# Patient Record
Sex: FEMALE | Race: WHITE | NOT HISPANIC OR LATINO | Employment: OTHER | URBAN - METROPOLITAN AREA
[De-identification: names, ages, dates, MRNs, and addresses within clinical notes are randomized per-mention and may not be internally consistent; named-entity substitution may affect disease eponyms.]

---

## 2017-02-06 ENCOUNTER — OFFICE VISIT (OUTPATIENT)
Dept: FAMILY MEDICINE | Facility: CLINIC | Age: 72
End: 2017-02-06
Payer: MEDICARE

## 2017-02-06 VITALS
SYSTOLIC BLOOD PRESSURE: 124 MMHG | BODY MASS INDEX: 31.51 KG/M2 | DIASTOLIC BLOOD PRESSURE: 84 MMHG | HEIGHT: 59 IN | WEIGHT: 156.31 LBS | HEART RATE: 72 BPM

## 2017-02-06 DIAGNOSIS — K21.9 GASTROESOPHAGEAL REFLUX DISEASE WITHOUT ESOPHAGITIS: Chronic | ICD-10-CM

## 2017-02-06 DIAGNOSIS — M54.2 NECK PAIN: ICD-10-CM

## 2017-02-06 DIAGNOSIS — Z12.39 BREAST CANCER SCREENING: ICD-10-CM

## 2017-02-06 DIAGNOSIS — I77.9 CAROTID DISEASE, BILATERAL: ICD-10-CM

## 2017-02-06 DIAGNOSIS — M54.9 BACK PAIN, UNSPECIFIED BACK LOCATION, UNSPECIFIED BACK PAIN LATERALITY, UNSPECIFIED CHRONICITY: ICD-10-CM

## 2017-02-06 DIAGNOSIS — I25.10 CORONARY ARTERY DISEASE INVOLVING NATIVE CORONARY ARTERY OF NATIVE HEART WITHOUT ANGINA PECTORIS: ICD-10-CM

## 2017-02-06 DIAGNOSIS — M79.2 NEUROPATHIC PAIN OF FOOT, UNSPECIFIED LATERALITY: ICD-10-CM

## 2017-02-06 DIAGNOSIS — M85.80 OSTEOPENIA: ICD-10-CM

## 2017-02-06 DIAGNOSIS — E78.5 DYSLIPIDEMIA: Primary | ICD-10-CM

## 2017-02-06 DIAGNOSIS — Z87.891 FORMER SMOKER: ICD-10-CM

## 2017-02-06 DIAGNOSIS — I70.0 ATHEROSCLEROSIS OF AORTA: ICD-10-CM

## 2017-02-06 PROCEDURE — 99999 PR PBB SHADOW E&M-EST. PATIENT-LVL II: CPT | Mod: PBBFAC,,, | Performed by: FAMILY MEDICINE

## 2017-02-06 PROCEDURE — 3074F SYST BP LT 130 MM HG: CPT | Mod: S$GLB,,, | Performed by: FAMILY MEDICINE

## 2017-02-06 PROCEDURE — 99214 OFFICE O/P EST MOD 30 MIN: CPT | Mod: S$GLB,,, | Performed by: FAMILY MEDICINE

## 2017-02-06 PROCEDURE — 1157F ADVNC CARE PLAN IN RCRD: CPT | Mod: S$GLB,,, | Performed by: FAMILY MEDICINE

## 2017-02-06 PROCEDURE — 99499 UNLISTED E&M SERVICE: CPT | Mod: S$PBB,,, | Performed by: FAMILY MEDICINE

## 2017-02-06 PROCEDURE — 1159F MED LIST DOCD IN RCRD: CPT | Mod: S$GLB,,, | Performed by: FAMILY MEDICINE

## 2017-02-06 PROCEDURE — 3079F DIAST BP 80-89 MM HG: CPT | Mod: S$GLB,,, | Performed by: FAMILY MEDICINE

## 2017-02-06 PROCEDURE — 1160F RVW MEDS BY RX/DR IN RCRD: CPT | Mod: S$GLB,,, | Performed by: FAMILY MEDICINE

## 2017-02-06 NOTE — MR AVS SNAPSHOT
[address] [address]  Phone: [phone]  Fax: [phone]                  Megha Mendoza   2017 8:00 AM   Office Visit    Description:  Female : 1945   Provider:  Tomy Baron MD   Department:  [facility]           Diagnoses this Visit        Comments    Dyslipidemia    -  Primary     Former smoker         Osteopenia         Gastroesophageal reflux disease without esophagitis         Coronary artery disease involving native coronary artery of native heart without angina pectoris         Carotid disease, bilateral         Atherosclerosis of aorta         Breast cancer screening         Neuropathic pain of foot, unspecified laterality         Neck pain         Back pain, unspecified back location, unspecified back pain laterality, unspecified chronicity                To Do List           Future Appointments        Provider Department Dept Phone    2017 8:10 AM STAH MAMMO1 [facility] [phone]    2017 9:20 AM JAMIE ROGERS [facility] [phone]    2017 8:30 AM CHAIR 01 STAH [facility] [phone]    2017 8:30 AM Tomy Baron MD [facility] [phone]      Goals (5 Years of Data)     None      Follow-Up and Disposition     Return in about 6 months (around 2017).    Follow-up and Disposition History      [facility]     [facility] -  Assistance  Registered nurses in the [facility] provide clinical advisement, health education, appointment booking, and other advisory services.  Call for this free service at [phone].             Medications           Message regarding Medications     Verify the changes and/or additions to your medication regime listed below are the same as discussed with your clinician today.  If any of these changes or additions are incorrect, please notify your  healthcare provider.             Verify that the below list of medications is an accurate representation of the medications you are currently taking.  If none reported, the list may be blank. If incorrect, please contact your healthcare provider. Carry this list with you in case of emergency.           Current Medications     acetaminophen (TYLENOL) 500 MG tablet Take 1,000 mg by mouth every 6 (six) hours as needed for Pain.     albuterol (PROVENTIL) 2.5 mg /3 mL (0.083 %) nebulizer solution Take 2.5 mg by nebulization every 12 (twelve) hours as needed for Wheezing.    aspirin 81 MG Chew Take 81 mg by mouth once daily.    atorvastatin (LIPITOR) 20 MG tablet Take 20 mg by mouth nightly.    biotin 10,000 mcg Cap Take 1 tablet by mouth nightly. Dissolvable tablet    cetirizine (ZYRTEC) 10 MG tablet Take 10 mg by mouth once daily.    co-enzyme Q-10 30 mg capsule Take 30 mg by mouth once daily.     denosumab (PROLIA) 60 mg/mL Syrg Inject 1 mL (60 mg total) into the skin every 6 (six) months.    esomeprazole (NEXIUM) 20 MG capsule Take 2 capsules (40 mg total) by mouth nightly. Hold until finished with Flagyl    fluticasone (FLONASE) 50 mcg/actuation nasal spray 1 spray by Each Nare route nightly.    ipratropium (ATROVENT) 0.02 % nebulizer solution Take 500 mcg by nebulization every 12 (twelve) hours.    krill-omega-3-dha-epa-lipids 858-10-88-50 mg Cap Take 1 capsule by mouth nightly.     LACTOBACILLUS ACIDOPHILUS (PROBIOTIC ORAL) Take 1 capsule by mouth once daily.    multivitamin with minerals tablet Take 1 tablet by mouth once daily.    nitroGLYCERIN (NITROSTAT) 0.4 MG SL tablet Place 0.4 mg under the tongue every 5 (five) minutes as needed for Chest pain.    vitamin D 1000 units Tab Take 185 mg by mouth every evening. Vitamin D3    denosumab (PROLIA) 60 mg/mL Syrg Inject 1 mL (60 mg total) into the skin every 6 (six) months.           Clinical Reference Information           Your Vitals Were     BP Pulse Height Weight  "BMI    124/84 (BP Location: Left arm, Patient Position: Sitting, BP Method: Manual) 72 4' 11" (1.499 m) 70.9 kg (156 lb 4.9 oz) 31.57 kg/m2      Blood Pressure          Most Recent Value    BP  124/84      Allergies as of 2/6/2017     Keflex [Cephalexin]    Sulfa (Sulfonamide Antibiotics)      Immunizations Administered on Date of Encounter - 2/6/2017     None      Orders Placed During Today's Visit      Normal Orders This Visit    Ambulatory referral to Pain Clinic     Future Labs/Procedures Expected by Expires    Mammo Digital Screening Bilat with CAD  2/6/2017 4/9/2018    Vitamin B12  2/6/2017 4/7/2018    Comprehensive metabolic panel  2/7/2017 2/6/2018    Lipid panel  2/7/2017 2/6/2018    Vitamin D  2/7/2017 2/6/2018      [facility] Sign-Up     Activating your [facility] account is as easy as 1-2-3!     1) Visit [email or web address], select Sign Up Now, enter this activation code and your date of birth, then select Next.  [email or web address]  Expires: 2/13/2017  9:42 AM      2) Create a username and password to use when you visit [facility] in the future and select a security question in case you lose your password and select Next.    3) Enter your e-mail address and click Sign Up!    Additional Information  If you have questions, please e-mail [email or web address] or call [phone] to talk to our [facility] staff. Remember, [facility] is NOT to be used for urgent needs. For medical emergencies, dial 911.         Language Assistance Services     ATTENTION: Language assistance services are available, free of charge. Please call [phone].      ATENCIÓN: Si habla dana, tiene a charles disposición servicios gratuitos de asistencia lingüística. Llame al [phone].     CHÚ Ý: N?u b?n nói Ti?ng Vi?t, có các d?ch v? h? tr? ngôn ng? mi?n phí dành cho b?n. G?i s? [phone].         [facility] complies with applicable Federal civil rights laws and does not discriminate on the basis of race, color, " national origin, age, disability, or sex.

## 2017-02-06 NOTE — PROGRESS NOTES
Subjective:       Patient ID: Megha Mendoza is a 71 y.o. female.    Chief Complaint: No chief complaint on file.    Pt is a 71 y.o. female who presents for evaluation and management of   Encounter Diagnoses   Name Primary?    Dyslipidemia Yes    Former smoker     Osteopenia     Gastroesophageal reflux disease without esophagitis     Coronary artery disease involving native coronary artery of native heart without angina pectoris     Carotid disease, bilateral     Atherosclerosis of aorta     Breast cancer screening    .  Doing well on current meds. Denies any side effects. Prevention is up to date.  Review of Systems   Constitutional: Negative for chills and fever.   Respiratory: Negative for shortness of breath.    Cardiovascular: Negative for chest pain and palpitations.   Gastrointestinal: Negative for abdominal pain, blood in stool, constipation and nausea.   Genitourinary: Negative for difficulty urinating.   Neurological: Positive for dizziness.   Psychiatric/Behavioral: Negative for dysphoric mood, sleep disturbance and suicidal ideas. The patient is not nervous/anxious.        Objective:      Physical Exam   Constitutional: She is oriented to person, place, and time. She appears well-developed and well-nourished.   HENT:   Head: Normocephalic and atraumatic.   Right Ear: External ear normal.   Left Ear: External ear normal.   Nose: Nose normal.   Mouth/Throat: Oropharynx is clear and moist.   Eyes: EOM are normal. Pupils are equal, round, and reactive to light.   Neck: Normal range of motion. Neck supple. No JVD present. No tracheal deviation present. No thyromegaly present.   Cardiovascular: Normal rate, normal heart sounds and intact distal pulses.    No murmur heard.  Pulmonary/Chest: Effort normal and breath sounds normal. No respiratory distress. She has no wheezes. She has no rales. She exhibits no tenderness.   Abdominal: Soft. Bowel sounds are normal. She exhibits no distension and no mass.  There is no tenderness. There is no rebound and no guarding.   Musculoskeletal: Normal range of motion. She exhibits no edema or tenderness.   Lymphadenopathy:     She has no cervical adenopathy.   Neurological: She is alert and oriented to person, place, and time. She has normal reflexes. She displays normal reflexes. No cranial nerve deficit. She exhibits normal muscle tone. Coordination normal.   Tandem gait intact   Neg Rhomberg    Skin: Skin is warm and dry. No rash noted. No erythema. No pallor.   Psychiatric: She has a normal mood and affect. Her behavior is normal. Judgment and thought content normal.       Assessment:       1. Dyslipidemia    2. Former smoker    3. Osteopenia    4. Gastroesophageal reflux disease without esophagitis    5. Coronary artery disease involving native coronary artery of native heart without angina pectoris    6. Carotid disease, bilateral    7. Atherosclerosis of aorta    8. Breast cancer screening        Plan:   Diagnoses and all orders for this visit:    Dyslipidemia  -     Comprehensive metabolic panel; Future  -     Lipid panel; Future    Former smoker    Osteopenia  -     Vitamin D; Future    Gastroesophageal reflux disease without esophagitis    Coronary artery disease involving native coronary artery of native heart without angina pectoris    Carotid disease, bilateral    Atherosclerosis of aorta    Breast cancer screening  -     Mammo Digital Screening Bilat with CAD; Future    Neuropathic pain of foot, unspecified laterality  -     Vitamin B12; Future    continue same see labs and rx orders   RTC 6 months   No Follow-up on file.

## 2017-02-08 ENCOUNTER — HOSPITAL ENCOUNTER (OUTPATIENT)
Dept: RADIOLOGY | Facility: HOSPITAL | Age: 72
Discharge: HOME | End: 2017-02-08
Attending: FAMILY MEDICINE
Payer: MEDICARE

## 2017-02-08 DIAGNOSIS — Z12.31 ENCOUNTER FOR SCREENING MAMMOGRAM FOR BREAST CANCER: ICD-10-CM

## 2017-02-08 PROCEDURE — 77063 BREAST TOMOSYNTHESIS BI: CPT | Mod: 26,,, | Performed by: RADIOLOGY

## 2017-02-08 PROCEDURE — 77067 SCR MAMMO BI INCL CAD: CPT | Mod: 26,,, | Performed by: RADIOLOGY

## 2017-02-08 PROCEDURE — 77067 SCR MAMMO BI INCL CAD: CPT | Mod: TC

## 2017-02-13 ENCOUNTER — LAB VISIT (OUTPATIENT)
Dept: LAB | Facility: HOSPITAL | Age: 72
End: 2017-02-13
Attending: PAIN MEDICINE
Payer: MEDICARE

## 2017-02-13 DIAGNOSIS — M54.81 OCCIPITAL NEURALGIA: ICD-10-CM

## 2017-02-13 DIAGNOSIS — E78.00 HIGH CHOLESTEROL: Primary | ICD-10-CM

## 2017-02-13 DIAGNOSIS — Z79.899 ENCOUNTER FOR LONG-TERM CURRENT USE OF MEDICATION: ICD-10-CM

## 2017-02-13 DIAGNOSIS — M25.50 MULTIPLE JOINT PAIN: ICD-10-CM

## 2017-02-13 DIAGNOSIS — G62.9 NEUROPATHY: ICD-10-CM

## 2017-02-13 DIAGNOSIS — R53.83 FATIGUE: ICD-10-CM

## 2017-02-13 LAB
ALBUMIN SERPL BCP-MCNC: 3.9 G/DL
ALP SERPL-CCNC: 41 U/L
ALT SERPL W/O P-5'-P-CCNC: 17 U/L
ANION GAP SERPL CALC-SCNC: 8 MMOL/L
AST SERPL-CCNC: 21 U/L
BASOPHILS # BLD AUTO: 0.02 K/UL
BASOPHILS NFR BLD: 0.4 %
BILIRUB SERPL-MCNC: 0.3 MG/DL
BUN SERPL-MCNC: 18 MG/DL
CALCIUM SERPL-MCNC: 9.4 MG/DL
CHLORIDE SERPL-SCNC: 107 MMOL/L
CK SERPL-CCNC: 75 U/L
CO2 SERPL-SCNC: 27 MMOL/L
CREAT SERPL-MCNC: 1 MG/DL
DIFFERENTIAL METHOD: NORMAL
EOSINOPHIL # BLD AUTO: 0.2 K/UL
EOSINOPHIL NFR BLD: 4.2 %
ERYTHROCYTE [DISTWIDTH] IN BLOOD BY AUTOMATED COUNT: 12.8 %
ERYTHROCYTE [SEDIMENTATION RATE] IN BLOOD BY WESTERGREN METHOD: 13 MM/HR
EST. GFR  (AFRICAN AMERICAN): >60 ML/MIN/1.73 M^2
EST. GFR  (NON AFRICAN AMERICAN): 57 ML/MIN/1.73 M^2
GLUCOSE SERPL-MCNC: 129 MG/DL
HCT VFR BLD AUTO: 38.8 %
HGB BLD-MCNC: 12.8 G/DL
LYMPHOCYTES # BLD AUTO: 2.4 K/UL
LYMPHOCYTES NFR BLD: 45.3 %
MCH RBC QN AUTO: 30.5 PG
MCHC RBC AUTO-ENTMCNC: 33 %
MCV RBC AUTO: 92 FL
MONOCYTES # BLD AUTO: 0.4 K/UL
MONOCYTES NFR BLD: 6.6 %
NEUTROPHILS # BLD AUTO: 2.3 K/UL
NEUTROPHILS NFR BLD: 43.5 %
PLATELET # BLD AUTO: 232 K/UL
PMV BLD AUTO: 10.3 FL
POTASSIUM SERPL-SCNC: 3.8 MMOL/L
PROT SERPL-MCNC: 7.5 G/DL
RBC # BLD AUTO: 4.2 M/UL
SODIUM SERPL-SCNC: 142 MMOL/L
T4 FREE SERPL-MCNC: 0.98 NG/DL
TSH SERPL DL<=0.005 MIU/L-ACNC: 1.11 UIU/ML
URATE SERPL-MCNC: 5.4 MG/DL
WBC # BLD AUTO: 5.28 K/UL

## 2017-02-13 PROCEDURE — 86235 NUCLEAR ANTIGEN ANTIBODY: CPT | Mod: 59

## 2017-02-13 PROCEDURE — 84550 ASSAY OF BLOOD/URIC ACID: CPT

## 2017-02-13 PROCEDURE — 85025 COMPLETE CBC W/AUTO DIFF WBC: CPT

## 2017-02-13 PROCEDURE — 86140 C-REACTIVE PROTEIN: CPT

## 2017-02-13 PROCEDURE — 36415 COLL VENOUS BLD VENIPUNCTURE: CPT

## 2017-02-13 PROCEDURE — 84443 ASSAY THYROID STIM HORMONE: CPT

## 2017-02-13 PROCEDURE — 84165 PROTEIN E-PHORESIS SERUM: CPT

## 2017-02-13 PROCEDURE — 82550 ASSAY OF CK (CPK): CPT

## 2017-02-13 PROCEDURE — 82652 VIT D 1 25-DIHYDROXY: CPT

## 2017-02-13 PROCEDURE — 84439 ASSAY OF FREE THYROXINE: CPT

## 2017-02-13 PROCEDURE — 84207 ASSAY OF VITAMIN B-6: CPT

## 2017-02-13 PROCEDURE — 85651 RBC SED RATE NONAUTOMATED: CPT

## 2017-02-13 PROCEDURE — 80053 COMPREHEN METABOLIC PANEL: CPT

## 2017-02-13 PROCEDURE — 83036 HEMOGLOBIN GLYCOSYLATED A1C: CPT

## 2017-02-13 PROCEDURE — 86038 ANTINUCLEAR ANTIBODIES: CPT

## 2017-02-13 PROCEDURE — 84165 PROTEIN E-PHORESIS SERUM: CPT | Mod: 26,,, | Performed by: PATHOLOGY

## 2017-02-13 PROCEDURE — 81374 HLA I TYPING 1 ANTIGEN LR: CPT

## 2017-02-13 PROCEDURE — 82607 VITAMIN B-12: CPT

## 2017-02-13 PROCEDURE — 86039 ANTINUCLEAR ANTIBODIES (ANA): CPT

## 2017-02-13 PROCEDURE — 86431 RHEUMATOID FACTOR QUANT: CPT

## 2017-02-14 LAB
CRP SERPL-MCNC: 1.1 MG/L
RHEUMATOID FACT SERPL-ACNC: <10 IU/ML
VIT B12 SERPL-MCNC: 589 PG/ML

## 2017-02-15 LAB
ALBUMIN SERPL ELPH-MCNC: 4.03 G/DL
ALPHA1 GLOB SERPL ELPH-MCNC: 0.31 G/DL
ALPHA2 GLOB SERPL ELPH-MCNC: 1.06 G/DL
ANA SER QL IF: POSITIVE
ANA TITR SER IF: NORMAL {TITER}
B-GLOBULIN SERPL ELPH-MCNC: 0.79 G/DL
ESTIMATED AVG GLUCOSE: 120 MG/DL
GAMMA GLOB SERPL ELPH-MCNC: 0.92 G/DL
HBA1C MFR BLD HPLC: 5.8 %
PROT SERPL-MCNC: 7.1 G/DL

## 2017-02-16 LAB
1,25(OH)2D3 SERPL-MCNC: 79 PG/ML
ANTI SM ANTIBODY: 0.11 EU
ANTI SM/RNP ANTIBODY: 0.51 EU
ANTI-SM INTERPRETATION: NEGATIVE
ANTI-SM/RNP INTERPRETATION: NEGATIVE
ANTI-SSA ANTIBODY: 0.37 EU
ANTI-SSA INTERPRETATION: NEGATIVE
ANTI-SSB ANTIBODY: 0 EU
ANTI-SSB INTERPRETATION: NEGATIVE
DSDNA AB SER-ACNC: NORMAL [IU]/ML
PATHOLOGIST INTERPRETATION SPE: NORMAL

## 2017-02-17 LAB — PYRIDOXAL SERPL-MCNC: 23 UG/L (ref 5–50)

## 2017-02-20 ENCOUNTER — OFFICE VISIT (OUTPATIENT)
Dept: INTERNAL MEDICINE | Facility: CLINIC | Age: 72
End: 2017-02-20
Payer: MEDICARE

## 2017-02-20 VITALS
SYSTOLIC BLOOD PRESSURE: 126 MMHG | HEIGHT: 59 IN | DIASTOLIC BLOOD PRESSURE: 70 MMHG | WEIGHT: 155.44 LBS | OXYGEN SATURATION: 96 % | HEART RATE: 68 BPM | TEMPERATURE: 100 F | BODY MASS INDEX: 31.34 KG/M2 | RESPIRATION RATE: 20 BRPM

## 2017-02-20 DIAGNOSIS — J06.9 VIRAL URI WITH COUGH: ICD-10-CM

## 2017-02-20 DIAGNOSIS — R50.9 FEVER, UNSPECIFIED FEVER CAUSE: Primary | ICD-10-CM

## 2017-02-20 LAB
CTP QC/QA: YES
FLUAV AG NPH QL: NEGATIVE
FLUBV AG NPH QL: NEGATIVE

## 2017-02-20 PROCEDURE — 3078F DIAST BP <80 MM HG: CPT | Mod: S$GLB,,, | Performed by: INTERNAL MEDICINE

## 2017-02-20 PROCEDURE — 87804 INFLUENZA ASSAY W/OPTIC: CPT | Mod: QW,S$GLB,, | Performed by: INTERNAL MEDICINE

## 2017-02-20 PROCEDURE — 96372 THER/PROPH/DIAG INJ SC/IM: CPT | Mod: S$GLB,,, | Performed by: INTERNAL MEDICINE

## 2017-02-20 PROCEDURE — 1159F MED LIST DOCD IN RCRD: CPT | Mod: S$GLB,,, | Performed by: INTERNAL MEDICINE

## 2017-02-20 PROCEDURE — 1157F ADVNC CARE PLAN IN RCRD: CPT | Mod: S$GLB,,, | Performed by: INTERNAL MEDICINE

## 2017-02-20 PROCEDURE — 3074F SYST BP LT 130 MM HG: CPT | Mod: S$GLB,,, | Performed by: INTERNAL MEDICINE

## 2017-02-20 PROCEDURE — 99213 OFFICE O/P EST LOW 20 MIN: CPT | Mod: 25,S$GLB,, | Performed by: INTERNAL MEDICINE

## 2017-02-20 PROCEDURE — 99999 PR PBB SHADOW E&M-EST. PATIENT-LVL III: CPT | Mod: PBBFAC,,, | Performed by: INTERNAL MEDICINE

## 2017-02-20 PROCEDURE — 1126F AMNT PAIN NOTED NONE PRSNT: CPT | Mod: S$GLB,,, | Performed by: INTERNAL MEDICINE

## 2017-02-20 RX ORDER — PROMETHAZINE HYDROCHLORIDE AND CODEINE PHOSPHATE 6.25; 1 MG/5ML; MG/5ML
5 SOLUTION ORAL EVERY 6 HOURS PRN
Qty: 120 ML | Refills: 0 | Status: SHIPPED | OUTPATIENT
Start: 2017-02-20 | End: 2017-03-02

## 2017-02-20 RX ORDER — METHYLPREDNISOLONE ACETATE 40 MG/ML
40 INJECTION, SUSPENSION INTRA-ARTICULAR; INTRALESIONAL; INTRAMUSCULAR; SOFT TISSUE
Status: COMPLETED | OUTPATIENT
Start: 2017-02-20 | End: 2017-02-20

## 2017-02-20 RX ORDER — PROMETHAZINE HYDROCHLORIDE AND CODEINE PHOSPHATE 6.25; 1 MG/5ML; MG/5ML
5 SOLUTION ORAL EVERY 6 HOURS PRN
Qty: 120 ML | Refills: 0 | Status: SHIPPED | OUTPATIENT
Start: 2017-02-20 | End: 2017-02-20 | Stop reason: SDUPTHER

## 2017-02-20 RX ORDER — FLUTICASONE PROPIONATE 50 MCG
1 SPRAY, SUSPENSION (ML) NASAL NIGHTLY
Qty: 16 G | Refills: 1 | Status: SHIPPED | OUTPATIENT
Start: 2017-02-20 | End: 2018-02-01

## 2017-02-20 RX ADMIN — METHYLPREDNISOLONE ACETATE 40 MG: 40 INJECTION, SUSPENSION INTRA-ARTICULAR; INTRALESIONAL; INTRAMUSCULAR; SOFT TISSUE at 02:02

## 2017-02-20 NOTE — PATIENT INSTRUCTIONS
Viral Upper Respiratory Illness (Adult)  You have a viral upper respiratory illness (URI), which is another term for the common cold. This illness is contagious during the first few days. It is spread through the air by coughing and sneezing. It may also be spread by direct contact (touching the sick person and then touching your own eyes, nose, or mouth). Frequent handwashing will decrease risk of spread. Most viral illnesses go away within 7 to 10 days with rest and simple home remedies. Sometimes the illness may last for several weeks. Antibiotics will not kill a virus, and they are generally not prescribed for this condition.    Home care  · If symptoms are severe, rest at home for the first 2 to 3 days. When you resume activity, don't let yourself get too tired.  · Avoid being exposed to cigarette smoke (yours or others).  · You may use acetaminophen or ibuprofen to control pain and fever, unless another medicine was prescribed. (Note: If you have chronic liver or kidney disease, have ever had a stomach ulcer or gastrointestinal bleeding, or are taking blood-thinning medicines, talk with your healthcare provider before using these medicines.) Aspirin should never be given to anyone under 18 years of age who is ill with a viral infection or fever. It may cause severe liver or brain damage.  · Your appetite may be poor, so a light diet is fine. Avoid dehydration by drinking 6 to 8 glasses of fluids per day (water, soft drinks, juices, tea, or soup). Extra fluids will help loosen secretions in the nose and lungs.  · Over-the-counter cold medicines will not shorten the length of time youre sick, but they may be helpful for the following symptoms: cough, sore throat, and nasal and sinus congestion. (Note: Do not use decongestants if you have high blood pressure.)  Follow-up care  Follow up with your healthcare provider, or as advised.  When to seek medical advice  Call your healthcare provider right away if any  of these occur:  · Cough with lots of colored sputum (mucus)  · Severe headache; face, neck, or ear pain  · Difficulty swallowing due to throat pain  · Fever of 100.4°F (38°C)  Call 911, or get immediate medical care  Call emergency services right away if any of these occur:  · Chest pain, shortness of breath, wheezing, or difficulty breathing  · Coughing up blood  · Inability to swallow due to throat pain  Date Last Reviewed: 9/13/2015  © 9068-1747 [employer]. [address] [place] [address]. All rights reserved. This information is not intended as a substitute for professional medical care. Always follow your healthcare professional's instructions.

## 2017-02-20 NOTE — MR AVS SNAPSHOT
[facility]  [address] [facility] [address]  Phone: [phone]  Fax: [phone]                  Megha Mendoza   2017 3:30 PM   Office Visit    Description:  Female : 1945   Provider:  Brandy Marie MD   Department:  [facility]           Reason for Visit     Cough           Diagnoses this Visit        Comments    Fever, unspecified fever cause    -  Primary     Viral URI with cough                To Do List           Future Appointments        Provider Department Dept Phone    2017 3:30 PM Brandy Marie MD [facility] [phone]    2017 8:30 AM CHAIR 01 STAH [facility] [phone]    2017 8:30 AM Tomy Baron MD [facility] [phone]      Goals (5 Years of Data)     None      Follow-Up and Disposition     Return if symptoms worsen or fail to improve.       These Medications        Disp Refills Start End    fluticasone (FLONASE) 50 mcg/actuation nasal spray 16 g 1 2017     1 spray by Each Nare route nightly. - Each Nare    [id number] [place], [place] - [address] Ph #: [phone]       promethazine-codeine 6.25-10 mg/5 ml (PHENERGAN WITH CODEINE) 6.25-10 mg/5 mL syrup 120 mL 0 2017 3/2/2017    Take 5 mLs by mouth every 6 (six) hours as needed for Cough. - Oral    Pharmacy: [id number] - [place], [place] - [address] Ph #: [phone]         [facility]     [facility] -  Assistance  Registered nurses in the [facility] provide clinical advisement, health education, appointment booking, and other advisory services.  Call for this free service at [phone].             Medications           Message regarding Medications     Verify the changes and/or additions to your medication regime listed below are the same as discussed with your clinician today.   If any of these changes or additions are incorrect, please notify your healthcare provider.        START taking these NEW medications        Refills    promethazine-codeine 6.25-10 mg/5 ml (PHENERGAN WITH CODEINE) 6.25-10 mg/5 mL syrup 0    Sig: Take 5 mLs by mouth every 6 (six) hours as needed for Cough.    Class: Print    Route: Oral      These medications were administered today        Dose Freq    methylPREDNISolone acetate injection 40 mg 40 mg Clinic/HOD 1 time    Sig: Inject 1 mL (40 mg total) into the muscle one time.    Class: Normal    Route: Intramuscular           Verify that the below list of medications is an accurate representation of the medications you are currently taking.  If none reported, the list may be blank. If incorrect, please contact your healthcare provider. Carry this list with you in case of emergency.           Current Medications     acetaminophen (TYLENOL) 500 MG tablet Take 1,000 mg by mouth every 6 (six) hours as needed for Pain.     albuterol (PROVENTIL) 2.5 mg /3 mL (0.083 %) nebulizer solution Take 2.5 mg by nebulization every 12 (twelve) hours as needed for Wheezing.    aspirin 81 MG Chew Take 81 mg by mouth once daily.    atorvastatin (LIPITOR) 20 MG tablet Take 20 mg by mouth nightly.    biotin 10,000 mcg Cap Take 1 tablet by mouth nightly. Dissolvable tablet    cetirizine (ZYRTEC) 10 MG tablet Take 10 mg by mouth once daily.    co-enzyme Q-10 30 mg capsule Take 30 mg by mouth once daily.     denosumab (PROLIA) 60 mg/mL Syrg Inject 1 mL (60 mg total) into the skin every 6 (six) months.    denosumab (PROLIA) 60 mg/mL Syrg Inject 1 mL (60 mg total) into the skin every 6 (six) months.    esomeprazole (NEXIUM) 20 MG capsule Take 2 capsules (40 mg total) by mouth nightly. Hold until finished with Flagyl    fluticasone (FLONASE) 50 mcg/actuation nasal spray 1 spray by Each Nare route nightly.    ipratropium (ATROVENT) 0.02 % nebulizer solution Take 500 mcg by nebulization every 12  "(twelve) hours.    krill-omega-3-dha-epa-lipids 823-04-88-50 mg Cap Take 1 capsule by mouth nightly.     LACTOBACILLUS ACIDOPHILUS (PROBIOTIC ORAL) Take 1 capsule by mouth once daily.    multivitamin with minerals tablet Take 1 tablet by mouth once daily.    nitroGLYCERIN (NITROSTAT) 0.4 MG SL tablet Place 0.4 mg under the tongue every 5 (five) minutes as needed for Chest pain.    promethazine-codeine 6.25-10 mg/5 ml (PHENERGAN WITH CODEINE) 6.25-10 mg/5 mL syrup Take 5 mLs by mouth every 6 (six) hours as needed for Cough.    vitamin D 1000 units Tab Take 185 mg by mouth every evening. Vitamin D3           Clinical Reference Information           Your Vitals Were     BP Pulse Temp Resp Height Weight    126/70 68 99.8 °F (37.7 °C) (Tympanic) 20 4' 11" (1.499 m) 70.5 kg (155 lb 6.8 oz)    SpO2 BMI             96% 31.39 kg/m2         Blood Pressure          Most Recent Value    BP  126/70      Allergies as of 2/20/2017     Keflex [Cephalexin]    Sulfa (Sulfonamide Antibiotics)      Immunizations Administered on Date of Encounter - 2/20/2017     None      Orders Placed During Today's Visit      Normal Orders This Visit    POCT Influenza A/B       [facility] Sign-Up     Activating your [facility] account is as easy as 1-2-3!     1) Visit [email or web address], select Sign Up Now, enter this activation code and your date of birth, then select Next.  [email or web address]  Expires: 4/6/2017  2:49 PM      2) Create a username and password to use when you visit [facility] in the future and select a security question in case you lose your password and select Next.    3) Enter your e-mail address and click Sign Up!    Additional Information  If you have questions, please e-mail [email or web address] or call [phone] to talk to our [facility] staff. Remember, [facility] is NOT to be used for urgent needs. For medical emergencies, dial 911.         Instructions      Viral Upper Respiratory Illness (Adult)  You have a viral upper respiratory " illness (URI), which is another term for the common cold. This illness is contagious during the first few days. It is spread through the air by coughing and sneezing. It may also be spread by direct contact (touching the sick person and then touching your own eyes, nose, or mouth). Frequent handwashing will decrease risk of spread. Most viral illnesses go away within 7 to 10 days with rest and simple home remedies. Sometimes the illness may last for several weeks. Antibiotics will not kill a virus, and they are generally not prescribed for this condition.    Home care  · If symptoms are severe, rest at home for the first 2 to 3 days. When you resume activity, don't let yourself get too tired.  · Avoid being exposed to cigarette smoke (yours or others).  · You may use acetaminophen or ibuprofen to control pain and fever, unless another medicine was prescribed. (Note: If you have chronic liver or kidney disease, have ever had a stomach ulcer or gastrointestinal bleeding, or are taking blood-thinning medicines, talk with your healthcare provider before using these medicines.) Aspirin should never be given to anyone under 18 years of age who is ill with a viral infection or fever. It may cause severe liver or brain damage.  · Your appetite may be poor, so a light diet is fine. Avoid dehydration by drinking 6 to 8 glasses of fluids per day (water, soft drinks, juices, tea, or soup). Extra fluids will help loosen secretions in the nose and lungs.  · Over-the-counter cold medicines will not shorten the length of time youre sick, but they may be helpful for the following symptoms: cough, sore throat, and nasal and sinus congestion. (Note: Do not use decongestants if you have high blood pressure.)  Follow-up care  Follow up with your healthcare provider, or as advised.  When to seek medical advice  Call your healthcare provider right away if any of these occur:  · Cough with lots of colored sputum (mucus)  · Severe  headache; face, neck, or ear pain  · Difficulty swallowing due to throat pain  · Fever of 100.4°F (38°C)  Call 911, or get immediate medical care  Call emergency services right away if any of these occur:  · Chest pain, shortness of breath, wheezing, or difficulty breathing  · Coughing up blood  · Inability to swallow due to throat pain  Date Last Reviewed: 9/13/2015  © 8698-7791 [employer]. [address] [place] [address], [place] [address]. All rights reserved. This information is not intended as a substitute for professional medical care. Always follow your healthcare professional's instructions.             Language Assistance Services     ATTENTION: Language assistance services are available, free of charge. Please call [phone].      ATENCIÓN: Si lupillola dana, tiene a charles disposición servicios gratuitos de asistencia lingüística. Llame al [phone].     CHÚ Ý: N?u b?n nói Ti?ng Vi?t, có các d?ch v? h? tr? ngôn ng? mi?n phí dành cho b?n. G?i s? [phone].         [facility] complies with applicable Federal civil rights laws and does not discriminate on the basis of race, color, national origin, age, disability, or sex.

## 2017-02-20 NOTE — PROGRESS NOTES
Subjective:       Patient ID: Megha Mendoza is a 71 y.o. female.    Chief Complaint: Cough      HPI:  Patient is new alejandra e but known to clinic and presents with cough. Sx started 1 week ago with sore throat. Cough started about 4 days ago and getting worse. Tried nyquil, robitussin, tylenol without relief. Cough is productive of yellow sputum. + nasal congestion but no chest tightness or wheezing. + PND, worse int he AM. Subjective fevers and chills, 99 F today in clinic.     Past Medical History   Diagnosis Date    Arthritis     Back pain     Bronchitis, chronic     Carotid artery disease      50% blockage bilateral    COPD (chronic obstructive pulmonary disease)     Coronary artery disease     Hyperlipidemia     Trouble in sleeping        Family History   Problem Relation Age of Onset    Diabetes Mother     Hypertension Mother     Arthritis Mother     Stroke Mother     Stroke Father     Cancer Sister      lung    COPD Sister        Social History     Social History    Marital status:      Spouse name: N/A    Number of children: N/A    Years of education: N/A     Occupational History    Not on file.     Social History Main Topics    Smoking status: Former Smoker     Packs/day: 0.75     Start date: 10/14/1959     Quit date: 10/14/2005    Smokeless tobacco: Never Used      Comment: Stopped and started several times    Alcohol use No    Drug use: No    Sexual activity: No      Comment:      Other Topics Concern    Not on file     Social History Narrative       Review of Systems   Constitutional: Positive for fatigue. Negative for activity change, fever and unexpected weight change.   HENT: Positive for congestion, postnasal drip and sore throat. Negative for ear pain, hearing loss, rhinorrhea and tinnitus.    Eyes: Negative for pain, redness and visual disturbance.   Respiratory: Positive for cough. Negative for shortness of breath and wheezing.    Cardiovascular: Negative for  chest pain, palpitations and leg swelling.   Gastrointestinal: Negative for abdominal pain, blood in stool, constipation, diarrhea, nausea and vomiting.   Genitourinary: Negative for dysuria, frequency and urgency.   Musculoskeletal: Negative for back pain, joint swelling and neck pain.   Skin: Negative for color change, rash and wound.   Neurological: Positive for headaches. Negative for dizziness, weakness and light-headedness.         Objective:      Physical Exam   Constitutional: She is oriented to person, place, and time. She appears well-developed and well-nourished. No distress.   HENT:   Head: Normocephalic and atraumatic.   Right Ear: External ear normal.   Left Ear: External ear normal.   Dull TM b/l  Posterior oropharyngel erythema without exudate   Eyes: Conjunctivae and EOM are normal. Pupils are equal, round, and reactive to light. Right eye exhibits no discharge. Left eye exhibits no discharge.   Neck: Neck supple. No tracheal deviation present.   Cardiovascular: Normal rate and regular rhythm.  Exam reveals no gallop and no friction rub.    No murmur heard.  Pulmonary/Chest: Effort normal and breath sounds normal. No respiratory distress. She has no wheezes. She has no rales.   Abdominal: Soft. Bowel sounds are normal. She exhibits no distension. There is no tenderness.   Musculoskeletal: She exhibits no edema.   Neurological: She is alert and oriented to person, place, and time. No cranial nerve deficit.   Skin: Skin is warm and dry.   Psychiatric: She has a normal mood and affect. Her behavior is normal.   Vitals reviewed.      Assessment:       1. Fever, unspecified fever cause    2. Viral URI with cough        Plan:       Megha was seen today for cough.    Diagnoses and all orders for this visit:    Fever, unspecified fever cause  -     POCT Influenza A/B--negative    Viral URI with cough  -     methylPREDNISolone acetate injection 40 mg; Inject 1 mL (40 mg total) into the muscle one time.  -      fluticasone (FLONASE) 50 mcg/actuation nasal spray; 1 spray by Each Nare route nightly.  -     promethazine-codeine 6.25-10 mg/5 ml (PHENERGAN WITH CODEINE) 6.25-10 mg/5 mL syrup; Take 5 mLs by mouth every 6 (six) hours as needed for Cough.    Stay on zyrtec daily  Start flonase daily  Saline nasal spray PRN  IM steroids  Call for worsening sx or fever > 101 F

## 2017-02-24 LAB
HLA-B27 RELATED AG QL: NEGATIVE
HLA-B27 RELATED AG QL: NORMAL

## 2017-03-13 ENCOUNTER — LAB VISIT (OUTPATIENT)
Dept: LAB | Facility: HOSPITAL | Age: 72
End: 2017-03-13
Attending: PAIN MEDICINE
Payer: MEDICARE

## 2017-03-13 DIAGNOSIS — R79.9 ABNORMAL BLOOD CHEMISTRY: ICD-10-CM

## 2017-03-13 DIAGNOSIS — E55.9 VITAMIN D DEFICIENCY: Primary | ICD-10-CM

## 2017-03-13 PROCEDURE — 86038 ANTINUCLEAR ANTIBODIES: CPT

## 2017-03-13 PROCEDURE — 82306 VITAMIN D 25 HYDROXY: CPT

## 2017-03-13 PROCEDURE — 86039 ANTINUCLEAR ANTIBODIES (ANA): CPT

## 2017-03-13 PROCEDURE — 36415 COLL VENOUS BLD VENIPUNCTURE: CPT

## 2017-03-13 PROCEDURE — 86235 NUCLEAR ANTIGEN ANTIBODY: CPT | Mod: 59

## 2017-03-14 LAB — 25(OH)D3+25(OH)D2 SERPL-MCNC: 47 NG/ML

## 2017-03-15 LAB
ANA SER QL IF: POSITIVE
ANA TITR SER IF: NORMAL {TITER}

## 2017-03-16 LAB
ANTI SM ANTIBODY: 0.49 EU
ANTI SM/RNP ANTIBODY: 0.66 EU
ANTI-SM INTERPRETATION: NEGATIVE
ANTI-SM/RNP INTERPRETATION: NEGATIVE
ANTI-SSA ANTIBODY: 1.19 EU
ANTI-SSA INTERPRETATION: NEGATIVE
ANTI-SSB ANTIBODY: 0.1 EU
ANTI-SSB INTERPRETATION: NEGATIVE
DSDNA AB SER-ACNC: NORMAL [IU]/ML

## 2017-03-17 NOTE — TELEPHONE ENCOUNTER
----- Message from Summer Sea sent at 3/17/2017  9:39 AM CDT -----  Contact: self  Megha Mendoza  MRN: [id number]  : 1945  PCP: Tomy Baron  Home Phone      [phone]  Work Phone      Not on file.  Mobile          [phone]      MESSAGE: needs refill on atorvastatin    [facility]    Phone: [phone]

## 2017-03-19 RX ORDER — ATORVASTATIN CALCIUM 20 MG/1
20 TABLET, FILM COATED ORAL NIGHTLY
Qty: 30 TABLET | Refills: 5 | Status: SHIPPED | OUTPATIENT
Start: 2017-03-19 | End: 2017-07-31 | Stop reason: SDUPTHER

## 2017-04-11 ENCOUNTER — LAB VISIT (OUTPATIENT)
Dept: LAB | Facility: HOSPITAL | Age: 72
End: 2017-04-11
Attending: PAIN MEDICINE
Payer: MEDICARE

## 2017-04-11 DIAGNOSIS — E55.9 UNSPECIFIED VITAMIN D DEFICIENCY: Primary | ICD-10-CM

## 2017-04-11 DIAGNOSIS — R79.9 ABNORMAL BLOOD FINDINGS: ICD-10-CM

## 2017-04-11 LAB — 25(OH)D3+25(OH)D2 SERPL-MCNC: 36 NG/ML

## 2017-04-11 PROCEDURE — 82306 VITAMIN D 25 HYDROXY: CPT

## 2017-04-11 PROCEDURE — 86235 NUCLEAR ANTIGEN ANTIBODY: CPT | Mod: 59

## 2017-04-11 PROCEDURE — 82652 VIT D 1 25-DIHYDROXY: CPT

## 2017-04-11 PROCEDURE — 36415 COLL VENOUS BLD VENIPUNCTURE: CPT

## 2017-04-11 PROCEDURE — 86038 ANTINUCLEAR ANTIBODIES: CPT

## 2017-04-11 PROCEDURE — 86039 ANTINUCLEAR ANTIBODIES (ANA): CPT

## 2017-04-12 LAB
ANA SER QL IF: POSITIVE
ANA TITR SER IF: NORMAL {TITER}

## 2017-04-13 LAB
1,25(OH)2D3 SERPL-MCNC: 84 PG/ML
ANTI SM ANTIBODY: 0 EU
ANTI SM/RNP ANTIBODY: 0 EU
ANTI-SM INTERPRETATION: NEGATIVE
ANTI-SM/RNP INTERPRETATION: NEGATIVE
ANTI-SSA ANTIBODY: 0.92 EU
ANTI-SSA INTERPRETATION: NEGATIVE
ANTI-SSB ANTIBODY: 0.68 EU
ANTI-SSB INTERPRETATION: NEGATIVE
DSDNA AB SER-ACNC: NORMAL [IU]/ML

## 2017-04-18 ENCOUNTER — TELEPHONE (OUTPATIENT)
Dept: FAMILY MEDICINE | Facility: CLINIC | Age: 72
End: 2017-04-18

## 2017-04-18 NOTE — TELEPHONE ENCOUNTER
----- Message from Mariya Newman sent at 2017 10:52 AM CDT -----  Contact: SELF  Megha Mendoza  MRN: [id number]  : 1945  PCP: Tomy Baron  Home Phone      [phone]  Work Phone      Not on file.  Mobile          [phone]      MESSAGE: NEEDS REFILL ON GENERIC LIPITOR    PHONE: [phone]    [facility]

## 2017-04-27 ENCOUNTER — HOSPITAL ENCOUNTER (OUTPATIENT)
Dept: RADIOLOGY | Facility: HOSPITAL | Age: 72
Discharge: HOME | End: 2017-04-27
Attending: PAIN MEDICINE
Payer: MEDICARE

## 2017-04-27 DIAGNOSIS — R10.32 ABDOMINAL PAIN, LEFT LOWER QUADRANT: ICD-10-CM

## 2017-04-27 DIAGNOSIS — R10.12 ABDOMINAL PAIN, LEFT UPPER QUADRANT: ICD-10-CM

## 2017-04-27 PROCEDURE — 74176 CT ABD & PELVIS W/O CONTRAST: CPT | Mod: 26,,, | Performed by: RADIOLOGY

## 2017-04-27 PROCEDURE — 74176 CT ABD & PELVIS W/O CONTRAST: CPT | Mod: TC

## 2017-04-27 PROCEDURE — 25500020 PHARM REV CODE 255: Performed by: PAIN MEDICINE

## 2017-04-27 RX ADMIN — IOHEXOL 30 ML: 350 INJECTION, SOLUTION INTRAVENOUS at 09:04

## 2017-06-30 ENCOUNTER — INFUSION (OUTPATIENT)
Dept: INFUSION THERAPY | Facility: HOSPITAL | Age: 72
End: 2017-06-30
Attending: FAMILY MEDICINE
Payer: MEDICARE

## 2017-06-30 VITALS
RESPIRATION RATE: 20 BRPM | HEART RATE: 73 BPM | TEMPERATURE: 96 F | DIASTOLIC BLOOD PRESSURE: 84 MMHG | SYSTOLIC BLOOD PRESSURE: 124 MMHG

## 2017-06-30 DIAGNOSIS — M85.80 OSTEOPENIA, UNSPECIFIED LOCATION: Primary | ICD-10-CM

## 2017-06-30 PROCEDURE — 63600175 PHARM REV CODE 636 W HCPCS: Performed by: FAMILY MEDICINE

## 2017-06-30 PROCEDURE — 96401 CHEMO ANTI-NEOPL SQ/IM: CPT

## 2017-06-30 RX ADMIN — DENOSUMAB 60 MG: 60 INJECTION SUBCUTANEOUS at 08:06

## 2017-06-30 NOTE — PATIENT INSTRUCTIONS
Denosumab injection  What is this medicine?  DENOSUMAB (den oh rosangela mab) slows bone breakdown. Prolia is used to treat osteoporosis in women after menopause and in men. Xgeva is used to prevent bone fractures and other bone problems caused by cancer bone metastases. Xgeva is also used to treat giant cell tumor of the bone.  How should I use this medicine?  This medicine is for injection under the skin. It is given by a health care professional in a hospital or clinic setting.  If you are getting Prolia, a special MedGuide will be given to you by the pharmacist with each prescription and refill. Be sure to read this information carefully each time.  For Prolia, talk to your pediatrician regarding the use of this medicine in children. Special care may be needed. For Xgeva, talk to your pediatrician regarding the use of this medicine in children. While this drug may be prescribed for children as young as 13 years for selected conditions, precautions do apply.  What side effects may I notice from receiving this medicine?  Side effects that you should report to your doctor or health care professional as soon as possible:  · allergic reactions like skin rash, itching or hives, swelling of the face, lips, or tongue  · breathing problems  · chest pain  · fast, irregular heartbeat  · feeling faint or lightheaded, falls  · fever, chills, or any other sign of infection  · muscle spasms, tightening, or twitches  · numbness or tingling  · skin blisters or bumps, or is dry, peels, or red  · slow healing or unexplained pain in the mouth or jaw  · unusual bleeding or bruising  Side effects that usually do not require medical attention (Report these to your doctor or health care professional if they continue or are bothersome.):  · muscle pain  · stomach upset, gas  What may interact with this medicine?  Do not take this medicine with any of the following medications:  · other medicines containing denosumab  This medicine may also  interact with the following medications:  · medicines that suppress the immune system  · medicines that treat cancer  · steroid medicines like prednisone or cortisone  What if I miss a dose?  It is important not to miss your dose. Call your doctor or health care professional if you are unable to keep an appointment.  Where should I keep my medicine?  This medicine is only given in a clinic, doctor's office, or other health care setting and will not be stored at home.  What should I tell my health care provider before I take this medicine?  They need to know if you have any of these conditions:  · dental disease  · eczema  · infection or history of infections  · kidney disease or on dialysis  · low blood calcium or vitamin D  · malabsorption syndrome  · scheduled to have surgery or tooth extraction  · taking medicine that contains denosumab  · thyroid or parathyroid disease  · an unusual reaction to denosumab, other medicines, foods, dyes, or preservatives  · pregnant or trying to get pregnant  · breast-feeding  What should I watch for while using this medicine?  Visit your doctor or health care professional for regular checks on your progress. Your doctor or health care professional may order blood tests and other tests to see how you are doing.  Call your doctor or health care professional if you get a cold or other infection while receiving this medicine. Do not treat yourself. This medicine may decrease your body's ability to fight infection.  You should make sure you get enough calcium and vitamin D while you are taking this medicine, unless your doctor tells you not to. Discuss the foods you eat and the vitamins you take with your health care professional.  See your dentist regularly. Brush and floss your teeth as directed. Before you have any dental work done, tell your dentist you are receiving this medicine.  Do not become pregnant while taking this medicine or for 5 months after stopping it. Women should  inform their doctor if they wish to become pregnant or think they might be pregnant. There is a potential for serious side effects to an unborn child. Talk to your health care professional or pharmacist for more information.  Date Last Reviewed:   NOTE:This sheet is a summary. It may not cover all possible information. If you have questions about this medicine, talk to your doctor, pharmacist, or health care provider. Copyright© 2016 [employer]

## 2017-07-17 ENCOUNTER — PATIENT OUTREACH (OUTPATIENT)
Dept: ADMINISTRATIVE | Facility: HOSPITAL | Age: 72
End: 2017-07-17

## 2017-07-19 ENCOUNTER — OFFICE VISIT (OUTPATIENT)
Dept: FAMILY MEDICINE | Facility: CLINIC | Age: 72
End: 2017-07-19
Payer: MEDICARE

## 2017-07-19 VITALS
WEIGHT: 158.31 LBS | HEART RATE: 74 BPM | HEIGHT: 59 IN | SYSTOLIC BLOOD PRESSURE: 100 MMHG | BODY MASS INDEX: 31.92 KG/M2 | DIASTOLIC BLOOD PRESSURE: 64 MMHG | RESPIRATION RATE: 18 BRPM

## 2017-07-19 DIAGNOSIS — J32.9 SINUSITIS, UNSPECIFIED CHRONICITY, UNSPECIFIED LOCATION: Primary | ICD-10-CM

## 2017-07-19 PROCEDURE — 96372 THER/PROPH/DIAG INJ SC/IM: CPT | Mod: S$GLB,,, | Performed by: FAMILY MEDICINE

## 2017-07-19 PROCEDURE — 1125F AMNT PAIN NOTED PAIN PRSNT: CPT | Mod: S$GLB,,, | Performed by: FAMILY MEDICINE

## 2017-07-19 PROCEDURE — 99213 OFFICE O/P EST LOW 20 MIN: CPT | Mod: 25,S$GLB,, | Performed by: FAMILY MEDICINE

## 2017-07-19 PROCEDURE — 1159F MED LIST DOCD IN RCRD: CPT | Mod: S$GLB,,, | Performed by: FAMILY MEDICINE

## 2017-07-19 PROCEDURE — 99999 PR PBB SHADOW E&M-EST. PATIENT-LVL II: CPT | Mod: PBBFAC,,, | Performed by: FAMILY MEDICINE

## 2017-07-19 RX ORDER — AZITHROMYCIN 500 MG/1
500 TABLET, FILM COATED ORAL DAILY
Qty: 3 TABLET | Refills: 0 | Status: SHIPPED | OUTPATIENT
Start: 2017-07-19 | End: 2017-07-22

## 2017-07-19 RX ORDER — METHYLPREDNISOLONE ACETATE 40 MG/ML
60 INJECTION, SUSPENSION INTRA-ARTICULAR; INTRALESIONAL; INTRAMUSCULAR; SOFT TISSUE
Status: COMPLETED | OUTPATIENT
Start: 2017-07-19 | End: 2017-07-19

## 2017-07-19 RX ADMIN — METHYLPREDNISOLONE ACETATE 60 MG: 40 INJECTION, SUSPENSION INTRA-ARTICULAR; INTRALESIONAL; INTRAMUSCULAR; SOFT TISSUE at 04:07

## 2017-07-19 NOTE — PROGRESS NOTES
Subjective:       Patient ID: Megha Mendoza is a 72 y.o. female.    Chief Complaint: Sinus Problem    Pt is 72 y.o. female who c/o 5 day h/o sinus congestion and h/a. Pos PND and cough.  No relief with OTC antihistamine/decongestant cold preparations. Severity is moderate.  Review of Systems   Constitutional: Negative for fatigue and fever.   HENT: Positive for congestion, postnasal drip, rhinorrhea and sinus pressure.    Eyes: Positive for itching.   Respiratory: Positive for cough. Negative for shortness of breath and wheezing.    Cardiovascular: Negative.  Negative for chest pain and palpitations.   Gastrointestinal: Negative.  Negative for diarrhea, nausea and vomiting.   Genitourinary: Negative.    Musculoskeletal: Negative.    Skin: Negative.    Neurological: Negative.    Psychiatric/Behavioral: Negative.        Objective:    Physical Exam   Constitutional: She is oriented to person, place, and time. She appears well-developed and well-nourished.   HENT:   Head: Normocephalic and atraumatic.   Right Ear: Hearing, tympanic membrane, external ear and ear canal normal.   Left Ear: Hearing, tympanic membrane, external ear and ear canal normal.   Nose: Nose normal.   Mouth/Throat: Uvula is midline and oropharynx is clear and moist.   Pos max sinus ttp   Eyes: Conjunctivae and EOM are normal. Pupils are equal, round, and reactive to light.   Neck: Normal range of motion. Neck supple. No JVD present. No tracheal deviation present. No thyromegaly present.   Cardiovascular: Normal rate, regular rhythm, normal heart sounds and intact distal pulses.    No murmur heard.  Pulmonary/Chest: Effort normal and breath sounds normal. No respiratory distress. She has no wheezes. She has no rales. She exhibits no tenderness.   Abdominal: Soft. Bowel sounds are normal. She exhibits no distension and no mass. There is no tenderness. There is no rebound and no guarding.   Musculoskeletal: Normal range of motion. She exhibits no  edema or tenderness.   Lymphadenopathy:     She has no cervical adenopathy.   Neurological: She is alert and oriented to person, place, and time. She has normal reflexes. She displays normal reflexes. No cranial nerve deficit. She exhibits normal muscle tone. Coordination normal.   Skin: Skin is warm and dry. No rash noted. No erythema. No pallor.   Psychiatric: She has a normal mood and affect. Her behavior is normal. Judgment and thought content normal.       Assessment:       1. Sinusitis, unspecified chronicity, unspecified location        Plan:   Megha was seen today for sinus problem.    Diagnoses and all orders for this visit:    Sinusitis, unspecified chronicity, unspecified location  -     azithromycin (ZITHROMAX) 500 MG tablet; Take 1 tablet (500 mg total) by mouth once daily.  -     methylPREDNISolone acetate injection 60 mg; Inject 1.5 mLs (60 mg total) into the muscle one time.      No Follow-up on file.

## 2017-07-31 ENCOUNTER — OFFICE VISIT (OUTPATIENT)
Dept: FAMILY MEDICINE | Facility: CLINIC | Age: 72
End: 2017-07-31
Payer: MEDICARE

## 2017-07-31 VITALS
RESPIRATION RATE: 20 BRPM | BODY MASS INDEX: 31.43 KG/M2 | HEIGHT: 59 IN | SYSTOLIC BLOOD PRESSURE: 100 MMHG | DIASTOLIC BLOOD PRESSURE: 60 MMHG | HEART RATE: 56 BPM | WEIGHT: 155.88 LBS

## 2017-07-31 DIAGNOSIS — J44.9 CHRONIC OBSTRUCTIVE PULMONARY DISEASE, UNSPECIFIED COPD TYPE: ICD-10-CM

## 2017-07-31 DIAGNOSIS — F51.04 CHRONIC INSOMNIA: ICD-10-CM

## 2017-07-31 DIAGNOSIS — I25.10 CORONARY ARTERY DISEASE INVOLVING NATIVE CORONARY ARTERY OF NATIVE HEART WITHOUT ANGINA PECTORIS: ICD-10-CM

## 2017-07-31 DIAGNOSIS — I77.9 CAROTID DISEASE, BILATERAL: ICD-10-CM

## 2017-07-31 DIAGNOSIS — E78.5 DYSLIPIDEMIA: ICD-10-CM

## 2017-07-31 DIAGNOSIS — Z87.891 FORMER SMOKER: ICD-10-CM

## 2017-07-31 DIAGNOSIS — M17.12 PRIMARY OSTEOARTHRITIS OF LEFT KNEE: ICD-10-CM

## 2017-07-31 DIAGNOSIS — I70.0 ATHEROSCLEROSIS OF AORTA: Primary | ICD-10-CM

## 2017-07-31 DIAGNOSIS — M85.80 OSTEOPENIA, UNSPECIFIED LOCATION: ICD-10-CM

## 2017-07-31 PROBLEM — I10 ESSENTIAL HYPERTENSION: Status: RESOLVED | Noted: 2017-07-31 | Resolved: 2017-07-31

## 2017-07-31 PROBLEM — I10 ESSENTIAL HYPERTENSION: Status: ACTIVE | Noted: 2017-07-31

## 2017-07-31 PROCEDURE — 1125F AMNT PAIN NOTED PAIN PRSNT: CPT | Mod: S$GLB,,, | Performed by: FAMILY MEDICINE

## 2017-07-31 PROCEDURE — 99214 OFFICE O/P EST MOD 30 MIN: CPT | Mod: S$GLB,,, | Performed by: FAMILY MEDICINE

## 2017-07-31 PROCEDURE — 99499 UNLISTED E&M SERVICE: CPT | Mod: S$PBB,,, | Performed by: FAMILY MEDICINE

## 2017-07-31 PROCEDURE — 99999 PR PBB SHADOW E&M-EST. PATIENT-LVL III: CPT | Mod: PBBFAC,,, | Performed by: FAMILY MEDICINE

## 2017-07-31 PROCEDURE — 1159F MED LIST DOCD IN RCRD: CPT | Mod: S$GLB,,, | Performed by: FAMILY MEDICINE

## 2017-07-31 RX ORDER — ATORVASTATIN CALCIUM 20 MG/1
20 TABLET, FILM COATED ORAL NIGHTLY
Qty: 30 TABLET | Refills: 5 | Status: SHIPPED | OUTPATIENT
Start: 2017-07-31 | End: 2018-08-03 | Stop reason: DRUGHIGH

## 2017-07-31 NOTE — TELEPHONE ENCOUNTER
Patient seen today, also requesting refill for Atorvastatin. Advise    [facility] [place]

## 2017-07-31 NOTE — PROGRESS NOTES
Subjective:       Patient ID: Megha Mendoza is a 72 y.o. female.    Chief Complaint: Follow-up (6 mo)    Pt is a 72 y.o. female who presents for evaluation and management of   Encounter Diagnoses   Name Primary?    Atherosclerosis of aorta Yes    Carotid disease, bilateral     Chronic insomnia     Coronary artery disease involving native coronary artery of native heart without angina pectoris     Dyslipidemia     Former smoker     Primary osteoarthritis of left knee     Osteopenia, unspecified location    .  Doing well on current meds. Denies any side effects. Prevention is not up to date. Needs cscope   Review of Systems   Constitutional: Negative.  Negative for activity change, appetite change, chills, diaphoresis, fatigue, fever and unexpected weight change.   HENT: Positive for hearing loss. Negative for congestion, dental problem, drooling, ear discharge, ear pain, facial swelling, mouth sores, nosebleeds, postnasal drip, rhinorrhea, sinus pressure, sneezing, sore throat, tinnitus, trouble swallowing and voice change.    Eyes: Negative.  Negative for photophobia, pain, discharge, redness, itching and visual disturbance.   Respiratory: Positive for shortness of breath. Negative for apnea, cough, choking, chest tightness and wheezing.         SOB unchanged(COPD)   Cardiovascular: Negative.  Negative for chest pain, palpitations and leg swelling.   Gastrointestinal: Negative.  Negative for abdominal distention, abdominal pain, anal bleeding, blood in stool, constipation, diarrhea, nausea, rectal pain and vomiting.   Genitourinary: Negative.  Negative for difficulty urinating, dyspareunia, dysuria, enuresis, flank pain, frequency, hematuria, menstrual problem, pelvic pain, urgency, vaginal bleeding, vaginal discharge and vaginal pain.   Musculoskeletal: Positive for arthralgias. Negative for back pain, gait problem, joint swelling, myalgias, neck pain and neck stiffness.   Skin: Negative.  Negative for color  change, pallor, rash and wound.   Neurological: Negative.  Negative for dizziness, tremors, seizures, syncope, facial asymmetry, speech difficulty, weakness, light-headedness, numbness and headaches.   Hematological: Negative for adenopathy. Does not bruise/bleed easily.   Psychiatric/Behavioral: Negative.  Negative for agitation, behavioral problems, confusion, decreased concentration, dysphoric mood, hallucinations, self-injury, sleep disturbance and suicidal ideas. The patient is not nervous/anxious and is not hyperactive.        Objective:      Physical Exam   Constitutional: She is oriented to person, place, and time. She appears well-developed and well-nourished.   HENT:   Head: Normocephalic and atraumatic.   Right Ear: External ear normal.   Left Ear: External ear normal.   Nose: Nose normal.   Mouth/Throat: Oropharynx is clear and moist.   Eyes: EOM are normal. Pupils are equal, round, and reactive to light.   Neck: Normal range of motion. Neck supple. No JVD present. No tracheal deviation present. No thyromegaly present.   Cardiovascular: Normal rate, normal heart sounds and intact distal pulses.    No murmur heard.  Pulmonary/Chest: Effort normal and breath sounds normal. No respiratory distress. She has no wheezes. She has no rales. She exhibits no tenderness.   Abdominal: Soft. Bowel sounds are normal. She exhibits no distension and no mass. There is no tenderness. There is no rebound and no guarding.   Musculoskeletal: Normal range of motion. She exhibits no edema or tenderness.   Lymphadenopathy:     She has no cervical adenopathy.   Neurological: She is alert and oriented to person, place, and time. She has normal reflexes. She displays normal reflexes. No cranial nerve deficit. She exhibits normal muscle tone. Coordination normal.   Skin: Skin is warm and dry. No rash noted. No erythema. No pallor.   Psychiatric: She has a normal mood and affect. Her behavior is normal. Judgment and thought content  normal.       CMP  Sodium   Date Value Ref Range Status   07/24/2017 140 136 - 145 mmol/L Final     Potassium   Date Value Ref Range Status   07/24/2017 4.0 3.5 - 5.1 mmol/L Final     Chloride   Date Value Ref Range Status   07/24/2017 101 95 - 110 mmol/L Final     CO2   Date Value Ref Range Status   07/24/2017 28 23 - 29 mmol/L Final     Glucose   Date Value Ref Range Status   07/24/2017 76 74 - 106 mg/dL Final     BUN, Bld   Date Value Ref Range Status   07/24/2017 23 (H) 7 - 17 mg/dL Final     Creatinine   Date Value Ref Range Status   07/24/2017 0.80 0.70 - 1.20 mg/dL Final     Calcium   Date Value Ref Range Status   07/24/2017 9.4 8.4 - 10.2 mg/dL Final     Total Protein   Date Value Ref Range Status   07/24/2017 7.5 6.3 - 8.2 g/dL Final     Albumin   Date Value Ref Range Status   07/24/2017 4.3 3.5 - 5.2 g/dL Final     Total Bilirubin   Date Value Ref Range Status   07/24/2017 0.4 0.2 - 1.3 mg/dL Final     Alkaline Phosphatase   Date Value Ref Range Status   07/24/2017 50 38 - 145 U/L Final     AST   Date Value Ref Range Status   07/24/2017 25 14 - 36 U/L Final     ALT   Date Value Ref Range Status   07/24/2017 32 10 - 44 U/L Final     Anion Gap   Date Value Ref Range Status   02/13/2017 8 8 - 16 mmol/L Final     eGFR if    Date Value Ref Range Status   07/24/2017 >60 >60 mL/min/1.73 m^2 Final     eGFR if non    Date Value Ref Range Status   07/24/2017 >60 >60 mL/min/1.73 m^2 Final     Comment:     Calculation used to obtain the estimated glomerular filtration  rate (eGFR) is the CKD-EPI equation. Since race is unknown   in our information system, the eGFR values for   -American and Non--American patients are given   for each creatinine result.         Assessment:       1. Atherosclerosis of aorta    2. Carotid disease, bilateral    3. Chronic insomnia    4. Coronary artery disease involving native coronary artery of native heart without angina pectoris    5.  Dyslipidemia    6. Former smoker    7. Primary osteoarthritis of left knee    8. Osteopenia, unspecified location        Plan:   Megha was seen today for follow-up.    Diagnoses and all orders for this visit:    Atherosclerosis of aorta  Continue statin    Carotid disease, bilateral  Continue statin. Dr maldonado imaging qyear     Chronic insomnia  Stable without meds currently     Coronary artery disease involving native coronary artery of native heart without angina pectoris  Continue statin, no longer smoking     Dyslipidemia  Continue statin    Former smoker    Primary osteoarthritis of left knee  Will have left knee replacement with Dr. Trotter this year     Osteopenia, unspecified location  -     DXA Bone Density Spine And Hip; Future      No Follow-up on file.

## 2017-08-02 PROBLEM — M25.562 KNEE PAIN, LEFT: Status: ACTIVE | Noted: 2017-08-02

## 2017-08-22 DIAGNOSIS — Z12.11 COLON CANCER SCREENING: ICD-10-CM

## 2017-09-12 ENCOUNTER — HOSPITAL ENCOUNTER (OUTPATIENT)
Dept: RADIOLOGY | Facility: HOSPITAL | Age: 72
Discharge: HOME | End: 2017-09-12
Attending: FAMILY MEDICINE
Payer: MEDICARE

## 2017-09-12 DIAGNOSIS — M85.80 OSTEOPENIA, UNSPECIFIED LOCATION: ICD-10-CM

## 2017-09-12 PROCEDURE — 77080 DXA BONE DENSITY AXIAL: CPT | Mod: 26,,, | Performed by: RADIOLOGY

## 2017-09-12 PROCEDURE — 77080 DXA BONE DENSITY AXIAL: CPT | Mod: TC

## 2018-01-24 ENCOUNTER — INFUSION (OUTPATIENT)
Dept: INFUSION THERAPY | Facility: HOSPITAL | Age: 73
End: 2018-01-24
Attending: FAMILY MEDICINE
Payer: MEDICARE

## 2018-01-24 ENCOUNTER — TELEPHONE (OUTPATIENT)
Dept: FAMILY MEDICINE | Facility: CLINIC | Age: 73
End: 2018-01-24

## 2018-01-24 VITALS
HEIGHT: 59 IN | TEMPERATURE: 96 F | SYSTOLIC BLOOD PRESSURE: 108 MMHG | DIASTOLIC BLOOD PRESSURE: 53 MMHG | WEIGHT: 155 LBS | BODY MASS INDEX: 31.25 KG/M2 | HEART RATE: 59 BPM | RESPIRATION RATE: 18 BRPM

## 2018-01-24 DIAGNOSIS — M85.80 OSTEOPENIA, UNSPECIFIED LOCATION: Primary | ICD-10-CM

## 2018-01-24 PROCEDURE — 96372 THER/PROPH/DIAG INJ SC/IM: CPT

## 2018-01-24 PROCEDURE — 63600175 PHARM REV CODE 636 W HCPCS: Performed by: FAMILY MEDICINE

## 2018-01-24 RX ADMIN — DENOSUMAB 60 MG: 60 INJECTION SUBCUTANEOUS at 12:01

## 2018-01-24 NOTE — PATIENT INSTRUCTIONS
Denosumab injection  What is this medicine?  DENOSUMAB (den oh rosangela mab) slows bone breakdown. Prolia is used to treat osteoporosis in women after menopause and in men. Xgeva is used to prevent bone fractures and other bone problems caused by cancer bone metastases. Xgeva is also used to treat giant cell tumor of the bone.  How should I use this medicine?  This medicine is for injection under the skin. It is given by a health care professional in a hospital or clinic setting.  If you are getting Prolia, a special MedGuide will be given to you by the pharmacist with each prescription and refill. Be sure to read this information carefully each time.  For Prolia, talk to your pediatrician regarding the use of this medicine in children. Special care may be needed. For Xgeva, talk to your pediatrician regarding the use of this medicine in children. While this drug may be prescribed for children as young as 13 years for selected conditions, precautions do apply.  What side effects may I notice from receiving this medicine?  Side effects that you should report to your doctor or health care professional as soon as possible:  · allergic reactions like skin rash, itching or hives, swelling of the face, lips, or tongue  · breathing problems  · chest pain  · fast, irregular heartbeat  · feeling faint or lightheaded, falls  · fever, chills, or any other sign of infection  · muscle spasms, tightening, or twitches  · numbness or tingling  · skin blisters or bumps, or is dry, peels, or red  · slow healing or unexplained pain in the mouth or jaw  · unusual bleeding or bruising  Side effects that usually do not require medical attention (Report these to your doctor or health care professional if they continue or are bothersome.):  · muscle pain  · stomach upset, gas  What may interact with this medicine?  Do not take this medicine with any of the following medications:  · other medicines containing denosumab  This medicine may also  interact with the following medications:  · medicines that suppress the immune system  · medicines that treat cancer  · steroid medicines like prednisone or cortisone  What if I miss a dose?  It is important not to miss your dose. Call your doctor or health care professional if you are unable to keep an appointment.  Where should I keep my medicine?  This medicine is only given in a clinic, doctor's office, or other health care setting and will not be stored at home.  What should I tell my health care provider before I take this medicine?  They need to know if you have any of these conditions:  · dental disease  · eczema  · infection or history of infections  · kidney disease or on dialysis  · low blood calcium or vitamin D  · malabsorption syndrome  · scheduled to have surgery or tooth extraction  · taking medicine that contains denosumab  · thyroid or parathyroid disease  · an unusual reaction to denosumab, other medicines, foods, dyes, or preservatives  · pregnant or trying to get pregnant  · breast-feeding  What should I watch for while using this medicine?  Visit your doctor or health care professional for regular checks on your progress. Your doctor or health care professional may order blood tests and other tests to see how you are doing.  Call your doctor or health care professional if you get a cold or other infection while receiving this medicine. Do not treat yourself. This medicine may decrease your body's ability to fight infection.  You should make sure you get enough calcium and vitamin D while you are taking this medicine, unless your doctor tells you not to. Discuss the foods you eat and the vitamins you take with your health care professional.  See your dentist regularly. Brush and floss your teeth as directed. Before you have any dental work done, tell your dentist you are receiving this medicine.  Do not become pregnant while taking this medicine or for 5 months after stopping it. Women should  inform their doctor if they wish to become pregnant or think they might be pregnant. There is a potential for serious side effects to an unborn child. Talk to your health care professional or pharmacist for more information.  NOTE:This sheet is a summary. It may not cover all possible information. If you have questions about this medicine, talk to your doctor, pharmacist, or health care provider. Copyright© 2017 [employer]

## 2018-01-25 NOTE — TELEPHONE ENCOUNTER
----- Message from Summer Terrell RN sent at 1/24/2018 10:43 AM CST -----  Dr Baron;  The insurance will cover the Prolia injection.  Can you please send me a rx. (See your previous msg about wanting to con't but dexa shows osteopenia).  Thanks,  Summer

## 2018-02-01 ENCOUNTER — DOCUMENTATION ONLY (OUTPATIENT)
Dept: FAMILY MEDICINE | Facility: CLINIC | Age: 73
End: 2018-02-01

## 2018-02-01 ENCOUNTER — OFFICE VISIT (OUTPATIENT)
Dept: FAMILY MEDICINE | Facility: CLINIC | Age: 73
End: 2018-02-01
Payer: MEDICARE

## 2018-02-01 VITALS
HEIGHT: 59 IN | BODY MASS INDEX: 27.42 KG/M2 | HEART RATE: 84 BPM | SYSTOLIC BLOOD PRESSURE: 126 MMHG | DIASTOLIC BLOOD PRESSURE: 86 MMHG | WEIGHT: 136 LBS

## 2018-02-01 DIAGNOSIS — E55.9 VITAMIN D DEFICIENCY DISEASE: ICD-10-CM

## 2018-02-01 DIAGNOSIS — K21.9 GASTROESOPHAGEAL REFLUX DISEASE WITHOUT ESOPHAGITIS: Chronic | ICD-10-CM

## 2018-02-01 DIAGNOSIS — F51.04 CHRONIC INSOMNIA: ICD-10-CM

## 2018-02-01 DIAGNOSIS — Z00.00 PREVENTATIVE HEALTH CARE: ICD-10-CM

## 2018-02-01 DIAGNOSIS — Z87.891 FORMER SMOKER: ICD-10-CM

## 2018-02-01 DIAGNOSIS — I77.9 CAROTID DISEASE, BILATERAL: ICD-10-CM

## 2018-02-01 DIAGNOSIS — I70.0 ATHEROSCLEROSIS OF AORTA: Primary | ICD-10-CM

## 2018-02-01 DIAGNOSIS — M85.80 OSTEOPENIA, UNSPECIFIED LOCATION: ICD-10-CM

## 2018-02-01 DIAGNOSIS — E78.5 DYSLIPIDEMIA: ICD-10-CM

## 2018-02-01 DIAGNOSIS — I25.10 CORONARY ARTERY DISEASE INVOLVING NATIVE CORONARY ARTERY OF NATIVE HEART WITHOUT ANGINA PECTORIS: ICD-10-CM

## 2018-02-01 DIAGNOSIS — M17.12 PRIMARY OSTEOARTHRITIS OF LEFT KNEE: ICD-10-CM

## 2018-02-01 PROCEDURE — 99999 PR PBB SHADOW E&M-EST. PATIENT-LVL II: CPT | Mod: PBBFAC,,, | Performed by: FAMILY MEDICINE

## 2018-02-01 PROCEDURE — 1159F MED LIST DOCD IN RCRD: CPT | Mod: S$GLB,,, | Performed by: FAMILY MEDICINE

## 2018-02-01 PROCEDURE — G0008 ADMIN INFLUENZA VIRUS VAC: HCPCS | Mod: S$GLB,,, | Performed by: FAMILY MEDICINE

## 2018-02-01 PROCEDURE — 99214 OFFICE O/P EST MOD 30 MIN: CPT | Mod: 25,S$GLB,, | Performed by: FAMILY MEDICINE

## 2018-02-01 PROCEDURE — 90715 TDAP VACCINE 7 YRS/> IM: CPT | Mod: S$GLB,,, | Performed by: FAMILY MEDICINE

## 2018-02-01 PROCEDURE — 90732 PPSV23 VACC 2 YRS+ SUBQ/IM: CPT | Mod: S$GLB,,, | Performed by: FAMILY MEDICINE

## 2018-02-01 PROCEDURE — 90662 IIV NO PRSV INCREASED AG IM: CPT | Mod: S$GLB,,, | Performed by: FAMILY MEDICINE

## 2018-02-01 PROCEDURE — 90471 IMMUNIZATION ADMIN: CPT | Mod: S$GLB,,, | Performed by: FAMILY MEDICINE

## 2018-02-01 PROCEDURE — G0009 ADMIN PNEUMOCOCCAL VACCINE: HCPCS | Mod: S$GLB,,, | Performed by: FAMILY MEDICINE

## 2018-02-01 PROCEDURE — 3008F BODY MASS INDEX DOCD: CPT | Mod: S$GLB,,, | Performed by: FAMILY MEDICINE

## 2018-02-01 NOTE — PROGRESS NOTES
FitKit was given to patient on 2/1/2018 9:37 AM       Instructions on fit kit given to pt. Pt verbalized understanding

## 2018-02-01 NOTE — PROGRESS NOTES
Subjective:       Patient ID: Megha Mendoza is a 72 y.o. female.    Chief Complaint: Follow-up    Pt is a 72 y.o. female who presents for evaluation and management of   Encounter Diagnoses   Name Primary?    Atherosclerosis of aorta Yes    Carotid disease, bilateral     Chronic insomnia     Coronary artery disease involving native coronary artery of native heart without angina pectoris     Dyslipidemia     Former smoker     Osteopenia, unspecified location     Primary osteoarthritis of left knee     Gastroesophageal reflux disease without esophagitis     Preventative health care     Vitamin D deficiency disease    .  Doing well on current meds. Denies any side effects. Prevention is not up to date. Needs cscope   Review of Systems   Constitutional: Negative.  Negative for activity change, appetite change, chills, diaphoresis, fatigue, fever and unexpected weight change.   HENT: Positive for hearing loss. Negative for congestion, dental problem, drooling, ear discharge, ear pain, facial swelling, mouth sores, nosebleeds, postnasal drip, rhinorrhea, sinus pressure, sneezing, sore throat, tinnitus, trouble swallowing and voice change.    Eyes: Negative.  Negative for photophobia, pain, discharge, redness, itching and visual disturbance.   Respiratory: Positive for shortness of breath. Negative for apnea, cough, choking, chest tightness and wheezing.         SOB unchanged(COPD)   Cardiovascular: Negative.  Negative for chest pain, palpitations and leg swelling.   Gastrointestinal: Negative.  Negative for abdominal distention, abdominal pain, anal bleeding, blood in stool, constipation, diarrhea, nausea, rectal pain and vomiting.   Genitourinary: Negative.  Negative for difficulty urinating, dyspareunia, dysuria, enuresis, flank pain, frequency, hematuria, menstrual problem, pelvic pain, urgency, vaginal bleeding, vaginal discharge and vaginal pain.   Musculoskeletal: Positive for arthralgias. Negative for back  pain, gait problem, joint swelling, myalgias, neck pain and neck stiffness.   Skin: Negative.  Negative for color change, pallor, rash and wound.   Neurological: Negative.  Negative for dizziness, tremors, seizures, syncope, facial asymmetry, speech difficulty, weakness, light-headedness, numbness and headaches.   Hematological: Negative for adenopathy. Does not bruise/bleed easily.   Psychiatric/Behavioral: Negative.  Negative for agitation, behavioral problems, confusion, decreased concentration, dysphoric mood, hallucinations, self-injury, sleep disturbance and suicidal ideas. The patient is not nervous/anxious and is not hyperactive.        Objective:      Physical Exam   Constitutional: She is oriented to person, place, and time. She appears well-developed and well-nourished.   HENT:   Head: Normocephalic and atraumatic.   Right Ear: External ear normal.   Left Ear: External ear normal.   Nose: Nose normal.   Mouth/Throat: Oropharynx is clear and moist.   Eyes: EOM are normal. Pupils are equal, round, and reactive to light.   Neck: Normal range of motion. Neck supple. No JVD present. No tracheal deviation present. No thyromegaly present.   Cardiovascular: Normal rate, normal heart sounds and intact distal pulses.    No murmur heard.  Pulmonary/Chest: Effort normal and breath sounds normal. No respiratory distress. She has no wheezes. She has no rales. She exhibits no tenderness.   Abdominal: Soft. Bowel sounds are normal. She exhibits no distension and no mass. There is no tenderness. There is no rebound and no guarding.   Musculoskeletal: Normal range of motion. She exhibits no edema or tenderness.   Lymphadenopathy:     She has no cervical adenopathy.   Neurological: She is alert and oriented to person, place, and time. She has normal reflexes. She displays normal reflexes. No cranial nerve deficit. She exhibits normal muscle tone. Coordination normal.   Skin: Skin is warm and dry. No rash noted. No erythema.  No pallor.   Psychiatric: She has a normal mood and affect. Her behavior is normal. Judgment and thought content normal.       CMP  Sodium   Date Value Ref Range Status   07/24/2017 140 136 - 145 mmol/L Final     Potassium   Date Value Ref Range Status   07/24/2017 4.0 3.5 - 5.1 mmol/L Final     Chloride   Date Value Ref Range Status   07/24/2017 101 95 - 110 mmol/L Final     CO2   Date Value Ref Range Status   07/24/2017 28 23 - 29 mmol/L Final     Glucose   Date Value Ref Range Status   07/24/2017 76 74 - 106 mg/dL Final     BUN, Bld   Date Value Ref Range Status   07/24/2017 23 (H) 7 - 17 mg/dL Final     Creatinine   Date Value Ref Range Status   07/24/2017 0.80 0.70 - 1.20 mg/dL Final     Calcium   Date Value Ref Range Status   07/24/2017 9.4 8.4 - 10.2 mg/dL Final     Total Protein   Date Value Ref Range Status   07/24/2017 7.5 6.3 - 8.2 g/dL Final     Albumin   Date Value Ref Range Status   07/24/2017 4.3 3.5 - 5.2 g/dL Final     Total Bilirubin   Date Value Ref Range Status   07/24/2017 0.4 0.2 - 1.3 mg/dL Final     Alkaline Phosphatase   Date Value Ref Range Status   07/24/2017 50 38 - 145 U/L Final     AST   Date Value Ref Range Status   07/24/2017 25 14 - 36 U/L Final     ALT   Date Value Ref Range Status   07/24/2017 32 10 - 44 U/L Final     Anion Gap   Date Value Ref Range Status   02/13/2017 8 8 - 16 mmol/L Final     eGFR if    Date Value Ref Range Status   07/24/2017 >60 >60 mL/min/1.73 m^2 Final     eGFR if non    Date Value Ref Range Status   07/24/2017 >60 >60 mL/min/1.73 m^2 Final     Comment:     Calculation used to obtain the estimated glomerular filtration  rate (eGFR) is the CKD-EPI equation. Since race is unknown   in our information system, the eGFR values for   -American and Non--American patients are given   for each creatinine result.         Assessment:       1. Atherosclerosis of aorta    2. Carotid disease, bilateral    3. Chronic insomnia     4. Coronary artery disease involving native coronary artery of native heart without angina pectoris    5. Dyslipidemia    6. Former smoker    7. Osteopenia, unspecified location    8. Primary osteoarthritis of left knee    9. Gastroesophageal reflux disease without esophagitis        Plan:   Megha was seen today for follow-up.    Diagnoses and all orders for this visit:    Atherosclerosis of aorta  Continue statin    Carotid disease, bilateral  Continue statin. Dr maldonado imaging qyear     Chronic insomnia  Stable without meds currently     Coronary artery disease involving native coronary artery of native heart without angina pectoris  Continue statin, no longer smoking     Dyslipidemia  Continue statin    Former smoker    Primary osteoarthritis of left knee  Had left knee replacement with Dr. Trotter last year     Osteopenia, unspecified location  -     Continue Vit d   Labs tomoro  Vaccines today see orders     RTC 6 months     No Follow-up on file.

## 2018-02-03 ENCOUNTER — LAB VISIT (OUTPATIENT)
Dept: LAB | Facility: HOSPITAL | Age: 73
End: 2018-02-03
Attending: FAMILY MEDICINE
Payer: MEDICARE

## 2018-02-03 DIAGNOSIS — E55.9 VITAMIN D DEFICIENCY DISEASE: ICD-10-CM

## 2018-02-03 DIAGNOSIS — E78.5 DYSLIPIDEMIA: ICD-10-CM

## 2018-02-03 LAB
25(OH)D3+25(OH)D2 SERPL-MCNC: 53 NG/ML
ALBUMIN SERPL BCP-MCNC: 3.9 G/DL
ALP SERPL-CCNC: 61 U/L
ALT SERPL W/O P-5'-P-CCNC: 14 U/L
ANION GAP SERPL CALC-SCNC: 9 MMOL/L
AST SERPL-CCNC: 19 U/L
BILIRUB SERPL-MCNC: 0.5 MG/DL
BUN SERPL-MCNC: 17 MG/DL
CALCIUM SERPL-MCNC: 9.7 MG/DL
CHLORIDE SERPL-SCNC: 107 MMOL/L
CHOLEST SERPL-MCNC: 187 MG/DL
CHOLEST/HDLC SERPL: 2.8 {RATIO}
CO2 SERPL-SCNC: 27 MMOL/L
CREAT SERPL-MCNC: 1 MG/DL
EST. GFR  (AFRICAN AMERICAN): >60 ML/MIN/1.73 M^2
EST. GFR  (NON AFRICAN AMERICAN): 56 ML/MIN/1.73 M^2
GLUCOSE SERPL-MCNC: 101 MG/DL
HDLC SERPL-MCNC: 66 MG/DL
HDLC SERPL: 35.3 %
LDLC SERPL CALC-MCNC: 102.6 MG/DL
NONHDLC SERPL-MCNC: 121 MG/DL
POTASSIUM SERPL-SCNC: 4 MMOL/L
PROT SERPL-MCNC: 7.7 G/DL
SODIUM SERPL-SCNC: 143 MMOL/L
TRIGL SERPL-MCNC: 92 MG/DL
TSH SERPL DL<=0.005 MIU/L-ACNC: 1.26 UIU/ML

## 2018-02-03 PROCEDURE — 82306 VITAMIN D 25 HYDROXY: CPT

## 2018-02-03 PROCEDURE — 80061 LIPID PANEL: CPT

## 2018-02-03 PROCEDURE — 80053 COMPREHEN METABOLIC PANEL: CPT

## 2018-02-03 PROCEDURE — 84443 ASSAY THYROID STIM HORMONE: CPT

## 2018-02-03 PROCEDURE — 36415 COLL VENOUS BLD VENIPUNCTURE: CPT

## 2018-02-05 ENCOUNTER — TELEPHONE (OUTPATIENT)
Dept: FAMILY MEDICINE | Facility: CLINIC | Age: 73
End: 2018-02-05

## 2018-02-05 DIAGNOSIS — Z12.39 BREAST CANCER SCREENING: Primary | ICD-10-CM

## 2018-02-05 NOTE — TELEPHONE ENCOUNTER
----- Message from Santos Tucker sent at 2018  9:15 AM CST -----  Contact: Patient  Megha Mendoza  MRN: [id number]  : 1945  PCP: Tomy Baron  Home Phone      [phone]  Work Phone      Not on file.  Mobile          [phone]      MESSAGE: requesting MMG be scheduled for her -- please advise    Call [phone]    PCP: Loraine

## 2018-02-09 ENCOUNTER — HOSPITAL ENCOUNTER (OUTPATIENT)
Dept: RADIOLOGY | Facility: HOSPITAL | Age: 73
Discharge: HOME | End: 2018-02-09
Attending: FAMILY MEDICINE
Payer: MEDICARE

## 2018-02-09 VITALS — HEIGHT: 59 IN | WEIGHT: 136 LBS | BODY MASS INDEX: 27.42 KG/M2

## 2018-02-09 DIAGNOSIS — Z12.39 BREAST CANCER SCREENING: ICD-10-CM

## 2018-02-09 PROCEDURE — 77063 BREAST TOMOSYNTHESIS BI: CPT | Mod: 26,,, | Performed by: RADIOLOGY

## 2018-02-09 PROCEDURE — 77067 SCR MAMMO BI INCL CAD: CPT | Mod: 26,,, | Performed by: RADIOLOGY

## 2018-02-09 PROCEDURE — 77067 SCR MAMMO BI INCL CAD: CPT | Mod: TC

## 2018-02-15 ENCOUNTER — LAB VISIT (OUTPATIENT)
Dept: LAB | Facility: HOSPITAL | Age: 73
End: 2018-02-15
Attending: FAMILY MEDICINE
Payer: MEDICARE

## 2018-02-15 DIAGNOSIS — Z12.11 COLON CANCER SCREENING: ICD-10-CM

## 2018-02-15 DIAGNOSIS — Z00.00 PREVENTATIVE HEALTH CARE: ICD-10-CM

## 2018-02-15 LAB — HEMOCCULT STL QL IA: NEGATIVE

## 2018-02-15 PROCEDURE — 82274 ASSAY TEST FOR BLOOD FECAL: CPT

## 2018-07-24 ENCOUNTER — OFFICE VISIT (OUTPATIENT)
Dept: FAMILY MEDICINE | Facility: CLINIC | Age: 73
End: 2018-07-24
Payer: MEDICARE

## 2018-07-24 VITALS
HEART RATE: 74 BPM | RESPIRATION RATE: 18 BRPM | BODY MASS INDEX: 27.91 KG/M2 | OXYGEN SATURATION: 96 % | DIASTOLIC BLOOD PRESSURE: 64 MMHG | SYSTOLIC BLOOD PRESSURE: 110 MMHG | HEIGHT: 59 IN | WEIGHT: 138.44 LBS

## 2018-07-24 DIAGNOSIS — T15.91XA FOREIGN BODY, EYE, RIGHT, INITIAL ENCOUNTER: ICD-10-CM

## 2018-07-24 DIAGNOSIS — S05.01XA ABRASION OF RIGHT CORNEA, INITIAL ENCOUNTER: Primary | ICD-10-CM

## 2018-07-24 PROCEDURE — 3078F DIAST BP <80 MM HG: CPT | Mod: CPTII,S$GLB,, | Performed by: FAMILY MEDICINE

## 2018-07-24 PROCEDURE — 65222 REMOVE FOREIGN BODY FROM EYE: CPT | Mod: RT,S$GLB,, | Performed by: FAMILY MEDICINE

## 2018-07-24 PROCEDURE — 99213 OFFICE O/P EST LOW 20 MIN: CPT | Mod: 25,S$GLB,, | Performed by: FAMILY MEDICINE

## 2018-07-24 PROCEDURE — 3074F SYST BP LT 130 MM HG: CPT | Mod: CPTII,S$GLB,, | Performed by: FAMILY MEDICINE

## 2018-07-24 PROCEDURE — 99999 PR PBB SHADOW E&M-EST. PATIENT-LVL III: CPT | Mod: PBBFAC,,, | Performed by: FAMILY MEDICINE

## 2018-07-24 RX ORDER — ATORVASTATIN CALCIUM 40 MG/1
40 TABLET, FILM COATED ORAL NIGHTLY
COMMUNITY
Start: 2018-04-18 | End: 2019-04-22 | Stop reason: SDUPTHER

## 2018-07-24 RX ORDER — TOBRAMYCIN 3 MG/ML
1 SOLUTION/ DROPS OPHTHALMIC EVERY 4 HOURS
Qty: 5 ML | Refills: 0 | Status: SHIPPED | OUTPATIENT
Start: 2018-07-24 | End: 2018-07-31

## 2018-07-24 NOTE — PROGRESS NOTES
Subjective:       Patient ID: Megha Mendoza is a 73 y.o. female.    Chief Complaint: eye irritation    Pt is a 73 y.o. female who presents for evaluation and management of   Encounter Diagnoses   Name Primary?    Abrasion of right cornea, initial encounter Yes    Foreign body, eye, right, initial encounter    .irritation right eye for 4 days   Was sanding floors 4 days ago     Doing well on current meds. Denies any side effects. Prevention is up to date.  Review of Systems   Eyes: Positive for pain and redness.       Objective:      Physical Exam   Constitutional: She is oriented to person, place, and time. She appears well-developed and well-nourished.   HENT:   Head: Normocephalic and atraumatic.   Right Ear: External ear normal.   Left Ear: External ear normal.   Nose: Nose normal.   Eyes: EOM are normal. Pupils are equal, round, and reactive to light.   OS 20/40  OD 20/70  Both 20/70    0.5mm black foreign body over cornea at 3,o clock position   No rust ring   FB removed with TB needle and q tip   1mm defect/abrasion seen with fluorescein and black light    Neck: Normal range of motion. Neck supple. No JVD present. No tracheal deviation present. No thyromegaly present.   Cardiovascular: Normal rate.    Pulmonary/Chest: Effort normal. No respiratory distress.   Abdominal: Soft.   Musculoskeletal: Normal range of motion. She exhibits no edema or tenderness.   Lymphadenopathy:     She has no cervical adenopathy.   Neurological: She is alert and oriented to person, place, and time.   Skin: Skin is warm and dry. No rash noted. No erythema. No pallor.   Psychiatric: She has a normal mood and affect. Her behavior is normal. Judgment and thought content normal.       Assessment:       1. Abrasion of right cornea, initial encounter    2. Foreign body, eye, right, initial encounter        Plan:   Megha was seen today for eye irritation.    Diagnoses and all orders for this visit:    Abrasion of right cornea, initial  encounter  -     tobramycin sulfate 0.3% (TOBREX) 0.3 % ophthalmic solution; Place 1 drop into the right eye every 4 (four) hours. for 7 days  -     Ambulatory referral to Optometry    Foreign body, eye, right, initial encounter  -     tobramycin sulfate 0.3% (TOBREX) 0.3 % ophthalmic solution; Place 1 drop into the right eye every 4 (four) hours. for 7 days  -     Ambulatory referral to Optometry      Problem List Items Addressed This Visit     None      Visit Diagnoses     Abrasion of right cornea, initial encounter    -  Primary    Relevant Medications    tobramycin sulfate 0.3% (TOBREX) 0.3 % ophthalmic solution    Other Relevant Orders    Ambulatory referral to Optometry    Foreign body, eye, right, initial encounter        Relevant Medications    tobramycin sulfate 0.3% (TOBREX) 0.3 % ophthalmic solution    Other Relevant Orders    Ambulatory referral to Optometry      FB removed as described above   Abx, refer to optometry     No Follow-up on file.

## 2018-07-25 ENCOUNTER — INFUSION (OUTPATIENT)
Dept: INFUSION THERAPY | Facility: HOSPITAL | Age: 73
End: 2018-07-25
Attending: FAMILY MEDICINE
Payer: MEDICARE

## 2018-07-25 VITALS
DIASTOLIC BLOOD PRESSURE: 51 MMHG | HEART RATE: 67 BPM | TEMPERATURE: 97 F | RESPIRATION RATE: 20 BRPM | SYSTOLIC BLOOD PRESSURE: 110 MMHG

## 2018-07-25 DIAGNOSIS — M85.80 OSTEOPENIA, UNSPECIFIED LOCATION: Primary | ICD-10-CM

## 2018-07-25 PROCEDURE — 63600175 PHARM REV CODE 636 W HCPCS: Performed by: FAMILY MEDICINE

## 2018-07-25 PROCEDURE — 96372 THER/PROPH/DIAG INJ SC/IM: CPT

## 2018-07-25 RX ADMIN — DENOSUMAB 60 MG: 60 INJECTION SUBCUTANEOUS at 02:07

## 2018-08-03 ENCOUNTER — OFFICE VISIT (OUTPATIENT)
Dept: FAMILY MEDICINE | Facility: CLINIC | Age: 73
End: 2018-08-03
Payer: MEDICARE

## 2018-08-03 VITALS
WEIGHT: 138 LBS | RESPIRATION RATE: 16 BRPM | DIASTOLIC BLOOD PRESSURE: 56 MMHG | SYSTOLIC BLOOD PRESSURE: 102 MMHG | BODY MASS INDEX: 27.82 KG/M2 | HEART RATE: 74 BPM | HEIGHT: 59 IN

## 2018-08-03 DIAGNOSIS — K21.9 GASTROESOPHAGEAL REFLUX DISEASE WITHOUT ESOPHAGITIS: Chronic | ICD-10-CM

## 2018-08-03 DIAGNOSIS — D64.9 ANEMIA, UNSPECIFIED TYPE: ICD-10-CM

## 2018-08-03 DIAGNOSIS — I70.0 ATHEROSCLEROSIS OF AORTA: ICD-10-CM

## 2018-08-03 DIAGNOSIS — I25.10 CORONARY ARTERY DISEASE INVOLVING NATIVE CORONARY ARTERY OF NATIVE HEART WITHOUT ANGINA PECTORIS: ICD-10-CM

## 2018-08-03 DIAGNOSIS — J41.0 SIMPLE CHRONIC BRONCHITIS: ICD-10-CM

## 2018-08-03 DIAGNOSIS — Z87.891 FORMER SMOKER: ICD-10-CM

## 2018-08-03 DIAGNOSIS — M17.12 PRIMARY OSTEOARTHRITIS OF LEFT KNEE: ICD-10-CM

## 2018-08-03 DIAGNOSIS — I77.9 CAROTID DISEASE, BILATERAL: ICD-10-CM

## 2018-08-03 DIAGNOSIS — E78.5 DYSLIPIDEMIA: Primary | ICD-10-CM

## 2018-08-03 LAB
BASOPHILS # BLD AUTO: 0.02 K/UL
BASOPHILS NFR BLD: 0.4 %
DIFFERENTIAL METHOD: NORMAL
EOSINOPHIL # BLD AUTO: 0.2 K/UL
EOSINOPHIL NFR BLD: 4.2 %
ERYTHROCYTE [DISTWIDTH] IN BLOOD BY AUTOMATED COUNT: 13.2 %
HCT VFR BLD AUTO: 39.9 %
HGB BLD-MCNC: 12.8 G/DL
LYMPHOCYTES # BLD AUTO: 2.4 K/UL
LYMPHOCYTES NFR BLD: 43 %
MCH RBC QN AUTO: 30.5 PG
MCHC RBC AUTO-ENTMCNC: 32.1 G/DL
MCV RBC AUTO: 95 FL
MONOCYTES # BLD AUTO: 0.4 K/UL
MONOCYTES NFR BLD: 7.7 %
NEUTROPHILS # BLD AUTO: 2.5 K/UL
NEUTROPHILS NFR BLD: 44.7 %
PLATELET # BLD AUTO: 266 K/UL
PMV BLD AUTO: 10.3 FL
RBC # BLD AUTO: 4.2 M/UL
WBC # BLD AUTO: 5.49 K/UL

## 2018-08-03 PROCEDURE — 3074F SYST BP LT 130 MM HG: CPT | Mod: CPTII,S$GLB,, | Performed by: FAMILY MEDICINE

## 2018-08-03 PROCEDURE — 99499 UNLISTED E&M SERVICE: CPT | Mod: S$GLB,,, | Performed by: FAMILY MEDICINE

## 2018-08-03 PROCEDURE — 80053 COMPREHEN METABOLIC PANEL: CPT

## 2018-08-03 PROCEDURE — 99999 PR PBB SHADOW E&M-EST. PATIENT-LVL III: CPT | Mod: PBBFAC,,, | Performed by: FAMILY MEDICINE

## 2018-08-03 PROCEDURE — 99214 OFFICE O/P EST MOD 30 MIN: CPT | Mod: S$GLB,,, | Performed by: FAMILY MEDICINE

## 2018-08-03 PROCEDURE — 36415 COLL VENOUS BLD VENIPUNCTURE: CPT | Mod: S$GLB,,, | Performed by: FAMILY MEDICINE

## 2018-08-03 PROCEDURE — 3078F DIAST BP <80 MM HG: CPT | Mod: CPTII,S$GLB,, | Performed by: FAMILY MEDICINE

## 2018-08-03 PROCEDURE — 85025 COMPLETE CBC W/AUTO DIFF WBC: CPT

## 2018-08-03 NOTE — PROGRESS NOTES
Subjective:       Patient ID: Megha Mendoza is a 73 y.o. female.    Chief Complaint: Follow-up ( 6month )    Pt is a 73 y.o. female who presents for evaluation and management of   Encounter Diagnoses   Name Primary?    Dyslipidemia Yes    Coronary artery disease involving native coronary artery of native heart without angina pectoris     Atherosclerosis of aorta     Carotid disease, bilateral     Former smoker     Primary osteoarthritis of left knee     Gastroesophageal reflux disease without esophagitis     Simple chronic bronchitis     Anemia, unspecified type    .  Doing well on current meds. Denies any side effects. Prevention is up to date.  Review of Systems   Constitutional: Negative.  Negative for activity change, appetite change, chills, diaphoresis, fatigue, fever and unexpected weight change.   HENT: Positive for hearing loss. Negative for congestion, dental problem, drooling, ear discharge, ear pain, facial swelling, mouth sores, nosebleeds, postnasal drip, rhinorrhea, sinus pressure, sneezing, sore throat, tinnitus, trouble swallowing and voice change.    Eyes: Negative.  Negative for photophobia, pain, discharge, redness, itching and visual disturbance.   Respiratory: Positive for shortness of breath. Negative for apnea, cough, choking, chest tightness and wheezing.         SOB unchanged(COPD)   Cardiovascular: Negative.  Negative for chest pain, palpitations and leg swelling.   Gastrointestinal: Negative.  Negative for abdominal distention, abdominal pain, anal bleeding, blood in stool, constipation, diarrhea, nausea, rectal pain and vomiting.   Genitourinary: Negative.  Negative for difficulty urinating, dyspareunia, dysuria, enuresis, flank pain, frequency, hematuria, menstrual problem, pelvic pain, urgency, vaginal bleeding, vaginal discharge and vaginal pain.   Musculoskeletal: Positive for arthralgias. Negative for back pain, gait problem, joint swelling, myalgias, neck pain and neck  stiffness.   Skin: Negative.  Negative for color change, pallor, rash and wound.   Neurological: Negative.  Negative for dizziness, tremors, seizures, syncope, facial asymmetry, speech difficulty, weakness, light-headedness, numbness and headaches.   Hematological: Negative for adenopathy. Does not bruise/bleed easily.   Psychiatric/Behavioral: Negative.  Negative for agitation, behavioral problems, confusion, decreased concentration, dysphoric mood, hallucinations, self-injury, sleep disturbance and suicidal ideas. The patient is not nervous/anxious and is not hyperactive.        Objective:      Physical Exam   Constitutional: She is oriented to person, place, and time. She appears well-developed and well-nourished.   HENT:   Head: Normocephalic and atraumatic.   Right Ear: External ear normal.   Left Ear: External ear normal.   Nose: Nose normal.   Mouth/Throat: Oropharynx is clear and moist.   Eyes: EOM are normal. Pupils are equal, round, and reactive to light.   Neck: Normal range of motion. Neck supple. No JVD present. No tracheal deviation present. No thyromegaly present.   Cardiovascular: Normal rate, normal heart sounds and intact distal pulses.    No murmur heard.  Pulmonary/Chest: Effort normal and breath sounds normal. No respiratory distress. She has no wheezes. She has no rales. She exhibits no tenderness.   Abdominal: Soft. Bowel sounds are normal. She exhibits no distension and no mass. There is no tenderness. There is no rebound and no guarding.   Musculoskeletal: Normal range of motion. She exhibits no edema or tenderness.   Lymphadenopathy:     She has no cervical adenopathy.   Neurological: She is alert and oriented to person, place, and time. She has normal reflexes. She displays normal reflexes. No cranial nerve deficit. She exhibits normal muscle tone. Coordination normal.   Skin: Skin is warm and dry. No rash noted. No erythema. No pallor.   Psychiatric: She has a normal mood and affect. Her  behavior is normal. Judgment and thought content normal.       Assessment:       1. Dyslipidemia    2. Coronary artery disease involving native coronary artery of native heart without angina pectoris    3. Atherosclerosis of aorta    4. Carotid disease, bilateral    5. Former smoker    6. Primary osteoarthritis of left knee    7. Gastroesophageal reflux disease without esophagitis    8. Simple chronic bronchitis    9. Anemia, unspecified type        Plan:   Megha was seen today for follow-up.    Diagnoses and all orders for this visit:    Dyslipidemia  -     Comprehensive metabolic panel; Future    Coronary artery disease involving native coronary artery of native heart without angina pectoris    Atherosclerosis of aorta    Carotid disease, bilateral    Former smoker    Primary osteoarthritis of left knee    Gastroesophageal reflux disease without esophagitis    Simple chronic bronchitis    Anemia, unspecified type  -     CBC auto differential; Future      Problem List Items Addressed This Visit     Atherosclerosis of aorta    Overview     On statin          Carotid disease, bilateral    Overview     Monitored per Dr. Day  On statin          Coronary artery disease involving native coronary artery without angina pectoris    Current Assessment & Plan     Dr Day   Continue statin          Dyslipidemia - Primary    Overview     Lab Results   Component Value Date    CHOL 187 02/03/2018    CHOL 166 02/08/2017    CHOL 184 02/04/2016     Lab Results   Component Value Date    HDL 66 02/03/2018    HDL 60 02/08/2017    HDL 58 02/04/2016     Lab Results   Component Value Date    LDLCALC 102.6 02/03/2018    LDLCALC 85.2 02/08/2017    LDLCALC 99.2 02/04/2016     Lab Results   Component Value Date    TRIG 92 02/03/2018    TRIG 104 02/08/2017    TRIG 134 02/04/2016     Lab Results   Component Value Date    CHOLHDL 35.3 02/03/2018    CHOLHDL 36.1 02/08/2017    CHOLHDL 31.5 02/04/2016     Continue statin          Relevant Orders     Comprehensive metabolic panel    Former smoker    Gastroesophageal reflux disease without esophagitis (Chronic)    Overview     On PPI   Can't take bisphosphonate for osteo          Primary osteoarthritis of left knee    Simple chronic bronchitis    Overview     Continue albuterol atrovent prn   No longer smoking            Other Visit Diagnoses     Anemia, unspecified type        Relevant Orders    CBC auto differential        No Follow-up on file.

## 2018-08-04 LAB
ALBUMIN SERPL BCP-MCNC: 3.8 G/DL
ALP SERPL-CCNC: 52 U/L
ALT SERPL W/O P-5'-P-CCNC: 15 U/L
ANION GAP SERPL CALC-SCNC: 8 MMOL/L
AST SERPL-CCNC: 20 U/L
BILIRUB SERPL-MCNC: 0.4 MG/DL
BUN SERPL-MCNC: 23 MG/DL
CALCIUM SERPL-MCNC: 10.2 MG/DL
CHLORIDE SERPL-SCNC: 105 MMOL/L
CO2 SERPL-SCNC: 30 MMOL/L
CREAT SERPL-MCNC: 1.1 MG/DL
EST. GFR  (AFRICAN AMERICAN): 57.6 ML/MIN/1.73 M^2
EST. GFR  (NON AFRICAN AMERICAN): 49.9 ML/MIN/1.73 M^2
GLUCOSE SERPL-MCNC: 117 MG/DL
POTASSIUM SERPL-SCNC: 4.1 MMOL/L
PROT SERPL-MCNC: 7.2 G/DL
SODIUM SERPL-SCNC: 143 MMOL/L

## 2018-08-27 ENCOUNTER — HOSPITAL ENCOUNTER (EMERGENCY)
Facility: HOSPITAL | Age: 73
Discharge: HOME | End: 2018-08-27
Attending: EMERGENCY MEDICINE
Payer: MEDICARE

## 2018-08-27 VITALS
TEMPERATURE: 97 F | BODY MASS INDEX: 27.87 KG/M2 | HEART RATE: 50 BPM | RESPIRATION RATE: 16 BRPM | SYSTOLIC BLOOD PRESSURE: 145 MMHG | WEIGHT: 138 LBS | DIASTOLIC BLOOD PRESSURE: 68 MMHG

## 2018-08-27 DIAGNOSIS — S61.412A LACERATION OF LEFT HAND WITHOUT FOREIGN BODY, INITIAL ENCOUNTER: Primary | ICD-10-CM

## 2018-08-27 DIAGNOSIS — M79.642 LEFT HAND PAIN: ICD-10-CM

## 2018-08-27 PROCEDURE — 99283 EMERGENCY DEPT VISIT LOW MDM: CPT | Mod: 25

## 2018-08-27 PROCEDURE — 12001 RPR S/N/AX/GEN/TRNK 2.5CM/<: CPT

## 2018-08-27 PROCEDURE — 25000003 PHARM REV CODE 250: Performed by: NURSE PRACTITIONER

## 2018-08-27 RX ORDER — ACETAMINOPHEN 325 MG/1
650 TABLET ORAL
Status: COMPLETED | OUTPATIENT
Start: 2018-08-27 | End: 2018-08-27

## 2018-08-27 RX ADMIN — ACETAMINOPHEN 650 MG: 325 TABLET ORAL at 02:08

## 2018-08-27 NOTE — ED NOTES
The patient was seen, evaluated and discharged by the mid-level provider. All questions were asked and/or answered and the pt was discharged with written and verbal instructions.  Discharged to home/self care.    - Condition at discharge: Good  - Mode of Discharge: Ambulatory  - The patient left the ED accompanied by a family member.  - The discharge instructions were discussed with the patient.  - They state an understanding of the discharge instructions.  - Walked pt to the discharge station.

## 2018-08-27 NOTE — ED NOTES
Pt lying in bed, no distress noted, family at bedside, call bell with in reach, denies needs at this time, instru

## 2018-08-27 NOTE — ED TRIAGE NOTES
73 y.o. female presents to ER ED 01/ED 01A   Chief Complaint   Patient presents with    Laceration     left hand   Pt cut left hand on cutting tool. Reports UTD on tetanus. No acute distress noted.

## 2018-08-27 NOTE — DISCHARGE INSTRUCTIONS
Keep area clean and dry. Monitor for signs of infection such as redness, swelling, purulent discharge, or fever.     **Follow up with PCP in 24-48 hours. Return to ER with worsening of symptoms.     **You may take previously prescribed narcotic pain medication or over-the-counter Tylenol for pain as directed on package insert.  Drink plenty fluids. Get plenty rest. Wash hands frequently.     **Our goal in the emergency department is to always give you outstanding care and exceptional service. You may receive a survey by mail or e-mail in the next week regarding your experience in our ED. We would greatly appreciate your completing and returning the survey. Your feedback provides us with a way to recognize our staff who give very good care and it helps us learn how to improve when your experience was below our aspiration of excellence.

## 2018-08-28 NOTE — ED PROVIDER NOTES
Encounter Date: 8/27/2018       History     Chief Complaint   Patient presents with    Laceration     left hand     The history is provided by the patient.   Laceration    The incident occurred just prior to arrival. The laceration is located on the left hand. The laceration is 1 cm in size. Injury mechanism: Lost her  on a tool. The pain is at a severity of 8/10. The pain has been constant since onset. She reports no foreign bodies present. Her tetanus status is UTD.     Review of patient's allergies indicates:   Allergen Reactions    Keflex [cephalexin] Rash     Solid patches - rash like skin thickened    Hibiclens [chlorhexidine gluconate] Itching    Sulfa (sulfonamide antibiotics) Rash     Past Medical History:   Diagnosis Date    Arthritis     Back pain     Bronchitis, chronic     Carotid artery disease     50% blockage bilateral    COPD (chronic obstructive pulmonary disease)     Coronary artery disease     Diverticulitis     DJD (degenerative joint disease)     GERD (gastroesophageal reflux disease)     severe    Hemorrhoids     Hiatal hernia     Hyperlipidemia     Irritable bowel syndrome with constipation     DARA (obstructive sleep apnea)     Osteoporosis     Respiratory distress     Trouble in sleeping      Past Surgical History:   Procedure Laterality Date    blockages in artaries      CARDIAC CATHETERIZATION  5/1/15    COLONOSCOPY      HEMORRHOID SURGERY  2009    HYSTERECTOMY  1995    complete    JOINT REPLACEMENT      JOINT REPLACEMENT Left 08/02/2017    TOTAL KNEE ARTHROPLASTY Right     TUBAL LIGATION       Family History   Problem Relation Age of Onset    Diabetes Mother     Hypertension Mother     Arthritis Mother     Stroke Mother     Stroke Father     Heart disease Father         congenital heart disease    Cancer Sister         lung    COPD Sister      Social History     Tobacco Use    Smoking status: Former Smoker     Packs/day: 0.75     Start date:  10/14/1959     Last attempt to quit: 10/14/2005     Years since quittin.8    Smokeless tobacco: Never Used    Tobacco comment: Stopped and started several times   Substance Use Topics    Alcohol use: No    Drug use: No     Review of Systems   Constitutional: Negative for chills, fatigue and fever.   HENT: Negative for congestion, dental problem, ear pain, rhinorrhea, sore throat and trouble swallowing.    Eyes: Negative for pain, discharge, redness and visual disturbance.   Respiratory: Negative for cough, shortness of breath and wheezing.    Cardiovascular: Negative for chest pain, palpitations and leg swelling.   Gastrointestinal: Negative for abdominal pain, constipation, diarrhea, nausea and vomiting.   Genitourinary: Negative for difficulty urinating, dysuria, flank pain, frequency, hematuria and urgency.   Musculoskeletal: Negative for arthralgias, back pain, myalgias and neck pain.   Skin: Positive for wound (Laceration to left hand near thumb). Negative for pallor and rash.   Neurological: Negative for seizures, weakness and headaches.   Psychiatric/Behavioral: Negative.        Physical Exam     Initial Vitals [18 1246]   BP Pulse Resp Temp SpO2   134/66 70 18 96.4 °F (35.8 °C) --      MAP       --         Physical Exam    Nursing note and vitals reviewed.  Constitutional: No distress.   HENT:   Head: Normocephalic and atraumatic.   Right Ear: External ear normal.   Left Ear: External ear normal.   Nose: Nose normal.   Mouth/Throat: Oropharynx is clear and moist.   Eyes: Conjunctivae, EOM and lids are normal. Pupils are equal, round, and reactive to light.   Neck: Neck supple.   Cardiovascular: Normal rate, regular rhythm, S1 normal, S2 normal, normal heart sounds and intact distal pulses.   Pulmonary/Chest: Effort normal and breath sounds normal. No respiratory distress.   Abdominal: Soft. Bowel sounds are normal. There is no tenderness.   Musculoskeletal: Normal range of motion.    Neurological: She is alert and oriented to person, place, and time. She has normal strength. GCS eye subscore is 4. GCS verbal subscore is 5. GCS motor subscore is 6.   Skin: Skin is warm and dry. Capillary refill takes less than 2 seconds. Laceration (Left hand near thumb) noted. No rash noted.   Psychiatric: She has a normal mood and affect. Her speech is normal and behavior is normal.         ED Course   Lac Repair  Date/Time: 8/27/2018 11:40 PM  Performed by: Karena Fields NP  Authorized by: Maico Hein MD   Consent Done: Emergent Situation  Body area: upper extremity  Location details: left hand  Laceration length: 1 cm  Foreign bodies: no foreign bodies  Tendon involvement: none  Nerve involvement: none  Irrigation solution: saline  Irrigation method: syringe  Amount of cleaning: standard  Skin closure: glue  Dressing: non-stick sterile dressing  Patient tolerance: Patient tolerated the procedure well with no immediate complications               Imaging Results          X-Ray Hand 3 view Left (Final result)  Result time 08/27/18 14:46:26    Final result by Christy Ingram MD (08/27/18 14:46:26)                 Impression:      As above.      Electronically signed by: Christy Ingram MD  Date:    08/27/2018  Time:    14:46             Narrative:    EXAMINATION:  XR HAND COMPLETE 3 VIEW LEFT    CLINICAL HISTORY:  hand pain;.    TECHNIQUE:  PA, lateral, and oblique views of the left hand were performed.    COMPARISON:  None    FINDINGS:  The bones are osteopenic.  There is diffuse interphalangeal joint space narrowing with findings suggestive for a erosive osteoarthritis involving the 2nd through 4th distal interphalangeal joints.  There are advanced osteoarthritic changes involving the 1st carpometacarpal joint space.  There are calcifications of the triangular fibrocartilage.  No fracture or dislocation is identified.                                     Medications   acetaminophen tablet 650 mg (650  mg Oral Given 8/27/18 3748)                         Clinical Impression:   The primary encounter diagnosis was Laceration of left hand without foreign body, initial encounter. A diagnosis of Left hand pain was also pertinent to this visit.    Educated patient to monitor for signs symptoms of infection.  Educated patient to take Tylenol p.r.n. pain or previously prescribed pain medication as directed.    Disposition:   Disposition: Discharged  Condition: Stable    The patient acknowledges that close follow up with medical provider is required. Instructed to follow up with PCP within 2 days. Patient was given specific return precautions. The patient agrees to comply with all instruction and directions given in the ER.                       Karena Fields NP  08/27/18 9780

## 2018-08-29 ENCOUNTER — OFFICE VISIT (OUTPATIENT)
Dept: FAMILY MEDICINE | Facility: CLINIC | Age: 73
End: 2018-08-29
Payer: MEDICARE

## 2018-08-29 VITALS
RESPIRATION RATE: 16 BRPM | HEART RATE: 56 BPM | DIASTOLIC BLOOD PRESSURE: 62 MMHG | WEIGHT: 139.31 LBS | SYSTOLIC BLOOD PRESSURE: 120 MMHG | HEIGHT: 60 IN | BODY MASS INDEX: 27.35 KG/M2

## 2018-08-29 DIAGNOSIS — S61.412A LACERATION OF LEFT HAND WITHOUT FOREIGN BODY, INITIAL ENCOUNTER: Primary | ICD-10-CM

## 2018-08-29 PROCEDURE — 99999 PR PBB SHADOW E&M-EST. PATIENT-LVL III: CPT | Mod: PBBFAC,,, | Performed by: FAMILY MEDICINE

## 2018-08-29 PROCEDURE — 99212 OFFICE O/P EST SF 10 MIN: CPT | Mod: S$GLB,,, | Performed by: FAMILY MEDICINE

## 2018-08-29 PROCEDURE — 3074F SYST BP LT 130 MM HG: CPT | Mod: CPTII,S$GLB,, | Performed by: FAMILY MEDICINE

## 2018-08-29 PROCEDURE — 3078F DIAST BP <80 MM HG: CPT | Mod: CPTII,S$GLB,, | Performed by: FAMILY MEDICINE

## 2018-08-29 NOTE — PROGRESS NOTES
Subjective:       Patient ID: Megha Mendoza is a 73 y.o. female.    Chief Complaint: Hospital Follow Up    Pt is a 73 y.o. female who presents for evaluation and management of   Encounter Diagnosis   Name Primary?    Laceration of left hand without foreign body, initial encounter Yes   .  Doing well on current meds. Denies any side effects. Prevention is up to date.    Review of Systems   Constitutional: Negative for fever.   Skin: Positive for wound. Negative for color change.       Objective:      Physical Exam   Constitutional: She is oriented to person, place, and time. She appears well-developed and well-nourished.   HENT:   Head: Normocephalic and atraumatic.   Right Ear: External ear normal.   Left Ear: External ear normal.   Nose: Nose normal.   Eyes: EOM are normal. Pupils are equal, round, and reactive to light.   Neck: Normal range of motion. Neck supple. No JVD present. No tracheal deviation present. No thyromegaly present.   Cardiovascular: Normal rate.   Pulmonary/Chest: Effort normal. No respiratory distress.   Abdominal: Soft.   Musculoskeletal: Normal range of motion. She exhibits no edema or tenderness.   Lymphadenopathy:     She has no cervical adenopathy.   Neurological: She is alert and oriented to person, place, and time.   Skin: Skin is warm and dry. No rash noted. No erythema. No pallor.   Well healing glued laceration over the left lat thumb  Numbness distal to injury   Psychiatric: She has a normal mood and affect. Her behavior is normal. Judgment and thought content normal.       Assessment:       1. Laceration of left hand without foreign body, initial encounter        Plan:   Megha was seen today for hospital follow up.    Diagnoses and all orders for this visit:    Laceration of left hand without foreign body, initial encounter      Problem List Items Addressed This Visit     None      Visit Diagnoses     Laceration of left hand without foreign body, initial encounter    -  Primary       bandage changed     RTC if condition acutely worsens or any other concerns, otherwise RTC as scheduled      No Follow-up on file.

## 2019-01-10 ENCOUNTER — HOSPITAL ENCOUNTER (EMERGENCY)
Facility: HOSPITAL | Age: 74
Discharge: HOME | End: 2019-01-10
Attending: SURGERY
Payer: MEDICARE

## 2019-01-10 ENCOUNTER — TELEPHONE (OUTPATIENT)
Dept: FAMILY MEDICINE | Facility: CLINIC | Age: 74
End: 2019-01-10

## 2019-01-10 VITALS
OXYGEN SATURATION: 99 % | TEMPERATURE: 97 F | SYSTOLIC BLOOD PRESSURE: 155 MMHG | DIASTOLIC BLOOD PRESSURE: 62 MMHG | BODY MASS INDEX: 29.36 KG/M2 | RESPIRATION RATE: 16 BRPM | HEIGHT: 59 IN | WEIGHT: 145.63 LBS | HEART RATE: 95 BPM

## 2019-01-10 DIAGNOSIS — R10.30 LOWER ABDOMINAL PAIN: Primary | ICD-10-CM

## 2019-01-10 LAB
ALBUMIN SERPL BCP-MCNC: 3.9 G/DL
ALP SERPL-CCNC: 53 U/L
ALT SERPL W/O P-5'-P-CCNC: 19 U/L
ANION GAP SERPL CALC-SCNC: 9 MMOL/L
AST SERPL-CCNC: 21 U/L
BASOPHILS # BLD AUTO: 0.04 K/UL
BASOPHILS NFR BLD: 0.6 %
BILIRUB SERPL-MCNC: 0.5 MG/DL
BILIRUB UR QL STRIP: NEGATIVE
BUN SERPL-MCNC: 25 MG/DL
CALCIUM SERPL-MCNC: 9.6 MG/DL
CHLORIDE SERPL-SCNC: 105 MMOL/L
CLARITY UR: CLEAR
CO2 SERPL-SCNC: 27 MMOL/L
COLOR UR: YELLOW
CREAT SERPL-MCNC: 0.8 MG/DL
DIFFERENTIAL METHOD: NORMAL
EOSINOPHIL # BLD AUTO: 0.4 K/UL
EOSINOPHIL NFR BLD: 5.6 %
ERYTHROCYTE [DISTWIDTH] IN BLOOD BY AUTOMATED COUNT: 13.2 %
EST. GFR  (AFRICAN AMERICAN): >60 ML/MIN/1.73 M^2
EST. GFR  (NON AFRICAN AMERICAN): >60 ML/MIN/1.73 M^2
GLUCOSE SERPL-MCNC: 81 MG/DL
GLUCOSE UR QL STRIP: NEGATIVE
HCT VFR BLD AUTO: 40.4 %
HGB BLD-MCNC: 13.1 G/DL
HGB UR QL STRIP: ABNORMAL
KETONES UR QL STRIP: NEGATIVE
LEUKOCYTE ESTERASE UR QL STRIP: NEGATIVE
LIPASE SERPL-CCNC: 47 U/L
LYMPHOCYTES # BLD AUTO: 1.7 K/UL
LYMPHOCYTES NFR BLD: 26.1 %
MCH RBC QN AUTO: 30.7 PG
MCHC RBC AUTO-ENTMCNC: 32.4 G/DL
MCV RBC AUTO: 95 FL
MONOCYTES # BLD AUTO: 0.6 K/UL
MONOCYTES NFR BLD: 8.7 %
NEUTROPHILS # BLD AUTO: 3.9 K/UL
NEUTROPHILS NFR BLD: 59 %
NITRITE UR QL STRIP: NEGATIVE
PH UR STRIP: 6 [PH] (ref 5–8)
PLATELET # BLD AUTO: 251 K/UL
PMV BLD AUTO: 10.2 FL
POTASSIUM SERPL-SCNC: 4.3 MMOL/L
PROT SERPL-MCNC: 7.3 G/DL
PROT UR QL STRIP: NEGATIVE
RBC # BLD AUTO: 4.27 M/UL
SODIUM SERPL-SCNC: 141 MMOL/L
SP GR UR STRIP: 1.01 (ref 1–1.03)
URN SPEC COLLECT METH UR: ABNORMAL
UROBILINOGEN UR STRIP-ACNC: NEGATIVE EU/DL
WBC # BLD AUTO: 6.66 K/UL

## 2019-01-10 PROCEDURE — 96361 HYDRATE IV INFUSION ADD-ON: CPT

## 2019-01-10 PROCEDURE — 83690 ASSAY OF LIPASE: CPT

## 2019-01-10 PROCEDURE — 99285 EMERGENCY DEPT VISIT HI MDM: CPT | Mod: 25

## 2019-01-10 PROCEDURE — 80053 COMPREHEN METABOLIC PANEL: CPT

## 2019-01-10 PROCEDURE — 25500020 PHARM REV CODE 255: Performed by: SURGERY

## 2019-01-10 PROCEDURE — 96360 HYDRATION IV INFUSION INIT: CPT | Mod: 59

## 2019-01-10 PROCEDURE — 36415 COLL VENOUS BLD VENIPUNCTURE: CPT

## 2019-01-10 PROCEDURE — 25000003 PHARM REV CODE 250: Performed by: SURGERY

## 2019-01-10 PROCEDURE — 85025 COMPLETE CBC W/AUTO DIFF WBC: CPT

## 2019-01-10 PROCEDURE — 81003 URINALYSIS AUTO W/O SCOPE: CPT

## 2019-01-10 RX ORDER — MORPHINE SULFATE 2 MG/ML
1 INJECTION, SOLUTION INTRAMUSCULAR; INTRAVENOUS
Status: DISCONTINUED | OUTPATIENT
Start: 2019-01-10 | End: 2019-01-10 | Stop reason: HOSPADM

## 2019-01-10 RX ORDER — DICYCLOMINE HYDROCHLORIDE 20 MG/1
20 TABLET ORAL 4 TIMES DAILY PRN
Qty: 15 TABLET | Refills: 0 | Status: SHIPPED | OUTPATIENT
Start: 2019-01-10 | End: 2019-02-09

## 2019-01-10 RX ORDER — ONDANSETRON 4 MG/1
4 TABLET, ORALLY DISINTEGRATING ORAL EVERY 8 HOURS PRN
Qty: 20 TABLET | Refills: 0 | Status: SHIPPED | OUTPATIENT
Start: 2019-01-10 | End: 2019-07-30

## 2019-01-10 RX ORDER — ONDANSETRON 2 MG/ML
4 INJECTION INTRAMUSCULAR; INTRAVENOUS
Status: DISCONTINUED | OUTPATIENT
Start: 2019-01-10 | End: 2019-01-10 | Stop reason: HOSPADM

## 2019-01-10 RX ADMIN — SODIUM CHLORIDE 500 ML: 0.9 INJECTION, SOLUTION INTRAVENOUS at 10:01

## 2019-01-10 RX ADMIN — IOHEXOL 75 ML: 350 INJECTION, SOLUTION INTRAVENOUS at 12:01

## 2019-01-10 RX ADMIN — IOHEXOL 30 ML: 350 INJECTION, SOLUTION INTRAVENOUS at 12:01

## 2019-01-10 NOTE — TELEPHONE ENCOUNTER
----- Message from Vika Patino sent at 1/10/2019  8:34 AM CST -----  Contact: Self  Megha Mendoza  MRN: [id number]  : 1945  PCP: Tomy Baron  Home Phone      [phone]  Work Phone      Not on file.  Mobile          [phone]      MESSAGE:   Pt requests a sooner appointment than the  can schedule.  Does patient feel like they need to be seen today:  yes  What is the nature of the appointment:  Sharp Side pain, lower abdomen severe  What visit type:  est  Did you check other providers/department schedules for availability:   yes  Comments:     Phone:  [phone]

## 2019-01-10 NOTE — ED TRIAGE NOTES
Patient presents with c/o right  Lower quadrant pain since last night  Describes pain sharp cramping pain  Hx IBS  Denies n/v/d  Normal stool reported

## 2019-01-10 NOTE — ED PROVIDER NOTES
Encounter Date: 1/10/2019       History     Chief Complaint   Patient presents with    Abdominal Pain     RLQ PAIN SINCE LAST NIGHT     The history is provided by the patient.   Abdominal Pain   The current episode started today. The problem has not changed since onset.The abdominal pain is located in the RLQ. The abdominal pain does not radiate. The severity of the abdominal pain is 4/10. The other symptoms of the illness do not include fever, shortness of breath, nausea, vomiting, diarrhea or dysuria.   Symptoms associated with the illness do not include constipation, urgency, frequency or back pain.     Review of patient's allergies indicates:   Allergen Reactions    Keflex [cephalexin] Rash     Solid patches - rash like skin thickened    Hibiclens [chlorhexidine gluconate] Itching    Sulfa (sulfonamide antibiotics) Rash     Past Medical History:   Diagnosis Date    Arthritis     Back pain     Bronchitis, chronic     Carotid artery disease     50% blockage bilateral    COPD (chronic obstructive pulmonary disease)     Coronary artery disease     Diverticulitis     DJD (degenerative joint disease)     GERD (gastroesophageal reflux disease)     severe    Hemorrhoids     Hiatal hernia     Hyperlipidemia     Irritable bowel syndrome with constipation     DARA (obstructive sleep apnea)     Osteoporosis     Respiratory distress     Trouble in sleeping      Past Surgical History:   Procedure Laterality Date    ARTHROPLASTY-KNEE Left 8/2/2017    Performed by [facility]rotter [facility] at [facility] OR    ARTHROPLASTY-KNEE (PROCEDURE WAS NOT PERFORMED) Right 10/21/2015    [facility]    ARTHROPLASTY-TOTAL KNEE  Right 10/26/2015    [facility]    blockages in artaries      CARDIAC CATHETERIZATION  5/1/15    COLONOSCOPY      HEMORRHOID SURGERY  2009    HYSTERECTOMY  1995    complete    JOINT REPLACEMENT      JOINT REPLACEMENT Left  2017    TOTAL KNEE ARTHROPLASTY Right     TUBAL LIGATION       Family History   Problem Relation Age of Onset    Diabetes Mother     Hypertension Mother     Arthritis Mother     Stroke Mother     Stroke Father     Heart disease Father         congenital heart disease    Cancer Sister         lung    COPD Sister      Social History     Tobacco Use    Smoking status: Former Smoker     Packs/day: 0.75     Start date: 10/14/1959     Last attempt to quit: 10/14/2005     Years since quittin.2    Smokeless tobacco: Never Used    Tobacco comment: Stopped and started several times   Substance Use Topics    Alcohol use: No    Drug use: No     Review of Systems   Constitutional: Negative for fever.   HENT: Negative for congestion, ear pain, rhinorrhea, sore throat and trouble swallowing.    Eyes: Negative for pain, discharge, redness and visual disturbance.   Respiratory: Negative for cough, shortness of breath and wheezing.    Cardiovascular: Negative for chest pain and leg swelling.   Gastrointestinal: Positive for abdominal pain. Negative for constipation, diarrhea, nausea and vomiting.   Genitourinary: Negative for difficulty urinating, dysuria, flank pain, frequency and urgency.   Musculoskeletal: Negative for arthralgias, back pain, myalgias and neck pain.   Skin: Negative for rash and wound.   Neurological: Negative for seizures, weakness and headaches.   Psychiatric/Behavioral: Negative.        Physical Exam     Initial Vitals [01/10/19 1025]   BP Pulse Resp Temp SpO2   (!) 164/69 102 20 96.5 °F (35.8 °C) 96 %      MAP       --         Physical Exam    Nursing note and vitals reviewed.  Constitutional: No distress.   HENT:   Head: Normocephalic and atraumatic.   Right Ear: External ear normal.   Left Ear: External ear normal.   Nose: Nose normal.   Mouth/Throat: Oropharynx is clear and moist.   Eyes: Conjunctivae, EOM and lids are normal. Pupils are equal, round, and reactive to light.   Neck:  Neck supple.   Cardiovascular: Normal rate, regular rhythm, S1 normal, S2 normal, normal heart sounds and intact distal pulses.   Pulmonary/Chest: Effort normal and breath sounds normal. No respiratory distress.   Abdominal: Soft. Bowel sounds are normal. There is tenderness in the right upper quadrant and right lower quadrant.   Musculoskeletal: Normal range of motion.   Neurological: She is alert and oriented to person, place, and time. She has normal strength. GCS eye subscore is 4. GCS verbal subscore is 5. GCS motor subscore is 6.   Skin: Skin is warm and dry. Capillary refill takes less than 2 seconds. No rash noted.   Psychiatric: She has a normal mood and affect. Her speech is normal and behavior is normal.         ED Course   Procedures  Labs Reviewed   COMPREHENSIVE METABOLIC PANEL - Abnormal; Notable for the following components:       Result Value    BUN, Bld 25 (*)     Alkaline Phosphatase 53 (*)     All other components within normal limits   URINALYSIS, REFLEX TO URINE CULTURE - Abnormal; Notable for the following components:    Occult Blood UA Trace (*)     All other components within normal limits    Narrative:     Preferred Collection Type->Urine, Clean Catch   CBC W/ AUTO DIFFERENTIAL   LIPASE          Imaging Results          CT Abdomen Pelvis With Contrast (Final result)  Result time 01/10/19 13:06:13    Final result by Christy Ingram MD (01/10/19 13:06:13)                 Impression:      No acute imaging abnormality is seen.  Diverticulosis coli is visualized without diverticulitis.    Additional nonemergent findings as above.      Electronically signed by: Christy Ingram MD  Date:    01/10/2019  Time:    13:06             Narrative:    EXAMINATION:  CT ABDOMEN PELVIS WITH CONTRAST    CLINICAL HISTORY:  Abd trauma, blunt, hematuria macroscopic;Right lower abdominal pain;    TECHNIQUE:  Low dose axial images, sagittal and coronal reformations were obtained from the lung bases to the pubic  symphysis following the IV administration of 75 mL of Omnipaque 350 and the oral administration of 30 mL of Omnipaque 350.    COMPARISON:  04/27/2017    FINDINGS:  Calcified granuloma is present in the lingula.  The lung bases are otherwise clear.  The base of the heart appears normal.  Calcified atheromatous disease affects the aorta and its branch vessels.  There is a moderate to large hiatal hernia.    No radiopaque gallstones are seen.  No intrahepatic or extrahepatic biliary ductal dilatation is identified.  The liver, spleen, pancreas and adrenal glands appear normal.  Both kidneys are normal in size, shape and contour.  No hydronephrosis or hydroureter is seen.  The urinary bladder is well distended and appears normal.    Diverticulosis coli is visualized without diverticulitis.  No free air, free fluid or obstruction is identified.  No pathologically enlarged abdominal or pelvic lymph nodes are seen.    Age-appropriate degenerative changes affect the skeleton.  There is bilateral sacroiliitis.                                           Medications   morphine injection 1 mg (1 mg Intravenous Not Given 1/10/19 1345)   ondansetron injection 4 mg (4 mg Intravenous Not Given 1/10/19 1345)   sodium chloride 0.9% bolus 500 mL (0 mLs Intravenous Stopped 1/10/19 1300)   omnipaque 350 iohexol 75 mL (75 mLs Intravenous Given 1/10/19 1234)   omnipaque 350 iohexol 30 mL (30 mLs Oral Given 1/10/19 1234)                   Clinical Impression:   The encounter diagnosis was Lower abdominal pain.      Disposition:   Disposition: Discharged  Condition: Stable       I took over this patient from the nurse practitioner this afternoon  Patient with right lower quadrant tenderness this morning  Patient had a normal white count, normal lab work in the ER  CT scan of the abdomen pelvis showed no acute findings at this time  Patient's pain has largely resolved without pain medication here  She declined morphine, stating that she no  longer had any abdominal pain  Follow up with PCP, patient is currently asymptomatic with a benign abdominal exam                 Abdias Sands MD  01/10/19 6398

## 2019-01-11 ENCOUNTER — TELEPHONE (OUTPATIENT)
Dept: INFUSION THERAPY | Facility: HOSPITAL | Age: 74
End: 2019-01-11

## 2019-01-11 NOTE — TELEPHONE ENCOUNTER
Dr Baron;  Mrs Cleveland's due for her Prolia injection.  Can you please send me a rx for Prolia.  Thanks,  Summer

## 2019-01-25 ENCOUNTER — INFUSION (OUTPATIENT)
Dept: INFUSION THERAPY | Facility: HOSPITAL | Age: 74
End: 2019-01-25
Attending: FAMILY MEDICINE
Payer: MEDICARE

## 2019-01-25 VITALS
RESPIRATION RATE: 20 BRPM | DIASTOLIC BLOOD PRESSURE: 53 MMHG | TEMPERATURE: 96 F | HEART RATE: 53 BPM | SYSTOLIC BLOOD PRESSURE: 133 MMHG

## 2019-01-25 DIAGNOSIS — M85.80 OSTEOPENIA, UNSPECIFIED LOCATION: Primary | ICD-10-CM

## 2019-01-25 PROCEDURE — 63600175 PHARM REV CODE 636 W HCPCS: Performed by: FAMILY MEDICINE

## 2019-01-25 PROCEDURE — 96372 THER/PROPH/DIAG INJ SC/IM: CPT

## 2019-01-25 RX ADMIN — DENOSUMAB 60 MG: 60 INJECTION SUBCUTANEOUS at 11:01

## 2019-01-31 ENCOUNTER — OFFICE VISIT (OUTPATIENT)
Dept: FAMILY MEDICINE | Facility: CLINIC | Age: 74
End: 2019-01-31
Payer: MEDICARE

## 2019-01-31 VITALS
RESPIRATION RATE: 18 BRPM | DIASTOLIC BLOOD PRESSURE: 68 MMHG | SYSTOLIC BLOOD PRESSURE: 124 MMHG | HEART RATE: 66 BPM | WEIGHT: 147.06 LBS | BODY MASS INDEX: 28.87 KG/M2 | HEIGHT: 60 IN

## 2019-01-31 DIAGNOSIS — Z87.891 FORMER SMOKER: ICD-10-CM

## 2019-01-31 DIAGNOSIS — I25.10 CORONARY ARTERY DISEASE INVOLVING NATIVE CORONARY ARTERY OF NATIVE HEART WITHOUT ANGINA PECTORIS: ICD-10-CM

## 2019-01-31 DIAGNOSIS — I70.0 ATHEROSCLEROSIS OF AORTA: ICD-10-CM

## 2019-01-31 DIAGNOSIS — K21.9 GASTROESOPHAGEAL REFLUX DISEASE WITHOUT ESOPHAGITIS: Chronic | ICD-10-CM

## 2019-01-31 DIAGNOSIS — E55.9 VITAMIN D DEFICIENCY DISEASE: ICD-10-CM

## 2019-01-31 DIAGNOSIS — E78.5 DYSLIPIDEMIA: Primary | ICD-10-CM

## 2019-01-31 DIAGNOSIS — M85.80 OSTEOPENIA, UNSPECIFIED LOCATION: ICD-10-CM

## 2019-01-31 DIAGNOSIS — I65.23 BILATERAL CAROTID ARTERY STENOSIS: ICD-10-CM

## 2019-01-31 LAB
ALBUMIN SERPL BCP-MCNC: 3.9 G/DL
ALP SERPL-CCNC: 47 U/L
ALT SERPL W/O P-5'-P-CCNC: 21 U/L
ANION GAP SERPL CALC-SCNC: 7 MMOL/L
AST SERPL-CCNC: 23 U/L
BASOPHILS # BLD AUTO: 0.02 K/UL
BASOPHILS NFR BLD: 0.4 %
BILIRUB SERPL-MCNC: 0.5 MG/DL
BUN SERPL-MCNC: 29 MG/DL
CALCIUM SERPL-MCNC: 10 MG/DL
CHLORIDE SERPL-SCNC: 104 MMOL/L
CHOLEST SERPL-MCNC: 179 MG/DL
CHOLEST/HDLC SERPL: 2.4 {RATIO}
CO2 SERPL-SCNC: 29 MMOL/L
CREAT SERPL-MCNC: 0.9 MG/DL
DIFFERENTIAL METHOD: ABNORMAL
EOSINOPHIL # BLD AUTO: 0.3 K/UL
EOSINOPHIL NFR BLD: 5.6 %
ERYTHROCYTE [DISTWIDTH] IN BLOOD BY AUTOMATED COUNT: 13.4 %
EST. GFR  (AFRICAN AMERICAN): >60 ML/MIN/1.73 M^2
EST. GFR  (NON AFRICAN AMERICAN): >60 ML/MIN/1.73 M^2
GLUCOSE SERPL-MCNC: 84 MG/DL
HCT VFR BLD AUTO: 41 %
HDLC SERPL-MCNC: 74 MG/DL
HDLC SERPL: 41.3 %
HGB BLD-MCNC: 13 G/DL
LDLC SERPL CALC-MCNC: 83.6 MG/DL
LYMPHOCYTES # BLD AUTO: 1.7 K/UL
LYMPHOCYTES NFR BLD: 31.6 %
MCH RBC QN AUTO: 30.2 PG
MCHC RBC AUTO-ENTMCNC: 31.7 G/DL
MCV RBC AUTO: 95 FL
MONOCYTES # BLD AUTO: 0.5 K/UL
MONOCYTES NFR BLD: 9.8 %
NEUTROPHILS # BLD AUTO: 2.8 K/UL
NEUTROPHILS NFR BLD: 52.6 %
NONHDLC SERPL-MCNC: 105 MG/DL
PLATELET # BLD AUTO: 266 K/UL
PMV BLD AUTO: 10.6 FL
POTASSIUM SERPL-SCNC: 4.2 MMOL/L
PROT SERPL-MCNC: 7.3 G/DL
RBC # BLD AUTO: 4.3 M/UL
SODIUM SERPL-SCNC: 140 MMOL/L
TRIGL SERPL-MCNC: 107 MG/DL
TSH SERPL DL<=0.005 MIU/L-ACNC: 1.96 UIU/ML
WBC # BLD AUTO: 5.31 K/UL

## 2019-01-31 PROCEDURE — 80053 COMPREHEN METABOLIC PANEL: CPT

## 2019-01-31 PROCEDURE — 90662 IIV NO PRSV INCREASED AG IM: CPT | Mod: S$GLB,,, | Performed by: FAMILY MEDICINE

## 2019-01-31 PROCEDURE — 99214 OFFICE O/P EST MOD 30 MIN: CPT | Mod: 25,S$GLB,, | Performed by: FAMILY MEDICINE

## 2019-01-31 PROCEDURE — 3074F PR MOST RECENT SYSTOLIC BLOOD PRESSURE < 130 MM HG: ICD-10-PCS | Mod: CPTII,S$GLB,, | Performed by: FAMILY MEDICINE

## 2019-01-31 PROCEDURE — G0008 FLU VACCINE - HIGH DOSE (65+) PRESERVATIVE FREE IM: ICD-10-PCS | Mod: S$GLB,,, | Performed by: FAMILY MEDICINE

## 2019-01-31 PROCEDURE — 99499 UNLISTED E&M SERVICE: CPT | Mod: S$GLB,,, | Performed by: FAMILY MEDICINE

## 2019-01-31 PROCEDURE — 84443 ASSAY THYROID STIM HORMONE: CPT

## 2019-01-31 PROCEDURE — 1101F PT FALLS ASSESS-DOCD LE1/YR: CPT | Mod: CPTII,S$GLB,, | Performed by: FAMILY MEDICINE

## 2019-01-31 PROCEDURE — 36415 COLL VENOUS BLD VENIPUNCTURE: CPT | Mod: S$GLB,,, | Performed by: FAMILY MEDICINE

## 2019-01-31 PROCEDURE — 3078F DIAST BP <80 MM HG: CPT | Mod: CPTII,S$GLB,, | Performed by: FAMILY MEDICINE

## 2019-01-31 PROCEDURE — 3074F SYST BP LT 130 MM HG: CPT | Mod: CPTII,S$GLB,, | Performed by: FAMILY MEDICINE

## 2019-01-31 PROCEDURE — 85025 COMPLETE CBC W/AUTO DIFF WBC: CPT

## 2019-01-31 PROCEDURE — 3078F PR MOST RECENT DIASTOLIC BLOOD PRESSURE < 80 MM HG: ICD-10-PCS | Mod: CPTII,S$GLB,, | Performed by: FAMILY MEDICINE

## 2019-01-31 PROCEDURE — 36415 PR COLLECTION VENOUS BLOOD,VENIPUNCTURE: ICD-10-PCS | Mod: S$GLB,,, | Performed by: FAMILY MEDICINE

## 2019-01-31 PROCEDURE — 90662 FLU VACCINE - HIGH DOSE (65+) PRESERVATIVE FREE IM: ICD-10-PCS | Mod: S$GLB,,, | Performed by: FAMILY MEDICINE

## 2019-01-31 PROCEDURE — 1101F PR PT FALLS ASSESS DOC 0-1 FALLS W/OUT INJ PAST YR: ICD-10-PCS | Mod: CPTII,S$GLB,, | Performed by: FAMILY MEDICINE

## 2019-01-31 PROCEDURE — 80061 LIPID PANEL: CPT

## 2019-01-31 PROCEDURE — 82306 VITAMIN D 25 HYDROXY: CPT

## 2019-01-31 PROCEDURE — 99214 PR OFFICE/OUTPT VISIT, EST, LEVL IV, 30-39 MIN: ICD-10-PCS | Mod: 25,S$GLB,, | Performed by: FAMILY MEDICINE

## 2019-01-31 PROCEDURE — 99999 PR PBB SHADOW E&M-EST. PATIENT-LVL IV: CPT | Mod: PBBFAC,,, | Performed by: FAMILY MEDICINE

## 2019-01-31 PROCEDURE — 99999 PR PBB SHADOW E&M-EST. PATIENT-LVL IV: ICD-10-PCS | Mod: PBBFAC,,, | Performed by: FAMILY MEDICINE

## 2019-01-31 PROCEDURE — 99499 RISK ADDL DX/OHS AUDIT: ICD-10-PCS | Mod: S$GLB,,, | Performed by: FAMILY MEDICINE

## 2019-01-31 PROCEDURE — G0008 ADMIN INFLUENZA VIRUS VAC: HCPCS | Mod: S$GLB,,, | Performed by: FAMILY MEDICINE

## 2019-01-31 NOTE — PROGRESS NOTES
Subjective:       Patient ID: Megha Mendoza is a 73 y.o. female.    Chief Complaint: Follow-up (6 month follow up)    Pt is a 73 y.o. female who presents for evaluation and management of   Encounter Diagnoses   Name Primary?    Dyslipidemia Yes    Coronary artery disease involving native coronary artery of native heart without angina pectoris     Bilateral carotid artery stenosis     Atherosclerosis of aorta     Former smoker     Gastroesophageal reflux disease without esophagitis     Osteopenia, unspecified location     Vitamin D deficiency disease    .  Doing well on current meds. Denies any side effects. Prevention is up to date.    Review of Systems   Constitutional: Negative.  Negative for activity change, appetite change, chills, diaphoresis, fatigue, fever and unexpected weight change.   HENT: Positive for hearing loss. Negative for congestion, dental problem, drooling, ear discharge, ear pain, facial swelling, mouth sores, nosebleeds, postnasal drip, rhinorrhea, sinus pressure, sneezing, sore throat, tinnitus, trouble swallowing and voice change.    Eyes: Negative.  Negative for photophobia, pain, discharge, redness, itching and visual disturbance.   Respiratory: Positive for shortness of breath. Negative for apnea, cough, choking, chest tightness and wheezing.         SOB unchanged(COPD)   Cardiovascular: Negative.  Negative for chest pain, palpitations and leg swelling.   Gastrointestinal: Negative.  Negative for abdominal distention, abdominal pain, anal bleeding, blood in stool, constipation, diarrhea, nausea, rectal pain and vomiting.   Genitourinary: Negative.  Negative for difficulty urinating, dyspareunia, dysuria, enuresis, flank pain, frequency, hematuria, menstrual problem, pelvic pain, urgency, vaginal bleeding, vaginal discharge and vaginal pain.   Musculoskeletal: Positive for arthralgias. Negative for back pain, gait problem, joint swelling, myalgias, neck pain and neck stiffness.    Skin: Negative.  Negative for color change, pallor, rash and wound.   Neurological: Negative.  Negative for dizziness, tremors, seizures, syncope, facial asymmetry, speech difficulty, weakness, light-headedness, numbness and headaches.   Hematological: Negative for adenopathy. Does not bruise/bleed easily.   Psychiatric/Behavioral: Negative.  Negative for agitation, behavioral problems, confusion, decreased concentration, dysphoric mood, hallucinations, self-injury, sleep disturbance and suicidal ideas. The patient is not nervous/anxious and is not hyperactive.        Objective:      Physical Exam   Constitutional: She is oriented to person, place, and time. She appears well-developed and well-nourished.   HENT:   Head: Normocephalic and atraumatic.   Right Ear: External ear normal.   Left Ear: External ear normal.   Nose: Nose normal.   Mouth/Throat: Oropharynx is clear and moist.   Eyes: EOM are normal. Pupils are equal, round, and reactive to light.   Neck: Normal range of motion. Neck supple. No JVD present. No tracheal deviation present. No thyromegaly present.   Cardiovascular: Normal rate, normal heart sounds and intact distal pulses.   No murmur heard.  Pulmonary/Chest: Effort normal and breath sounds normal. No respiratory distress. She has no wheezes. She has no rales. She exhibits no tenderness.   Abdominal: Soft. Bowel sounds are normal. She exhibits no distension and no mass. There is no tenderness. There is no rebound and no guarding.   Musculoskeletal: Normal range of motion. She exhibits no edema or tenderness.   Lymphadenopathy:     She has no cervical adenopathy.   Neurological: She is alert and oriented to person, place, and time. She has normal reflexes. She displays normal reflexes. No cranial nerve deficit. She exhibits normal muscle tone. Coordination normal.   Skin: Skin is warm and dry. No rash noted. No erythema. No pallor.   Psychiatric: She has a normal mood and affect. Her behavior is  normal. Judgment and thought content normal.       Assessment:       1. Dyslipidemia    2. Coronary artery disease involving native coronary artery of native heart without angina pectoris    3. Bilateral carotid artery stenosis    4. Atherosclerosis of aorta    5. Former smoker    6. Gastroesophageal reflux disease without esophagitis    7. Osteopenia, unspecified location    8. Vitamin D deficiency disease        Plan:   Megha was seen today for follow-up.    Diagnoses and all orders for this visit:    Dyslipidemia  -     Comprehensive metabolic panel; Future  -     Lipid panel; Future  -     TSH; Future    Coronary artery disease involving native coronary artery of native heart without angina pectoris  -     CBC auto differential; Future  -     Comprehensive metabolic panel; Future  -     Lipid panel; Future    Bilateral carotid artery stenosis    Atherosclerosis of aorta    Former smoker    Gastroesophageal reflux disease without esophagitis    Osteopenia, unspecified location  -     Vitamin D; Future    Vitamin D deficiency disease  -     Vitamin D; Future      Problem List Items Addressed This Visit     Atherosclerosis of aorta    Overview     On statin          Carotid disease, bilateral    Overview     Monitored per Dr. Day  On statin          Coronary artery disease involving native coronary artery without angina pectoris    Relevant Orders    CBC auto differential    Comprehensive metabolic panel    Lipid panel    Dyslipidemia - Primary    Overview     Lab Results   Component Value Date    CHOL 187 02/03/2018    CHOL 166 02/08/2017    CHOL 184 02/04/2016     Lab Results   Component Value Date    HDL 66 02/03/2018    HDL 60 02/08/2017    HDL 58 02/04/2016     Lab Results   Component Value Date    LDLCALC 102.6 02/03/2018    LDLCALC 85.2 02/08/2017    LDLCALC 99.2 02/04/2016     Lab Results   Component Value Date    TRIG 92 02/03/2018    TRIG 104 02/08/2017    TRIG 134 02/04/2016     Lab Results   Component  Value Date    CHOLHDL 35.3 02/03/2018    CHOLHDL 36.1 02/08/2017    CHOLHDL 31.5 02/04/2016     Continue statin          Relevant Orders    Comprehensive metabolic panel    Lipid panel    TSH    Former smoker    Gastroesophageal reflux disease without esophagitis (Chronic)    Overview     On PPI   Can't take bisphosphonate for osteo          Osteopenia    Overview     Originally osteoporosis. Continue prolia          Relevant Orders    Vitamin D      Other Visit Diagnoses     Vitamin D deficiency disease        Relevant Orders    Vitamin D        No Follow-up on file.

## 2019-02-01 LAB — 25(OH)D3+25(OH)D2 SERPL-MCNC: 76 NG/ML

## 2019-04-22 RX ORDER — ATORVASTATIN CALCIUM 40 MG/1
40 TABLET, FILM COATED ORAL NIGHTLY
Qty: 30 TABLET | Refills: 5 | Status: SHIPPED | OUTPATIENT
Start: 2019-04-22 | End: 2020-01-29

## 2019-04-22 NOTE — TELEPHONE ENCOUNTER
----- Message from Manjula Whitley sent at 2019  2:06 PM CDT -----  Contact: pt  Megha Mendoza  MRN: [id number]  : 1945  PCP: Tomy Baron  Home Phone      [phone]  Work Phone      Not on file.  Mobile          [phone]      MESSAGE:   Pt requesting refill or new Rx.   Is this a refill or new RX:  refill  RX name and strength: atorvastatin (LIPITOR) 40 MG tablet  Last office visit:   Is this a 30-day or 90-day RX:  30  Pharmacy name and location:  [facility] [address]  Comments:      Phone:  [phone]

## 2019-06-18 LAB — GASTROCULT GAST QL: NEGATIVE

## 2019-07-26 ENCOUNTER — INFUSION (OUTPATIENT)
Dept: INFUSION THERAPY | Facility: HOSPITAL | Age: 74
End: 2019-07-26
Attending: FAMILY MEDICINE
Payer: MEDICARE

## 2019-07-26 VITALS
DIASTOLIC BLOOD PRESSURE: 63 MMHG | BODY MASS INDEX: 31.25 KG/M2 | WEIGHT: 155 LBS | HEIGHT: 59 IN | HEART RATE: 51 BPM | RESPIRATION RATE: 18 BRPM | SYSTOLIC BLOOD PRESSURE: 140 MMHG | TEMPERATURE: 97 F

## 2019-07-26 DIAGNOSIS — M85.80 OSTEOPENIA, UNSPECIFIED LOCATION: Primary | ICD-10-CM

## 2019-07-26 PROCEDURE — 63600175 PHARM REV CODE 636 W HCPCS: Performed by: FAMILY MEDICINE

## 2019-07-26 PROCEDURE — 96372 THER/PROPH/DIAG INJ SC/IM: CPT

## 2019-07-26 RX ADMIN — DENOSUMAB 60 MG: 60 INJECTION SUBCUTANEOUS at 11:07

## 2019-07-26 NOTE — DISCHARGE INSTRUCTIONS
Denosumab injection (PROLIA)  What is this medicine?  DENOSUMAB (den oh rosangela mab) slows bone breakdown. Prolia is used to treat osteoporosis in women after menopause and in men. Xgeva is used to prevent bone fractures and other bone problems caused by cancer bone metastases. Xgeva is also used to treat giant cell tumor of the bone.  How should I use this medicine?  This medicine is for injection under the skin. It is given by a health care professional in a hospital or clinic setting.  If you are getting Prolia, a special MedGuide will be given to you by the pharmacist with each prescription and refill. Be sure to read this information carefully each time.  For Prolia, talk to your pediatrician regarding the use of this medicine in children. Special care may be needed. For Xgeva, talk to your pediatrician regarding the use of this medicine in children. While this drug may be prescribed for children as young as 13 years for selected conditions, precautions do apply.  What side effects may I notice from receiving this medicine?  Side effects that you should report to your doctor or health care professional as soon as possible:  · allergic reactions like skin rash, itching or hives, swelling of the face, lips, or tongue  · breathing problems  · chest pain  · fast, irregular heartbeat  · feeling faint or lightheaded, falls  · fever, chills, or any other sign of infection  · muscle spasms, tightening, or twitches  · numbness or tingling  · skin blisters or bumps, or is dry, peels, or red  · slow healing or unexplained pain in the mouth or jaw  · unusual bleeding or bruising  Side effects that usually do not require medical attention (Report these to your doctor or health care professional if they continue or are bothersome.):  · muscle pain  · stomach upset, gas  What may interact with this medicine?  Do not take this medicine with any of the following medications:  · other medicines containing denosumab  This medicine  may also interact with the following medications:  · medicines that suppress the immune system  · medicines that treat cancer  · steroid medicines like prednisone or cortisone  What if I miss a dose?  It is important not to miss your dose. Call your doctor or health care professional if you are unable to keep an appointment.  Where should I keep my medicine?  This medicine is only given in a clinic, doctor's office, or other health care setting and will not be stored at home.  What should I tell my health care provider before I take this medicine?  They need to know if you have any of these conditions:  · dental disease  · eczema  · infection or history of infections  · kidney disease or on dialysis  · low blood calcium or vitamin D  · malabsorption syndrome  · scheduled to have surgery or tooth extraction  · taking medicine that contains denosumab  · thyroid or parathyroid disease  · an unusual reaction to denosumab, other medicines, foods, dyes, or preservatives  · pregnant or trying to get pregnant  · breast-feeding  What should I watch for while using this medicine?  Visit your doctor or health care professional for regular checks on your progress. Your doctor or health care professional may order blood tests and other tests to see how you are doing.  Call your doctor or health care professional if you get a cold or other infection while receiving this medicine. Do not treat yourself. This medicine may decrease your body's ability to fight infection.  You should make sure you get enough calcium and vitamin D while you are taking this medicine, unless your doctor tells you not to. Discuss the foods you eat and the vitamins you take with your health care professional.  See your dentist regularly. Brush and floss your teeth as directed. Before you have any dental work done, tell your dentist you are receiving this medicine.  Do not become pregnant while taking this medicine or for 5 months after stopping it. Women  should inform their doctor if they wish to become pregnant or think they might be pregnant. There is a potential for serious side effects to an unborn child. Talk to your health care professional or pharmacist for more information.  NOTE:This sheet is a summary. It may not cover all possible information. If you have questions about this medicine, talk to your doctor, pharmacist, or health care provider. Copyright© 2017 [employer]

## 2019-07-30 ENCOUNTER — OFFICE VISIT (OUTPATIENT)
Dept: FAMILY MEDICINE | Facility: CLINIC | Age: 74
End: 2019-07-30
Payer: MEDICARE

## 2019-07-30 VITALS
HEIGHT: 60 IN | BODY MASS INDEX: 28.27 KG/M2 | DIASTOLIC BLOOD PRESSURE: 62 MMHG | WEIGHT: 144 LBS | HEART RATE: 60 BPM | SYSTOLIC BLOOD PRESSURE: 124 MMHG

## 2019-07-30 DIAGNOSIS — K21.9 GASTROESOPHAGEAL REFLUX DISEASE WITHOUT ESOPHAGITIS: Chronic | ICD-10-CM

## 2019-07-30 DIAGNOSIS — E78.5 DYSLIPIDEMIA: Primary | ICD-10-CM

## 2019-07-30 DIAGNOSIS — R06.02 SOB (SHORTNESS OF BREATH): ICD-10-CM

## 2019-07-30 DIAGNOSIS — I65.23 BILATERAL CAROTID ARTERY STENOSIS: ICD-10-CM

## 2019-07-30 PROCEDURE — 99999 PR PBB SHADOW E&M-EST. PATIENT-LVL II: CPT | Mod: PBBFAC,,, | Performed by: FAMILY MEDICINE

## 2019-07-30 PROCEDURE — 99499 RISK ADDL DX/OHS AUDIT: ICD-10-PCS | Mod: S$GLB,,, | Performed by: FAMILY MEDICINE

## 2019-07-30 PROCEDURE — 99214 OFFICE O/P EST MOD 30 MIN: CPT | Mod: S$GLB,,, | Performed by: FAMILY MEDICINE

## 2019-07-30 PROCEDURE — 3074F SYST BP LT 130 MM HG: CPT | Mod: CPTII,S$GLB,, | Performed by: FAMILY MEDICINE

## 2019-07-30 PROCEDURE — 99999 PR PBB SHADOW E&M-EST. PATIENT-LVL II: ICD-10-PCS | Mod: PBBFAC,,, | Performed by: FAMILY MEDICINE

## 2019-07-30 PROCEDURE — 3074F PR MOST RECENT SYSTOLIC BLOOD PRESSURE < 130 MM HG: ICD-10-PCS | Mod: CPTII,S$GLB,, | Performed by: FAMILY MEDICINE

## 2019-07-30 PROCEDURE — 99499 UNLISTED E&M SERVICE: CPT | Mod: S$GLB,,, | Performed by: FAMILY MEDICINE

## 2019-07-30 PROCEDURE — 3078F PR MOST RECENT DIASTOLIC BLOOD PRESSURE < 80 MM HG: ICD-10-PCS | Mod: CPTII,S$GLB,, | Performed by: FAMILY MEDICINE

## 2019-07-30 PROCEDURE — 1101F PT FALLS ASSESS-DOCD LE1/YR: CPT | Mod: CPTII,S$GLB,, | Performed by: FAMILY MEDICINE

## 2019-07-30 PROCEDURE — 99214 PR OFFICE/OUTPT VISIT, EST, LEVL IV, 30-39 MIN: ICD-10-PCS | Mod: S$GLB,,, | Performed by: FAMILY MEDICINE

## 2019-07-30 PROCEDURE — 1101F PR PT FALLS ASSESS DOC 0-1 FALLS W/OUT INJ PAST YR: ICD-10-PCS | Mod: CPTII,S$GLB,, | Performed by: FAMILY MEDICINE

## 2019-07-30 PROCEDURE — 3078F DIAST BP <80 MM HG: CPT | Mod: CPTII,S$GLB,, | Performed by: FAMILY MEDICINE

## 2019-07-30 RX ORDER — DICLOFENAC SODIUM 10 MG/G
2 GEL TOPICAL DAILY
Qty: 300 G | Refills: 5 | Status: SHIPPED | OUTPATIENT
Start: 2019-07-30 | End: 2023-04-18

## 2019-07-30 NOTE — PROGRESS NOTES
Subjective:       Patient ID: Megha Mendoza is a 74 y.o. female.    Chief Complaint: Follow-up ( 6month )    Pt is a 74 y.o. female who presents for evaluation and management of   Encounter Diagnoses   Name Primary?    Dyslipidemia Yes    Gastroesophageal reflux disease without esophagitis     SOB (shortness of breath)     Bilateral carotid artery stenosis    .  Doing well on current meds. Denies any side effects. Prevention is up to date.  Review of Systems   Constitutional: Negative for chills and fever.   Respiratory: Negative for shortness of breath.    Cardiovascular: Negative for chest pain and palpitations.        SOB sometimes when she is falling asleep  PND?  Can't lie flat on back due to pain    Gastrointestinal: Negative for abdominal pain, blood in stool, constipation and nausea.   Genitourinary: Negative for difficulty urinating.   Psychiatric/Behavioral: Negative for dysphoric mood, sleep disturbance and suicidal ideas. The patient is not nervous/anxious.        Objective:      Physical Exam   Constitutional: She is oriented to person, place, and time. She appears well-developed and well-nourished.   HENT:   Head: Normocephalic and atraumatic.   Right Ear: External ear normal.   Left Ear: External ear normal.   Nose: Nose normal.   Mouth/Throat: Oropharynx is clear and moist.   Eyes: Pupils are equal, round, and reactive to light. EOM are normal.   Neck: Normal range of motion. Neck supple. No JVD present. No tracheal deviation present. No thyromegaly present.   Cardiovascular: Normal rate, normal heart sounds and intact distal pulses.   No murmur heard.  Pulmonary/Chest: Effort normal and breath sounds normal. No respiratory distress. She has no wheezes. She has no rales. She exhibits no tenderness.   Abdominal: Soft. Bowel sounds are normal. She exhibits no distension and no mass. There is no tenderness. There is no rebound and no guarding.   Musculoskeletal: Normal range of motion. She exhibits  no edema or tenderness.   Lymphadenopathy:     She has no cervical adenopathy.   Neurological: She is alert and oriented to person, place, and time. She has normal reflexes. She displays normal reflexes. No cranial nerve deficit. She exhibits normal muscle tone. Coordination normal.   Skin: Skin is warm and dry. No rash noted. No erythema. No pallor.   Psychiatric: She has a normal mood and affect. Her behavior is normal. Judgment and thought content normal.       Assessment:       1. Dyslipidemia    2. Gastroesophageal reflux disease without esophagitis    3. SOB (shortness of breath)    4. Bilateral carotid artery stenosis        Plan:   Megha was seen today for follow-up.    Diagnoses and all orders for this visit:    Dyslipidemia  -     Comprehensive metabolic panel; Future    Gastroesophageal reflux disease without esophagitis    SOB (shortness of breath)  -     Brain natriuretic peptide; Future    Bilateral carotid artery stenosis      Problem List Items Addressed This Visit     Carotid disease, bilateral    Overview     Monitored per Dr. Day  On statin          Dyslipidemia - Primary    Overview     Lab Results   Component Value Date    CHOL 187 02/03/2018    CHOL 166 02/08/2017    CHOL 184 02/04/2016     Lab Results   Component Value Date    HDL 66 02/03/2018    HDL 60 02/08/2017    HDL 58 02/04/2016     Lab Results   Component Value Date    LDLCALC 102.6 02/03/2018    LDLCALC 85.2 02/08/2017    LDLCALC 99.2 02/04/2016     Lab Results   Component Value Date    TRIG 92 02/03/2018    TRIG 104 02/08/2017    TRIG 134 02/04/2016     Lab Results   Component Value Date    CHOLHDL 35.3 02/03/2018    CHOLHDL 36.1 02/08/2017    CHOLHDL 31.5 02/04/2016     Continue statin          Relevant Orders    Comprehensive metabolic panel    Gastroesophageal reflux disease without esophagitis (Chronic)    Overview     On PPI   Can't take bisphosphonate for osteo            Other Visit Diagnoses     SOB (shortness of breath)         Relevant Orders    Brain natriuretic peptide        No follow-ups on file.

## 2019-07-31 LAB
CHOLEST SERPL-MSCNC: 168 MG/DL (ref 0–200)
CHOLEST/HDLC SERPL: 2.2 {RATIO}
HDLC SERPL-MCNC: 77 MG/DL
LDL CHOLESTEROL DIRECT: 75 MG/DL
NON HDL CHOL (CALC): 91
TRIGL SERPL-MCNC: 80 MG/DL
VLDL CHOLESTEROL: 16 MG/DL

## 2019-08-20 ENCOUNTER — PATIENT OUTREACH (OUTPATIENT)
Dept: ADMINISTRATIVE | Facility: HOSPITAL | Age: 74
End: 2019-08-20

## 2019-09-06 ENCOUNTER — PATIENT OUTREACH (OUTPATIENT)
Dept: ADMINISTRATIVE | Facility: HOSPITAL | Age: 74
End: 2019-09-06

## 2020-01-02 LAB
CHOLEST/HDLC SERPL: 2 {RATIO}
CHOLESTEROL, TOTAL: 163
HDLC SERPL-MCNC: 82 MG/DL (ref 35–70)
LDL CHOLESTEROL DIRECT: 55 MG/DL
NON HDL CHOL. (LDL+VLDL): 81
TRIGL SERPL-MCNC: 133 MG/DL
VLDL CHOLESTEROL: 27 MG/DL

## 2020-01-17 DIAGNOSIS — M81.0 OSTEOPOROSIS, POSTMENOPAUSAL: Primary | ICD-10-CM

## 2020-01-17 NOTE — TELEPHONE ENCOUNTER
Dr Baron;  Mrs Cleveland's due for her Prolia.  Her last DEXA was 2017.  Can you please send me a rx for Prolia if you want her to continue on Prolia.  Thanks,  Summer

## 2020-01-23 ENCOUNTER — TELEPHONE (OUTPATIENT)
Dept: ADMINISTRATIVE | Facility: HOSPITAL | Age: 75
End: 2020-01-23

## 2020-01-27 ENCOUNTER — INFUSION (OUTPATIENT)
Dept: INFUSION THERAPY | Facility: HOSPITAL | Age: 75
End: 2020-01-27
Attending: FAMILY MEDICINE
Payer: MEDICARE

## 2020-01-27 VITALS
WEIGHT: 155 LBS | TEMPERATURE: 97 F | SYSTOLIC BLOOD PRESSURE: 123 MMHG | RESPIRATION RATE: 18 BRPM | DIASTOLIC BLOOD PRESSURE: 61 MMHG | HEART RATE: 58 BPM | HEIGHT: 59 IN | BODY MASS INDEX: 31.25 KG/M2

## 2020-01-27 DIAGNOSIS — M85.80 OSTEOPENIA, UNSPECIFIED LOCATION: Primary | ICD-10-CM

## 2020-01-27 PROCEDURE — 96372 THER/PROPH/DIAG INJ SC/IM: CPT

## 2020-01-27 PROCEDURE — 63600175 PHARM REV CODE 636 W HCPCS: Performed by: FAMILY MEDICINE

## 2020-01-27 RX ADMIN — DENOSUMAB 60 MG: 60 INJECTION SUBCUTANEOUS at 10:01

## 2020-01-29 ENCOUNTER — OFFICE VISIT (OUTPATIENT)
Dept: FAMILY MEDICINE | Facility: CLINIC | Age: 75
End: 2020-01-29
Payer: MEDICARE

## 2020-01-29 VITALS
DIASTOLIC BLOOD PRESSURE: 74 MMHG | HEIGHT: 59 IN | SYSTOLIC BLOOD PRESSURE: 118 MMHG | HEART RATE: 60 BPM | RESPIRATION RATE: 16 BRPM | BODY MASS INDEX: 31.16 KG/M2 | WEIGHT: 154.56 LBS

## 2020-01-29 DIAGNOSIS — K21.9 GASTROESOPHAGEAL REFLUX DISEASE WITHOUT ESOPHAGITIS: Chronic | ICD-10-CM

## 2020-01-29 DIAGNOSIS — E78.5 DYSLIPIDEMIA: Primary | ICD-10-CM

## 2020-01-29 DIAGNOSIS — R51.9 SINUS HEADACHE: ICD-10-CM

## 2020-01-29 DIAGNOSIS — M85.80 OSTEOPENIA, UNSPECIFIED LOCATION: ICD-10-CM

## 2020-01-29 DIAGNOSIS — M81.6 LOCALIZED OSTEOPOROSIS (LEQUESNE): ICD-10-CM

## 2020-01-29 DIAGNOSIS — J41.0 SIMPLE CHRONIC BRONCHITIS: ICD-10-CM

## 2020-01-29 PROCEDURE — 1125F AMNT PAIN NOTED PAIN PRSNT: CPT | Mod: S$GLB,,, | Performed by: FAMILY MEDICINE

## 2020-01-29 PROCEDURE — 1101F PT FALLS ASSESS-DOCD LE1/YR: CPT | Mod: CPTII,S$GLB,, | Performed by: FAMILY MEDICINE

## 2020-01-29 PROCEDURE — 99214 OFFICE O/P EST MOD 30 MIN: CPT | Mod: 25,S$GLB,, | Performed by: FAMILY MEDICINE

## 2020-01-29 PROCEDURE — 1101F PR PT FALLS ASSESS DOC 0-1 FALLS W/OUT INJ PAST YR: ICD-10-PCS | Mod: CPTII,S$GLB,, | Performed by: FAMILY MEDICINE

## 2020-01-29 PROCEDURE — 1125F PR PAIN SEVERITY QUANTIFIED, PAIN PRESENT: ICD-10-PCS | Mod: S$GLB,,, | Performed by: FAMILY MEDICINE

## 2020-01-29 PROCEDURE — 99214 PR OFFICE/OUTPT VISIT, EST, LEVL IV, 30-39 MIN: ICD-10-PCS | Mod: 25,S$GLB,, | Performed by: FAMILY MEDICINE

## 2020-01-29 PROCEDURE — G0008 FLU VACCINE - HIGH DOSE (65+) PRESERVATIVE FREE IM: ICD-10-PCS | Mod: 59,S$GLB,, | Performed by: FAMILY MEDICINE

## 2020-01-29 PROCEDURE — 3078F PR MOST RECENT DIASTOLIC BLOOD PRESSURE < 80 MM HG: ICD-10-PCS | Mod: CPTII,S$GLB,, | Performed by: FAMILY MEDICINE

## 2020-01-29 PROCEDURE — 3078F DIAST BP <80 MM HG: CPT | Mod: CPTII,S$GLB,, | Performed by: FAMILY MEDICINE

## 2020-01-29 PROCEDURE — 3074F PR MOST RECENT SYSTOLIC BLOOD PRESSURE < 130 MM HG: ICD-10-PCS | Mod: CPTII,S$GLB,, | Performed by: FAMILY MEDICINE

## 2020-01-29 PROCEDURE — 99499 RISK ADDL DX/OHS AUDIT: ICD-10-PCS | Mod: S$GLB,,, | Performed by: FAMILY MEDICINE

## 2020-01-29 PROCEDURE — 3074F SYST BP LT 130 MM HG: CPT | Mod: CPTII,S$GLB,, | Performed by: FAMILY MEDICINE

## 2020-01-29 PROCEDURE — 90662 FLU VACCINE - HIGH DOSE (65+) PRESERVATIVE FREE IM: ICD-10-PCS | Mod: S$GLB,,, | Performed by: FAMILY MEDICINE

## 2020-01-29 PROCEDURE — 90662 IIV NO PRSV INCREASED AG IM: CPT | Mod: S$GLB,,, | Performed by: FAMILY MEDICINE

## 2020-01-29 PROCEDURE — 1159F PR MEDICATION LIST DOCUMENTED IN MEDICAL RECORD: ICD-10-PCS | Mod: S$GLB,,, | Performed by: FAMILY MEDICINE

## 2020-01-29 PROCEDURE — 1159F MED LIST DOCD IN RCRD: CPT | Mod: S$GLB,,, | Performed by: FAMILY MEDICINE

## 2020-01-29 PROCEDURE — 99999 PR PBB SHADOW E&M-EST. PATIENT-LVL III: ICD-10-PCS | Mod: PBBFAC,,, | Performed by: FAMILY MEDICINE

## 2020-01-29 PROCEDURE — 96372 THER/PROPH/DIAG INJ SC/IM: CPT | Mod: S$GLB,,, | Performed by: FAMILY MEDICINE

## 2020-01-29 PROCEDURE — 99999 PR PBB SHADOW E&M-EST. PATIENT-LVL III: CPT | Mod: PBBFAC,,, | Performed by: FAMILY MEDICINE

## 2020-01-29 PROCEDURE — G0008 ADMIN INFLUENZA VIRUS VAC: HCPCS | Mod: 59,S$GLB,, | Performed by: FAMILY MEDICINE

## 2020-01-29 PROCEDURE — 96372 PR INJECTION,THERAP/PROPH/DIAG2ST, IM OR SUBCUT: ICD-10-PCS | Mod: S$GLB,,, | Performed by: FAMILY MEDICINE

## 2020-01-29 PROCEDURE — 99499 UNLISTED E&M SERVICE: CPT | Mod: S$GLB,,, | Performed by: FAMILY MEDICINE

## 2020-01-29 RX ORDER — METHYLPREDNISOLONE ACETATE 40 MG/ML
60 INJECTION, SUSPENSION INTRA-ARTICULAR; INTRALESIONAL; INTRAMUSCULAR; SOFT TISSUE
Status: COMPLETED | OUTPATIENT
Start: 2020-01-29 | End: 2020-01-29

## 2020-01-29 RX ORDER — LEVOFLOXACIN 500 MG/1
500 TABLET, FILM COATED ORAL DAILY
Qty: 10 TABLET | Refills: 0 | Status: SHIPPED | OUTPATIENT
Start: 2020-01-29 | End: 2020-02-08

## 2020-01-29 RX ORDER — ROSUVASTATIN CALCIUM 40 MG/1
40 TABLET, COATED ORAL NIGHTLY
COMMUNITY
Start: 2019-11-19 | End: 2021-08-02 | Stop reason: SDUPTHER

## 2020-01-29 RX ORDER — EZETIMIBE 10 MG/1
10 TABLET ORAL DAILY
COMMUNITY
Start: 2020-01-14 | End: 2021-02-02

## 2020-01-29 RX ADMIN — METHYLPREDNISOLONE ACETATE 60 MG: 40 INJECTION, SUSPENSION INTRA-ARTICULAR; INTRALESIONAL; INTRAMUSCULAR; SOFT TISSUE at 01:01

## 2020-01-29 NOTE — PROGRESS NOTES
Subjective:       Patient ID: Megha Mendoza is a 74 y.o. female.    Chief Complaint: Follow-up (6 month)    Pt is a 74 y.o. female who presents for evaluation and management of   Encounter Diagnoses   Name Primary?    Dyslipidemia Yes    Gastroesophageal reflux disease without esophagitis     Osteopenia, unspecified location     Simple chronic bronchitis     Sinus headache     Localized osteoporosis ([place])     .  Doing well on current meds. Denies any side effects. Prevention is up to date.  Review of Systems   Constitutional: Negative for chills and fever.   HENT: Positive for congestion and sinus pressure.    Respiratory: Negative for shortness of breath.    Cardiovascular: Negative for chest pain and palpitations.   Gastrointestinal: Negative for abdominal pain, blood in stool, constipation and nausea.   Genitourinary: Negative for difficulty urinating.   Neurological: Positive for headaches.   Psychiatric/Behavioral: Negative for dysphoric mood, sleep disturbance and suicidal ideas. The patient is not nervous/anxious.        Objective:      Physical Exam   Constitutional: She is oriented to person, place, and time. She appears well-developed and well-nourished.   HENT:   Head: Normocephalic and atraumatic.   Right Ear: External ear normal.   Left Ear: External ear normal.   Nose: Nose normal.   Mouth/Throat: Oropharynx is clear and moist.   Maxillary TTP      Eyes: Pupils are equal, round, and reactive to light. EOM are normal.   Neck: Normal range of motion. Neck supple. No JVD present. No tracheal deviation present. No thyromegaly present.   Cardiovascular: Normal rate, normal heart sounds and intact distal pulses.   No murmur heard.  Pulmonary/Chest: Effort normal and breath sounds normal. No respiratory distress. She has no wheezes. She has no rales. She exhibits no tenderness.   Abdominal: Soft. Bowel sounds are normal. She exhibits no distension and no mass. There is no tenderness. There is no  rebound and no guarding.   Musculoskeletal: Normal range of motion. She exhibits no edema or tenderness.   Lymphadenopathy:     She has no cervical adenopathy.   Neurological: She is alert and oriented to person, place, and time. She has normal reflexes. She displays normal reflexes. No cranial nerve deficit. She exhibits normal muscle tone. Coordination normal.   Skin: Skin is warm and dry. No rash noted. No erythema. No pallor.   Psychiatric: She has a normal mood and affect. Her behavior is normal. Judgment and thought content normal.       Assessment:       1. Dyslipidemia    2. Gastroesophageal reflux disease without esophagitis    3. Osteopenia, unspecified location    4. Simple chronic bronchitis    5. Sinus headache    6. Localized osteoporosis ([place])         Plan:   Megha was seen today for follow-up.    Diagnoses and all orders for this visit:    Dyslipidemia    Gastroesophageal reflux disease without esophagitis    Osteopenia, unspecified location  -     DXA Bone Density Spine And Hip; Future    Simple chronic bronchitis    Sinus headache  -     methylPREDNISolone acetate injection 60 mg  -     levoFLOXacin (LEVAQUIN) 500 MG tablet; Take 1 tablet (500 mg total) by mouth once daily. for 10 days    Localized osteoporosis ([place])   -     DXA Bone Density Spine And Hip; Future      Problem List Items Addressed This Visit     Dyslipidemia - Primary    Overview     Lab Results   Component Value Date    CHOL 187 02/03/2018    CHOL 166 02/08/2017    CHOL 184 02/04/2016     Lab Results   Component Value Date    HDL 66 02/03/2018    HDL 60 02/08/2017    HDL 58 02/04/2016     Lab Results   Component Value Date    LDLCALC 102.6 02/03/2018    LDLCALC 85.2 02/08/2017    LDLCALC 99.2 02/04/2016     Lab Results   Component Value Date    TRIG 92 02/03/2018    TRIG 104 02/08/2017    TRIG 134 02/04/2016     Lab Results   Component Value Date    CHOLHDL 35.3 02/03/2018    CHOLHDL 36.1 02/08/2017    CHOLHDL 31.5  02/04/2016     Continue statin          Gastroesophageal reflux disease without esophagitis (Chronic)    Overview     On PPI   Can't take bisphosphonate for osteo          Osteopenia    Overview     Originally osteoporosis. Continue prolia          Relevant Orders    DXA Bone Density Spine And Hip    Simple chronic bronchitis    Overview     Continue albuterol atrovent prn   No longer smoking            Other Visit Diagnoses     Sinus headache        Relevant Medications    methylPREDNISolone acetate injection 60 mg (Start on 1/29/2020  1:30 PM)    levoFLOXacin (LEVAQUIN) 500 MG tablet    Localized osteoporosis ([place])         Relevant Orders    DXA Bone Density Spine And Hip        No follow-ups on file.

## 2020-02-04 ENCOUNTER — HOSPITAL ENCOUNTER (OUTPATIENT)
Dept: RADIOLOGY | Facility: HOSPITAL | Age: 75
Discharge: HOME | End: 2020-02-04
Attending: FAMILY MEDICINE
Payer: MEDICARE

## 2020-02-04 DIAGNOSIS — M85.80 OSTEOPENIA, UNSPECIFIED LOCATION: ICD-10-CM

## 2020-02-04 DIAGNOSIS — M81.6 LOCALIZED OSTEOPOROSIS (LEQUESNE): ICD-10-CM

## 2020-02-04 PROCEDURE — 77080 DEXA BONE DENSITY SPINE HIP: ICD-10-PCS | Mod: 26,,, | Performed by: RADIOLOGY

## 2020-02-04 PROCEDURE — 77080 DXA BONE DENSITY AXIAL: CPT | Mod: 26,,, | Performed by: RADIOLOGY

## 2020-02-04 PROCEDURE — 77080 DXA BONE DENSITY AXIAL: CPT | Mod: TC

## 2020-07-27 ENCOUNTER — INFUSION (OUTPATIENT)
Dept: INFUSION THERAPY | Facility: HOSPITAL | Age: 75
End: 2020-07-27
Attending: FAMILY MEDICINE
Payer: MEDICARE

## 2020-07-27 VITALS
HEART RATE: 66 BPM | DIASTOLIC BLOOD PRESSURE: 53 MMHG | RESPIRATION RATE: 18 BRPM | SYSTOLIC BLOOD PRESSURE: 142 MMHG | TEMPERATURE: 97 F

## 2020-07-27 DIAGNOSIS — M85.80 OSTEOPENIA, UNSPECIFIED LOCATION: Primary | ICD-10-CM

## 2020-07-27 PROCEDURE — 96372 THER/PROPH/DIAG INJ SC/IM: CPT

## 2020-07-27 PROCEDURE — 63600175 PHARM REV CODE 636 W HCPCS: Performed by: FAMILY MEDICINE

## 2020-07-27 RX ADMIN — DENOSUMAB 60 MG: 60 INJECTION SUBCUTANEOUS at 10:07

## 2020-07-28 ENCOUNTER — PATIENT OUTREACH (OUTPATIENT)
Dept: ADMINISTRATIVE | Facility: HOSPITAL | Age: 75
End: 2020-07-28

## 2020-07-28 NOTE — PROGRESS NOTES
Health Maintenance Due   Topic Date Due    Shingles Vaccine (1 of 2) 1995    Colorectal Cancer Screening  2020       Chart review completed 2020.  Care Everywhere updates requested and reviewed.  Immunizations reconciled. Media reports reviewed.  Duplicate HM overrides and  orders removed.  Overdue HM topic chart audit and/or requested.  Overdue lab testing linked to upcoming lab appointments if applies.      Patient is due for another fit kit

## 2020-07-29 ENCOUNTER — OFFICE VISIT (OUTPATIENT)
Dept: FAMILY MEDICINE | Facility: CLINIC | Age: 75
End: 2020-07-29
Payer: MEDICARE

## 2020-07-29 VITALS
RESPIRATION RATE: 20 BRPM | BODY MASS INDEX: 29.42 KG/M2 | TEMPERATURE: 98 F | WEIGHT: 145.94 LBS | HEIGHT: 59 IN | SYSTOLIC BLOOD PRESSURE: 112 MMHG | HEART RATE: 88 BPM | DIASTOLIC BLOOD PRESSURE: 80 MMHG

## 2020-07-29 DIAGNOSIS — J43.9 PULMONARY EMPHYSEMA, UNSPECIFIED EMPHYSEMA TYPE: ICD-10-CM

## 2020-07-29 DIAGNOSIS — I70.0 ATHEROSCLEROSIS OF AORTA: ICD-10-CM

## 2020-07-29 DIAGNOSIS — E78.5 DYSLIPIDEMIA: Primary | ICD-10-CM

## 2020-07-29 DIAGNOSIS — E55.9 VITAMIN D DEFICIENCY: ICD-10-CM

## 2020-07-29 DIAGNOSIS — I25.10 CORONARY ARTERY DISEASE INVOLVING NATIVE CORONARY ARTERY OF NATIVE HEART WITHOUT ANGINA PECTORIS: ICD-10-CM

## 2020-07-29 DIAGNOSIS — I65.23 BILATERAL CAROTID ARTERY STENOSIS: ICD-10-CM

## 2020-07-29 DIAGNOSIS — F51.04 CHRONIC INSOMNIA: ICD-10-CM

## 2020-07-29 PROCEDURE — 3079F DIAST BP 80-89 MM HG: CPT | Mod: CPTII,S$GLB,, | Performed by: FAMILY MEDICINE

## 2020-07-29 PROCEDURE — 99999 PR PBB SHADOW E&M-EST. PATIENT-LVL IV: ICD-10-PCS | Mod: PBBFAC,,, | Performed by: FAMILY MEDICINE

## 2020-07-29 PROCEDURE — 99499 UNLISTED E&M SERVICE: CPT | Mod: S$GLB,,, | Performed by: FAMILY MEDICINE

## 2020-07-29 PROCEDURE — 1159F MED LIST DOCD IN RCRD: CPT | Mod: S$GLB,,, | Performed by: FAMILY MEDICINE

## 2020-07-29 PROCEDURE — 1101F PR PT FALLS ASSESS DOC 0-1 FALLS W/OUT INJ PAST YR: ICD-10-PCS | Mod: CPTII,S$GLB,, | Performed by: FAMILY MEDICINE

## 2020-07-29 PROCEDURE — 99999 PR PBB SHADOW E&M-EST. PATIENT-LVL IV: CPT | Mod: PBBFAC,,, | Performed by: FAMILY MEDICINE

## 2020-07-29 PROCEDURE — 99499 RISK ADDL DX/OHS AUDIT: ICD-10-PCS | Mod: S$GLB,,, | Performed by: FAMILY MEDICINE

## 2020-07-29 PROCEDURE — 99214 PR OFFICE/OUTPT VISIT, EST, LEVL IV, 30-39 MIN: ICD-10-PCS | Mod: S$GLB,,, | Performed by: FAMILY MEDICINE

## 2020-07-29 PROCEDURE — 3079F PR MOST RECENT DIASTOLIC BLOOD PRESSURE 80-89 MM HG: ICD-10-PCS | Mod: CPTII,S$GLB,, | Performed by: FAMILY MEDICINE

## 2020-07-29 PROCEDURE — 1125F PR PAIN SEVERITY QUANTIFIED, PAIN PRESENT: ICD-10-PCS | Mod: S$GLB,,, | Performed by: FAMILY MEDICINE

## 2020-07-29 PROCEDURE — 3074F SYST BP LT 130 MM HG: CPT | Mod: CPTII,S$GLB,, | Performed by: FAMILY MEDICINE

## 2020-07-29 PROCEDURE — 1101F PT FALLS ASSESS-DOCD LE1/YR: CPT | Mod: CPTII,S$GLB,, | Performed by: FAMILY MEDICINE

## 2020-07-29 PROCEDURE — 1125F AMNT PAIN NOTED PAIN PRSNT: CPT | Mod: S$GLB,,, | Performed by: FAMILY MEDICINE

## 2020-07-29 PROCEDURE — 3074F PR MOST RECENT SYSTOLIC BLOOD PRESSURE < 130 MM HG: ICD-10-PCS | Mod: CPTII,S$GLB,, | Performed by: FAMILY MEDICINE

## 2020-07-29 PROCEDURE — 1159F PR MEDICATION LIST DOCUMENTED IN MEDICAL RECORD: ICD-10-PCS | Mod: S$GLB,,, | Performed by: FAMILY MEDICINE

## 2020-07-29 PROCEDURE — 99214 OFFICE O/P EST MOD 30 MIN: CPT | Mod: S$GLB,,, | Performed by: FAMILY MEDICINE

## 2020-07-29 RX ORDER — UMECLIDINIUM BROMIDE AND VILANTEROL TRIFENATATE 62.5; 25 UG/1; UG/1
1 POWDER RESPIRATORY (INHALATION) DAILY
Qty: 1 EACH | Refills: 5 | Status: SHIPPED | OUTPATIENT
Start: 2020-07-29 | End: 2021-02-02

## 2020-07-29 RX ORDER — HYDROCHLOROTHIAZIDE 12.5 MG/1
TABLET ORAL
COMMUNITY
Start: 2020-07-17 | End: 2021-08-02

## 2020-07-29 NOTE — PROGRESS NOTES
Subjective:       Patient ID: Megha Mendoza is a 75 y.o. female.    Chief Complaint: Follow-up    Pt is a 75 y.o. female who presents for evaluation and management of   Encounter Diagnoses   Name Primary?    Dyslipidemia Yes    Coronary artery disease involving native coronary artery of native heart without angina pectoris     Bilateral carotid artery stenosis     Chronic insomnia     Atherosclerosis of aorta     Vitamin D deficiency     Pulmonary emphysema, unspecified emphysema type    .  Doing well on current meds. Denies any side effects. Prevention is up to date.    Review of Systems   Constitutional: Negative.  Negative for activity change, appetite change, chills, diaphoresis, fatigue, fever and unexpected weight change.   HENT: Positive for hearing loss. Negative for congestion, dental problem, drooling, ear discharge, ear pain, facial swelling, mouth sores, nosebleeds, postnasal drip, rhinorrhea, sinus pressure, sneezing, sore throat, tinnitus, trouble swallowing and voice change.    Eyes: Negative.  Negative for photophobia, pain, discharge, redness, itching and visual disturbance.   Respiratory: Positive for shortness of breath. Negative for apnea, cough, choking, chest tightness and wheezing.         SOB unchanged(COPD)   Cardiovascular: Negative.  Negative for chest pain, palpitations and leg swelling.   Gastrointestinal: Negative.  Negative for abdominal distention, abdominal pain, anal bleeding, blood in stool, constipation, diarrhea, nausea, rectal pain and vomiting.   Genitourinary: Negative.  Negative for difficulty urinating, dyspareunia, dysuria, enuresis, flank pain, frequency, hematuria, menstrual problem, pelvic pain, urgency, vaginal bleeding, vaginal discharge and vaginal pain.   Musculoskeletal: Positive for arthralgias. Negative for back pain, gait problem, joint swelling, myalgias, neck pain and neck stiffness.   Skin: Negative.  Negative for color change, pallor, rash and wound.    Neurological: Negative.  Negative for dizziness, tremors, seizures, syncope, facial asymmetry, speech difficulty, weakness, light-headedness, numbness and headaches.   Hematological: Negative for adenopathy. Does not bruise/bleed easily.   Psychiatric/Behavioral: Negative.  Negative for agitation, behavioral problems, confusion, decreased concentration, dysphoric mood, hallucinations, self-injury, sleep disturbance and suicidal ideas. The patient is not nervous/anxious and is not hyperactive.        Objective:      Physical Exam  Constitutional:       Appearance: She is well-developed.   HENT:      Head: Normocephalic and atraumatic.      Right Ear: External ear normal.      Left Ear: External ear normal.      Nose: Nose normal.   Eyes:      Pupils: Pupils are equal, round, and reactive to light.   Neck:      Musculoskeletal: Normal range of motion and neck supple.      Thyroid: No thyromegaly.      Vascular: No JVD.      Trachea: No tracheal deviation.   Cardiovascular:      Rate and Rhythm: Normal rate.      Heart sounds: Normal heart sounds. No murmur.   Pulmonary:      Effort: Pulmonary effort is normal. No respiratory distress.      Breath sounds: Normal breath sounds. No wheezing or rales.   Chest:      Chest wall: No tenderness.   Abdominal:      General: Bowel sounds are normal. There is no distension.      Palpations: Abdomen is soft. There is no mass.      Tenderness: There is no abdominal tenderness. There is no guarding or rebound.   Musculoskeletal: Normal range of motion.         General: No tenderness.   Lymphadenopathy:      Cervical: No cervical adenopathy.   Skin:     General: Skin is warm and dry.      Coloration: Skin is not pale.      Findings: No erythema or rash.   Neurological:      Mental Status: She is alert and oriented to person, place, and time.      Cranial Nerves: No cranial nerve deficit.      Motor: No abnormal muscle tone.      Coordination: Coordination normal.      Deep Tendon  Reflexes: Reflexes are normal and symmetric. Reflexes normal.   Psychiatric:         Behavior: Behavior normal.         Thought Content: Thought content normal.         Judgment: Judgment normal.         Assessment:       1. Dyslipidemia    2. Coronary artery disease involving native coronary artery of native heart without angina pectoris    3. Bilateral carotid artery stenosis    4. Chronic insomnia    5. Atherosclerosis of aorta    6. Vitamin D deficiency    7. Pulmonary emphysema, unspecified emphysema type        Plan:   Megha was seen today for follow-up.    Diagnoses and all orders for this visit:    Dyslipidemia    Coronary artery disease involving native coronary artery of native heart without angina pectoris    Bilateral carotid artery stenosis    Chronic insomnia    Atherosclerosis of aorta    Vitamin D deficiency  -     Vitamin D; Future    Pulmonary emphysema, unspecified emphysema type  -     umeclidinium-vilanteroL (ANORO ELLIPTA) 62.5-25 mcg/actuation DsDv; Inhale 1 puff into the lungs once daily. Controller      Problem List Items Addressed This Visit     Atherosclerosis of aorta    Overview     On statin          Carotid disease, bilateral    Overview     Monitored per Dr. Day  On statin          Chronic insomnia    Coronary artery disease involving native coronary artery without angina pectoris    Dyslipidemia - Primary    Overview     Lab Results   Component Value Date    CHOL 187 02/03/2018    CHOL 166 02/08/2017    CHOL 184 02/04/2016     Lab Results   Component Value Date    HDL 66 02/03/2018    HDL 60 02/08/2017    HDL 58 02/04/2016     Lab Results   Component Value Date    LDLCALC 102.6 02/03/2018    LDLCALC 85.2 02/08/2017    LDLCALC 99.2 02/04/2016     Lab Results   Component Value Date    TRIG 92 02/03/2018    TRIG 104 02/08/2017    TRIG 134 02/04/2016     Lab Results   Component Value Date    CHOLHDL 35.3 02/03/2018    CHOLHDL 36.1 02/08/2017    CHOLHDL 31.5 02/04/2016     Continue  statin            Other Visit Diagnoses     Vitamin D deficiency        Relevant Orders    Vitamin D    Pulmonary emphysema, unspecified emphysema type        Relevant Medications    umeclidinium-vilanteroL (ANORO ELLIPTA) 62.5-25 mcg/actuation DsDv        No follow-ups on file.

## 2020-07-30 DIAGNOSIS — Z12.11 COLON CANCER SCREENING: ICD-10-CM

## 2020-08-03 ENCOUNTER — TELEPHONE (OUTPATIENT)
Dept: FAMILY MEDICINE | Facility: CLINIC | Age: 75
End: 2020-08-03

## 2020-08-03 NOTE — TELEPHONE ENCOUNTER
----- Message from Debra Hart sent at 8/3/2020 10:13 AM CDT -----  Contact: self  Megha Mendoza  MRN: [id number]  : 1945  PCP: Tomy Baron  Home Phone      [phone]  Work Phone      Not on file.  Mobile          [phone]      MESSAGE:   Patient said she left a surgery clearance form on  and needs it for tomorrow. She seen  on . Would like to see if this is done for her yet?      [phone]

## 2020-08-10 ENCOUNTER — LAB VISIT (OUTPATIENT)
Dept: LAB | Facility: HOSPITAL | Age: 75
End: 2020-08-10
Attending: FAMILY MEDICINE
Payer: MEDICARE

## 2020-08-10 DIAGNOSIS — E55.9 VITAMIN D DEFICIENCY: ICD-10-CM

## 2020-08-10 LAB — 25(OH)D3+25(OH)D2 SERPL-MCNC: 53 NG/ML (ref 30–96)

## 2020-08-10 PROCEDURE — 82306 VITAMIN D 25 HYDROXY: CPT

## 2020-08-10 PROCEDURE — 36415 COLL VENOUS BLD VENIPUNCTURE: CPT

## 2020-09-01 ENCOUNTER — OFFICE VISIT (OUTPATIENT)
Dept: FAMILY MEDICINE | Facility: CLINIC | Age: 75
End: 2020-09-01
Payer: MEDICARE

## 2020-09-01 VITALS
RESPIRATION RATE: 18 BRPM | WEIGHT: 147.5 LBS | DIASTOLIC BLOOD PRESSURE: 60 MMHG | TEMPERATURE: 98 F | HEIGHT: 59 IN | SYSTOLIC BLOOD PRESSURE: 122 MMHG | HEART RATE: 60 BPM | BODY MASS INDEX: 29.73 KG/M2

## 2020-09-01 DIAGNOSIS — R51.9 CHRONIC INTRACTABLE HEADACHE, UNSPECIFIED HEADACHE TYPE: Primary | ICD-10-CM

## 2020-09-01 DIAGNOSIS — G89.29 CHRONIC INTRACTABLE HEADACHE, UNSPECIFIED HEADACHE TYPE: Primary | ICD-10-CM

## 2020-09-01 DIAGNOSIS — G47.01 INSOMNIA DUE TO MEDICAL CONDITION: ICD-10-CM

## 2020-09-01 PROCEDURE — 3074F SYST BP LT 130 MM HG: CPT | Mod: CPTII,S$GLB,, | Performed by: FAMILY MEDICINE

## 2020-09-01 PROCEDURE — 99214 PR OFFICE/OUTPT VISIT, EST, LEVL IV, 30-39 MIN: ICD-10-PCS | Mod: S$GLB,,, | Performed by: FAMILY MEDICINE

## 2020-09-01 PROCEDURE — 1159F PR MEDICATION LIST DOCUMENTED IN MEDICAL RECORD: ICD-10-PCS | Mod: S$GLB,,, | Performed by: FAMILY MEDICINE

## 2020-09-01 PROCEDURE — 1125F AMNT PAIN NOTED PAIN PRSNT: CPT | Mod: S$GLB,,, | Performed by: FAMILY MEDICINE

## 2020-09-01 PROCEDURE — 1101F PR PT FALLS ASSESS DOC 0-1 FALLS W/OUT INJ PAST YR: ICD-10-PCS | Mod: CPTII,S$GLB,, | Performed by: FAMILY MEDICINE

## 2020-09-01 PROCEDURE — 3078F DIAST BP <80 MM HG: CPT | Mod: CPTII,S$GLB,, | Performed by: FAMILY MEDICINE

## 2020-09-01 PROCEDURE — 3078F PR MOST RECENT DIASTOLIC BLOOD PRESSURE < 80 MM HG: ICD-10-PCS | Mod: CPTII,S$GLB,, | Performed by: FAMILY MEDICINE

## 2020-09-01 PROCEDURE — 99214 OFFICE O/P EST MOD 30 MIN: CPT | Mod: S$GLB,,, | Performed by: FAMILY MEDICINE

## 2020-09-01 PROCEDURE — 99999 PR PBB SHADOW E&M-EST. PATIENT-LVL IV: ICD-10-PCS | Mod: PBBFAC,,, | Performed by: FAMILY MEDICINE

## 2020-09-01 PROCEDURE — 3074F PR MOST RECENT SYSTOLIC BLOOD PRESSURE < 130 MM HG: ICD-10-PCS | Mod: CPTII,S$GLB,, | Performed by: FAMILY MEDICINE

## 2020-09-01 PROCEDURE — 1159F MED LIST DOCD IN RCRD: CPT | Mod: S$GLB,,, | Performed by: FAMILY MEDICINE

## 2020-09-01 PROCEDURE — 1125F PR PAIN SEVERITY QUANTIFIED, PAIN PRESENT: ICD-10-PCS | Mod: S$GLB,,, | Performed by: FAMILY MEDICINE

## 2020-09-01 PROCEDURE — 99999 PR PBB SHADOW E&M-EST. PATIENT-LVL IV: CPT | Mod: PBBFAC,,, | Performed by: FAMILY MEDICINE

## 2020-09-01 PROCEDURE — 1101F PT FALLS ASSESS-DOCD LE1/YR: CPT | Mod: CPTII,S$GLB,, | Performed by: FAMILY MEDICINE

## 2020-09-01 RX ORDER — OFLOXACIN 3 MG/ML
SOLUTION/ DROPS OPHTHALMIC
COMMUNITY
Start: 2020-07-31 | End: 2021-02-02

## 2020-09-01 RX ORDER — NORTRIPTYLINE HYDROCHLORIDE 25 MG/1
25 CAPSULE ORAL NIGHTLY
Qty: 30 CAPSULE | Refills: 5 | Status: SHIPPED | OUTPATIENT
Start: 2020-09-01 | End: 2022-02-02

## 2020-09-01 NOTE — PROGRESS NOTES
Subjective:       Patient ID: Megha Mendoza is a 75 y.o. female.    Chief Complaint: Migraine    Pt is a 75 y.o. female who presents for evaluation and management of   Encounter Diagnosis   Name Primary?    Chronic intractable headache, unspecified headache type Yes   .has had them for years. Fronto-temporal and sometimes occiput  Injections with ha and pain center have helped in the past but were too $$$ for her to continue   She takes #2 650 tylenols daily for her headache. This takes some of the edge off   Recently had her eyes checked and was told her eyes are not causing her headache     Doing well on current meds. Denies any side effects. Prevention is up to date.    Review of Systems   Eyes: Positive for photophobia.   Gastrointestinal: Negative for nausea and vomiting.   Musculoskeletal: Positive for neck pain.   Neurological: Positive for headaches.   Psychiatric/Behavioral: Positive for sleep disturbance.       Objective:      Physical Exam  Constitutional:       Appearance: She is well-developed.   HENT:      Head: Normocephalic and atraumatic.      Right Ear: External ear normal.      Left Ear: External ear normal.      Nose: Nose normal.   Eyes:      Pupils: Pupils are equal, round, and reactive to light.   Neck:      Musculoskeletal: Normal range of motion and neck supple.      Thyroid: No thyromegaly.      Vascular: No JVD.      Trachea: No tracheal deviation.   Cardiovascular:      Rate and Rhythm: Normal rate.      Heart sounds: Normal heart sounds. No murmur.   Pulmonary:      Effort: Pulmonary effort is normal. No respiratory distress.      Breath sounds: Normal breath sounds. No wheezing or rales.   Chest:      Chest wall: No tenderness.   Abdominal:      General: Bowel sounds are normal. There is no distension.      Palpations: Abdomen is soft. There is no mass.      Tenderness: There is no abdominal tenderness. There is no guarding or rebound.   Musculoskeletal: Normal range of motion.          General: No tenderness.   Lymphadenopathy:      Cervical: No cervical adenopathy.   Skin:     General: Skin is warm and dry.      Coloration: Skin is not pale.      Findings: No erythema or rash.   Neurological:      General: No focal deficit present.      Mental Status: She is alert and oriented to person, place, and time.      Cranial Nerves: No cranial nerve deficit.      Motor: No abnormal muscle tone.      Coordination: Coordination normal.      Deep Tendon Reflexes: Reflexes are normal and symmetric. Reflexes normal.   Psychiatric:         Behavior: Behavior normal.         Thought Content: Thought content normal.         Judgment: Judgment normal.         Assessment:       1. Chronic intractable headache, unspecified headache type    2. Insomnia due to medical condition        Plan:   Megha was seen today for migraine.    Diagnoses and all orders for this visit:    Chronic intractable headache, unspecified headache type  -     nortriptyline (PAMELOR) 25 MG capsule; Take 1 capsule (25 mg total) by mouth every evening.    Insomnia due to medical condition  -     nortriptyline (PAMELOR) 25 MG capsule; Take 1 capsule (25 mg total) by mouth every evening.      Problem List Items Addressed This Visit     None      Visit Diagnoses     Chronic intractable headache, unspecified headache type    -  Primary    Relevant Medications    nortriptyline (PAMELOR) 25 MG capsule    Insomnia due to medical condition        Relevant Medications    nortriptyline (PAMELOR) 25 MG capsule      call me in about 2 weeks   Consider adding low dose BB if TCA not helping     No follow-ups on file.

## 2021-01-10 ENCOUNTER — IMMUNIZATION (OUTPATIENT)
Dept: FAMILY MEDICINE | Facility: CLINIC | Age: 76
End: 2021-01-10
Payer: MEDICARE

## 2021-01-10 DIAGNOSIS — Z23 NEED FOR VACCINATION: ICD-10-CM

## 2021-01-10 PROCEDURE — 91300 COVID-19, MRNA, LNP-S, PF, 30 MCG/0.3 ML DOSE VACCINE: CPT | Mod: PBBFAC | Performed by: FAMILY MEDICINE

## 2021-01-19 ENCOUNTER — TELEPHONE (OUTPATIENT)
Dept: INFUSION THERAPY | Facility: HOSPITAL | Age: 76
End: 2021-01-19

## 2021-01-19 DIAGNOSIS — M81.0 OSTEOPOROSIS, POSTMENOPAUSAL: ICD-10-CM

## 2021-01-19 DIAGNOSIS — Z13.820 OSTEOPOROSIS SCREENING: Primary | ICD-10-CM

## 2021-01-19 RX ORDER — DENOSUMAB 60 MG/ML
60 INJECTION SUBCUTANEOUS
Qty: 1 SYRINGE | Refills: 1 | Status: SHIPPED | OUTPATIENT
Start: 2021-01-19 | End: 2022-01-24 | Stop reason: SDUPTHER

## 2021-01-27 ENCOUNTER — INFUSION (OUTPATIENT)
Dept: INFUSION THERAPY | Facility: HOSPITAL | Age: 76
End: 2021-01-27
Attending: FAMILY MEDICINE
Payer: MEDICARE

## 2021-01-27 VITALS
RESPIRATION RATE: 20 BRPM | TEMPERATURE: 96 F | HEART RATE: 80 BPM | SYSTOLIC BLOOD PRESSURE: 190 MMHG | DIASTOLIC BLOOD PRESSURE: 74 MMHG

## 2021-01-27 DIAGNOSIS — M85.80 OSTEOPENIA, UNSPECIFIED LOCATION: Primary | ICD-10-CM

## 2021-01-27 PROCEDURE — 63600175 PHARM REV CODE 636 W HCPCS: Performed by: FAMILY MEDICINE

## 2021-01-27 PROCEDURE — 96372 THER/PROPH/DIAG INJ SC/IM: CPT

## 2021-01-27 RX ADMIN — DENOSUMAB 60 MG: 60 INJECTION SUBCUTANEOUS at 08:01

## 2021-01-31 ENCOUNTER — IMMUNIZATION (OUTPATIENT)
Dept: FAMILY MEDICINE | Facility: CLINIC | Age: 76
End: 2021-01-31
Payer: MEDICARE

## 2021-01-31 DIAGNOSIS — Z23 NEED FOR VACCINATION: Primary | ICD-10-CM

## 2021-01-31 PROCEDURE — 0002A COVID-19, MRNA, LNP-S, PF, 30 MCG/0.3 ML DOSE VACCINE: CPT | Mod: PBBFAC | Performed by: INTERNAL MEDICINE

## 2021-01-31 PROCEDURE — 91300 COVID-19, MRNA, LNP-S, PF, 30 MCG/0.3 ML DOSE VACCINE: CPT | Mod: PBBFAC | Performed by: INTERNAL MEDICINE

## 2021-02-02 ENCOUNTER — OFFICE VISIT (OUTPATIENT)
Dept: FAMILY MEDICINE | Facility: CLINIC | Age: 76
End: 2021-02-02
Payer: MEDICARE

## 2021-02-02 ENCOUNTER — CLINICAL SUPPORT (OUTPATIENT)
Dept: FAMILY MEDICINE | Facility: CLINIC | Age: 76
End: 2021-02-02
Payer: MEDICARE

## 2021-02-02 VITALS
TEMPERATURE: 97 F | RESPIRATION RATE: 16 BRPM | DIASTOLIC BLOOD PRESSURE: 84 MMHG | HEART RATE: 92 BPM | WEIGHT: 155 LBS | HEIGHT: 59 IN | BODY MASS INDEX: 31.25 KG/M2 | SYSTOLIC BLOOD PRESSURE: 112 MMHG

## 2021-02-02 DIAGNOSIS — Z23 FLU VACCINE NEED: Primary | ICD-10-CM

## 2021-02-02 DIAGNOSIS — N39.3 STRESS INCONTINENCE: ICD-10-CM

## 2021-02-02 DIAGNOSIS — I70.0 ATHEROSCLEROSIS OF AORTA: ICD-10-CM

## 2021-02-02 DIAGNOSIS — E78.5 DYSLIPIDEMIA: ICD-10-CM

## 2021-02-02 DIAGNOSIS — I65.23 BILATERAL CAROTID ARTERY STENOSIS: ICD-10-CM

## 2021-02-02 DIAGNOSIS — E55.9 VITAMIN D DEFICIENCY DISEASE: ICD-10-CM

## 2021-02-02 DIAGNOSIS — Z87.891 FORMER SMOKER: ICD-10-CM

## 2021-02-02 DIAGNOSIS — J41.0 SIMPLE CHRONIC BRONCHITIS: ICD-10-CM

## 2021-02-02 LAB
25(OH)D3+25(OH)D2 SERPL-MCNC: 100 NG/ML (ref 30–96)
ALBUMIN SERPL BCP-MCNC: 3.8 G/DL (ref 3.5–5.2)
ALP SERPL-CCNC: 44 U/L (ref 55–135)
ALT SERPL W/O P-5'-P-CCNC: 23 U/L (ref 10–44)
ANION GAP SERPL CALC-SCNC: 9 MMOL/L (ref 8–16)
AST SERPL-CCNC: 28 U/L (ref 10–40)
BASOPHILS # BLD AUTO: 0.04 K/UL (ref 0–0.2)
BASOPHILS NFR BLD: 0.8 % (ref 0–1.9)
BILIRUB SERPL-MCNC: 0.3 MG/DL (ref 0.1–1)
BUN SERPL-MCNC: 20 MG/DL (ref 8–23)
CALCIUM SERPL-MCNC: 9.6 MG/DL (ref 8.7–10.5)
CHLORIDE SERPL-SCNC: 103 MMOL/L (ref 95–110)
CHOLEST SERPL-MCNC: 157 MG/DL (ref 120–199)
CHOLEST/HDLC SERPL: 2.1 {RATIO} (ref 2–5)
CO2 SERPL-SCNC: 30 MMOL/L (ref 23–29)
CREAT SERPL-MCNC: 0.9 MG/DL (ref 0.5–1.4)
DIFFERENTIAL METHOD: NORMAL
EOSINOPHIL # BLD AUTO: 0.3 K/UL (ref 0–0.5)
EOSINOPHIL NFR BLD: 5.5 % (ref 0–8)
ERYTHROCYTE [DISTWIDTH] IN BLOOD BY AUTOMATED COUNT: 12.8 % (ref 11.5–14.5)
EST. GFR  (AFRICAN AMERICAN): >60 ML/MIN/1.73 M^2
EST. GFR  (NON AFRICAN AMERICAN): >60 ML/MIN/1.73 M^2
GLUCOSE SERPL-MCNC: 108 MG/DL (ref 70–110)
HCT VFR BLD AUTO: 41.8 % (ref 37–48.5)
HDLC SERPL-MCNC: 76 MG/DL (ref 40–75)
HDLC SERPL: 48.4 % (ref 20–50)
HGB BLD-MCNC: 13.4 G/DL (ref 12–16)
IMM GRANULOCYTES # BLD AUTO: 0.01 K/UL (ref 0–0.04)
IMM GRANULOCYTES NFR BLD AUTO: 0.2 % (ref 0–0.5)
LDLC SERPL CALC-MCNC: 56.6 MG/DL (ref 63–159)
LYMPHOCYTES # BLD AUTO: 1.6 K/UL (ref 1–4.8)
LYMPHOCYTES NFR BLD: 32.7 % (ref 18–48)
MCH RBC QN AUTO: 30 PG (ref 27–31)
MCHC RBC AUTO-ENTMCNC: 32.1 G/DL (ref 32–36)
MCV RBC AUTO: 94 FL (ref 82–98)
MONOCYTES # BLD AUTO: 0.5 K/UL (ref 0.3–1)
MONOCYTES NFR BLD: 11 % (ref 4–15)
NEUTROPHILS # BLD AUTO: 2.4 K/UL (ref 1.8–7.7)
NEUTROPHILS NFR BLD: 49.8 % (ref 38–73)
NONHDLC SERPL-MCNC: 81 MG/DL
NRBC BLD-RTO: 0 /100 WBC
PLATELET # BLD AUTO: 241 K/UL (ref 150–350)
PMV BLD AUTO: 10 FL (ref 9.2–12.9)
POTASSIUM SERPL-SCNC: 4.1 MMOL/L (ref 3.5–5.1)
PROT SERPL-MCNC: 7.5 G/DL (ref 6–8.4)
RBC # BLD AUTO: 4.47 M/UL (ref 4–5.4)
SODIUM SERPL-SCNC: 142 MMOL/L (ref 136–145)
TRIGL SERPL-MCNC: 122 MG/DL (ref 30–150)
TSH SERPL DL<=0.005 MIU/L-ACNC: 1.09 UIU/ML (ref 0.4–4)
WBC # BLD AUTO: 4.9 K/UL (ref 3.9–12.7)

## 2021-02-02 PROCEDURE — 3074F PR MOST RECENT SYSTOLIC BLOOD PRESSURE < 130 MM HG: ICD-10-PCS | Mod: CPTII,S$GLB,, | Performed by: FAMILY MEDICINE

## 2021-02-02 PROCEDURE — 80053 COMPREHEN METABOLIC PANEL: CPT

## 2021-02-02 PROCEDURE — 99499 UNLISTED E&M SERVICE: CPT | Mod: S$GLB,,, | Performed by: FAMILY MEDICINE

## 2021-02-02 PROCEDURE — 99499 RISK ADDL DX/OHS AUDIT: ICD-10-PCS | Mod: S$GLB,,, | Performed by: FAMILY MEDICINE

## 2021-02-02 PROCEDURE — 99214 PR OFFICE/OUTPT VISIT, EST, LEVL IV, 30-39 MIN: ICD-10-PCS | Mod: 25,S$GLB,, | Performed by: FAMILY MEDICINE

## 2021-02-02 PROCEDURE — 36415 COLL VENOUS BLD VENIPUNCTURE: CPT | Mod: S$GLB,,, | Performed by: FAMILY MEDICINE

## 2021-02-02 PROCEDURE — 1100F PR PT FALLS ASSESS DOC 2+ FALLS/FALL W/INJURY/YR: ICD-10-PCS | Mod: CPTII,S$GLB,, | Performed by: FAMILY MEDICINE

## 2021-02-02 PROCEDURE — 1159F PR MEDICATION LIST DOCUMENTED IN MEDICAL RECORD: ICD-10-PCS | Mod: S$GLB,,, | Performed by: FAMILY MEDICINE

## 2021-02-02 PROCEDURE — 1159F MED LIST DOCD IN RCRD: CPT | Mod: S$GLB,,, | Performed by: FAMILY MEDICINE

## 2021-02-02 PROCEDURE — 3079F PR MOST RECENT DIASTOLIC BLOOD PRESSURE 80-89 MM HG: ICD-10-PCS | Mod: CPTII,S$GLB,, | Performed by: FAMILY MEDICINE

## 2021-02-02 PROCEDURE — 90694 VACC AIIV4 NO PRSRV 0.5ML IM: CPT | Mod: S$GLB,,, | Performed by: FAMILY MEDICINE

## 2021-02-02 PROCEDURE — 1125F AMNT PAIN NOTED PAIN PRSNT: CPT | Mod: S$GLB,,, | Performed by: FAMILY MEDICINE

## 2021-02-02 PROCEDURE — 99214 OFFICE O/P EST MOD 30 MIN: CPT | Mod: 25,S$GLB,, | Performed by: FAMILY MEDICINE

## 2021-02-02 PROCEDURE — 84443 ASSAY THYROID STIM HORMONE: CPT

## 2021-02-02 PROCEDURE — 3079F DIAST BP 80-89 MM HG: CPT | Mod: CPTII,S$GLB,, | Performed by: FAMILY MEDICINE

## 2021-02-02 PROCEDURE — 3288F FALL RISK ASSESSMENT DOCD: CPT | Mod: CPTII,S$GLB,, | Performed by: FAMILY MEDICINE

## 2021-02-02 PROCEDURE — 36415 PR COLLECTION VENOUS BLOOD,VENIPUNCTURE: ICD-10-PCS | Mod: S$GLB,,, | Performed by: FAMILY MEDICINE

## 2021-02-02 PROCEDURE — 90694 FLU VACCINE - QUADRIVALENT - ADJUVANTED: ICD-10-PCS | Mod: S$GLB,,, | Performed by: FAMILY MEDICINE

## 2021-02-02 PROCEDURE — 99999 PR PBB SHADOW E&M-EST. PATIENT-LVL V: ICD-10-PCS | Mod: PBBFAC,,, | Performed by: FAMILY MEDICINE

## 2021-02-02 PROCEDURE — 1100F PTFALLS ASSESS-DOCD GE2>/YR: CPT | Mod: CPTII,S$GLB,, | Performed by: FAMILY MEDICINE

## 2021-02-02 PROCEDURE — G0008 FLU VACCINE - QUADRIVALENT - ADJUVANTED: ICD-10-PCS | Mod: S$GLB,,, | Performed by: FAMILY MEDICINE

## 2021-02-02 PROCEDURE — G0008 ADMIN INFLUENZA VIRUS VAC: HCPCS | Mod: S$GLB,,, | Performed by: FAMILY MEDICINE

## 2021-02-02 PROCEDURE — 3074F SYST BP LT 130 MM HG: CPT | Mod: CPTII,S$GLB,, | Performed by: FAMILY MEDICINE

## 2021-02-02 PROCEDURE — 85025 COMPLETE CBC W/AUTO DIFF WBC: CPT

## 2021-02-02 PROCEDURE — 99999 PR PBB SHADOW E&M-EST. PATIENT-LVL V: CPT | Mod: PBBFAC,,, | Performed by: FAMILY MEDICINE

## 2021-02-02 PROCEDURE — 80061 LIPID PANEL: CPT

## 2021-02-02 PROCEDURE — 3288F PR FALLS RISK ASSESSMENT DOCUMENTED: ICD-10-PCS | Mod: CPTII,S$GLB,, | Performed by: FAMILY MEDICINE

## 2021-02-02 PROCEDURE — 1125F PR PAIN SEVERITY QUANTIFIED, PAIN PRESENT: ICD-10-PCS | Mod: S$GLB,,, | Performed by: FAMILY MEDICINE

## 2021-02-02 PROCEDURE — 82306 VITAMIN D 25 HYDROXY: CPT

## 2021-02-02 RX ORDER — BUDESONIDE AND FORMOTEROL FUMARATE DIHYDRATE 80; 4.5 UG/1; UG/1
2 AEROSOL RESPIRATORY (INHALATION) 2 TIMES DAILY
Qty: 10.2 G | Refills: 5 | Status: SHIPPED | OUTPATIENT
Start: 2021-02-02 | End: 2022-01-14

## 2021-02-02 RX ORDER — HYDROGEN PEROXIDE 3 %
SOLUTION, NON-ORAL MISCELLANEOUS
COMMUNITY
End: 2022-08-02

## 2021-03-29 ENCOUNTER — PATIENT OUTREACH (OUTPATIENT)
Dept: ADMINISTRATIVE | Facility: OTHER | Age: 76
End: 2021-03-29

## 2021-03-30 ENCOUNTER — OFFICE VISIT (OUTPATIENT)
Dept: UROGYNECOLOGY | Facility: CLINIC | Age: 76
End: 2021-03-30
Payer: MEDICARE

## 2021-03-30 VITALS — BODY MASS INDEX: 31.45 KG/M2 | HEIGHT: 59 IN | WEIGHT: 156 LBS

## 2021-03-30 DIAGNOSIS — Z12.31 ENCOUNTER FOR SCREENING MAMMOGRAM FOR MALIGNANT NEOPLASM OF BREAST: ICD-10-CM

## 2021-03-30 DIAGNOSIS — N39.46 MIXED STRESS AND URGE URINARY INCONTINENCE: ICD-10-CM

## 2021-03-30 DIAGNOSIS — K59.09 CHRONIC CONSTIPATION: ICD-10-CM

## 2021-03-30 DIAGNOSIS — N95.2 VAGINAL ATROPHY: ICD-10-CM

## 2021-03-30 DIAGNOSIS — Z12.39 ENCOUNTER FOR SCREENING FOR MALIGNANT NEOPLASM OF BREAST, UNSPECIFIED SCREENING MODALITY: Primary | ICD-10-CM

## 2021-03-30 DIAGNOSIS — N39.3 STRESS INCONTINENCE: ICD-10-CM

## 2021-03-30 PROCEDURE — 51701 INSERT BLADDER CATHETER: CPT | Mod: S$GLB,,, | Performed by: OBSTETRICS & GYNECOLOGY

## 2021-03-30 PROCEDURE — 1101F PR PT FALLS ASSESS DOC 0-1 FALLS W/OUT INJ PAST YR: ICD-10-PCS | Mod: CPTII,S$GLB,, | Performed by: OBSTETRICS & GYNECOLOGY

## 2021-03-30 PROCEDURE — 1126F AMNT PAIN NOTED NONE PRSNT: CPT | Mod: S$GLB,,, | Performed by: OBSTETRICS & GYNECOLOGY

## 2021-03-30 PROCEDURE — 1159F PR MEDICATION LIST DOCUMENTED IN MEDICAL RECORD: ICD-10-PCS | Mod: S$GLB,,, | Performed by: OBSTETRICS & GYNECOLOGY

## 2021-03-30 PROCEDURE — 1159F MED LIST DOCD IN RCRD: CPT | Mod: S$GLB,,, | Performed by: OBSTETRICS & GYNECOLOGY

## 2021-03-30 PROCEDURE — 1126F PR PAIN SEVERITY QUANTIFIED, NO PAIN PRESENT: ICD-10-PCS | Mod: S$GLB,,, | Performed by: OBSTETRICS & GYNECOLOGY

## 2021-03-30 PROCEDURE — 1101F PT FALLS ASSESS-DOCD LE1/YR: CPT | Mod: CPTII,S$GLB,, | Performed by: OBSTETRICS & GYNECOLOGY

## 2021-03-30 PROCEDURE — 99999 PR PBB SHADOW E&M-EST. PATIENT-LVL V: CPT | Mod: PBBFAC,,, | Performed by: OBSTETRICS & GYNECOLOGY

## 2021-03-30 PROCEDURE — 99205 OFFICE O/P NEW HI 60 MIN: CPT | Mod: 25,S$GLB,, | Performed by: OBSTETRICS & GYNECOLOGY

## 2021-03-30 PROCEDURE — 3288F FALL RISK ASSESSMENT DOCD: CPT | Mod: CPTII,S$GLB,, | Performed by: OBSTETRICS & GYNECOLOGY

## 2021-03-30 PROCEDURE — 99999 PR PBB SHADOW E&M-EST. PATIENT-LVL V: ICD-10-PCS | Mod: PBBFAC,,, | Performed by: OBSTETRICS & GYNECOLOGY

## 2021-03-30 PROCEDURE — 99205 PR OFFICE/OUTPT VISIT, NEW, LEVL V, 60-74 MIN: ICD-10-PCS | Mod: 25,S$GLB,, | Performed by: OBSTETRICS & GYNECOLOGY

## 2021-03-30 PROCEDURE — 3288F PR FALLS RISK ASSESSMENT DOCUMENTED: ICD-10-PCS | Mod: CPTII,S$GLB,, | Performed by: OBSTETRICS & GYNECOLOGY

## 2021-03-30 PROCEDURE — 51701 PR INSERTION OF NON-INDWELLING BLADDER CATHETERIZATION FOR RESIDUAL UR: ICD-10-PCS | Mod: S$GLB,,, | Performed by: OBSTETRICS & GYNECOLOGY

## 2021-03-30 PROCEDURE — 87086 URINE CULTURE/COLONY COUNT: CPT | Performed by: OBSTETRICS & GYNECOLOGY

## 2021-03-30 RX ORDER — ESTRADIOL 0.1 MG/G
CREAM VAGINAL
Qty: 42.5 G | Refills: 11 | Status: SHIPPED | OUTPATIENT
Start: 2021-03-30

## 2021-03-31 LAB — BACTERIA UR CULT: NO GROWTH

## 2021-04-05 ENCOUNTER — PATIENT MESSAGE (OUTPATIENT)
Dept: ADMINISTRATIVE | Facility: HOSPITAL | Age: 76
End: 2021-04-05

## 2021-04-08 ENCOUNTER — PATIENT OUTREACH (OUTPATIENT)
Dept: ADMINISTRATIVE | Facility: HOSPITAL | Age: 76
End: 2021-04-08

## 2021-04-29 ENCOUNTER — LAB VISIT (OUTPATIENT)
Dept: LAB | Facility: HOSPITAL | Age: 76
End: 2021-04-29
Attending: FAMILY MEDICINE
Payer: MEDICARE

## 2021-04-29 DIAGNOSIS — Z12.11 COLON CANCER SCREENING: ICD-10-CM

## 2021-04-29 PROCEDURE — 82274 ASSAY TEST FOR BLOOD FECAL: CPT | Performed by: FAMILY MEDICINE

## 2021-05-05 LAB — HEMOCCULT STL QL IA: NEGATIVE

## 2021-05-11 ENCOUNTER — HOSPITAL ENCOUNTER (OUTPATIENT)
Dept: RADIOLOGY | Facility: HOSPITAL | Age: 76
Discharge: HOME | End: 2021-05-11
Attending: OBSTETRICS & GYNECOLOGY
Payer: MEDICARE

## 2021-05-11 VITALS — HEIGHT: 59 IN | BODY MASS INDEX: 29.43 KG/M2 | WEIGHT: 146 LBS

## 2021-05-11 DIAGNOSIS — Z12.39 ENCOUNTER FOR SCREENING FOR MALIGNANT NEOPLASM OF BREAST, UNSPECIFIED SCREENING MODALITY: ICD-10-CM

## 2021-05-11 DIAGNOSIS — Z12.31 ENCOUNTER FOR SCREENING MAMMOGRAM FOR MALIGNANT NEOPLASM OF BREAST: ICD-10-CM

## 2021-05-11 LAB
CHOLEST SERPL-MSCNC: 156 MG/DL (ref 0–200)
CHOLEST/HDLC SERPL: 2.1 {RATIO}
HDLC SERPL-MCNC: 76 MG/DL (ref 35–70)
LDL CHOLESTEROL DIRECT: 65 MG/DL
NON HDL CHOL. (LDL+VLDL): 80
TRIGL SERPL-MCNC: 76 MG/DL
VLDL CHOLESTEROL: 15 MG/DL

## 2021-05-11 PROCEDURE — 77063 MAMMO DIGITAL SCREENING BILAT WITH TOMO: ICD-10-PCS | Mod: 26,,, | Performed by: RADIOLOGY

## 2021-05-11 PROCEDURE — 77063 BREAST TOMOSYNTHESIS BI: CPT | Mod: 26,,, | Performed by: RADIOLOGY

## 2021-05-11 PROCEDURE — 77067 MAMMO DIGITAL SCREENING BILAT WITH TOMO: ICD-10-PCS | Mod: 26,,, | Performed by: RADIOLOGY

## 2021-05-11 PROCEDURE — 77067 SCR MAMMO BI INCL CAD: CPT | Mod: TC

## 2021-05-11 PROCEDURE — 77067 SCR MAMMO BI INCL CAD: CPT | Mod: 26,,, | Performed by: RADIOLOGY

## 2021-05-14 ENCOUNTER — TELEPHONE (OUTPATIENT)
Dept: UROGYNECOLOGY | Facility: CLINIC | Age: 76
End: 2021-05-14

## 2021-06-11 ENCOUNTER — PATIENT OUTREACH (OUTPATIENT)
Dept: ADMINISTRATIVE | Facility: HOSPITAL | Age: 76
End: 2021-06-11

## 2021-06-16 ENCOUNTER — OFFICE VISIT (OUTPATIENT)
Dept: FAMILY MEDICINE | Facility: CLINIC | Age: 76
End: 2021-06-16
Payer: MEDICARE

## 2021-06-16 VITALS
HEIGHT: 59 IN | HEART RATE: 86 BPM | BODY MASS INDEX: 32.2 KG/M2 | RESPIRATION RATE: 18 BRPM | SYSTOLIC BLOOD PRESSURE: 134 MMHG | WEIGHT: 159.75 LBS | DIASTOLIC BLOOD PRESSURE: 76 MMHG

## 2021-06-16 DIAGNOSIS — R29.898 WEAKNESS OF BOTH LOWER EXTREMITIES: Primary | ICD-10-CM
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
## 2021-06-16 DIAGNOSIS — M48.07 SPINAL STENOSIS, LUMBOSACRAL REGION: ICD-10-CM

## 2021-06-16 PROCEDURE — 1101F PR PT FALLS ASSESS DOC 0-1 FALLS W/OUT INJ PAST YR: ICD-10-PCS | Mod: CPTII,S$GLB,, | Performed by: FAMILY MEDICINE

## 2021-06-16 PROCEDURE — 3288F FALL RISK ASSESSMENT DOCD: CPT | Mod: CPTII,S$GLB,, | Performed by: FAMILY MEDICINE

## 2021-06-16 PROCEDURE — 1126F PR PAIN SEVERITY QUANTIFIED, NO PAIN PRESENT: ICD-10-PCS | Mod: S$GLB,,, | Performed by: FAMILY MEDICINE

## 2021-06-16 PROCEDURE — 99213 OFFICE O/P EST LOW 20 MIN: CPT | Mod: S$GLB,,, | Performed by: FAMILY MEDICINE

## 2021-06-16 PROCEDURE — 1159F PR MEDICATION LIST DOCUMENTED IN MEDICAL RECORD: ICD-10-PCS | Mod: S$GLB,,, | Performed by: FAMILY MEDICINE

## 2021-06-16 PROCEDURE — 1101F PT FALLS ASSESS-DOCD LE1/YR: CPT | Mod: CPTII,S$GLB,, | Performed by: FAMILY MEDICINE

## 2021-06-16 PROCEDURE — 3288F PR FALLS RISK ASSESSMENT DOCUMENTED: ICD-10-PCS | Mod: CPTII,S$GLB,, | Performed by: FAMILY MEDICINE

## 2021-06-16 PROCEDURE — 1126F AMNT PAIN NOTED NONE PRSNT: CPT | Mod: S$GLB,,, | Performed by: FAMILY MEDICINE

## 2021-06-16 PROCEDURE — 99999 PR PBB SHADOW E&M-EST. PATIENT-LVL IV: ICD-10-PCS | Mod: PBBFAC,,, | Performed by: FAMILY MEDICINE

## 2021-06-16 PROCEDURE — 99213 PR OFFICE/OUTPT VISIT, EST, LEVL III, 20-29 MIN: ICD-10-PCS | Mod: S$GLB,,, | Performed by: FAMILY MEDICINE

## 2021-06-16 PROCEDURE — 1159F MED LIST DOCD IN RCRD: CPT | Mod: S$GLB,,, | Performed by: FAMILY MEDICINE

## 2021-06-16 PROCEDURE — 99999 PR PBB SHADOW E&M-EST. PATIENT-LVL IV: CPT | Mod: PBBFAC,,, | Performed by: FAMILY MEDICINE

## 2021-07-28 ENCOUNTER — INFUSION (OUTPATIENT)
Dept: INFUSION THERAPY | Facility: HOSPITAL | Age: 76
End: 2021-07-28
Attending: FAMILY MEDICINE
Payer: MEDICARE

## 2021-07-28 VITALS — DIASTOLIC BLOOD PRESSURE: 57 MMHG | RESPIRATION RATE: 20 BRPM | SYSTOLIC BLOOD PRESSURE: 133 MMHG | HEART RATE: 55 BPM

## 2021-07-28 DIAGNOSIS — M85.80 OSTEOPENIA, UNSPECIFIED LOCATION: Primary | ICD-10-CM

## 2021-07-28 PROCEDURE — 96372 THER/PROPH/DIAG INJ SC/IM: CPT

## 2021-07-28 PROCEDURE — 63600175 PHARM REV CODE 636 W HCPCS: Performed by: FAMILY MEDICINE

## 2021-07-28 RX ADMIN — DENOSUMAB 60 MG: 60 INJECTION SUBCUTANEOUS at 08:07

## 2021-08-02 ENCOUNTER — OFFICE VISIT (OUTPATIENT)
Dept: FAMILY MEDICINE | Facility: CLINIC | Age: 76
End: 2021-08-02
Payer: MEDICARE

## 2021-08-02 VITALS
WEIGHT: 157.5 LBS | HEIGHT: 59 IN | RESPIRATION RATE: 18 BRPM | SYSTOLIC BLOOD PRESSURE: 134 MMHG | HEART RATE: 80 BPM | DIASTOLIC BLOOD PRESSURE: 82 MMHG | BODY MASS INDEX: 31.75 KG/M2

## 2021-08-02 DIAGNOSIS — I25.10 CORONARY ARTERY DISEASE INVOLVING NATIVE CORONARY ARTERY OF NATIVE HEART WITHOUT ANGINA PECTORIS: ICD-10-CM

## 2021-08-02 DIAGNOSIS — R30.0 DYSURIA: ICD-10-CM

## 2021-08-02 DIAGNOSIS — I65.23 BILATERAL CAROTID ARTERY STENOSIS: ICD-10-CM

## 2021-08-02 DIAGNOSIS — N39.0 URINARY TRACT INFECTION WITHOUT HEMATURIA, SITE UNSPECIFIED: ICD-10-CM

## 2021-08-02 DIAGNOSIS — I70.0 ATHEROSCLEROSIS OF AORTA: ICD-10-CM

## 2021-08-02 DIAGNOSIS — K21.9 GASTROESOPHAGEAL REFLUX DISEASE WITHOUT ESOPHAGITIS: Chronic | ICD-10-CM

## 2021-08-02 DIAGNOSIS — J43.8 OTHER EMPHYSEMA: ICD-10-CM

## 2021-08-02 DIAGNOSIS — E78.5 DYSLIPIDEMIA: Primary | ICD-10-CM

## 2021-08-02 DIAGNOSIS — G47.62 NOCTURNAL LEG CRAMPS: ICD-10-CM

## 2021-08-02 PROCEDURE — 81000 POCT URINE SEDIMENT EXAM: ICD-10-PCS | Mod: S$GLB,,, | Performed by: FAMILY MEDICINE

## 2021-08-02 PROCEDURE — 87086 URINE CULTURE/COLONY COUNT: CPT | Performed by: FAMILY MEDICINE

## 2021-08-02 PROCEDURE — 1101F PT FALLS ASSESS-DOCD LE1/YR: CPT | Mod: CPTII,S$GLB,, | Performed by: FAMILY MEDICINE

## 2021-08-02 PROCEDURE — 3288F PR FALLS RISK ASSESSMENT DOCUMENTED: ICD-10-PCS | Mod: CPTII,S$GLB,, | Performed by: FAMILY MEDICINE

## 2021-08-02 PROCEDURE — 3079F PR MOST RECENT DIASTOLIC BLOOD PRESSURE 80-89 MM HG: ICD-10-PCS | Mod: CPTII,S$GLB,, | Performed by: FAMILY MEDICINE

## 2021-08-02 PROCEDURE — 3075F SYST BP GE 130 - 139MM HG: CPT | Mod: CPTII,S$GLB,, | Performed by: FAMILY MEDICINE

## 2021-08-02 PROCEDURE — 1160F PR REVIEW ALL MEDS BY PRESCRIBER/CLIN PHARMACIST DOCUMENTED: ICD-10-PCS | Mod: CPTII,S$GLB,, | Performed by: FAMILY MEDICINE

## 2021-08-02 PROCEDURE — 1159F PR MEDICATION LIST DOCUMENTED IN MEDICAL RECORD: ICD-10-PCS | Mod: CPTII,S$GLB,, | Performed by: FAMILY MEDICINE

## 2021-08-02 PROCEDURE — 87186 SC STD MICRODIL/AGAR DIL: CPT | Performed by: FAMILY MEDICINE

## 2021-08-02 PROCEDURE — 99999 PR PBB SHADOW E&M-EST. PATIENT-LVL IV: ICD-10-PCS | Mod: PBBFAC,,, | Performed by: FAMILY MEDICINE

## 2021-08-02 PROCEDURE — 87077 CULTURE AEROBIC IDENTIFY: CPT | Performed by: FAMILY MEDICINE

## 2021-08-02 PROCEDURE — 3079F DIAST BP 80-89 MM HG: CPT | Mod: CPTII,S$GLB,, | Performed by: FAMILY MEDICINE

## 2021-08-02 PROCEDURE — 87088 URINE BACTERIA CULTURE: CPT | Performed by: FAMILY MEDICINE

## 2021-08-02 PROCEDURE — 3288F FALL RISK ASSESSMENT DOCD: CPT | Mod: CPTII,S$GLB,, | Performed by: FAMILY MEDICINE

## 2021-08-02 PROCEDURE — 1159F MED LIST DOCD IN RCRD: CPT | Mod: CPTII,S$GLB,, | Performed by: FAMILY MEDICINE

## 2021-08-02 PROCEDURE — 1101F PR PT FALLS ASSESS DOC 0-1 FALLS W/OUT INJ PAST YR: ICD-10-PCS | Mod: CPTII,S$GLB,, | Performed by: FAMILY MEDICINE

## 2021-08-02 PROCEDURE — 99499 UNLISTED E&M SERVICE: CPT | Mod: S$GLB,,, | Performed by: FAMILY MEDICINE

## 2021-08-02 PROCEDURE — 1125F PR PAIN SEVERITY QUANTIFIED, PAIN PRESENT: ICD-10-PCS | Mod: CPTII,S$GLB,, | Performed by: FAMILY MEDICINE

## 2021-08-02 PROCEDURE — 3075F PR MOST RECENT SYSTOLIC BLOOD PRESS GE 130-139MM HG: ICD-10-PCS | Mod: CPTII,S$GLB,, | Performed by: FAMILY MEDICINE

## 2021-08-02 PROCEDURE — 1125F AMNT PAIN NOTED PAIN PRSNT: CPT | Mod: CPTII,S$GLB,, | Performed by: FAMILY MEDICINE

## 2021-08-02 PROCEDURE — 99499 RISK ADDL DX/OHS AUDIT: ICD-10-PCS | Mod: S$GLB,,, | Performed by: FAMILY MEDICINE

## 2021-08-02 PROCEDURE — 99999 PR PBB SHADOW E&M-EST. PATIENT-LVL IV: CPT | Mod: PBBFAC,,, | Performed by: FAMILY MEDICINE

## 2021-08-02 PROCEDURE — 81000 URINALYSIS NONAUTO W/SCOPE: CPT | Mod: S$GLB,,, | Performed by: FAMILY MEDICINE

## 2021-08-02 PROCEDURE — 99214 PR OFFICE/OUTPT VISIT, EST, LEVL IV, 30-39 MIN: ICD-10-PCS | Mod: 25,S$GLB,, | Performed by: FAMILY MEDICINE

## 2021-08-02 PROCEDURE — 1160F RVW MEDS BY RX/DR IN RCRD: CPT | Mod: CPTII,S$GLB,, | Performed by: FAMILY MEDICINE

## 2021-08-02 PROCEDURE — 99214 OFFICE O/P EST MOD 30 MIN: CPT | Mod: 25,S$GLB,, | Performed by: FAMILY MEDICINE

## 2021-08-02 RX ORDER — ROSUVASTATIN CALCIUM 40 MG/1
40 TABLET, COATED ORAL NIGHTLY
Qty: 30 TABLET | Refills: 5 | Status: SHIPPED | OUTPATIENT
Start: 2021-08-02 | End: 2022-02-06

## 2021-08-02 RX ORDER — CIPROFLOXACIN 500 MG/1
500 TABLET ORAL 2 TIMES DAILY
Qty: 10 TABLET | Refills: 0 | Status: SHIPPED | OUTPATIENT
Start: 2021-08-02 | End: 2021-08-07

## 2021-08-02 RX ORDER — CYCLOBENZAPRINE HCL 10 MG
10 TABLET ORAL NIGHTLY
Qty: 30 TABLET | Refills: 5 | Status: SHIPPED | OUTPATIENT
Start: 2021-08-02 | End: 2021-09-01

## 2021-08-03 ENCOUNTER — LAB VISIT (OUTPATIENT)
Dept: LAB | Facility: HOSPITAL | Age: 76
End: 2021-08-03
Attending: FAMILY MEDICINE
Payer: MEDICARE

## 2021-08-03 DIAGNOSIS — E78.5 DYSLIPIDEMIA: ICD-10-CM

## 2021-08-03 LAB
ALBUMIN SERPL BCP-MCNC: 3.5 G/DL (ref 3.5–5.2)
ALP SERPL-CCNC: 40 U/L (ref 55–135)
ALT SERPL W/O P-5'-P-CCNC: 20 U/L (ref 10–44)
ANION GAP SERPL CALC-SCNC: 10 MMOL/L (ref 8–16)
AST SERPL-CCNC: 20 U/L (ref 10–40)
BILIRUB SERPL-MCNC: 0.5 MG/DL (ref 0.1–1)
BUN SERPL-MCNC: 28 MG/DL (ref 8–23)
CALCIUM SERPL-MCNC: 10.2 MG/DL (ref 8.7–10.5)
CHLORIDE SERPL-SCNC: 106 MMOL/L (ref 95–110)
CO2 SERPL-SCNC: 27 MMOL/L (ref 23–29)
CREAT SERPL-MCNC: 1 MG/DL (ref 0.5–1.4)
EST. GFR  (AFRICAN AMERICAN): >60 ML/MIN/1.73 M^2
EST. GFR  (NON AFRICAN AMERICAN): 55 ML/MIN/1.73 M^2
GLUCOSE SERPL-MCNC: 80 MG/DL (ref 70–110)
POTASSIUM SERPL-SCNC: 4.7 MMOL/L (ref 3.5–5.1)
PROT SERPL-MCNC: 7.2 G/DL (ref 6–8.4)
SODIUM SERPL-SCNC: 143 MMOL/L (ref 136–145)

## 2021-08-03 PROCEDURE — 36415 COLL VENOUS BLD VENIPUNCTURE: CPT | Performed by: FAMILY MEDICINE

## 2021-08-03 PROCEDURE — 80053 COMPREHEN METABOLIC PANEL: CPT | Performed by: FAMILY MEDICINE

## 2021-08-04 LAB
BACTERIA SPEC CULT: NORMAL /HPF
BILIRUB SERPL-MCNC: NORMAL MG/DL
BLOOD URINE, POC: NORMAL
CASTS: NORMAL
COLOR, POC UA: YELLOW
CRYSTALS: NORMAL
GLUCOSE UR QL STRIP: NORMAL
KETONES UR QL STRIP: NORMAL
LEUKOCYTE ESTERASE URINE, POC: NORMAL
NITRITE, POC UA: NORMAL
PH, POC UA: 6
PROTEIN, POC: NORMAL
RBC CELLS COUNTED: NORMAL
SPECIFIC GRAVITY, POC UA: 1.01
UROBILINOGEN, POC UA: NORMAL
WHITE BLOOD CELLS: NORMAL

## 2021-08-05 LAB — BACTERIA UR CULT: ABNORMAL

## 2021-08-05 RX ORDER — NITROFURANTOIN 25; 75 MG/1; MG/1
100 CAPSULE ORAL 2 TIMES DAILY
Qty: 14 CAPSULE | Refills: 0 | Status: SHIPPED | OUTPATIENT
Start: 2021-08-05 | End: 2021-08-12

## 2021-08-26 ENCOUNTER — HOSPITAL ENCOUNTER (OUTPATIENT)
Dept: RADIOLOGY | Facility: HOSPITAL | Age: 76
Discharge: HOME | End: 2021-08-26
Attending: INTERNAL MEDICINE
Payer: MEDICARE

## 2021-08-26 DIAGNOSIS — R10.13 ABDOMINAL PAIN, EPIGASTRIC: ICD-10-CM

## 2021-08-26 PROCEDURE — 74240 X-RAY XM UPR GI TRC 1CNTRST: CPT | Mod: TC

## 2021-08-26 PROCEDURE — A9698 NON-RAD CONTRAST MATERIALNOC: HCPCS | Performed by: INTERNAL MEDICINE

## 2021-08-26 PROCEDURE — 25500020 PHARM REV CODE 255: Performed by: INTERNAL MEDICINE

## 2021-08-26 PROCEDURE — 74240 X-RAY XM UPR GI TRC 1CNTRST: CPT | Mod: 26,,, | Performed by: RADIOLOGY

## 2021-08-26 PROCEDURE — 74240 FL UPPER GI: ICD-10-PCS | Mod: 26,,, | Performed by: RADIOLOGY

## 2021-08-26 RX ADMIN — BARIUM SULFATE 355 ML: 0.6 SUSPENSION ORAL at 09:08

## 2021-11-08 ENCOUNTER — TELEPHONE (OUTPATIENT)
Dept: FAMILY MEDICINE | Facility: CLINIC | Age: 76
End: 2021-11-08
Payer: MEDICARE

## 2021-12-10 DIAGNOSIS — J44.89 OBSTRUCTIVE CHRONIC BRONCHITIS WITHOUT EXACERBATION: Primary | ICD-10-CM

## 2021-12-14 ENCOUNTER — HOSPITAL ENCOUNTER (OUTPATIENT)
Dept: RADIOLOGY | Facility: HOSPITAL | Age: 76
Discharge: HOME | End: 2021-12-14
Attending: INTERNAL MEDICINE
Payer: MEDICARE

## 2021-12-14 ENCOUNTER — HOSPITAL ENCOUNTER (OUTPATIENT)
Dept: PULMONOLOGY | Facility: HOSPITAL | Age: 76
Discharge: HOME | End: 2021-12-14
Attending: INTERNAL MEDICINE
Payer: MEDICARE

## 2021-12-14 DIAGNOSIS — J44.89 OBSTRUCTIVE CHRONIC BRONCHITIS WITHOUT EXACERBATION: ICD-10-CM

## 2021-12-14 PROCEDURE — 71250 CT CHEST WITHOUT CONTRAST: ICD-10-PCS | Mod: 26,,, | Performed by: RADIOLOGY

## 2021-12-14 PROCEDURE — 94727 PR PULM FUNCTION TEST BY GAS: ICD-10-PCS | Mod: 26,,, | Performed by: INTERNAL MEDICINE

## 2021-12-14 PROCEDURE — 94799 PR NIF/PIF PULMONARY FUNCTION TEST: ICD-10-PCS | Mod: 26,,, | Performed by: INTERNAL MEDICINE

## 2021-12-14 PROCEDURE — 94729 DIFFUSING CAPACITY: CPT | Mod: 26,,, | Performed by: INTERNAL MEDICINE

## 2021-12-14 PROCEDURE — 94727 GAS DIL/WSHOT DETER LNG VOL: CPT | Mod: 26,,, | Performed by: INTERNAL MEDICINE

## 2021-12-14 PROCEDURE — 94729 DIFFUSING CAPACITY: CPT

## 2021-12-14 PROCEDURE — 94799 UNLISTED PULMONARY SVC/PX: CPT | Mod: 26,,, | Performed by: INTERNAL MEDICINE

## 2021-12-14 PROCEDURE — 71250 CT THORAX DX C-: CPT | Mod: TC

## 2021-12-14 PROCEDURE — 94060 EVALUATION OF WHEEZING: CPT | Mod: 26,,, | Performed by: INTERNAL MEDICINE

## 2021-12-14 PROCEDURE — 71250 CT THORAX DX C-: CPT | Mod: 26,,, | Performed by: RADIOLOGY

## 2021-12-14 PROCEDURE — 94727 GAS DIL/WSHOT DETER LNG VOL: CPT

## 2021-12-14 PROCEDURE — 94060 EVALUATION OF WHEEZING: CPT

## 2021-12-14 PROCEDURE — 99900031 HC PATIENT EDUCATION (STAT)

## 2021-12-14 PROCEDURE — 94729 PR C02/MEMBANE DIFFUSE CAPACITY: ICD-10-PCS | Mod: 26,,, | Performed by: INTERNAL MEDICINE

## 2021-12-14 PROCEDURE — 94060 PR EVAL OF BRONCHOSPASM: ICD-10-PCS | Mod: 26,,, | Performed by: INTERNAL MEDICINE

## 2021-12-16 LAB
BRPFT: ABNORMAL
DLCO SINGLE BREATH LLN: 12.19
DLCO SINGLE BREATH PRE REF: 42.9 %
DLCO SINGLE BREATH REF: 17.92
DLCOC SBVA LLN: 2.54
DLCOC SBVA REF: 4.2
DLCOC SINGLE BREATH LLN: 12.19
DLCOC SINGLE BREATH REF: 17.92
DLCOVA LLN: 2.54
DLCOVA PRE REF: 61.8 %
DLCOVA PRE: 2.6 ML/(MIN*MMHG*L) (ref 2.54–5.86)
DLCOVA REF: 4.2
ERVN2 LLN: -16449.48
ERVN2 PRE REF: 77.8 %
ERVN2 PRE: 0.4 L (ref -16449.48–16450.52)
ERVN2 REF: 0.52
FEF 25 75 CHG: 4.5 %
FEF 25 75 LLN: 0.65
FEF 25 75 POST REF: 53.6 %
FEF 25 75 PRE REF: 51.3 %
FEF 25 75 REF: 1.51
FET100 CHG: 26.9 %
FEV1 CHG: 5.5 %
FEV1 FVC CHG: -3.1 %
FEV1 FVC LLN: 64
FEV1 FVC POST REF: 85.1 %
FEV1 FVC PRE REF: 87.9 %
FEV1 FVC REF: 78
FEV1 LLN: 1.25
FEV1 POST REF: 83.7 %
FEV1 PRE REF: 79.3 %
FEV1 REF: 1.76
FRCN2 LLN: 1.67
FRCN2 PRE REF: 62.8 %
FRCN2 REF: 2.49
FVC CHG: 8.9 %
FVC LLN: 1.62
FVC POST REF: 97.4 %
FVC PRE REF: 89.4 %
FVC REF: 2.28
IVC PRE: 2.02 L (ref 1.62–2.98)
IVC SINGLE BREATH LLN: 1.62
IVC SINGLE BREATH PRE REF: 88.6 %
IVC SINGLE BREATH REF: 2.28
MEP LLN: 63
MEP PRE REF: 47.4 %
MEP PRE: 37.92 CMH2O (ref 63.23–96.78)
MEP REF: 80
MIP LLN: 33
MIP PRE REF: 110.4 %
MIP PRE: 55.21 CMH2O (ref 33.23–66.78)
MIP REF: 50
MVV LLN: 52
MVV PRE REF: 85.5 %
MVV REF: 61
PEF CHG: -7.4 %
PEF LLN: 3.06
PEF POST REF: 79.9 %
PEF PRE REF: 86.3 %
PEF REF: 4.56
POST FEF 25 75: 0.81 L/S (ref 0.65–2.77)
POST FET 100: 8.39 SEC
POST FEV1 FVC: 66.32 % (ref 63.61–90.26)
POST FEV1: 1.47 L (ref 1.25–2.25)
POST FVC: 2.22 L (ref 1.62–2.98)
POST PEF: 3.65 L/S (ref 3.06–6.07)
PRE DLCO: 7.69 ML/(MIN*MMHG) (ref 12.19–23.66)
PRE FEF 25 75: 0.77 L/S (ref 0.65–2.77)
PRE FET 100: 6.61 SEC
PRE FEV1 FVC: 68.46 % (ref 63.61–90.26)
PRE FEV1: 1.4 L (ref 1.25–2.25)
PRE FRC N2: 1.56 L (ref 1.67–3.31)
PRE FVC: 2.04 L (ref 1.62–2.98)
PRE MVV: 51.97 L/MIN (ref 51.67–69.91)
PRE PEF: 3.94 L/S (ref 3.06–6.07)
RVN2 LLN: 1.4
RVN2 PRE REF: 58.9 %
RVN2 PRE: 1.16 L (ref 1.4–2.55)
RVN2 REF: 1.97
RVN2TLCN2 LLN: 35.21
RVN2TLCN2 PRE REF: 78.9 %
RVN2TLCN2 PRE: 35.35 % (ref 35.21–54.39)
RVN2TLCN2 REF: 44.8
TLCN2 LLN: 3.28
TLCN2 PRE REF: 77 %
TLCN2 PRE: 3.29 L (ref 3.28–5.26)
TLCN2 REF: 4.27
VA PRE: 2.96 L (ref 4.12–4.12)
VA SINGLE BREATH LLN: 4.12
VA SINGLE BREATH PRE REF: 72 %
VA SINGLE BREATH REF: 4.12
VCMAXN2 LLN: 1.62
VCMAXN2 PRE REF: 93.1 %
VCMAXN2 PRE: 2.13 L (ref 1.62–2.98)
VCMAXN2 REF: 2.28

## 2022-01-12 DIAGNOSIS — J41.0 SIMPLE CHRONIC BRONCHITIS: ICD-10-CM

## 2022-01-12 NOTE — TELEPHONE ENCOUNTER
No new care gaps identified.  Powered by [employer]. Reference number: [id number].   1/12/2022 9:05:48 AM CST

## 2022-01-14 RX ORDER — BUDESONIDE AND FORMOTEROL FUMARATE DIHYDRATE 80; 4.5 UG/1; UG/1
AEROSOL RESPIRATORY (INHALATION)
Qty: 30.6 G | Refills: 1 | Status: SHIPPED | OUTPATIENT
Start: 2022-01-14 | End: 2023-05-31 | Stop reason: SDUPTHER

## 2022-01-14 NOTE — TELEPHONE ENCOUNTER
Refill Authorization Note   Megha Mendoza  is requesting a refill authorization.  Brief Assessment and Rationale for Refill:  Approve     Medication Therapy Plan:       Medication Reconciliation Completed: No   Comments:   --->Care Gap information included below if applicable.       Requested Prescriptions   Pending Prescriptions Disp Refills    SYMBICORT 80-4.5 mcg/actuation HFAA [Pharmacy Med Name: Symbicort 80-4.5 MCG/ACT Inhalation Aerosol] 30.6 g 1     Sig: INHALE 2 PUFFS BY MOUTH TWICE DAILY .  CONTROLLER       Pulmonology:  Combination Products Passed - 1/12/2022  9:05 AM        Passed - Patient is at least 18 years old        Passed - Patient has applicable pulmonary diagnosis on their problem list        Passed - Last Heart Rate in normal range within 360 days     Pulse Readings from Last 1 Encounters:   08/02/21 80              Passed - Valid encounter within last 15 months     Recent Visits  Date Type Provider Dept   08/02/21 Office Visit Tomy Baron MD [facility]   06/16/21 Office Visit Tomy Baron MD [facility]   02/02/21 Office Visit Tomy Baron MD [facility]   09/01/20 Office Visit Tomy Baron MD [facility]   07/29/20 Office Visit Tomy Baron MD [facility]   01/29/20 Office Visit Tomy Baron MD [facility]   Showing recent visits within past 720 days and meeting all other requirements  Future Appointments  No visits were found meeting these conditions.  Showing future appointments within next 150 days and meeting all other requirements      Future Appointments              In 2 weeks [address]    In 2 weeks Tomy Baron MD [facility]                    Appointments  past 12m or future 3m with PCP    Date Provider   Last Visit   8/2/2021 Tomy Baron MD   Next Visit   2/2/2022 Tomy Baron MD   ED visits in  past 90 days: 0     Note composed:9:59 AM 01/14/2022

## 2022-01-24 ENCOUNTER — TELEPHONE (OUTPATIENT)
Dept: INFUSION THERAPY | Facility: HOSPITAL | Age: 77
End: 2022-01-24
Payer: MEDICARE

## 2022-01-24 DIAGNOSIS — M81.0 OSTEOPOROSIS, POSTMENOPAUSAL: ICD-10-CM

## 2022-01-24 RX ORDER — DENOSUMAB 60 MG/ML
60 INJECTION SUBCUTANEOUS
Qty: 1 EACH | Refills: 1 | Status: SHIPPED | OUTPATIENT
Start: 2022-01-24

## 2022-01-28 ENCOUNTER — INFUSION (OUTPATIENT)
Dept: INFUSION THERAPY | Facility: HOSPITAL | Age: 77
End: 2022-01-28
Attending: FAMILY MEDICINE
Payer: MEDICARE

## 2022-01-28 VITALS
DIASTOLIC BLOOD PRESSURE: 59 MMHG | RESPIRATION RATE: 18 BRPM | HEART RATE: 80 BPM | TEMPERATURE: 98 F | SYSTOLIC BLOOD PRESSURE: 169 MMHG

## 2022-01-28 DIAGNOSIS — M85.80 OSTEOPENIA, UNSPECIFIED LOCATION: Primary | ICD-10-CM

## 2022-01-28 PROCEDURE — 63600175 PHARM REV CODE 636 W HCPCS: Performed by: FAMILY MEDICINE

## 2022-01-28 PROCEDURE — 96372 THER/PROPH/DIAG INJ SC/IM: CPT

## 2022-01-28 RX ADMIN — DENOSUMAB 60 MG: 60 INJECTION SUBCUTANEOUS at 08:01

## 2022-01-28 NOTE — PATIENT INSTRUCTIONS
Patient Education       Denosumab (joselin [place] rosangela mab)   Brand Names: [place] Prolia; Xgeva   Brand Names: Stephanie Prolia; Xgeva   What is this drug used for?   · It is used to treat soft, brittle bones (osteoporosis).  · It is used for bone growth.  · It is used when treating some cancers.  · It is used to treat high calcium levels in patients with cancer.  · It may be given to you for other reasons. Talk with the doctor.    What do I need to tell my doctor BEFORE I take this drug?   · If you are allergic to this drug; any part of this drug; or any other drugs, foods, or substances. Tell your doctor about the allergy and what signs you had.  · If you have low calcium levels.  · If you are using another drug that has the same drug in it.  · If you are pregnant or may be pregnant. Do not take this drug if you are pregnant.  · If you are breast-feeding or plan to breast-feed.  This is not a list of all drugs or health problems that interact with this drug.  Tell your doctor and pharmacist about all of your drugs (prescription or OTC, natural products, vitamins) and health problems. You must check to make sure that it is safe for you to take this drug with all of your drugs and health problems. Do not start, stop, or change the dose of any drug without checking with your doctor.  What are some things I need to know or do while I take this drug?   All products:   · Tell all of your health care providers that you take this drug. This includes your doctors, nurses, pharmacists, and dentists.  · This drug may raise the chance of a broken leg. Talk with the doctor.  · If treatment with this drug is stopped, skipped, or delayed, the chance of a broken bone is raised. This includes bones in the spine. The chance of having more than 1 broken bone in the spine is raised if you have ever had a broken bone in your spine. Do not stop, skip, or delay treatment with this drug without talking to your doctor.  · Have a bone density test as you  have been told by your doctor. Talk with your doctor.  · Have blood work checked as you have been told by the doctor. Talk with the doctor.  · Take calcium and vitamin D as you were told by your doctor.  · Have a dental exam before starting this drug.  · Take good care of your teeth. See a dentist often.  · This drug may cause harm to an unborn baby. A pregnancy test will be done before you start this drug to show that you are NOT pregnant.  · If you may become pregnant, you must use birth control while taking this drug and for some time after the last dose. Ask your doctor how long to use birth control. If you get pregnant, call your doctor right away.  Xgeva®:   · Very low blood calcium levels have happened with this drug. Sometimes, this has been deadly. If you have questions, talk with the doctor.  · High calcium levels have happened after this drug was stopped in people whose bones were still growing and people with giant cell bone tumor. Call your doctor right away if you have signs of high calcium levels like weakness, confusion, feeling tired, headache, upset stomach or throwing up, constipation, or bone pain.  Prolia:   · Very bad infections have been reported with use of this drug. If you have any infection, are taking antibiotics now or in the recent past, or have many infections, talk with your doctor.  · Rarely, a pancreas problem (pancreatitis) has happened with this drug. This has included 1 death. Call your doctor right away if you have signs of pancreatitis like very bad stomach pain, very bad back pain, or very bad upset stomach or throwing up.  · This drug may lower blood calcium levels. If you already have low blood calcium, it may get worse with this drug. Sometimes, blood calcium levels have stayed low for weeks or months after use of this drug. Talk with the doctor.  · This drug is not approved for use in children. Talk with the doctor.  What are some side effects that I need to call my  doctor about right away?   WARNING/CAUTION: Even though it may be rare, some people may have very bad and sometimes deadly side effects when taking a drug. Tell your doctor or get medical help right away if you have any of the following signs or symptoms that may be related to a very bad side effect:  All products:   · Signs of an allergic reaction, like rash; hives; itching; red, swollen, blistered, or peeling skin with or without fever; wheezing; tightness in the chest or throat; trouble breathing, swallowing, or talking; unusual hoarseness; or swelling of the mouth, face, lips, tongue, or throat.  · Signs of low calcium levels like muscle cramps or spasms, numbness and tingling, or seizures.  · Mouth sores.  · Swelling in the arms or legs.  · Feeling very tired or weak.  · Any new or strange groin, hip, or thigh pain.  · Very bad bone, joint, or muscle pain.  · Shortness of breath.  · This drug may cause jawbone problems. The risk may be higher with longer use, cancer, dental problems, ill-fitting dentures, anemia, blood clotting problems, or infection. It may also be higher if you have dental work, chemo, radiation, or take other drugs that may cause jawbone problems. Many drugs can do this. Talk with your doctor if any of these apply to you, or if you have questions. Call your doctor right away if you have jaw swelling or pain.  Xgeva®:   · Signs of low phosphate levels like change in eyesight, feeling confused, mood changes, muscle pain or weakness, shortness of breath or other breathing problems, or trouble swallowing.  · Very bad dizziness or passing out.  · Any unexplained bruising or bleeding.  Prolia:   · Signs of infection like fever, chills, very bad sore throat, ear or sinus pain, cough, more sputum or change in color of sputum, pain with passing urine, mouth sores, or wound that will not heal.  · Signs of skin infection like oozing, heat, swelling, redness, or pain.  · Signs of high or low blood  pressure like very bad headache or dizziness, passing out, or change in eyesight.  · Small bumps or patches on your skin, dry skin, or if your skin feels like leather.  · Bladder pain or pain when passing urine or change in how much urine is passed.  · Passing urine more often.  What are some other side effects of this drug?   All drugs may cause side effects. However, many people have no side effects or only have minor side effects. Call your doctor or get medical help if any of these side effects or any other side effects bother you or do not go away:  All products:   · Back pain.  · Headache.  · Signs of a common cold.  · Pain in arms or legs.  · Muscle or joint pain.  Xgeva®:   · Constipation, diarrhea, stomach pain, upset stomach, throwing up, or feeling less hungry.  · Feeling tired or weak.  · Nose or throat irritation.  · Tooth pain.  These are not all of the side effects that may occur. If you have questions about side effects, call your doctor. Call your doctor for medical advice about side effects.  You may report side effects to your national health agency.  You may report side effects to the FDA at 1-346.454.7570. You may also report side effects at https://www.fda.gov/medwatch.  How is this drug best taken?   Use this drug as ordered by your doctor. Read all information given to you. Follow all instructions closely.  · It is given as a shot into the fatty part of the skin.  What do I do if I miss a dose?   · Call your doctor to find out what to do.    How do I store and/or throw out this drug?   · If you need to store this drug at home, talk with your doctor, nurse, or pharmacist about how to store it.    General drug facts   · If your symptoms or health problems do not get better or if they become worse, call your doctor.  · Do not share your drugs with others and do not take anyone else's drugs.  · Keep all drugs in a safe place. Keep all drugs out of the reach of children and pets.  · Throw away  unused or  drugs. Do not flush down a toilet or pour down a drain unless you are told to do so. Check with your pharmacist if you have questions about the best way to throw out drugs. There may be drug take-back programs in your area.  · Some drugs may have another patient information leaflet. If you have any questions about this drug, please talk with your doctor, nurse, pharmacist, or other health care provider.  · Some drugs may have another patient information leaflet. Check with your pharmacist. If you have any questions about this drug, please talk with your doctor, nurse, pharmacist, or other health care provider.  · If you think there has been an overdose, call your poison control center or get medical care right away. Be ready to tell or show what was taken, how much, and when it happened.    Consumer Information Use and Disclaimer   This generalized information is a limited summary of diagnosis, treatment, and/or medication information. It is not meant to be comprehensive and should be used as a tool to help the user understand and/or assess potential diagnostic and treatment options. It does NOT include all information about conditions, treatments, medications, side effects, or risks that may apply to a specific patient. It is not intended to be medical advice or a substitute for the medical advice, diagnosis, or treatment of a health care provider based on the health care provider's examination and assessment of a patient's specific and unique circumstances. Patients must speak with a health care provider for complete information about their health, medical questions, and treatment options, including any risks or benefits regarding use of medications. This information does not endorse any treatments or medications as safe, effective, or approved for treating a specific patient. [employer] and its affiliates disclaim any warranty or liability relating to this information or the use thereof. The  use of this information is governed by the Terms of Use, available at https://www.[employer].com/en/solutions/lexicomp/about/grabiel.  Last Reviewed Date   2020-08-03  Copyright   © 2021 [employer] and its affiliates and/or licensors. All rights reserved.

## 2022-02-02 ENCOUNTER — OFFICE VISIT (OUTPATIENT)
Dept: FAMILY MEDICINE | Facility: CLINIC | Age: 77
End: 2022-02-02
Payer: MEDICARE

## 2022-02-02 ENCOUNTER — CLINICAL SUPPORT (OUTPATIENT)
Dept: FAMILY MEDICINE | Facility: CLINIC | Age: 77
End: 2022-02-02
Payer: MEDICARE

## 2022-02-02 VITALS
DIASTOLIC BLOOD PRESSURE: 72 MMHG | RESPIRATION RATE: 18 BRPM | BODY MASS INDEX: 31.51 KG/M2 | SYSTOLIC BLOOD PRESSURE: 122 MMHG | OXYGEN SATURATION: 97 % | WEIGHT: 156.31 LBS | HEIGHT: 59 IN | HEART RATE: 71 BPM

## 2022-02-02 DIAGNOSIS — E78.5 DYSLIPIDEMIA: Primary | ICD-10-CM

## 2022-02-02 DIAGNOSIS — F51.04 CHRONIC INSOMNIA: ICD-10-CM

## 2022-02-02 DIAGNOSIS — E78.5 DYSLIPIDEMIA: ICD-10-CM

## 2022-02-02 DIAGNOSIS — K21.9 GASTROESOPHAGEAL REFLUX DISEASE WITHOUT ESOPHAGITIS: Chronic | ICD-10-CM

## 2022-02-02 DIAGNOSIS — I25.10 CORONARY ARTERY DISEASE INVOLVING NATIVE CORONARY ARTERY OF NATIVE HEART WITHOUT ANGINA PECTORIS: ICD-10-CM

## 2022-02-02 DIAGNOSIS — J43.9 PULMONARY EMPHYSEMA, UNSPECIFIED EMPHYSEMA TYPE: ICD-10-CM

## 2022-02-02 LAB
ALBUMIN SERPL BCP-MCNC: 3.5 G/DL (ref 3.5–5.2)
ALP SERPL-CCNC: 36 U/L (ref 55–135)
ALT SERPL W/O P-5'-P-CCNC: 18 U/L (ref 10–44)
ANION GAP SERPL CALC-SCNC: 8 MMOL/L (ref 8–16)
AST SERPL-CCNC: 20 U/L (ref 10–40)
BASOPHILS # BLD AUTO: 0.08 K/UL (ref 0–0.2)
BASOPHILS NFR BLD: 0.9 % (ref 0–1.9)
BILIRUB SERPL-MCNC: 0.5 MG/DL (ref 0.1–1)
BUN SERPL-MCNC: 27 MG/DL (ref 8–23)
CALCIUM SERPL-MCNC: 9.5 MG/DL (ref 8.7–10.5)
CHLORIDE SERPL-SCNC: 106 MMOL/L (ref 95–110)
CHOLEST SERPL-MCNC: 154 MG/DL (ref 120–199)
CHOLEST/HDLC SERPL: 2.1 {RATIO} (ref 2–5)
CO2 SERPL-SCNC: 28 MMOL/L (ref 23–29)
CREAT SERPL-MCNC: 0.8 MG/DL (ref 0.5–1.4)
DIFFERENTIAL METHOD: ABNORMAL
EOSINOPHIL # BLD AUTO: 0.4 K/UL (ref 0–0.5)
EOSINOPHIL NFR BLD: 4.8 % (ref 0–8)
ERYTHROCYTE [DISTWIDTH] IN BLOOD BY AUTOMATED COUNT: 13.2 % (ref 11.5–14.5)
EST. GFR  (AFRICAN AMERICAN): >60 ML/MIN/1.73 M^2
EST. GFR  (NON AFRICAN AMERICAN): >60 ML/MIN/1.73 M^2
GLUCOSE SERPL-MCNC: 92 MG/DL (ref 70–110)
HCT VFR BLD AUTO: 41.4 % (ref 37–48.5)
HDLC SERPL-MCNC: 73 MG/DL (ref 40–75)
HDLC SERPL: 47.4 % (ref 20–50)
HGB BLD-MCNC: 13.1 G/DL (ref 12–16)
IMM GRANULOCYTES # BLD AUTO: 0.03 K/UL (ref 0–0.04)
IMM GRANULOCYTES NFR BLD AUTO: 0.3 % (ref 0–0.5)
LDLC SERPL CALC-MCNC: 57.6 MG/DL (ref 63–159)
LYMPHOCYTES # BLD AUTO: 1.9 K/UL (ref 1–4.8)
LYMPHOCYTES NFR BLD: 21.3 % (ref 18–48)
MCH RBC QN AUTO: 31.2 PG (ref 27–31)
MCHC RBC AUTO-ENTMCNC: 31.6 G/DL (ref 32–36)
MCV RBC AUTO: 99 FL (ref 82–98)
MONOCYTES # BLD AUTO: 0.9 K/UL (ref 0.3–1)
MONOCYTES NFR BLD: 10.7 % (ref 4–15)
NEUTROPHILS # BLD AUTO: 5.4 K/UL (ref 1.8–7.7)
NEUTROPHILS NFR BLD: 62 % (ref 38–73)
NONHDLC SERPL-MCNC: 81 MG/DL
NRBC BLD-RTO: 0 /100 WBC
PLATELET # BLD AUTO: 282 K/UL (ref 150–450)
PMV BLD AUTO: 10.1 FL (ref 9.2–12.9)
POTASSIUM SERPL-SCNC: 4.2 MMOL/L (ref 3.5–5.1)
PROT SERPL-MCNC: 6.8 G/DL (ref 6–8.4)
RBC # BLD AUTO: 4.2 M/UL (ref 4–5.4)
SODIUM SERPL-SCNC: 142 MMOL/L (ref 136–145)
TRIGL SERPL-MCNC: 117 MG/DL (ref 30–150)
TSH SERPL DL<=0.005 MIU/L-ACNC: 1.15 UIU/ML (ref 0.4–4)
WBC # BLD AUTO: 8.75 K/UL (ref 3.9–12.7)

## 2022-02-02 PROCEDURE — 3078F PR MOST RECENT DIASTOLIC BLOOD PRESSURE < 80 MM HG: ICD-10-PCS | Mod: CPTII,S$GLB,, | Performed by: FAMILY MEDICINE

## 2022-02-02 PROCEDURE — 99999 PR PBB SHADOW E&M-EST. PATIENT-LVL IV: ICD-10-PCS | Mod: PBBFAC,,, | Performed by: FAMILY MEDICINE

## 2022-02-02 PROCEDURE — 99214 OFFICE O/P EST MOD 30 MIN: CPT | Mod: S$GLB,,, | Performed by: FAMILY MEDICINE

## 2022-02-02 PROCEDURE — 1160F PR REVIEW ALL MEDS BY PRESCRIBER/CLIN PHARMACIST DOCUMENTED: ICD-10-PCS | Mod: CPTII,S$GLB,, | Performed by: FAMILY MEDICINE

## 2022-02-02 PROCEDURE — 3288F FALL RISK ASSESSMENT DOCD: CPT | Mod: CPTII,S$GLB,, | Performed by: FAMILY MEDICINE

## 2022-02-02 PROCEDURE — 85025 COMPLETE CBC W/AUTO DIFF WBC: CPT | Performed by: FAMILY MEDICINE

## 2022-02-02 PROCEDURE — 1159F MED LIST DOCD IN RCRD: CPT | Mod: CPTII,S$GLB,, | Performed by: FAMILY MEDICINE

## 2022-02-02 PROCEDURE — 36415 COLL VENOUS BLD VENIPUNCTURE: CPT | Mod: S$GLB,,, | Performed by: FAMILY MEDICINE

## 2022-02-02 PROCEDURE — 3078F DIAST BP <80 MM HG: CPT | Mod: CPTII,S$GLB,, | Performed by: FAMILY MEDICINE

## 2022-02-02 PROCEDURE — 1101F PT FALLS ASSESS-DOCD LE1/YR: CPT | Mod: CPTII,S$GLB,, | Performed by: FAMILY MEDICINE

## 2022-02-02 PROCEDURE — 84443 ASSAY THYROID STIM HORMONE: CPT | Performed by: FAMILY MEDICINE

## 2022-02-02 PROCEDURE — 36415 PR COLLECTION VENOUS BLOOD,VENIPUNCTURE: ICD-10-PCS | Mod: S$GLB,,, | Performed by: FAMILY MEDICINE

## 2022-02-02 PROCEDURE — 3288F PR FALLS RISK ASSESSMENT DOCUMENTED: ICD-10-PCS | Mod: CPTII,S$GLB,, | Performed by: FAMILY MEDICINE

## 2022-02-02 PROCEDURE — 99214 PR OFFICE/OUTPT VISIT, EST, LEVL IV, 30-39 MIN: ICD-10-PCS | Mod: S$GLB,,, | Performed by: FAMILY MEDICINE

## 2022-02-02 PROCEDURE — 80053 COMPREHEN METABOLIC PANEL: CPT | Performed by: FAMILY MEDICINE

## 2022-02-02 PROCEDURE — 1159F PR MEDICATION LIST DOCUMENTED IN MEDICAL RECORD: ICD-10-PCS | Mod: CPTII,S$GLB,, | Performed by: FAMILY MEDICINE

## 2022-02-02 PROCEDURE — 80061 LIPID PANEL: CPT | Performed by: FAMILY MEDICINE

## 2022-02-02 PROCEDURE — 99999 PR PBB SHADOW E&M-EST. PATIENT-LVL IV: CPT | Mod: PBBFAC,,, | Performed by: FAMILY MEDICINE

## 2022-02-02 PROCEDURE — 1125F AMNT PAIN NOTED PAIN PRSNT: CPT | Mod: CPTII,S$GLB,, | Performed by: FAMILY MEDICINE

## 2022-02-02 PROCEDURE — 1160F RVW MEDS BY RX/DR IN RCRD: CPT | Mod: CPTII,S$GLB,, | Performed by: FAMILY MEDICINE

## 2022-02-02 PROCEDURE — 3074F PR MOST RECENT SYSTOLIC BLOOD PRESSURE < 130 MM HG: ICD-10-PCS | Mod: CPTII,S$GLB,, | Performed by: FAMILY MEDICINE

## 2022-02-02 PROCEDURE — 3074F SYST BP LT 130 MM HG: CPT | Mod: CPTII,S$GLB,, | Performed by: FAMILY MEDICINE

## 2022-02-02 PROCEDURE — 1125F PR PAIN SEVERITY QUANTIFIED, PAIN PRESENT: ICD-10-PCS | Mod: CPTII,S$GLB,, | Performed by: FAMILY MEDICINE

## 2022-02-02 PROCEDURE — 1101F PR PT FALLS ASSESS DOC 0-1 FALLS W/OUT INJ PAST YR: ICD-10-PCS | Mod: CPTII,S$GLB,, | Performed by: FAMILY MEDICINE

## 2022-02-02 RX ORDER — CYCLOBENZAPRINE HCL 10 MG
TABLET ORAL
COMMUNITY
Start: 2021-11-12 | End: 2022-08-15

## 2022-02-02 NOTE — PROGRESS NOTES
Subjective:       Patient ID: Megha Mendoza is a 76 y.o. female.    Chief Complaint: Follow-up    Pt is a 76 y.o. female who presents for evaluation and management of   Encounter Diagnoses   Name Primary?    Dyslipidemia Yes    Coronary artery disease involving native coronary artery of native heart without angina pectoris     Chronic insomnia     Gastroesophageal reflux disease without esophagitis     Pulmonary emphysema, unspecified emphysema type    .  Doing well on current meds. Denies any side effects. Prevention is up to date.    Review of Systems   Constitutional: Negative for chills and fever.   Respiratory: Negative for shortness of breath.    Cardiovascular: Negative for chest pain and palpitations.   Gastrointestinal: Negative for abdominal pain, blood in stool, constipation and nausea.   Genitourinary: Negative for difficulty urinating.   Psychiatric/Behavioral: Negative for dysphoric mood, sleep disturbance and suicidal ideas. The patient is not nervous/anxious.        Objective:      Physical Exam  Constitutional:       Appearance: She is well-developed and well-nourished.   HENT:      Head: Normocephalic and atraumatic.      Right Ear: External ear normal.      Left Ear: External ear normal.      Nose: Nose normal.      Mouth/Throat:      Mouth: Oropharynx is clear and moist.   Eyes:      Extraocular Movements: EOM normal.      Pupils: Pupils are equal, round, and reactive to light.   Neck:      Thyroid: No thyromegaly.      Vascular: No JVD.      Trachea: No tracheal deviation.   Cardiovascular:      Rate and Rhythm: Normal rate.      Pulses: Intact distal pulses.      Heart sounds: Normal heart sounds. No murmur heard.      Pulmonary:      Effort: Pulmonary effort is normal. No respiratory distress.      Breath sounds: Normal breath sounds. No wheezing or rales.   Chest:      Chest wall: No tenderness.   Abdominal:      General: Bowel sounds are normal. There is no distension.      Palpations:  Abdomen is soft. There is no mass.      Tenderness: There is no abdominal tenderness. There is no guarding or rebound.   Musculoskeletal:         General: No tenderness or edema. Normal range of motion.      Cervical back: Normal range of motion and neck supple.   Lymphadenopathy:      Cervical: No cervical adenopathy.   Skin:     General: Skin is warm and dry.      Coloration: Skin is not pale.      Findings: No erythema or rash.   Neurological:      General: No focal deficit present.      Mental Status: She is alert and oriented to person, place, and time.      Cranial Nerves: No cranial nerve deficit.      Motor: No abnormal muscle tone.      Coordination: Coordination normal.      Deep Tendon Reflexes: Reflexes are normal and symmetric. Reflexes normal.   Psychiatric:         Mood and Affect: Mood and affect normal.         Behavior: Behavior normal.         Thought Content: Thought content normal.         Judgment: Judgment normal.         Assessment:       1. Dyslipidemia    2. Coronary artery disease involving native coronary artery of native heart without angina pectoris    3. Chronic insomnia    4. Gastroesophageal reflux disease without esophagitis    5. Pulmonary emphysema, unspecified emphysema type        Plan:   Megha was seen today for follow-up.    Diagnoses and all orders for this visit:    Dyslipidemia  -     Comprehensive Metabolic Panel; Future  -     Lipid Panel; Future  -     TSH; Future    Coronary artery disease involving native coronary artery of native heart without angina pectoris  -     CBC Auto Differential; Future  -     Comprehensive Metabolic Panel; Future  -     Lipid Panel; Future    Chronic insomnia    Gastroesophageal reflux disease without esophagitis    Pulmonary emphysema, unspecified emphysema type      Problem List Items Addressed This Visit     Chronic insomnia    Coronary artery disease involving native coronary artery without angina pectoris    Overview     Statin            Relevant Orders    CBC Auto Differential    Comprehensive Metabolic Panel    Lipid Panel    Dyslipidemia - Primary    Overview     Lab Results   Component Value Date    CHOL 156 05/11/2021    CHOL 157 02/02/2021    CHOL 168 07/31/2019     Lab Results   Component Value Date    HDL 76 (A) 05/11/2021    HDL 76 (H) 02/02/2021    HDL 82 (A) 01/02/2020     Lab Results   Component Value Date    LDLCALC 56.6 (L) 02/02/2021    LDLCALC 83.6 01/31/2019    LDLCALC 102.6 02/03/2018     Lab Results   Component Value Date    TRIG 76 05/11/2021    TRIG 122 02/02/2021    TRIG 133 01/02/2020     Lab Results   Component Value Date    CHOLHDL 48.4 02/02/2021    CHOLHDL 41.3 01/31/2019    CHOLHDL 35.3 02/03/2018     Continue statin          Relevant Orders    Comprehensive Metabolic Panel    Lipid Panel    TSH    Emphysema/COPD    Overview     symbicort          Gastroesophageal reflux disease without esophagitis (Chronic)    Overview     On PPI   Can't take bisphosphonate for osteo   Large hiatal hernia. Possible surgery being considered              No follow-ups on file.

## 2022-05-24 ENCOUNTER — OFFICE VISIT (OUTPATIENT)
Dept: FAMILY MEDICINE | Facility: CLINIC | Age: 77
End: 2022-05-24
Payer: MEDICARE

## 2022-05-24 VITALS
SYSTOLIC BLOOD PRESSURE: 128 MMHG | WEIGHT: 147.19 LBS | BODY MASS INDEX: 28.9 KG/M2 | HEIGHT: 60 IN | RESPIRATION RATE: 17 BRPM | HEART RATE: 58 BPM | DIASTOLIC BLOOD PRESSURE: 66 MMHG

## 2022-05-24 DIAGNOSIS — R07.0 PAIN IN THROAT AND CHEST: Primary | ICD-10-CM

## 2022-05-24 DIAGNOSIS — R07.9 PAIN IN THROAT AND CHEST: Primary | ICD-10-CM

## 2022-05-24 PROCEDURE — 3078F DIAST BP <80 MM HG: CPT | Mod: CPTII,S$GLB,, | Performed by: FAMILY MEDICINE

## 2022-05-24 PROCEDURE — 99499 UNLISTED E&M SERVICE: CPT | Mod: S$GLB,,, | Performed by: FAMILY MEDICINE

## 2022-05-24 PROCEDURE — 1126F AMNT PAIN NOTED NONE PRSNT: CPT | Mod: CPTII,S$GLB,, | Performed by: FAMILY MEDICINE

## 2022-05-24 PROCEDURE — 1159F PR MEDICATION LIST DOCUMENTED IN MEDICAL RECORD: ICD-10-PCS | Mod: CPTII,S$GLB,, | Performed by: FAMILY MEDICINE

## 2022-05-24 PROCEDURE — 99213 OFFICE O/P EST LOW 20 MIN: CPT | Mod: S$GLB,,, | Performed by: FAMILY MEDICINE

## 2022-05-24 PROCEDURE — 3074F SYST BP LT 130 MM HG: CPT | Mod: CPTII,S$GLB,, | Performed by: FAMILY MEDICINE

## 2022-05-24 PROCEDURE — 99213 PR OFFICE/OUTPT VISIT, EST, LEVL III, 20-29 MIN: ICD-10-PCS | Mod: S$GLB,,, | Performed by: FAMILY MEDICINE

## 2022-05-24 PROCEDURE — 99999 PR PBB SHADOW E&M-EST. PATIENT-LVL IV: CPT | Mod: PBBFAC,,, | Performed by: FAMILY MEDICINE

## 2022-05-24 PROCEDURE — 3288F PR FALLS RISK ASSESSMENT DOCUMENTED: ICD-10-PCS | Mod: CPTII,S$GLB,, | Performed by: FAMILY MEDICINE

## 2022-05-24 PROCEDURE — 99499 RISK ADDL DX/OHS AUDIT: ICD-10-PCS | Mod: S$GLB,,, | Performed by: FAMILY MEDICINE

## 2022-05-24 PROCEDURE — 3288F FALL RISK ASSESSMENT DOCD: CPT | Mod: CPTII,S$GLB,, | Performed by: FAMILY MEDICINE

## 2022-05-24 PROCEDURE — 1101F PT FALLS ASSESS-DOCD LE1/YR: CPT | Mod: CPTII,S$GLB,, | Performed by: FAMILY MEDICINE

## 2022-05-24 PROCEDURE — 1159F MED LIST DOCD IN RCRD: CPT | Mod: CPTII,S$GLB,, | Performed by: FAMILY MEDICINE

## 2022-05-24 PROCEDURE — 3074F PR MOST RECENT SYSTOLIC BLOOD PRESSURE < 130 MM HG: ICD-10-PCS | Mod: CPTII,S$GLB,, | Performed by: FAMILY MEDICINE

## 2022-05-24 PROCEDURE — 1126F PR PAIN SEVERITY QUANTIFIED, NO PAIN PRESENT: ICD-10-PCS | Mod: CPTII,S$GLB,, | Performed by: FAMILY MEDICINE

## 2022-05-24 PROCEDURE — 99999 PR PBB SHADOW E&M-EST. PATIENT-LVL IV: ICD-10-PCS | Mod: PBBFAC,,, | Performed by: FAMILY MEDICINE

## 2022-05-24 PROCEDURE — 3078F PR MOST RECENT DIASTOLIC BLOOD PRESSURE < 80 MM HG: ICD-10-PCS | Mod: CPTII,S$GLB,, | Performed by: FAMILY MEDICINE

## 2022-05-24 PROCEDURE — 1101F PR PT FALLS ASSESS DOC 0-1 FALLS W/OUT INJ PAST YR: ICD-10-PCS | Mod: CPTII,S$GLB,, | Performed by: FAMILY MEDICINE

## 2022-05-24 RX ORDER — MULTIVIT WITH MINERALS/HERBS
1 TABLET ORAL DAILY
COMMUNITY

## 2022-05-24 NOTE — PROGRESS NOTES
Subjective:       Patient ID: Megha Mendoza is a 77 y.o. female.    Chief Complaint: muscle spasms in her throat    Pt is a 77 y.o. female who presents for evaluation and management of   Encounter Diagnosis   Name Primary?    Pain in throat and chest Yes   .2 episodes. Once 2 weeks ago and once yesterday.  Not related to meals. Random episodes of pain/cramping. Feels cramping in her throat bilaterally. Severe. Spontaneous. Lasts couple of hours.   Unable to eat or drink when they occur. Radiates into her upper chest at times   She has seen Dr. Day to be reassured that it is not related to her carotid disease   Her general surgeon has told her that it is not related to her recent hiatal hernia surgery.       Doing well on current meds. Denies any side effects. Prevention is up to date.    Review of Systems   HENT: Negative for congestion.    Gastrointestinal: Negative for blood in stool, nausea and vomiting.   Neurological: Negative for dizziness, syncope and light-headedness.       Objective:      Physical Exam  Constitutional:       Appearance: She is well-developed.   HENT:      Head: Normocephalic and atraumatic.      Right Ear: External ear normal.      Left Ear: External ear normal.      Nose: Nose normal.   Eyes:      Pupils: Pupils are equal, round, and reactive to light.   Neck:      Thyroid: No thyromegaly.      Vascular: No JVD.      Trachea: No tracheal deviation.   Cardiovascular:      Rate and Rhythm: Normal rate.      Heart sounds: Normal heart sounds. No murmur heard.  Pulmonary:      Effort: Pulmonary effort is normal. No respiratory distress.      Breath sounds: Normal breath sounds. No wheezing or rales.   Chest:      Chest wall: No tenderness.   Abdominal:      General: Bowel sounds are normal. There is no distension.      Palpations: Abdomen is soft. There is no mass.      Tenderness: There is no abdominal tenderness. There is no guarding or rebound.   Musculoskeletal:         General: No  tenderness. Normal range of motion.      Cervical back: Normal range of motion and neck supple.   Lymphadenopathy:      Cervical: No cervical adenopathy.   Skin:     General: Skin is warm and dry.      Coloration: Skin is not pale.      Findings: No erythema or rash.   Neurological:      General: No focal deficit present.      Mental Status: She is alert and oriented to person, place, and time.      Cranial Nerves: No cranial nerve deficit.      Motor: No abnormal muscle tone.      Coordination: Coordination normal.      Deep Tendon Reflexes: Reflexes are normal and symmetric. Reflexes normal.   Psychiatric:         Behavior: Behavior normal.         Thought Content: Thought content normal.         Judgment: Judgment normal.         Assessment:       1. Pain in throat and chest        Plan:   Megha was seen today for muscle spasms in her throat.    Diagnoses and all orders for this visit:    Pain in throat and chest  -     Ambulatory referral/consult to ENT; Future      Problem List Items Addressed This Visit    None     Visit Diagnoses     Pain in throat and chest    -  Primary    Relevant Orders    Ambulatory referral/consult to ENT        No follow-ups on file.

## 2022-06-29 ENCOUNTER — HOSPITAL ENCOUNTER (OUTPATIENT)
Dept: RADIOLOGY | Facility: HOSPITAL | Age: 77
Discharge: HOME | End: 2022-06-29
Attending: NURSE PRACTITIONER
Payer: MEDICARE

## 2022-06-29 DIAGNOSIS — K22.4 ESOPHAGOSPASM: ICD-10-CM

## 2022-06-29 PROCEDURE — A9698 NON-RAD CONTRAST MATERIALNOC: HCPCS

## 2022-06-29 PROCEDURE — 74220 FL ESOPHAGRAM COMPLETE: ICD-10-PCS | Mod: 26,,, | Performed by: RADIOLOGY

## 2022-06-29 PROCEDURE — 25500020 PHARM REV CODE 255

## 2022-06-29 PROCEDURE — 74220 X-RAY XM ESOPHAGUS 1CNTRST: CPT | Mod: 26,,, | Performed by: RADIOLOGY

## 2022-06-29 PROCEDURE — 74220 X-RAY XM ESOPHAGUS 1CNTRST: CPT | Mod: TC

## 2022-06-29 RX ADMIN — BARIUM SULFATE 355 ML: 0.6 SUSPENSION ORAL at 10:06

## 2022-07-29 ENCOUNTER — INFUSION (OUTPATIENT)
Dept: INFUSION THERAPY | Facility: HOSPITAL | Age: 77
End: 2022-07-29
Attending: FAMILY MEDICINE
Payer: MEDICARE

## 2022-07-29 VITALS
SYSTOLIC BLOOD PRESSURE: 141 MMHG | TEMPERATURE: 98 F | HEART RATE: 63 BPM | RESPIRATION RATE: 18 BRPM | DIASTOLIC BLOOD PRESSURE: 64 MMHG

## 2022-07-29 DIAGNOSIS — M85.80 OSTEOPENIA, UNSPECIFIED LOCATION: Primary | ICD-10-CM

## 2022-07-29 PROCEDURE — 63600175 PHARM REV CODE 636 W HCPCS: Performed by: FAMILY MEDICINE

## 2022-07-29 PROCEDURE — 96372 THER/PROPH/DIAG INJ SC/IM: CPT

## 2022-07-29 RX ADMIN — DENOSUMAB 60 MG: 60 INJECTION SUBCUTANEOUS at 09:07

## 2022-08-02 ENCOUNTER — OFFICE VISIT (OUTPATIENT)
Dept: FAMILY MEDICINE | Facility: CLINIC | Age: 77
End: 2022-08-02
Payer: MEDICARE

## 2022-08-02 ENCOUNTER — LAB VISIT (OUTPATIENT)
Dept: LAB | Facility: HOSPITAL | Age: 77
End: 2022-08-02
Attending: FAMILY MEDICINE
Payer: MEDICARE

## 2022-08-02 VITALS
SYSTOLIC BLOOD PRESSURE: 126 MMHG | DIASTOLIC BLOOD PRESSURE: 74 MMHG | HEIGHT: 60 IN | HEART RATE: 62 BPM | BODY MASS INDEX: 28.13 KG/M2 | RESPIRATION RATE: 18 BRPM | WEIGHT: 143.31 LBS | OXYGEN SATURATION: 94 %

## 2022-08-02 DIAGNOSIS — J43.9 PULMONARY EMPHYSEMA, UNSPECIFIED EMPHYSEMA TYPE: ICD-10-CM

## 2022-08-02 DIAGNOSIS — Z12.11 SCREEN FOR COLON CANCER: ICD-10-CM

## 2022-08-02 DIAGNOSIS — Z12.31 ENCOUNTER FOR SCREENING MAMMOGRAM FOR MALIGNANT NEOPLASM OF BREAST: ICD-10-CM

## 2022-08-02 DIAGNOSIS — E78.5 DYSLIPIDEMIA: ICD-10-CM

## 2022-08-02 DIAGNOSIS — F51.04 CHRONIC INSOMNIA: ICD-10-CM

## 2022-08-02 DIAGNOSIS — Z12.39 ENCOUNTER FOR SCREENING FOR MALIGNANT NEOPLASM OF BREAST, UNSPECIFIED SCREENING MODALITY: ICD-10-CM

## 2022-08-02 DIAGNOSIS — I25.10 CORONARY ARTERY DISEASE INVOLVING NATIVE CORONARY ARTERY OF NATIVE HEART WITHOUT ANGINA PECTORIS: ICD-10-CM

## 2022-08-02 DIAGNOSIS — J44.89 OBSTRUCTIVE CHRONIC BRONCHITIS WITHOUT EXACERBATION: ICD-10-CM

## 2022-08-02 DIAGNOSIS — K21.9 GASTROESOPHAGEAL REFLUX DISEASE WITHOUT ESOPHAGITIS: Chronic | ICD-10-CM

## 2022-08-02 DIAGNOSIS — I65.23 BILATERAL CAROTID ARTERY STENOSIS: ICD-10-CM

## 2022-08-02 DIAGNOSIS — N39.0 URINARY TRACT INFECTION WITH HEMATURIA, SITE UNSPECIFIED: Primary | ICD-10-CM

## 2022-08-02 DIAGNOSIS — I70.0 ATHEROSCLEROSIS OF AORTA: ICD-10-CM

## 2022-08-02 DIAGNOSIS — R31.9 URINARY TRACT INFECTION WITH HEMATURIA, SITE UNSPECIFIED: Primary | ICD-10-CM

## 2022-08-02 LAB
ALBUMIN SERPL BCP-MCNC: 3.7 G/DL (ref 3.5–5.2)
ALP SERPL-CCNC: 37 U/L (ref 55–135)
ALT SERPL W/O P-5'-P-CCNC: 14 U/L (ref 10–44)
ANION GAP SERPL CALC-SCNC: 9 MMOL/L (ref 8–16)
AST SERPL-CCNC: 20 U/L (ref 10–40)
BILIRUB SERPL-MCNC: 0.4 MG/DL (ref 0.1–1)
BUN SERPL-MCNC: 28 MG/DL (ref 8–23)
CALCIUM SERPL-MCNC: 9.6 MG/DL (ref 8.7–10.5)
CHLORIDE SERPL-SCNC: 106 MMOL/L (ref 95–110)
CO2 SERPL-SCNC: 26 MMOL/L (ref 23–29)
CREAT SERPL-MCNC: 0.9 MG/DL (ref 0.5–1.4)
EST. GFR  (NO RACE VARIABLE): >60 ML/MIN/1.73 M^2
GLUCOSE SERPL-MCNC: 97 MG/DL (ref 70–110)
POTASSIUM SERPL-SCNC: 4.6 MMOL/L (ref 3.5–5.1)
PROT SERPL-MCNC: 7.1 G/DL (ref 6–8.4)
SODIUM SERPL-SCNC: 141 MMOL/L (ref 136–145)

## 2022-08-02 PROCEDURE — 87086 URINE CULTURE/COLONY COUNT: CPT | Performed by: FAMILY MEDICINE

## 2022-08-02 PROCEDURE — 3288F FALL RISK ASSESSMENT DOCD: CPT | Mod: CPTII,S$GLB,, | Performed by: FAMILY MEDICINE

## 2022-08-02 PROCEDURE — 1159F PR MEDICATION LIST DOCUMENTED IN MEDICAL RECORD: ICD-10-PCS | Mod: CPTII,S$GLB,, | Performed by: FAMILY MEDICINE

## 2022-08-02 PROCEDURE — 99214 PR OFFICE/OUTPT VISIT, EST, LEVL IV, 30-39 MIN: ICD-10-PCS | Mod: S$GLB,,, | Performed by: FAMILY MEDICINE

## 2022-08-02 PROCEDURE — 81000 URINALYSIS NONAUTO W/SCOPE: CPT | Mod: S$GLB,,, | Performed by: FAMILY MEDICINE

## 2022-08-02 PROCEDURE — 3078F PR MOST RECENT DIASTOLIC BLOOD PRESSURE < 80 MM HG: ICD-10-PCS | Mod: CPTII,S$GLB,, | Performed by: FAMILY MEDICINE

## 2022-08-02 PROCEDURE — 1125F PR PAIN SEVERITY QUANTIFIED, PAIN PRESENT: ICD-10-PCS | Mod: CPTII,S$GLB,, | Performed by: FAMILY MEDICINE

## 2022-08-02 PROCEDURE — 1101F PT FALLS ASSESS-DOCD LE1/YR: CPT | Mod: CPTII,S$GLB,, | Performed by: FAMILY MEDICINE

## 2022-08-02 PROCEDURE — 1159F MED LIST DOCD IN RCRD: CPT | Mod: CPTII,S$GLB,, | Performed by: FAMILY MEDICINE

## 2022-08-02 PROCEDURE — 99499 UNLISTED E&M SERVICE: CPT | Mod: S$GLB,,, | Performed by: FAMILY MEDICINE

## 2022-08-02 PROCEDURE — 36415 COLL VENOUS BLD VENIPUNCTURE: CPT | Performed by: FAMILY MEDICINE

## 2022-08-02 PROCEDURE — 81000 POCT URINE SEDIMENT EXAM: ICD-10-PCS | Mod: S$GLB,,, | Performed by: FAMILY MEDICINE

## 2022-08-02 PROCEDURE — 99499 RISK ADDL DX/OHS AUDIT: ICD-10-PCS | Mod: S$GLB,,, | Performed by: FAMILY MEDICINE

## 2022-08-02 PROCEDURE — 3288F PR FALLS RISK ASSESSMENT DOCUMENTED: ICD-10-PCS | Mod: CPTII,S$GLB,, | Performed by: FAMILY MEDICINE

## 2022-08-02 PROCEDURE — 3078F DIAST BP <80 MM HG: CPT | Mod: CPTII,S$GLB,, | Performed by: FAMILY MEDICINE

## 2022-08-02 PROCEDURE — 99214 OFFICE O/P EST MOD 30 MIN: CPT | Mod: S$GLB,,, | Performed by: FAMILY MEDICINE

## 2022-08-02 PROCEDURE — 3074F PR MOST RECENT SYSTOLIC BLOOD PRESSURE < 130 MM HG: ICD-10-PCS | Mod: CPTII,S$GLB,, | Performed by: FAMILY MEDICINE

## 2022-08-02 PROCEDURE — 3074F SYST BP LT 130 MM HG: CPT | Mod: CPTII,S$GLB,, | Performed by: FAMILY MEDICINE

## 2022-08-02 PROCEDURE — 1125F AMNT PAIN NOTED PAIN PRSNT: CPT | Mod: CPTII,S$GLB,, | Performed by: FAMILY MEDICINE

## 2022-08-02 PROCEDURE — 80053 COMPREHEN METABOLIC PANEL: CPT | Performed by: FAMILY MEDICINE

## 2022-08-02 PROCEDURE — 99999 PR PBB SHADOW E&M-EST. PATIENT-LVL IV: CPT | Mod: PBBFAC,,, | Performed by: FAMILY MEDICINE

## 2022-08-02 PROCEDURE — 1101F PR PT FALLS ASSESS DOC 0-1 FALLS W/OUT INJ PAST YR: ICD-10-PCS | Mod: CPTII,S$GLB,, | Performed by: FAMILY MEDICINE

## 2022-08-02 PROCEDURE — 99999 PR PBB SHADOW E&M-EST. PATIENT-LVL IV: ICD-10-PCS | Mod: PBBFAC,,, | Performed by: FAMILY MEDICINE

## 2022-08-02 RX ORDER — HYDROCODONE BITARTRATE AND ACETAMINOPHEN 5; 325 MG/1; MG/1
1 TABLET ORAL EVERY 6 HOURS PRN
COMMUNITY
Start: 2022-03-31 | End: 2023-04-18 | Stop reason: ALTCHOICE

## 2022-08-02 RX ORDER — OMEPRAZOLE 40 MG/1
CAPSULE, DELAYED RELEASE ORAL
COMMUNITY
Start: 2022-07-28 | End: 2023-04-18

## 2022-08-02 RX ORDER — ROSUVASTATIN CALCIUM 40 MG/1
40 TABLET, COATED ORAL NIGHTLY
Qty: 30 TABLET | Refills: 5 | Status: SHIPPED | OUTPATIENT
Start: 2022-08-02 | End: 2023-04-18

## 2022-08-02 RX ORDER — FAMOTIDINE 40 MG/1
40 TABLET, FILM COATED ORAL NIGHTLY
COMMUNITY
Start: 2022-07-28

## 2022-08-02 RX ORDER — NITROFURANTOIN 25; 75 MG/1; MG/1
100 CAPSULE ORAL 2 TIMES DAILY
Qty: 14 CAPSULE | Refills: 0 | Status: SHIPPED | OUTPATIENT
Start: 2022-08-02 | End: 2023-04-18 | Stop reason: ALTCHOICE

## 2022-08-02 NOTE — PROGRESS NOTES
Subjective:       Patient ID: Megha Mendoza is a 77 y.o. female.    Chief Complaint: Urinary Tract Infection (X1 month) and Follow-up (6 month)    Pt is a 77 y.o. female who presents for evaluation and management of   Encounter Diagnoses   Name Primary?    Urinary tract infection with hematuria, site unspecified Yes    Encounter for screening for malignant neoplasm of breast, unspecified screening modality     Encounter for screening mammogram for malignant neoplasm of breast      Dyslipidemia     Chronic insomnia     Coronary artery disease involving native coronary artery of native heart without angina pectoris     Obstructive chronic bronchitis without exacerbation     Atherosclerosis of aorta     Bilateral carotid artery stenosis     Gastroesophageal reflux disease without esophagitis     Pulmonary emphysema, unspecified emphysema type     Screen for colon cancer    .  Doing well on current meds. Denies any side effects. Prevention is up to date.    Review of Systems   Constitutional: Negative for chills and fever.   Respiratory: Negative for shortness of breath.    Cardiovascular: Negative for chest pain and palpitations.   Gastrointestinal: Negative for abdominal pain, blood in stool, constipation and nausea.   Genitourinary: Positive for dysuria. Negative for difficulty urinating.   Psychiatric/Behavioral: Negative for dysphoric mood, sleep disturbance and suicidal ideas. The patient is not nervous/anxious.        Objective:      Physical Exam  Constitutional:       Appearance: She is well-developed.   HENT:      Head: Normocephalic and atraumatic.      Right Ear: External ear normal.      Left Ear: External ear normal.      Nose: Nose normal.   Eyes:      Pupils: Pupils are equal, round, and reactive to light.   Neck:      Thyroid: No thyromegaly.      Vascular: No JVD.      Trachea: No tracheal deviation.   Cardiovascular:      Rate and Rhythm: Normal rate.      Heart sounds: Normal heart sounds.  No murmur heard.  Pulmonary:      Effort: Pulmonary effort is normal. No respiratory distress.      Breath sounds: Normal breath sounds. No wheezing or rales.   Chest:      Chest wall: No tenderness.   Abdominal:      General: Bowel sounds are normal. There is no distension.      Palpations: Abdomen is soft. There is no mass.      Tenderness: There is no abdominal tenderness. There is no guarding or rebound.   Musculoskeletal:         General: No tenderness. Normal range of motion.      Cervical back: Normal range of motion and neck supple.   Lymphadenopathy:      Cervical: No cervical adenopathy.   Skin:     General: Skin is warm and dry.      Coloration: Skin is not pale.      Findings: No erythema or rash.   Neurological:      Mental Status: She is alert and oriented to person, place, and time.      Cranial Nerves: No cranial nerve deficit.      Motor: No abnormal muscle tone.      Coordination: Coordination normal.      Deep Tendon Reflexes: Reflexes are normal and symmetric. Reflexes normal.   Psychiatric:         Behavior: Behavior normal.         Thought Content: Thought content normal.         Judgment: Judgment normal.         Assessment:       1. Urinary tract infection with hematuria, site unspecified    2. Encounter for screening for malignant neoplasm of breast, unspecified screening modality    3. Encounter for screening mammogram for malignant neoplasm of breast     4. Dyslipidemia    5. Chronic insomnia    6. Coronary artery disease involving native coronary artery of native heart without angina pectoris    7. Obstructive chronic bronchitis without exacerbation    8. Atherosclerosis of aorta    9. Bilateral carotid artery stenosis    10. Gastroesophageal reflux disease without esophagitis    11. Pulmonary emphysema, unspecified emphysema type    12. Screen for colon cancer        Plan:   Megha was seen today for urinary tract infection and follow-up.    Diagnoses and all orders for this  visit:    Urinary tract infection with hematuria, site unspecified  -     Cancel: POCT URINE DIPSTICK WITH MICROSCOPE, AUTOMATED  -     POCT URINE SEDIMENT EXAM; Future  -     POCT urinalysis, dipstick or tablet reag; Future  -     Urine culture  -     nitrofurantoin, macrocrystal-monohydrate, (MACROBID) 100 MG capsule; Take 1 capsule (100 mg total) by mouth 2 (two) times daily.  -     POCT urinalysis, dipstick or tablet reag; Future  -     POCT URINE SEDIMENT EXAM; Future    Encounter for screening for malignant neoplasm of breast, unspecified screening modality  -     Mammo Digital Screening Bilat; Future    Encounter for screening mammogram for malignant neoplasm of breast   -     Mammo Digital Screening Bilat; Future    Dyslipidemia  -     Comprehensive Metabolic Panel; Future  -     rosuvastatin (CRESTOR) 40 MG Tab; Take 1 tablet (40 mg total) by mouth every evening.    Chronic insomnia    Coronary artery disease involving native coronary artery of native heart without angina pectoris  -     rosuvastatin (CRESTOR) 40 MG Tab; Take 1 tablet (40 mg total) by mouth every evening.    Obstructive chronic bronchitis without exacerbation    Atherosclerosis of aorta  -     rosuvastatin (CRESTOR) 40 MG Tab; Take 1 tablet (40 mg total) by mouth every evening.    Bilateral carotid artery stenosis  -     rosuvastatin (CRESTOR) 40 MG Tab; Take 1 tablet (40 mg total) by mouth every evening.    Gastroesophageal reflux disease without esophagitis    Pulmonary emphysema, unspecified emphysema type    Screen for colon cancer  -     Fecal Immunochemical Test (iFOBT); Future      Problem List Items Addressed This Visit     Gastroesophageal reflux disease without esophagitis (Chronic)    Overview     On PPI   Can't take bisphosphonate for osteo   Large hiatal hernia. Possible surgery being considered  Has some dysphagia as well---GI has her on PPI and high dose H2 blocker. Considering dilation             Atherosclerosis of aorta     Overview     On statin            Relevant Medications    rosuvastatin (CRESTOR) 40 MG Tab    Carotid disease, bilateral    Overview     Monitored per Dr. Day q 6mo  On statin            Relevant Medications    rosuvastatin (CRESTOR) 40 MG Tab    Chronic insomnia    Coronary artery disease involving native coronary artery without angina pectoris    Overview     Statin  Lab Results   Component Value Date    LDLCALC 57.6 (L) 02/02/2022                  Relevant Medications    rosuvastatin (CRESTOR) 40 MG Tab    Dyslipidemia    Overview     Lab Results   Component Value Date    CHOL 154 02/02/2022    CHOL 156 05/11/2021    CHOL 157 02/02/2021     Lab Results   Component Value Date    HDL 73 02/02/2022    HDL 76 (A) 05/11/2021    HDL 76 (H) 02/02/2021     Lab Results   Component Value Date    LDLCALC 57.6 (L) 02/02/2022    LDLCALC 56.6 (L) 02/02/2021    LDLCALC 83.6 01/31/2019     Lab Results   Component Value Date    TRIG 117 02/02/2022    TRIG 76 05/11/2021    TRIG 122 02/02/2021     Lab Results   Component Value Date    CHOLHDL 47.4 02/02/2022    CHOLHDL 48.4 02/02/2021    CHOLHDL 41.3 01/31/2019     Continue statin            Relevant Medications    rosuvastatin (CRESTOR) 40 MG Tab    Other Relevant Orders    Comprehensive Metabolic Panel    Emphysema/COPD    Overview     symbicort            Obstructive chronic bronchitis without exacerbation      Other Visit Diagnoses     Urinary tract infection with hematuria, site unspecified    -  Primary    Relevant Medications    nitrofurantoin, macrocrystal-monohydrate, (MACROBID) 100 MG capsule    Other Relevant Orders    POCT URINE SEDIMENT EXAM    POCT urinalysis, dipstick or tablet reag    Urine culture    POCT urinalysis, dipstick or tablet reag    POCT URINE SEDIMENT EXAM    Encounter for screening for malignant neoplasm of breast, unspecified screening modality        Relevant Orders    Mammo Digital Screening Bilat    Encounter for screening mammogram for malignant  neoplasm of breast         Relevant Orders    Mammo Digital Screening Bilat    Screen for colon cancer        Relevant Orders    Fecal Immunochemical Test (iFOBT)      repeat u/a 2 weeks to make sure hematuria resolves     No follow-ups on file.         free water restrict discussed with pt  neuro workup lupus noted

## 2022-08-03 LAB
BACTERIA SPEC CULT: ABNORMAL /HPF
BILIRUB SERPL-MCNC: NORMAL MG/DL
BLOOD URINE, POC: NORMAL
CASTS: ABNORMAL
COLOR, POC UA: YELLOW
CRYSTALS: ABNORMAL
GLUCOSE UR QL STRIP: NORMAL
KETONES UR QL STRIP: NORMAL
LEUKOCYTE ESTERASE URINE, POC: NORMAL
NITRITE, POC UA: NORMAL
PH, POC UA: 5
PROTEIN, POC: NORMAL
RBC CELLS COUNTED: ABNORMAL
SPECIFIC GRAVITY, POC UA: 1.02
UROBILINOGEN, POC UA: NORMAL
WHITE BLOOD CELLS: ABNORMAL

## 2022-08-04 LAB
BACTERIA UR CULT: NORMAL
BACTERIA UR CULT: NORMAL

## 2022-08-05 ENCOUNTER — LAB VISIT (OUTPATIENT)
Dept: LAB | Facility: HOSPITAL | Age: 77
End: 2022-08-05
Attending: FAMILY MEDICINE
Payer: MEDICARE

## 2022-08-05 DIAGNOSIS — Z12.11 SCREEN FOR COLON CANCER: ICD-10-CM

## 2022-08-05 PROCEDURE — 82274 ASSAY TEST FOR BLOOD FECAL: CPT | Performed by: FAMILY MEDICINE

## 2022-08-09 LAB — HEMOCCULT STL QL IA: POSITIVE

## 2022-08-09 NOTE — PROGRESS NOTES
Test results positive. She will need to be set up for a [facility] [place]

## 2022-08-10 ENCOUNTER — TELEPHONE (OUTPATIENT)
Dept: FAMILY MEDICINE | Facility: CLINIC | Age: 77
End: 2022-08-10
Payer: MEDICARE

## 2022-08-10 DIAGNOSIS — R19.5 POSITIVE FIT (FECAL IMMUNOCHEMICAL TEST): Primary | ICD-10-CM

## 2022-08-10 NOTE — TELEPHONE ENCOUNTER
----- Message from Tomy Baron MD sent at 8/9/2022  5:55 PM CDT -----  Test results positive. She will need to be set up for a [facility] [place]

## 2022-08-22 ENCOUNTER — HOSPITAL ENCOUNTER (OUTPATIENT)
Dept: RADIOLOGY | Facility: HOSPITAL | Age: 77
Discharge: HOME | End: 2022-08-22
Attending: PAIN MEDICINE
Payer: MEDICARE

## 2022-08-22 DIAGNOSIS — R10.2 VAGINAL PAIN: ICD-10-CM

## 2022-08-22 DIAGNOSIS — R63.4 WEIGHT LOSS: ICD-10-CM

## 2022-08-22 PROCEDURE — 74176 CT ABDOMEN PELVIS WITHOUT CONTRAST: ICD-10-PCS | Mod: 26,,, | Performed by: RADIOLOGY

## 2022-08-22 PROCEDURE — 25500020 PHARM REV CODE 255: Performed by: PAIN MEDICINE

## 2022-08-22 PROCEDURE — 74176 CT ABD & PELVIS W/O CONTRAST: CPT | Mod: TC

## 2022-08-22 PROCEDURE — 74176 CT ABD & PELVIS W/O CONTRAST: CPT | Mod: 26,,, | Performed by: RADIOLOGY

## 2022-08-22 RX ADMIN — IOHEXOL 30 ML: 350 INJECTION, SOLUTION INTRAVENOUS at 02:08

## 2022-09-15 NOTE — TELEPHONE ENCOUNTER
Spoke to pt, colonoscopy scheduled for 10/11 with Dr. Baron. Pre admit appt scheduled for 10/5 at 1000. Pt notified and verbalized understanding.

## 2022-09-19 ENCOUNTER — HOSPITAL ENCOUNTER (OUTPATIENT)
Dept: RADIOLOGY | Facility: HOSPITAL | Age: 77
Discharge: HOME | End: 2022-09-19
Attending: FAMILY MEDICINE
Payer: MEDICARE

## 2022-09-19 VITALS — HEIGHT: 60 IN | BODY MASS INDEX: 28.07 KG/M2 | WEIGHT: 143 LBS

## 2022-09-19 DIAGNOSIS — Z12.31 ENCOUNTER FOR SCREENING MAMMOGRAM FOR MALIGNANT NEOPLASM OF BREAST: ICD-10-CM

## 2022-09-19 DIAGNOSIS — Z12.39 ENCOUNTER FOR SCREENING FOR MALIGNANT NEOPLASM OF BREAST, UNSPECIFIED SCREENING MODALITY: ICD-10-CM

## 2022-09-19 PROCEDURE — 77067 SCR MAMMO BI INCL CAD: CPT | Mod: TC

## 2022-09-19 PROCEDURE — 77063 BREAST TOMOSYNTHESIS BI: CPT | Mod: TC

## 2022-09-20 ENCOUNTER — LAB VISIT (OUTPATIENT)
Dept: LAB | Facility: HOSPITAL | Age: 77
End: 2022-09-20
Attending: PAIN MEDICINE
Payer: MEDICARE

## 2022-09-20 DIAGNOSIS — E16.2 HYPOGLYCEMIA: ICD-10-CM

## 2022-09-20 DIAGNOSIS — R52 PAIN: ICD-10-CM

## 2022-09-20 DIAGNOSIS — R79.9 ABNORMAL BLOOD FINDINGS: Primary | ICD-10-CM

## 2022-09-20 LAB
BASOPHILS # BLD AUTO: 0.04 K/UL (ref 0–0.2)
BASOPHILS NFR BLD: 0.7 % (ref 0–1.9)
DIFFERENTIAL METHOD: ABNORMAL
EOSINOPHIL # BLD AUTO: 0.2 K/UL (ref 0–0.5)
EOSINOPHIL NFR BLD: 3.4 % (ref 0–8)
ERYTHROCYTE [DISTWIDTH] IN BLOOD BY AUTOMATED COUNT: 13 % (ref 11.5–14.5)
ESTIMATED AVG GLUCOSE: 114 MG/DL (ref 68–131)
HBA1C MFR BLD: 5.6 % (ref 4–5.6)
HCT VFR BLD AUTO: 42 % (ref 37–48.5)
HGB BLD-MCNC: 13.6 G/DL (ref 12–16)
IMM GRANULOCYTES # BLD AUTO: 0.02 K/UL (ref 0–0.04)
IMM GRANULOCYTES NFR BLD AUTO: 0.4 % (ref 0–0.5)
LYMPHOCYTES # BLD AUTO: 1.6 K/UL (ref 1–4.8)
LYMPHOCYTES NFR BLD: 28.1 % (ref 18–48)
MCH RBC QN AUTO: 31.4 PG (ref 27–31)
MCHC RBC AUTO-ENTMCNC: 32.4 G/DL (ref 32–36)
MCV RBC AUTO: 97 FL (ref 82–98)
MONOCYTES # BLD AUTO: 0.4 K/UL (ref 0.3–1)
MONOCYTES NFR BLD: 7.9 % (ref 4–15)
NEUTROPHILS # BLD AUTO: 3.3 K/UL (ref 1.8–7.7)
NEUTROPHILS NFR BLD: 59.5 % (ref 38–73)
NRBC BLD-RTO: 0 /100 WBC
PLATELET # BLD AUTO: 252 K/UL (ref 150–450)
PMV BLD AUTO: 9.8 FL (ref 9.2–12.9)
RBC # BLD AUTO: 4.33 M/UL (ref 4–5.4)
WBC # BLD AUTO: 5.58 K/UL (ref 3.9–12.7)

## 2022-09-20 PROCEDURE — 83036 HEMOGLOBIN GLYCOSYLATED A1C: CPT | Performed by: PAIN MEDICINE

## 2022-09-20 PROCEDURE — 85025 COMPLETE CBC W/AUTO DIFF WBC: CPT | Performed by: PAIN MEDICINE

## 2022-09-20 PROCEDURE — 86038 ANTINUCLEAR ANTIBODIES: CPT | Performed by: PAIN MEDICINE

## 2022-09-20 PROCEDURE — 84165 PROTEIN E-PHORESIS SERUM: CPT | Mod: 26,,, | Performed by: PATHOLOGY

## 2022-09-20 PROCEDURE — 84165 PROTEIN E-PHORESIS SERUM: CPT | Performed by: PAIN MEDICINE

## 2022-09-20 PROCEDURE — 36415 COLL VENOUS BLD VENIPUNCTURE: CPT | Performed by: PAIN MEDICINE

## 2022-09-20 PROCEDURE — 86039 ANTINUCLEAR ANTIBODIES (ANA): CPT | Performed by: PAIN MEDICINE

## 2022-09-20 PROCEDURE — 84165 PATHOLOGIST INTERPRETATION SPE: ICD-10-PCS | Mod: 26,,, | Performed by: PATHOLOGY

## 2022-09-20 PROCEDURE — 86235 NUCLEAR ANTIGEN ANTIBODY: CPT | Mod: 59 | Performed by: PAIN MEDICINE

## 2022-09-21 LAB
ALBUMIN SERPL ELPH-MCNC: 3.73 G/DL (ref 3.35–5.55)
ALPHA1 GLOB SERPL ELPH-MCNC: 0.31 G/DL (ref 0.17–0.41)
ALPHA2 GLOB SERPL ELPH-MCNC: 1.09 G/DL (ref 0.43–0.99)
ANA PATTERN 1: NORMAL
ANA SER QL IF: POSITIVE
ANA TITR SER IF: NORMAL {TITER}
B-GLOBULIN SERPL ELPH-MCNC: 0.77 G/DL (ref 0.5–1.1)
GAMMA GLOB SERPL ELPH-MCNC: 0.71 G/DL (ref 0.67–1.58)
PATHOLOGIST INTERPRETATION SPE: NORMAL
PROT SERPL-MCNC: 6.6 G/DL (ref 6–8.4)

## 2022-09-22 LAB
ANTI SM ANTIBODY: 0.11 RATIO (ref 0–0.99)
ANTI SM/RNP ANTIBODY: 0.06 RATIO (ref 0–0.99)
ANTI-SM INTERPRETATION: NEGATIVE
ANTI-SM/RNP INTERPRETATION: NEGATIVE
ANTI-SSA ANTIBODY: 0.05 RATIO (ref 0–0.99)
ANTI-SSA INTERPRETATION: NEGATIVE
ANTI-SSB ANTIBODY: 0.04 RATIO (ref 0–0.99)
ANTI-SSB INTERPRETATION: NEGATIVE
DSDNA AB SER-ACNC: NORMAL [IU]/ML

## 2022-09-28 ENCOUNTER — TELEPHONE (OUTPATIENT)
Dept: FAMILY MEDICINE | Facility: CLINIC | Age: 77
End: 2022-09-28
Payer: MEDICARE

## 2022-09-28 NOTE — TELEPHONE ENCOUNTER
Cardiac clearance request form completed and faxed to [facility] at [phone] for colonoscopy scheduled for 10/11.

## 2022-10-05 ENCOUNTER — HOSPITAL ENCOUNTER (OUTPATIENT)
Dept: PREADMISSION TESTING | Facility: HOSPITAL | Age: 77
Discharge: HOME | End: 2022-10-05
Attending: FAMILY MEDICINE
Payer: MEDICARE

## 2022-10-11 ENCOUNTER — ANESTHESIA EVENT (OUTPATIENT)
Dept: ENDOSCOPY | Facility: HOSPITAL | Age: 77
End: 2022-10-11
Payer: MEDICARE

## 2022-10-11 ENCOUNTER — ANESTHESIA (OUTPATIENT)
Dept: ENDOSCOPY | Facility: HOSPITAL | Age: 77
End: 2022-10-11
Payer: MEDICARE

## 2022-10-11 ENCOUNTER — HOSPITAL ENCOUNTER (OUTPATIENT)
Facility: HOSPITAL | Age: 77
Discharge: HOME | End: 2022-10-11
Attending: FAMILY MEDICINE | Admitting: FAMILY MEDICINE
Payer: MEDICARE

## 2022-10-11 VITALS
SYSTOLIC BLOOD PRESSURE: 107 MMHG | HEART RATE: 70 BPM | TEMPERATURE: 97 F | OXYGEN SATURATION: 97 % | DIASTOLIC BLOOD PRESSURE: 42 MMHG | RESPIRATION RATE: 18 BRPM

## 2022-10-11 DIAGNOSIS — R19.5 POSITIVE FIT (FECAL IMMUNOCHEMICAL TEST): Primary | ICD-10-CM

## 2022-10-11 PROCEDURE — D9220AH HC ANESTHESIA PROFESSIONAL FEE: Mod: QZ | Performed by: NURSE ANESTHETIST, CERTIFIED REGISTERED

## 2022-10-11 PROCEDURE — 37000009 HC ANESTHESIA EA ADD 15 MINS: Performed by: FAMILY MEDICINE

## 2022-10-11 PROCEDURE — 63600175 PHARM REV CODE 636 W HCPCS: Performed by: NURSE ANESTHETIST, CERTIFIED REGISTERED

## 2022-10-11 PROCEDURE — 45378 PR COLONOSCOPY,DIAGNOSTIC: ICD-10-PCS | Mod: 53,,, | Performed by: FAMILY MEDICINE

## 2022-10-11 PROCEDURE — 45378 DIAGNOSTIC COLONOSCOPY: CPT | Mod: 74 | Performed by: FAMILY MEDICINE

## 2022-10-11 PROCEDURE — 25000003 PHARM REV CODE 250: Performed by: NURSE ANESTHETIST, CERTIFIED REGISTERED

## 2022-10-11 PROCEDURE — 37000008 HC ANESTHESIA 1ST 15 MINUTES: Performed by: FAMILY MEDICINE

## 2022-10-11 PROCEDURE — 45378 DIAGNOSTIC COLONOSCOPY: CPT | Mod: 53,,, | Performed by: FAMILY MEDICINE

## 2022-10-11 PROCEDURE — 00811 ANES LWR INTST NDSC NOS: CPT | Mod: QZ | Performed by: NURSE ANESTHETIST, CERTIFIED REGISTERED

## 2022-10-11 RX ORDER — PROPOFOL 10 MG/ML
VIAL (ML) INTRAVENOUS
Status: DISCONTINUED | OUTPATIENT
Start: 2022-10-11 | End: 2022-10-11

## 2022-10-11 RX ORDER — PHENYLEPHRINE HYDROCHLORIDE 10 MG/ML
INJECTION INTRAVENOUS
Status: DISCONTINUED | OUTPATIENT
Start: 2022-10-11 | End: 2022-10-11

## 2022-10-11 RX ORDER — LIDOCAINE HYDROCHLORIDE 20 MG/ML
INJECTION, SOLUTION EPIDURAL; INFILTRATION; INTRACAUDAL; PERINEURAL
Status: DISCONTINUED | OUTPATIENT
Start: 2022-10-11 | End: 2022-10-11

## 2022-10-11 RX ADMIN — PROPOFOL 20 MG: 10 INJECTION, EMULSION INTRAVENOUS at 10:10

## 2022-10-11 RX ADMIN — PROPOFOL 50 MG: 10 INJECTION, EMULSION INTRAVENOUS at 10:10

## 2022-10-11 RX ADMIN — PHENYLEPHRINE HYDROCHLORIDE 100 MCG: 10 INJECTION INTRAVENOUS at 10:10

## 2022-10-11 RX ADMIN — PROPOFOL 20 MG: 10 INJECTION, EMULSION INTRAVENOUS at 11:10

## 2022-10-11 RX ADMIN — PROPOFOL 100 MG: 10 INJECTION, EMULSION INTRAVENOUS at 10:10

## 2022-10-11 RX ADMIN — SODIUM CHLORIDE, SODIUM LACTATE, POTASSIUM CHLORIDE, AND CALCIUM CHLORIDE: .6; .31; .03; .02 INJECTION, SOLUTION INTRAVENOUS at 09:10

## 2022-10-11 RX ADMIN — LIDOCAINE HYDROCHLORIDE 50 MG: 20 INJECTION, SOLUTION EPIDURAL; INFILTRATION; INTRACAUDAL; PERINEURAL at 10:10

## 2022-10-11 RX ADMIN — SODIUM CHLORIDE, SODIUM LACTATE, POTASSIUM CHLORIDE, AND CALCIUM CHLORIDE: .6; .31; .03; .02 INJECTION, SOLUTION INTRAVENOUS at 10:10

## 2022-10-11 NOTE — DISCHARGE SUMMARY
[facility] - Endoscopy  Discharge Note  Short Stay    Procedure(s) (LRB):  COLONOSCOPY (N/A)      OUTCOME: Patient tolerated treatment/procedure well without complication and is now ready for discharge.    DISPOSITION: Home or Self Care    FINAL DIAGNOSIS:  Positive FIT (fecal immunochemical test)    FOLLOWUP: In clinic    DISCHARGE INSTRUCTIONS:  No discharge procedures on file.     TIME SPENT ON DISCHARGE:     10 minutes

## 2022-10-11 NOTE — ANESTHESIA PREPROCEDURE EVALUATION
10/11/2022  Megha Mendoza is a 77 y.o., female.    Pre-op Assessment    I have reviewed the Patient Summary Reports.    I have reviewed the Nursing Notes.    I have reviewed the Medications.     Review of Systems  Anesthesia Hx:  No problems with previous Anesthesia    Social:  Non-Smoker Previous smoker for 25+ years   Hematology/Oncology:     Oncology Normal     EENT/Dental:EENT/Dental Normal   Cardiovascular:   Exercise tolerance: good Hypertension, well controlled CAD asymptomatic   Functional Capacity 4 METS  Carotoid Artery Disease, bilateral , Left stenosis is 50% , Right stenosis is  50% Hypertension    Pulmonary:   COPD, mild    Hepatic/GI:   GERD, well controlled    Musculoskeletal:  Musculoskeletal Normal    Neurological:  Neurology Normal    Endocrine:  Endocrine Normal    Dermatological:  Skin Normal    Psych:  Psychiatric Normal           Physical Exam  General:  Well nourished      Airway/Jaw/Neck:  Airway Findings: Mouth Opening: Normal   Tongue: Large   General Airway Assessment: Adult Mallampati: II  TM Distance: Normal, at least 6 cm       Dental:  Dental Findings: Upper Dentures, Edentulous               Anesthesia Plan  Type of Anesthesia, risks & benefits discussed:  Anesthesia Type:  general    Patient's Preference:   Plan Factors:          Intra-op Monitoring Plan:   Intra-op Monitoring Plan Comments:   Post Op Pain Control Plan:   Post Op Pain Control Plan Comments:     Induction:   IV  Beta Blocker:  Patient is not currently on a Beta-Blocker (No further documentation required).       Informed Consent: Informed consent signed with the Patient and all parties understand the risks and agree with anesthesia plan.  All questions answered.  Anesthesia consent signed with patient.  ASA Score: 3     Day of Surgery Review of History & Physical: I have interviewed and examined the patient. I  have reviewed the patient's H&P dated: 10/11/22.  There are no significant changes.            Ready For Surgery From Anesthesia Perspective.           Physical Exam  General: Well nourished    Airway:  Mallampati: II   Mouth Opening: Normal  TM Distance: Normal, at least 6 cm  Tongue: Large    Dental:  Upper Dentures, Edentulous          Anesthesia Plan  Type of Anesthesia, risks & benefits discussed:    Anesthesia Type: general  Induction:  IV  Informed Consent: Informed consent signed with the Patient and all parties understand the risks and agree with anesthesia plan.  All questions answered.   ASA Score: 3  Day of Surgery Review of History & Physical: I have interviewed and examined the patient. I have reviewed the patient's H&P dated: 10/11/22.     Ready For Surgery From Anesthesia Perspective.       .

## 2022-10-11 NOTE — H&P
Subjective:    Megha Mendoza is a 77 y.o. female here for colonoscopy for positive fit test    Past Medical History:   Diagnosis Date    Arthritis     Back pain     Bronchitis, chronic     Carotid artery disease     50% blockage bilateral    Colitis, acute     COPD (chronic obstructive pulmonary disease)     Coronary artery disease     Diverticulitis     DJD (degenerative joint disease)     GERD (gastroesophageal reflux disease)     severe    Hemorrhoids     Hiatal hernia     Hyperlipidemia     Irritable bowel syndrome with constipation     Mixed stress and urge urinary incontinence 3/30/2021    DARA (obstructive sleep apnea)     Osteoporosis     Respiratory distress     Trouble in sleeping      Past Surgical History:   Procedure Laterality Date    blockages in artaries      CARDIAC CATHETERIZATION  5/1/15    COLONOSCOPY      EYE SURGERY      HAND TENODESIS Bilateral 10/04/2019    HEMORRHOID SURGERY  2009    HERNIA REPAIR  2022    HYSTERECTOMY  1995    complete    JOINT REPLACEMENT      JOINT REPLACEMENT Left 2017    TOTAL KNEE ARTHROPLASTY Right     TUBAL LIGATION       Family History   Problem Relation Age of Onset    Diabetes Mother     Hypertension Mother     Arthritis Mother     Stroke Mother     Stroke Father     Heart disease Father         congenital heart disease    Cancer Sister         lung    COPD Sister      Social History     Tobacco Use    Smoking status: Former     Packs/day: 0.75     Types: Cigarettes     Start date: 10/14/1959     Quit date: 10/14/2005     Years since quittin.0    Smokeless tobacco: Never    Tobacco comments:     Stopped and started several times   Substance Use Topics    Alcohol use: No    Drug use: No       PTA Medications   Medication Sig    acetaminophen (TYLENOL) 500 MG tablet Take 1,000 mg by mouth every 6 (six) hours as needed for Pain.     albuterol (PROVENTIL) 2.5 mg /3 mL (0.083 %) nebulizer solution Take 2.5 mg by nebulization every 12 (twelve) hours as  needed for Wheezing.    aspirin 81 MG Chew Take 81 mg by mouth once daily.     b complex vitamins tablet Take 1 tablet by mouth once daily.    BACILLUS COAGULANS (DIGESTIVE ADVANTAGE ORAL) Take 1 tablet by mouth once daily.    cetirizine (ZYRTEC) 10 MG tablet Take 10 mg by mouth once daily.    co-enzyme Q-10 30 mg capsule Take 30 mg by mouth once daily.     cyclobenzaprine (FLEXERIL) 10 MG tablet TAKE 1 TABLET BY MOUTH ONCE DAILY IN THE EVENING    cyclobenzaprine (FLEXERIL) 10 MG tablet Take 1 tablet (10 mg total) by mouth every evening.    denosumab (PROLIA) 60 mg/mL Syrg Inject 1 mL (60 mg total) into the skin every 6 (six) months.    diclofenac sodium (VOLTAREN) 1 % Gel Apply 2 g topically once daily. (Patient not taking: No sig reported)    estradioL (ESTRACE) 0.01 % (0.1 mg/gram) vaginal cream 0.5 grams with applicator or dime-sized amount with finger in vagina nightly x 2 weeks, then twice a week thereafter (Patient not taking: No sig reported)    famotidine (PEPCID) 40 MG tablet Take 40 mg by mouth nightly.    HYDROcodone-acetaminophen (NORCO) 5-325 mg per tablet Take 1 tablet by mouth every 6 (six) hours as needed.    ipratropium (ATROVENT) 0.02 % nebulizer solution Take 500 mcg by nebulization every 12 (twelve) hours.    krill-omega-3-dha-epa-lipids 480-19-38-50 mg Cap Take 1 capsule by mouth nightly.     multivitamin (THERAGRAN) tablet Take 1 tablet by mouth once daily.    nitrofurantoin, macrocrystal-monohydrate, (MACROBID) 100 MG capsule Take 1 capsule (100 mg total) by mouth 2 (two) times daily.    nitroGLYCERIN (NITROSTAT) 0.4 MG SL tablet Place 0.4 mg under the tongue every 5 (five) minutes as needed for Chest pain.    omeprazole (PRILOSEC) 40 MG capsule TAKE 1 CAPSULE BY MOUTH ONCE DAILY 30 MINUTES BEFORE BREAKFAST    rosuvastatin (CRESTOR) 40 MG Tab Take 1 tablet (40 mg total) by mouth every evening.    SYMBICORT 80-4.5 mcg/actuation HFAA INHALE 2 PUFFS BY MOUTH TWICE DAILY .  CONTROLLER    vitamin  D 1000 units Tab Take 400 Units by mouth every evening. Vitamin D3       Review of patient's allergies indicates:   Allergen Reactions    Keflex [cephalexin] Rash     Solid patches - rash like skin thickened    Hibiclens [chlorhexidine gluconate] Itching    Sulfa (sulfonamide antibiotics) Rash       Review of Systems   Constitutional: Negative for fever and chills.   HENT: Negative for hearing loss.    Eyes: Negative for blurred vision and double vision.   Respiratory: Negative for cough and shortness of breath.    Cardiovascular: Negative for chest pain and palpitations.   Gastrointestinal: Negative for heartburn, nausea, vomiting and abdominal pain.   Genitourinary: Negative for dysuria and frequency.   Musculoskeletal: Negative for myalgias, joint pain and falls.   Skin: Negative for itching and rash.   Neurological: Negative for dizziness, tingling and headaches.   Endo/Heme/Allergies: Does not bruise/bleed easily.   Psychiatric/Behavioral: Negative for depression and suicidal ideas.       Objective:               Physical Exam   Constitutional: She is oriented to person, place, and time. She appears well-developed and well-nourished.   HENT:   Head: Normocephalic and atraumatic.   Right Ear: External ear normal.   Left Ear: External ear normal.   Nose: Nose normal.   Mouth/Throat: Oropharynx is clear and moist.   Eyes: Conjunctivae normal and EOM are normal. Pupils are equal, round, and reactive to light. Right eye exhibits no discharge. Left eye exhibits no discharge. No scleral icterus.   Neck: Normal range of motion. Neck supple. No JVD present. No tracheal deviation present. No thyromegaly present.   Cardiovascular: Normal rate, regular rhythm, normal heart sounds and intact distal pulses.    No murmur heard.  Pulmonary/Chest: Effort normal and breath sounds normal. No respiratory distress. She has no wheezes. She has no rales. She exhibits no tenderness.   Abdominal: Soft. Bowel sounds are normal. She  exhibits no distension and no mass. There is no tenderness. There is no rebound and no guarding.   Musculoskeletal: Normal range of motion.   Lymphadenopathy:     She has no cervical adenopathy.   Neurological: She is alert and oriented to person, place, and time. She has normal reflexes. No cranial nerve deficit. She exhibits normal muscle tone. Coordination normal.   Skin: Skin is warm and dry.   Psychiatric: She has a normal mood and affect. Her behavior is normal. Judgment and thought content normal.           Assessment:      There are no hospital problems to display for this patient.        Plan:    ASA grade 3. Proceed with MAC for colonoscopy    Patient cleared for Anesthesia:  MAC and general    Anesthesia/Surgery risks, benefits, and alternative options discussed and understood by patient/family.

## 2022-10-11 NOTE — OP NOTE
[facility] - Endoscopy  Colonoscopy Procedure  Operative Note    SUMMARY     Date of Procedure: 10/11/2022     Procedure: Procedure(s) (LRB):  COLONOSCOPY (N/A)    Surgeon(s) and Role:     * Tomy Baron MD - Primary    Assisting Surgeon: None     Patient location: [facility] endoscopy suite    Pre-Operative Diagnosis: Positive FIT (fecal immunochemical test) [R19.5]    Post-Operative Diagnosis: Post-Op Diagnosis Codes:     * Positive FIT (fecal immunochemical test) [R19.5]     * Diverticulosis [K57.90]     Indications: positive FIT      Medications:  Propofol per anesthesia.      ASA Score: III       Procedure:                  The patient was brought in to the endoscopy suite where the risks, benefits, and alternatives of the procedure were described.  The patient was given the opportunity to ask questions and then signed informed consent.  Patient was positioned in the left lateral decubitus position, continuous monitoring was initiated, and supplemental oxygen was provided via nasal cannula.  Adequate sedation was achieved with the above mentioned medications and then titrated during the entire procedure.  Digital rectal exam was performed.  Under direct visualization the colonoscope was introduced through the anus in to the rectum.  The scope was then advanced to the ascending colon. Her prep was poor. I could not definitely see the cecum.  Scope was then withdrawn and the mucosa was carefully examined in a circular fashion.  The entire colonic mucosa was examined, including the rectum with retroflexion. once again her prep was suboptimal. Air was evacuated from the colon and the procedure was terminated.  The patient tolerated the procedure well and was able to be transferred to the recovery area in stable condition.    Findings:                 Digital rectal examination: WNL                     Rectum: extensive diverticulosis                     Sigmoid: extensive diverticulosis         Descending:  diverticulosis         Transverse: divertculosis         Ascending: WNL        Cecum: not seen        Terminal Ileum: n/a      Specimens:   Specimen (24h ago, onward)      None              Estimated Blood Loss (EBL): * No values recorded between 10/11/2022  9:56 AM and 10/11/2022 11:04 AM *     Complications: No     Diagnostic Impression: diverticulosis. Poor prep      Recommendations: Repeat procedure in 1 year with a better prep---suprep.    Disposition:  recovery room  home      Attestation: I performed the procedure.        Follow Up:             Future Appointments   Date Time Provider Department Center   1/30/2023  9:00 AM CHAIR Jeniffer SIMON [facility]           Tomy Baron MD  10/11/2022

## 2022-10-11 NOTE — TRANSFER OF CARE
Anesthesia Transfer of Care Note    Patient: Megha Mendoza    Procedure(s) Performed: Procedure(s) (LRB):  COLONOSCOPY (N/A)    Patient location: PACU    Anesthesia Type: general    Transport from OR: Transported from OR on 6-10 L/min O2 by face mask with adequate spontaneous ventilation    Post pain: adequate analgesia    Post assessment: no apparent anesthetic complications and tolerated procedure well    Post vital signs: stable    Level of consciousness: sedated    Nausea/Vomiting: no nausea/vomiting    Complications: none    Transfer of care protocol was followed      Last vitals:   Visit Vitals  BP (!) 110/53   Pulse 74   Temp 36.2 °C (97.2 °F) (Tympanic)   Resp 18   SpO2 99%   Breastfeeding No

## 2022-10-11 NOTE — ANESTHESIA POSTPROCEDURE EVALUATION
Anesthesia Post Evaluation    Patient: Megha Mendoza    Procedure(s) Performed: Procedure(s) (LRB):  COLONOSCOPY (N/A)    Final Anesthesia Type: general      Patient location during evaluation: PACU  Patient participation: Yes- Able to Participate  Level of consciousness: awake and alert and oriented  Post-procedure vital signs: reviewed and stable  Pain management: adequate  Airway patency: patent    PONV status at discharge: No PONV  Anesthetic complications: no      Cardiovascular status: blood pressure returned to baseline and hemodynamically stable  Respiratory status: unassisted, spontaneous ventilation and room air  Hydration status: euvolemic  Follow-up not needed.          Vitals Value Taken Time   /42 10/11/22 1134   Temp  10/11/22 1212   Pulse 70 10/11/22 1134   Resp 18 10/11/22 1134   SpO2 97 % 10/11/22 1134         No case tracking events are documented in the log.      Pain/Lou Score: Lou Score: 10 (10/11/2022 11:20 AM)

## 2022-10-11 NOTE — BRIEF OP NOTE
[facility] - Endoscopy  Brief Operative Note    Surgery Date: 10/11/2022     Surgeon(s) and Role:     * Tomy Baron MD - Primary    Assisting Surgeon: None    Pre-op Diagnosis:  Positive FIT (fecal immunochemical test) [R19.5]    Post-op Diagnosis:  Post-Op Diagnosis Codes:     * Positive FIT (fecal immunochemical test) [R19.5]     * Diverticulosis [K57.90]    Procedure(s) (LRB):  COLONOSCOPY (N/A)    Anesthesia: General    Operative Findings: diverticulosis     Estimated Blood Loss: * No values recorded between 10/11/2022  9:56 AM and 10/11/2022 11:04 AM *         Specimens:   Specimen (24h ago, onward)      None              Discharge Note    OUTCOME: Patient tolerated treatment/procedure well without complication and is now ready for discharge.    DISPOSITION: Home or Self Care    FINAL DIAGNOSIS:  Positive FIT (fecal immunochemical test)    FOLLOWUP: In clinic    DISCHARGE INSTRUCTIONS:  No discharge procedures on file.

## 2023-01-12 ENCOUNTER — HOSPITAL ENCOUNTER (OUTPATIENT)
Dept: RADIOLOGY | Facility: HOSPITAL | Age: 78
Discharge: HOME | End: 2023-01-12
Attending: INTERNAL MEDICINE
Payer: MEDICARE

## 2023-01-12 DIAGNOSIS — J44.1 COPD EXACERBATION: Primary | ICD-10-CM

## 2023-01-12 DIAGNOSIS — J44.1 COPD EXACERBATION: ICD-10-CM

## 2023-01-12 PROCEDURE — 71046 XR CHEST PA AND LATERAL: ICD-10-PCS | Mod: 26,,, | Performed by: RADIOLOGY

## 2023-01-12 PROCEDURE — 71046 X-RAY EXAM CHEST 2 VIEWS: CPT | Mod: TC

## 2023-01-12 PROCEDURE — 71046 X-RAY EXAM CHEST 2 VIEWS: CPT | Mod: 26,,, | Performed by: RADIOLOGY

## 2023-01-18 ENCOUNTER — TELEPHONE (OUTPATIENT)
Dept: FAMILY MEDICINE | Facility: CLINIC | Age: 78
End: 2023-01-18
Payer: MEDICARE

## 2023-01-18 DIAGNOSIS — R07.0 PAIN IN THROAT AND CHEST: ICD-10-CM

## 2023-01-18 DIAGNOSIS — R07.9 PAIN IN THROAT AND CHEST: ICD-10-CM

## 2023-01-19 NOTE — TELEPHONE ENCOUNTER
----- Message from Summer Terrell RN sent at 1/18/2023  7:23 AM CST -----  Dr Baron;  Mrs Cleveland's due for her Prolia injection.  Her calcium level is good.  Her last DEXA was 2 years ago.  If you want her to continue her Prolia, can you please re-sign her therapy plan.   Thanks,  Summer

## 2023-01-30 ENCOUNTER — INFUSION (OUTPATIENT)
Dept: INFUSION THERAPY | Facility: HOSPITAL | Age: 78
End: 2023-01-30
Attending: FAMILY MEDICINE
Payer: MEDICARE

## 2023-01-30 VITALS
TEMPERATURE: 98 F | SYSTOLIC BLOOD PRESSURE: 131 MMHG | HEART RATE: 87 BPM | RESPIRATION RATE: 18 BRPM | DIASTOLIC BLOOD PRESSURE: 54 MMHG

## 2023-01-30 DIAGNOSIS — M85.80 OSTEOPENIA, UNSPECIFIED LOCATION: Primary | ICD-10-CM

## 2023-01-30 PROCEDURE — 96372 THER/PROPH/DIAG INJ SC/IM: CPT

## 2023-01-30 PROCEDURE — 63600175 PHARM REV CODE 636 W HCPCS: Performed by: FAMILY MEDICINE

## 2023-01-30 RX ADMIN — DENOSUMAB 60 MG: 60 INJECTION SUBCUTANEOUS at 09:01

## 2023-02-27 ENCOUNTER — OFFICE VISIT (OUTPATIENT)
Dept: FAMILY MEDICINE | Facility: CLINIC | Age: 78
End: 2023-02-27
Payer: MEDICARE

## 2023-02-27 ENCOUNTER — LAB VISIT (OUTPATIENT)
Dept: LAB | Facility: HOSPITAL | Age: 78
End: 2023-02-27
Attending: FAMILY MEDICINE
Payer: MEDICARE

## 2023-02-27 VITALS
BODY MASS INDEX: 27.12 KG/M2 | HEIGHT: 60 IN | RESPIRATION RATE: 16 BRPM | HEART RATE: 69 BPM | OXYGEN SATURATION: 98 % | SYSTOLIC BLOOD PRESSURE: 122 MMHG | WEIGHT: 138.13 LBS | DIASTOLIC BLOOD PRESSURE: 64 MMHG

## 2023-02-27 DIAGNOSIS — R13.19 ESOPHAGEAL DYSPHAGIA: ICD-10-CM

## 2023-02-27 DIAGNOSIS — K21.9 GASTROESOPHAGEAL REFLUX DISEASE WITHOUT ESOPHAGITIS: Chronic | ICD-10-CM

## 2023-02-27 DIAGNOSIS — M79.672 FOOT PAIN, BILATERAL: ICD-10-CM

## 2023-02-27 DIAGNOSIS — I65.23 BILATERAL CAROTID ARTERY STENOSIS: ICD-10-CM

## 2023-02-27 DIAGNOSIS — I25.10 CORONARY ARTERY DISEASE INVOLVING NATIVE CORONARY ARTERY OF NATIVE HEART WITHOUT ANGINA PECTORIS: ICD-10-CM

## 2023-02-27 DIAGNOSIS — E78.5 DYSLIPIDEMIA: ICD-10-CM

## 2023-02-27 DIAGNOSIS — M79.671 FOOT PAIN, BILATERAL: ICD-10-CM

## 2023-02-27 DIAGNOSIS — R79.9 ABNORMAL FINDING OF BLOOD CHEMISTRY, UNSPECIFIED: ICD-10-CM

## 2023-02-27 DIAGNOSIS — E78.5 DYSLIPIDEMIA: Primary | ICD-10-CM

## 2023-02-27 DIAGNOSIS — J43.9 PULMONARY EMPHYSEMA, UNSPECIFIED EMPHYSEMA TYPE: ICD-10-CM

## 2023-02-27 DIAGNOSIS — J44.89 OBSTRUCTIVE CHRONIC BRONCHITIS WITHOUT EXACERBATION: ICD-10-CM

## 2023-02-27 DIAGNOSIS — M85.80 OSTEOPENIA, UNSPECIFIED LOCATION: ICD-10-CM

## 2023-02-27 LAB
ALBUMIN SERPL BCP-MCNC: 3.6 G/DL (ref 3.5–5.2)
ALP SERPL-CCNC: 42 U/L (ref 55–135)
ALT SERPL W/O P-5'-P-CCNC: 13 U/L (ref 10–44)
ANION GAP SERPL CALC-SCNC: 9 MMOL/L (ref 8–16)
AST SERPL-CCNC: 21 U/L (ref 10–40)
BASOPHILS # BLD AUTO: 0.04 K/UL (ref 0–0.2)
BASOPHILS NFR BLD: 0.9 % (ref 0–1.9)
BILIRUB SERPL-MCNC: 0.2 MG/DL (ref 0.1–1)
BUN SERPL-MCNC: 29 MG/DL (ref 8–23)
CALCIUM SERPL-MCNC: 9.6 MG/DL (ref 8.7–10.5)
CHLORIDE SERPL-SCNC: 106 MMOL/L (ref 95–110)
CHOLEST SERPL-MCNC: 135 MG/DL (ref 120–199)
CHOLEST/HDLC SERPL: 2.6 {RATIO} (ref 2–5)
CO2 SERPL-SCNC: 25 MMOL/L (ref 23–29)
CREAT SERPL-MCNC: 0.9 MG/DL (ref 0.5–1.4)
DIFFERENTIAL METHOD: ABNORMAL
EOSINOPHIL # BLD AUTO: 0.2 K/UL (ref 0–0.5)
EOSINOPHIL NFR BLD: 4.9 % (ref 0–8)
ERYTHROCYTE [DISTWIDTH] IN BLOOD BY AUTOMATED COUNT: 13.6 % (ref 11.5–14.5)
EST. GFR  (NO RACE VARIABLE): >60 ML/MIN/1.73 M^2
GLUCOSE SERPL-MCNC: 98 MG/DL (ref 70–110)
HCT VFR BLD AUTO: 38.2 % (ref 37–48.5)
HDLC SERPL-MCNC: 52 MG/DL (ref 40–75)
HDLC SERPL: 38.5 % (ref 20–50)
HGB BLD-MCNC: 12.2 G/DL (ref 12–16)
IMM GRANULOCYTES # BLD AUTO: 0 K/UL (ref 0–0.04)
IMM GRANULOCYTES NFR BLD AUTO: 0 % (ref 0–0.5)
LDLC SERPL CALC-MCNC: 69.8 MG/DL (ref 63–159)
LYMPHOCYTES # BLD AUTO: 1.8 K/UL (ref 1–4.8)
LYMPHOCYTES NFR BLD: 39.6 % (ref 18–48)
MCH RBC QN AUTO: 29.6 PG (ref 27–31)
MCHC RBC AUTO-ENTMCNC: 31.9 G/DL (ref 32–36)
MCV RBC AUTO: 93 FL (ref 82–98)
MONOCYTES # BLD AUTO: 0.4 K/UL (ref 0.3–1)
MONOCYTES NFR BLD: 8.1 % (ref 4–15)
NEUTROPHILS # BLD AUTO: 2.1 K/UL (ref 1.8–7.7)
NEUTROPHILS NFR BLD: 46.5 % (ref 38–73)
NONHDLC SERPL-MCNC: 83 MG/DL
NRBC BLD-RTO: 0 /100 WBC
PLATELET # BLD AUTO: 241 K/UL (ref 150–450)
PMV BLD AUTO: 9.8 FL (ref 9.2–12.9)
POTASSIUM SERPL-SCNC: 4.3 MMOL/L (ref 3.5–5.1)
PROT SERPL-MCNC: 7.1 G/DL (ref 6–8.4)
RBC # BLD AUTO: 4.12 M/UL (ref 4–5.4)
SODIUM SERPL-SCNC: 140 MMOL/L (ref 136–145)
TRIGL SERPL-MCNC: 66 MG/DL (ref 30–150)
TSH SERPL DL<=0.005 MIU/L-ACNC: 0.9 UIU/ML (ref 0.4–4)
WBC # BLD AUTO: 4.47 K/UL (ref 3.9–12.7)

## 2023-02-27 PROCEDURE — 36415 COLL VENOUS BLD VENIPUNCTURE: CPT | Performed by: FAMILY MEDICINE

## 2023-02-27 PROCEDURE — 99999 PR PBB SHADOW E&M-EST. PATIENT-LVL III: CPT | Mod: PBBFAC,,, | Performed by: FAMILY MEDICINE

## 2023-02-27 PROCEDURE — 80053 COMPREHEN METABOLIC PANEL: CPT | Performed by: FAMILY MEDICINE

## 2023-02-27 PROCEDURE — 99499 UNLISTED E&M SERVICE: CPT | Mod: S$GLB,,, | Performed by: FAMILY MEDICINE

## 2023-02-27 PROCEDURE — 80061 LIPID PANEL: CPT | Performed by: FAMILY MEDICINE

## 2023-02-27 PROCEDURE — 99214 PR OFFICE/OUTPT VISIT, EST, LEVL IV, 30-39 MIN: ICD-10-PCS | Mod: S$GLB,,, | Performed by: FAMILY MEDICINE

## 2023-02-27 PROCEDURE — 1125F PR PAIN SEVERITY QUANTIFIED, PAIN PRESENT: ICD-10-PCS | Mod: CPTII,S$GLB,, | Performed by: FAMILY MEDICINE

## 2023-02-27 PROCEDURE — 1125F AMNT PAIN NOTED PAIN PRSNT: CPT | Mod: CPTII,S$GLB,, | Performed by: FAMILY MEDICINE

## 2023-02-27 PROCEDURE — 3074F PR MOST RECENT SYSTOLIC BLOOD PRESSURE < 130 MM HG: ICD-10-PCS | Mod: CPTII,S$GLB,, | Performed by: FAMILY MEDICINE

## 2023-02-27 PROCEDURE — 83036 HEMOGLOBIN GLYCOSYLATED A1C: CPT | Performed by: FAMILY MEDICINE

## 2023-02-27 PROCEDURE — 99999 PR PBB SHADOW E&M-EST. PATIENT-LVL III: ICD-10-PCS | Mod: PBBFAC,,, | Performed by: FAMILY MEDICINE

## 2023-02-27 PROCEDURE — 84443 ASSAY THYROID STIM HORMONE: CPT | Performed by: FAMILY MEDICINE

## 2023-02-27 PROCEDURE — 99499 RISK ADDL DX/OHS AUDIT: ICD-10-PCS | Mod: S$GLB,,, | Performed by: FAMILY MEDICINE

## 2023-02-27 PROCEDURE — 3078F PR MOST RECENT DIASTOLIC BLOOD PRESSURE < 80 MM HG: ICD-10-PCS | Mod: CPTII,S$GLB,, | Performed by: FAMILY MEDICINE

## 2023-02-27 PROCEDURE — 85025 COMPLETE CBC W/AUTO DIFF WBC: CPT | Performed by: FAMILY MEDICINE

## 2023-02-27 PROCEDURE — 99214 OFFICE O/P EST MOD 30 MIN: CPT | Mod: S$GLB,,, | Performed by: FAMILY MEDICINE

## 2023-02-27 PROCEDURE — 3074F SYST BP LT 130 MM HG: CPT | Mod: CPTII,S$GLB,, | Performed by: FAMILY MEDICINE

## 2023-02-27 PROCEDURE — 3078F DIAST BP <80 MM HG: CPT | Mod: CPTII,S$GLB,, | Performed by: FAMILY MEDICINE

## 2023-02-27 NOTE — PROGRESS NOTES
Subjective:       Patient ID: Megha Mendoza is a 77 y.o. female.    Chief Complaint: Follow-up (6 month follow up)    Pt is a 77 y.o. female who presents for evaluation and management of   Encounter Diagnoses   Name Primary?    Dyslipidemia Yes    Pulmonary emphysema, unspecified emphysema type     Osteopenia, unspecified location     Gastroesophageal reflux disease without esophagitis     Coronary artery disease involving native coronary artery of native heart without angina pectoris     Bilateral carotid artery stenosis     Obstructive chronic bronchitis without exacerbation     Esophageal dysphagia     Foot pain, bilateral     Abnormal finding of blood chemistry, unspecified    .  Doing well on current meds. Denies any side effects. Prevention is up to date.    Review of Systems   Constitutional:  Negative for chills and fever.   Respiratory:  Negative for shortness of breath.    Cardiovascular:  Negative for chest pain and palpitations.   Gastrointestinal:  Negative for abdominal pain, blood in stool, constipation and nausea.        Esophageal dysphagia    Genitourinary:  Negative for difficulty urinating.   Musculoskeletal:         Foot pain    Psychiatric/Behavioral:  Negative for dysphoric mood, sleep disturbance and suicidal ideas. The patient is not nervous/anxious.      Objective:      Physical Exam  Constitutional:       Appearance: She is well-developed.   HENT:      Head: Normocephalic and atraumatic.      Right Ear: External ear normal.      Left Ear: External ear normal.      Nose: Nose normal.   Eyes:      Pupils: Pupils are equal, round, and reactive to light.   Neck:      Thyroid: No thyromegaly.      Vascular: No JVD.      Trachea: No tracheal deviation.   Cardiovascular:      Rate and Rhythm: Normal rate.      Heart sounds: Normal heart sounds. No murmur heard.  Pulmonary:      Effort: Pulmonary effort is normal. No respiratory distress.      Breath sounds: Normal breath sounds. No wheezing or  rales.   Chest:      Chest wall: No tenderness.   Abdominal:      General: Bowel sounds are normal. There is no distension.      Palpations: Abdomen is soft. There is no mass.      Tenderness: There is no abdominal tenderness. There is no guarding or rebound.   Musculoskeletal:         General: No tenderness. Normal range of motion.      Cervical back: Normal range of motion and neck supple.   Lymphadenopathy:      Cervical: No cervical adenopathy.   Skin:     General: Skin is warm and dry.      Coloration: Skin is not pale.      Findings: No erythema or rash.   Neurological:      Mental Status: She is alert and oriented to person, place, and time.      Cranial Nerves: No cranial nerve deficit.      Motor: No abnormal muscle tone.      Coordination: Coordination normal.      Deep Tendon Reflexes: Reflexes are normal and symmetric. Reflexes normal.   Psychiatric:         Behavior: Behavior normal.         Thought Content: Thought content normal.         Judgment: Judgment normal.       Assessment:       1. Dyslipidemia    2. Pulmonary emphysema, unspecified emphysema type    3. Osteopenia, unspecified location    4. Gastroesophageal reflux disease without esophagitis    5. Coronary artery disease involving native coronary artery of native heart without angina pectoris    6. Bilateral carotid artery stenosis    7. Obstructive chronic bronchitis without exacerbation    8. Esophageal dysphagia    9. Foot pain, bilateral    10. Abnormal finding of blood chemistry, unspecified          Plan:   1. Dyslipidemia  Overview:  Lab Results   Component Value Date    CHOL 154 02/02/2022    CHOL 156 05/11/2021    CHOL 157 02/02/2021     Lab Results   Component Value Date    HDL 73 02/02/2022    HDL 76 (A) 05/11/2021    HDL 76 (H) 02/02/2021     Lab Results   Component Value Date    LDLCALC 57.6 (L) 02/02/2022    LDLCALC 56.6 (L) 02/02/2021    LDLCALC 83.6 01/31/2019     Lab Results   Component Value Date    TRIG 117 02/02/2022     TRIG 76 05/11/2021    TRIG 122 02/02/2021     Lab Results   Component Value Date    CHOLHDL 47.4 02/02/2022    CHOLHDL 48.4 02/02/2021    CHOLHDL 41.3 01/31/2019     Continue statin     Orders:  -     Comprehensive Metabolic Panel; Future; Expected date: 02/27/2023  -     Lipid Panel; Future; Expected date: 02/27/2023  -     TSH; Future; Expected date: 02/27/2023    2. Pulmonary emphysema, unspecified emphysema type  Overview:  symbicort       3. Osteopenia, unspecified location  Overview:  Originally osteoporosis. Continue prolia       4. Gastroesophageal reflux disease without esophagitis  Overview:  On PPI   Can't take bisphosphonate for osteo   Large hiatal hernia. Possible surgery being considered  Has some dysphagia as well---GI has her on PPI and high dose H2 blocker. Considering dilation      Orders:  -     CBC Auto Differential; Future; Expected date: 02/27/2023    5. Coronary artery disease involving native coronary artery of native heart without angina pectoris  Overview:  Statin  Lab Results   Component Value Date    LDLCALC 57.6 (L) 02/02/2022           Orders:  -     CBC Auto Differential; Future; Expected date: 02/27/2023    6. Bilateral carotid artery stenosis  Overview:  Monitored per Dr. Day q 6mo  On statin       7. Obstructive chronic bronchitis without exacerbation  Overview:  Sees Dr. Crane   She is on home O2 for nighttime     Orders:  -     CBC Auto Differential; Future; Expected date: 02/27/2023    8. Esophageal dysphagia  -     Ambulatory referral/consult to Gastroenterology; Future; Expected date: 03/06/2023    9. Foot pain, bilateral  -     Ambulatory referral/consult to Podiatry; Future; Expected date: 03/06/2023  -     Hemoglobin A1C; Future; Expected date: 02/27/2023  -     TSH; Future; Expected date: 02/27/2023    10. Abnormal finding of blood chemistry, unspecified  -     Hemoglobin A1C; Future; Expected date: 02/27/2023      No follow-ups on file.

## 2023-02-28 LAB
ESTIMATED AVG GLUCOSE: 111 MG/DL (ref 68–131)
HBA1C MFR BLD: 5.5 % (ref 4–5.6)

## 2023-04-10 ENCOUNTER — LAB VISIT (OUTPATIENT)
Dept: LAB | Facility: HOSPITAL | Age: 78
End: 2023-04-10
Attending: PAIN MEDICINE
Payer: MEDICARE

## 2023-04-10 DIAGNOSIS — R25.2 CRAMP IN MUSCLE: ICD-10-CM

## 2023-04-10 DIAGNOSIS — Z13.0 SCREENING FOR IRON DEFICIENCY ANEMIA: Primary | ICD-10-CM

## 2023-04-10 LAB
ALBUMIN SERPL BCP-MCNC: 3.7 G/DL (ref 3.5–5.2)
ALP SERPL-CCNC: 45 U/L (ref 55–135)
ALT SERPL W/O P-5'-P-CCNC: 21 U/L (ref 10–44)
ANION GAP SERPL CALC-SCNC: 8 MMOL/L (ref 8–16)
AST SERPL-CCNC: 22 U/L (ref 10–40)
BILIRUB SERPL-MCNC: 0.4 MG/DL (ref 0.1–1)
BUN SERPL-MCNC: 19 MG/DL (ref 8–23)
CALCIUM SERPL-MCNC: 10.2 MG/DL (ref 8.7–10.5)
CHLORIDE SERPL-SCNC: 104 MMOL/L (ref 95–110)
CO2 SERPL-SCNC: 29 MMOL/L (ref 23–29)
CREAT SERPL-MCNC: 0.9 MG/DL (ref 0.5–1.4)
EST. GFR  (NO RACE VARIABLE): >60 ML/MIN/1.73 M^2
GLUCOSE SERPL-MCNC: 96 MG/DL (ref 70–110)
MAGNESIUM SERPL-MCNC: 2 MG/DL (ref 1.6–2.6)
POTASSIUM SERPL-SCNC: 4.5 MMOL/L (ref 3.5–5.1)
PROT SERPL-MCNC: 7.3 G/DL (ref 6–8.4)
SODIUM SERPL-SCNC: 141 MMOL/L (ref 136–145)

## 2023-04-10 PROCEDURE — 36415 COLL VENOUS BLD VENIPUNCTURE: CPT | Performed by: PAIN MEDICINE

## 2023-04-10 PROCEDURE — 83735 ASSAY OF MAGNESIUM: CPT | Performed by: PAIN MEDICINE

## 2023-04-10 PROCEDURE — 80053 COMPREHEN METABOLIC PANEL: CPT | Performed by: PAIN MEDICINE

## 2023-04-18 ENCOUNTER — OFFICE VISIT (OUTPATIENT)
Dept: GASTROENTEROLOGY | Facility: CLINIC | Age: 78
End: 2023-04-18
Payer: MEDICARE

## 2023-04-18 VITALS
SYSTOLIC BLOOD PRESSURE: 118 MMHG | WEIGHT: 139.38 LBS | BODY MASS INDEX: 27.68 KG/M2 | DIASTOLIC BLOOD PRESSURE: 66 MMHG

## 2023-04-18 DIAGNOSIS — R13.13 PHARYNGEAL DYSPHAGIA: Primary | ICD-10-CM

## 2023-04-18 PROCEDURE — 3074F PR MOST RECENT SYSTOLIC BLOOD PRESSURE < 130 MM HG: ICD-10-PCS | Mod: CPTII,S$GLB,, | Performed by: INTERNAL MEDICINE

## 2023-04-18 PROCEDURE — 99999 PR PBB SHADOW E&M-EST. PATIENT-LVL V: CPT | Mod: PBBFAC,,, | Performed by: INTERNAL MEDICINE

## 2023-04-18 PROCEDURE — 3074F SYST BP LT 130 MM HG: CPT | Mod: CPTII,S$GLB,, | Performed by: INTERNAL MEDICINE

## 2023-04-18 PROCEDURE — 1126F AMNT PAIN NOTED NONE PRSNT: CPT | Mod: CPTII,S$GLB,, | Performed by: INTERNAL MEDICINE

## 2023-04-18 PROCEDURE — 3288F PR FALLS RISK ASSESSMENT DOCUMENTED: ICD-10-PCS | Mod: CPTII,S$GLB,, | Performed by: INTERNAL MEDICINE

## 2023-04-18 PROCEDURE — 3288F FALL RISK ASSESSMENT DOCD: CPT | Mod: CPTII,S$GLB,, | Performed by: INTERNAL MEDICINE

## 2023-04-18 PROCEDURE — 3078F DIAST BP <80 MM HG: CPT | Mod: CPTII,S$GLB,, | Performed by: INTERNAL MEDICINE

## 2023-04-18 PROCEDURE — 1101F PR PT FALLS ASSESS DOC 0-1 FALLS W/OUT INJ PAST YR: ICD-10-PCS | Mod: CPTII,S$GLB,, | Performed by: INTERNAL MEDICINE

## 2023-04-18 PROCEDURE — 1101F PT FALLS ASSESS-DOCD LE1/YR: CPT | Mod: CPTII,S$GLB,, | Performed by: INTERNAL MEDICINE

## 2023-04-18 PROCEDURE — 99204 PR OFFICE/OUTPT VISIT, NEW, LEVL IV, 45-59 MIN: ICD-10-PCS | Mod: S$GLB,,, | Performed by: INTERNAL MEDICINE

## 2023-04-18 PROCEDURE — 99204 OFFICE O/P NEW MOD 45 MIN: CPT | Mod: S$GLB,,, | Performed by: INTERNAL MEDICINE

## 2023-04-18 PROCEDURE — 3078F PR MOST RECENT DIASTOLIC BLOOD PRESSURE < 80 MM HG: ICD-10-PCS | Mod: CPTII,S$GLB,, | Performed by: INTERNAL MEDICINE

## 2023-04-18 PROCEDURE — 1126F PR PAIN SEVERITY QUANTIFIED, NO PAIN PRESENT: ICD-10-PCS | Mod: CPTII,S$GLB,, | Performed by: INTERNAL MEDICINE

## 2023-04-18 PROCEDURE — 99999 PR PBB SHADOW E&M-EST. PATIENT-LVL V: ICD-10-PCS | Mod: PBBFAC,,, | Performed by: INTERNAL MEDICINE

## 2023-04-18 RX ORDER — TETRACYCLINE HCL 500 MG
1 CAPSULE ORAL DAILY
COMMUNITY
Start: 2021-11-16

## 2023-04-18 RX ORDER — HYDROGEN PEROXIDE 3 %
SOLUTION, NON-ORAL MISCELLANEOUS
COMMUNITY
Start: 2022-11-16 | End: 2023-04-18

## 2023-04-18 NOTE — PROGRESS NOTES
"Subjective     Patient ID: Megha Mendoza is a 78 y.o. female.    Chief Complaint: Gastroesophageal Reflux and Dysphagia    77 yo F complains of pharyngeal dysphagia.  She had hiatal hernia repair with Dr. Pearson 3/2022 ("half wrap") and began to have issues a few months afterward.  She states that food gets stuck in the back of her throat which causes "spasms that go into her ears and up around her head."  ENT gave PPI and Pepcid which did not help.  She then got Flexeril which has helped.  Prior to Flexeril these spasms would happen all the time, now they only happen when she is eating and food gets stuck.  She points to the suprasternal notch as location for food to stick; she states that if she clears her throat, the food will dislodge, and sometimes it comes up on its own.  If she eats or drinks too quickly, that also makes it stick.  Liquids cause issues as well.  Once the food clears that upper area, there is no sensation of food sticking in her chest.  The PPI has made her baseline heartburn better but not perfect, but she stopped taking it b/c she worries about the side effects therefore she is currently taking Pepcid only.    ENT ordered esophagram 3 months post op and she states that they told her "the top part of her esophagus is closed off."    Review of Systems   Constitutional:  Negative for chills and fever.   HENT:  Positive for trouble swallowing.    Eyes:  Negative for photophobia and visual disturbance.   Respiratory:  Negative for shortness of breath and wheezing.    Cardiovascular:  Negative for chest pain, palpitations and leg swelling.   Gastrointestinal:  Positive for reflux.   Genitourinary:  Negative for dysuria and hematuria.   Musculoskeletal:  Negative for joint swelling and myalgias.   Integumentary:  Negative for color change and rash.   Neurological:  Negative for dizziness and speech difficulty.   Psychiatric/Behavioral:  Negative for confusion and hallucinations.       Objective "   /66 (BP Location: Left arm, Patient Position: Sitting, BP Method: Medium (Manual))   Wt 63.2 kg (139 lb 6.4 oz)   BMI 27.68 kg/m²     Physical Exam  Constitutional:       Appearance: Normal appearance. She is well-developed.   HENT:      Head: Normocephalic and atraumatic.   Eyes:      Extraocular Movements: Extraocular movements intact.      Pupils: Pupils are equal, round, and reactive to light.   Pulmonary:      Effort: Pulmonary effort is normal. No respiratory distress.   Abdominal:      General: There is no distension.      Palpations: Abdomen is soft.   Musculoskeletal:         General: No signs of injury. Normal range of motion.      Cervical back: Normal range of motion and neck supple.   Neurological:      General: No focal deficit present.      Mental Status: She is alert and oriented to person, place, and time.   Psychiatric:         Mood and Affect: Mood normal.         Behavior: Behavior normal.         Thought Content: Thought content normal.         Judgment: Judgment normal.     Lab Results   Component Value Date    WBC 4.47 02/27/2023    HGB 12.2 02/27/2023    HCT 38.2 02/27/2023    MCV 93 02/27/2023     02/27/2023     Esophagram was independently visualized and reviewed by me and showed post op changes from hernia with small HH, no reflux, normal at [facility].    Old records from chart reviewed and summarized, significant for:  Colonoscopy 10/2022:  diverticulosis, poor prep     Assessment and Plan     Problem List Items Addressed This Visit          GI    Pharyngeal dysphagia - Primary    Relevant Orders    Case Request Endoscopy: EGD (ESOPHAGOGASTRODUODENOSCOPY) (Completed)       Pharyngeal dysphagia  -     Ambulatory referral/consult to Gastroenterology  -     Case Request Endoscopy: EGD (ESOPHAGOGASTRODUODENOSCOPY)  -     Lets see what the EGD shows before deciding whether Pepcid is sufficient

## 2023-04-18 NOTE — PATIENT INSTRUCTIONS
EGD Prep Instructions    [facility] [address], [place]  [place]  [phone].    You are scheduled for an EGD with Dr. Dunlap on Monday, May 8 at [facility].  You will enter through the Main Entrance and check in at Registration.    Nothing to eat or drink after midnight before the procedure.  You MAY brush your teeth.    You MAY take your blood pressure, heart, and seizure medication on the morning of the procedure, with a SIP of water.  Hold ALL other medications until after the procedure.    You must have someone with you to DRIVE YOU HOME since you will be receiving IV sedation for the procedure.    If you are on blood thinners THAT YOU HAVE BEEN INSTRUCTED TO HOLD BY YOUR DOCTOR FOR THIS PROCEDURE, then do NOT take this the morning of your EGD.  Do NOT stop these medications on your own, they must be approved to be held by your doctor.  Your EGD can NOT be done if you are on these medications.  Examples of blood thinners include: Coumadin, Aggrenox, Plavix, Pradaxa, Reapro, Pletal, Xarelto, Ticagrelor, Brilinta, Eliquis, and high dose aspirin (325 mg).  You do not have to stop baby aspirin 81 mg.    You will receive a call 2-3 days before your EGD to tell you the time to arrive.  If you have not received a call by the day before your procedure, call the OR  at [phone].

## 2023-04-18 NOTE — H&P (VIEW-ONLY)
"Subjective     Patient ID: Megha Mendoza is a 78 y.o. female.    Chief Complaint: Gastroesophageal Reflux and Dysphagia    77 yo F complains of pharyngeal dysphagia.  She had hiatal hernia repair with Dr. Pearson 3/2022 ("half wrap") and began to have issues a few months afterward.  She states that food gets stuck in the back of her throat which causes "spasms that go into her ears and up around her head."  EGD gave PPI and Pepcid which did not help.  She then got Flexeril which has helped.  Prior to Flexeril these spasms would happen all the time, now they only happen when she is eating and food gets stuck.  She points to the suprasternal notch as location for food to stick; she states that if she clears her throat, the food will dislodge, and sometimes it comes up on its own.  If she eats or drinks too quickly, that also makes it stick.  Liquids cause issues as well.  Once the food clears that upper area, there is no sensation of food sticking in her chest.  The PPI has made her baseline heartburn better but not perfect, but she stopped taking it b/c she worries about the side effects therefore she is currently taking Pepcid only.    ENT ordered esophagram 3 months post op and she states that they told her "the top part of her esophagus is closed off."    Review of Systems   Constitutional:  Negative for chills and fever.   HENT:  Positive for trouble swallowing.    Eyes:  Negative for photophobia and visual disturbance.   Respiratory:  Negative for shortness of breath and wheezing.    Cardiovascular:  Negative for chest pain, palpitations and leg swelling.   Gastrointestinal:  Positive for reflux.   Genitourinary:  Negative for dysuria and hematuria.   Musculoskeletal:  Negative for joint swelling and myalgias.   Integumentary:  Negative for color change and rash.   Neurological:  Negative for dizziness and speech difficulty.   Psychiatric/Behavioral:  Negative for confusion and hallucinations.       Objective "   /66 (BP Location: Left arm, Patient Position: Sitting, BP Method: Medium (Manual))   Wt 63.2 kg (139 lb 6.4 oz)   BMI 27.68 kg/m²     Physical Exam  Constitutional:       Appearance: Normal appearance. She is well-developed.   HENT:      Head: Normocephalic and atraumatic.   Eyes:      Extraocular Movements: Extraocular movements intact.      Pupils: Pupils are equal, round, and reactive to light.   Pulmonary:      Effort: Pulmonary effort is normal. No respiratory distress.   Abdominal:      General: There is no distension.      Palpations: Abdomen is soft.   Musculoskeletal:         General: No signs of injury. Normal range of motion.      Cervical back: Normal range of motion and neck supple.   Neurological:      General: No focal deficit present.      Mental Status: She is alert and oriented to person, place, and time.   Psychiatric:         Mood and Affect: Mood normal.         Behavior: Behavior normal.         Thought Content: Thought content normal.         Judgment: Judgment normal.     Lab Results   Component Value Date    WBC 4.47 02/27/2023    HGB 12.2 02/27/2023    HCT 38.2 02/27/2023    MCV 93 02/27/2023     02/27/2023     Esophagram was independently visualized and reviewed by me and showed post op changes from hernia with small HH, no reflux, normal at UES.    Old records from chart reviewed and summarized, significant for:  Colonoscopy 10/2022:  diverticulosis, poor prep     Assessment and Plan     Problem List Items Addressed This Visit          GI    Pharyngeal dysphagia - Primary    Relevant Orders    Case Request Endoscopy: EGD (ESOPHAGOGASTRODUODENOSCOPY) (Completed)       Pharyngeal dysphagia  -     Ambulatory referral/consult to Gastroenterology  -     Case Request Endoscopy: EGD (ESOPHAGOGASTRODUODENOSCOPY)  -     Lets see what the EGD shows before deciding whether Pepcid is sufficient

## 2023-04-19 ENCOUNTER — OFFICE VISIT (OUTPATIENT)
Dept: FAMILY MEDICINE | Facility: CLINIC | Age: 78
End: 2023-04-19
Payer: MEDICARE

## 2023-04-19 ENCOUNTER — TELEPHONE (OUTPATIENT)
Dept: FAMILY MEDICINE | Facility: CLINIC | Age: 78
End: 2023-04-19
Payer: MEDICARE

## 2023-04-19 VITALS
HEIGHT: 59 IN | BODY MASS INDEX: 28.07 KG/M2 | RESPIRATION RATE: 18 BRPM | WEIGHT: 139.25 LBS | SYSTOLIC BLOOD PRESSURE: 120 MMHG | OXYGEN SATURATION: 96 % | DIASTOLIC BLOOD PRESSURE: 68 MMHG | HEART RATE: 71 BPM

## 2023-04-19 DIAGNOSIS — R42 DIZZINESS: ICD-10-CM

## 2023-04-19 DIAGNOSIS — R53.1 WEAKNESS GENERALIZED: Primary | ICD-10-CM

## 2023-04-19 PROCEDURE — 3074F SYST BP LT 130 MM HG: CPT | Mod: CPTII,S$GLB,, | Performed by: FAMILY MEDICINE

## 2023-04-19 PROCEDURE — 93005 EKG 12-LEAD: ICD-10-PCS | Mod: S$GLB,,, | Performed by: FAMILY MEDICINE

## 2023-04-19 PROCEDURE — 99214 OFFICE O/P EST MOD 30 MIN: CPT | Mod: S$GLB,,, | Performed by: FAMILY MEDICINE

## 2023-04-19 PROCEDURE — 1159F MED LIST DOCD IN RCRD: CPT | Mod: CPTII,S$GLB,, | Performed by: FAMILY MEDICINE

## 2023-04-19 PROCEDURE — 3288F PR FALLS RISK ASSESSMENT DOCUMENTED: ICD-10-PCS | Mod: CPTII,S$GLB,, | Performed by: FAMILY MEDICINE

## 2023-04-19 PROCEDURE — 3074F PR MOST RECENT SYSTOLIC BLOOD PRESSURE < 130 MM HG: ICD-10-PCS | Mod: CPTII,S$GLB,, | Performed by: FAMILY MEDICINE

## 2023-04-19 PROCEDURE — 1101F PT FALLS ASSESS-DOCD LE1/YR: CPT | Mod: CPTII,S$GLB,, | Performed by: FAMILY MEDICINE

## 2023-04-19 PROCEDURE — 1125F AMNT PAIN NOTED PAIN PRSNT: CPT | Mod: CPTII,S$GLB,, | Performed by: FAMILY MEDICINE

## 2023-04-19 PROCEDURE — 93005 ELECTROCARDIOGRAM TRACING: CPT | Mod: S$GLB,,, | Performed by: FAMILY MEDICINE

## 2023-04-19 PROCEDURE — 1101F PR PT FALLS ASSESS DOC 0-1 FALLS W/OUT INJ PAST YR: ICD-10-PCS | Mod: CPTII,S$GLB,, | Performed by: FAMILY MEDICINE

## 2023-04-19 PROCEDURE — 93010 ELECTROCARDIOGRAM REPORT: CPT | Mod: S$GLB,,, | Performed by: INTERNAL MEDICINE

## 2023-04-19 PROCEDURE — 93010 EKG 12-LEAD: ICD-10-PCS | Mod: S$GLB,,, | Performed by: INTERNAL MEDICINE

## 2023-04-19 PROCEDURE — 1125F PR PAIN SEVERITY QUANTIFIED, PAIN PRESENT: ICD-10-PCS | Mod: CPTII,S$GLB,, | Performed by: FAMILY MEDICINE

## 2023-04-19 PROCEDURE — 3078F PR MOST RECENT DIASTOLIC BLOOD PRESSURE < 80 MM HG: ICD-10-PCS | Mod: CPTII,S$GLB,, | Performed by: FAMILY MEDICINE

## 2023-04-19 PROCEDURE — 3288F FALL RISK ASSESSMENT DOCD: CPT | Mod: CPTII,S$GLB,, | Performed by: FAMILY MEDICINE

## 2023-04-19 PROCEDURE — 99999 PR PBB SHADOW E&M-EST. PATIENT-LVL IV: CPT | Mod: PBBFAC,,, | Performed by: FAMILY MEDICINE

## 2023-04-19 PROCEDURE — 1159F PR MEDICATION LIST DOCUMENTED IN MEDICAL RECORD: ICD-10-PCS | Mod: CPTII,S$GLB,, | Performed by: FAMILY MEDICINE

## 2023-04-19 PROCEDURE — 99214 PR OFFICE/OUTPT VISIT, EST, LEVL IV, 30-39 MIN: ICD-10-PCS | Mod: S$GLB,,, | Performed by: FAMILY MEDICINE

## 2023-04-19 PROCEDURE — 3078F DIAST BP <80 MM HG: CPT | Mod: CPTII,S$GLB,, | Performed by: FAMILY MEDICINE

## 2023-04-19 PROCEDURE — 99999 PR PBB SHADOW E&M-EST. PATIENT-LVL IV: ICD-10-PCS | Mod: PBBFAC,,, | Performed by: FAMILY MEDICINE

## 2023-04-19 NOTE — TELEPHONE ENCOUNTER
----- Message from Santos Tucker sent at 2023 12:32 PM CDT -----  Contact: Patient  Megha Mendoza  MRN: [id number]  : 1945  PCP: Tomy Baron  Home Phone      [phone]  Work Phone      Not on file.  Mobile          [phone]      MESSAGE: dizziness - weakness - cold sweats -- requesting appt sooner than next available    Call [phone]    PCP: Loraine

## 2023-04-19 NOTE — PROGRESS NOTES
Subjective:       Patient ID: Megha Mendoza is a 78 y.o. female.    Chief Complaint: Dizziness and Fatigue    Pt is a 78 y.o. female who presents for evaluation and management of   Encounter Diagnoses   Name Primary?    Weakness generalized Yes    Dizziness    .episodic 5-6 years now   Recently she had 4 episodes in the last week which prompted her to come to see me   She is not diabetic, but borrowed a meter to check her glucose next time she has an episode   Has tried eating something sweet during episodes, but has not helped   Episodes last about 1 hour----excessive sweating, dizziness, global weakness. No chest pain. No palps.     Doing well on current meds. Denies any side effects. Prevention is up to date.  Review of Systems   Constitutional:  Positive for diaphoresis and fatigue.   Respiratory:  Negative for shortness of breath.    Cardiovascular:  Negative for chest pain and palpitations.   Gastrointestinal:  Negative for nausea and vomiting.   Neurological:  Positive for dizziness, weakness, light-headedness and headaches. Negative for syncope.     Objective:      Physical Exam  Constitutional:       Appearance: She is well-developed.   HENT:      Head: Normocephalic and atraumatic.      Right Ear: External ear normal.      Left Ear: External ear normal.      Nose: Nose normal.   Eyes:      Pupils: Pupils are equal, round, and reactive to light.   Neck:      Thyroid: No thyromegaly.      Vascular: No JVD.      Trachea: No tracheal deviation.   Cardiovascular:      Rate and Rhythm: Normal rate.      Heart sounds: Normal heart sounds. No murmur heard.  Pulmonary:      Effort: Pulmonary effort is normal. No respiratory distress.      Breath sounds: Normal breath sounds. No wheezing or rales.   Chest:      Chest wall: No tenderness.   Abdominal:      General: Bowel sounds are normal. There is no distension.      Palpations: Abdomen is soft. There is no mass.      Tenderness: There is no abdominal tenderness.  There is no guarding or rebound.   Musculoskeletal:         General: No tenderness. Normal range of motion.      Cervical back: Normal range of motion and neck supple.   Lymphadenopathy:      Cervical: No cervical adenopathy.   Skin:     General: Skin is warm and dry.      Coloration: Skin is not pale.      Findings: No erythema or rash.   Neurological:      Mental Status: She is alert and oriented to person, place, and time.      Cranial Nerves: No cranial nerve deficit.      Motor: No abnormal muscle tone.      Coordination: Coordination normal.      Deep Tendon Reflexes: Reflexes are normal and symmetric. Reflexes normal.   Psychiatric:         Behavior: Behavior normal.         Thought Content: Thought content normal.         Judgment: Judgment normal.       Assessment:       1. Weakness generalized    2. Dizziness        Plan:   1. Weakness generalized  -     EKG 12-lead    2. Dizziness  -     EKG 12-lead      She has recent labs that were reviewed:    Lab Results   Component Value Date    WBC 4.47 02/27/2023    HGB 12.2 02/27/2023    HCT 38.2 02/27/2023    MCV 93 02/27/2023     02/27/2023       CMP  Sodium   Date Value Ref Range Status   04/10/2023 141 136 - 145 mmol/L Final     Potassium   Date Value Ref Range Status   04/10/2023 4.5 3.5 - 5.1 mmol/L Final     Chloride   Date Value Ref Range Status   04/10/2023 104 95 - 110 mmol/L Final     CO2   Date Value Ref Range Status   04/10/2023 29 23 - 29 mmol/L Final     Glucose   Date Value Ref Range Status   04/10/2023 96 70 - 110 mg/dL Final     BUN   Date Value Ref Range Status   04/10/2023 19 8 - 23 mg/dL Final     Creatinine   Date Value Ref Range Status   04/10/2023 0.9 0.5 - 1.4 mg/dL Final     Calcium   Date Value Ref Range Status   04/10/2023 10.2 8.7 - 10.5 mg/dL Final     Total Protein   Date Value Ref Range Status   04/10/2023 7.3 6.0 - 8.4 g/dL Final     Albumin   Date Value Ref Range Status   04/10/2023 3.7 3.5 - 5.2 g/dL Final     Total  Bilirubin   Date Value Ref Range Status   04/10/2023 0.4 0.1 - 1.0 mg/dL Final     Comment:     For infants and newborns, interpretation of results should be based  on gestational age, weight and in agreement with clinical  observations.    Premature Infant recommended reference ranges:  Up to 24 hours.............<8.0 mg/dL  Up to 48 hours............<12.0 mg/dL  3-5 days..................<15.0 mg/dL  6-29 days.................<15.0 mg/dL       Alkaline Phosphatase   Date Value Ref Range Status   04/10/2023 45 (L) 55 - 135 U/L Final     AST   Date Value Ref Range Status   04/10/2023 22 10 - 40 U/L Final     ALT   Date Value Ref Range Status   04/10/2023 21 10 - 44 U/L Final     Anion Gap   Date Value Ref Range Status   04/10/2023 8 8 - 16 mmol/L Final     eGFR   Date Value Ref Range Status   04/10/2023 >60 >60 mL/min/1.73 m^2 Final     Lab Results   Component Value Date    TSH 0.898 02/27/2023       No follow-ups on file.

## 2023-04-20 ENCOUNTER — TELEPHONE (OUTPATIENT)
Dept: FAMILY MEDICINE | Facility: CLINIC | Age: 78
End: 2023-04-20
Payer: MEDICARE

## 2023-04-20 NOTE — TELEPHONE ENCOUNTER
Cardiac event monitor order faxed to Dr. Lundberg office/ [facility] per pt's request. Unable to schedule at [facility].

## 2023-05-08 ENCOUNTER — HOSPITAL ENCOUNTER (OUTPATIENT)
Facility: HOSPITAL | Age: 78
Discharge: HOME | End: 2023-05-08
Attending: INTERNAL MEDICINE | Admitting: INTERNAL MEDICINE
Payer: MEDICARE

## 2023-05-08 ENCOUNTER — ANESTHESIA (OUTPATIENT)
Dept: ENDOSCOPY | Facility: HOSPITAL | Age: 78
End: 2023-05-08
Payer: MEDICARE

## 2023-05-08 ENCOUNTER — ANESTHESIA EVENT (OUTPATIENT)
Dept: ENDOSCOPY | Facility: HOSPITAL | Age: 78
End: 2023-05-08
Payer: MEDICARE

## 2023-05-08 VITALS
RESPIRATION RATE: 19 BRPM | OXYGEN SATURATION: 99 % | SYSTOLIC BLOOD PRESSURE: 162 MMHG | HEART RATE: 61 BPM | TEMPERATURE: 97 F | DIASTOLIC BLOOD PRESSURE: 69 MMHG

## 2023-05-08 DIAGNOSIS — R13.13 PHARYNGEAL DYSPHAGIA: ICD-10-CM

## 2023-05-08 PROCEDURE — 43239 EGD BIOPSY SINGLE/MULTIPLE: CPT | Performed by: INTERNAL MEDICINE

## 2023-05-08 PROCEDURE — 27201012 HC FORCEPS, HOT/COLD, DISP: Performed by: INTERNAL MEDICINE

## 2023-05-08 PROCEDURE — 00731 ANES UPR GI NDSC PX NOS: CPT | Mod: QZ,P3 | Performed by: NURSE ANESTHETIST, CERTIFIED REGISTERED

## 2023-05-08 PROCEDURE — 43239 EGD BIOPSY SINGLE/MULTIPLE: CPT | Mod: ,,, | Performed by: INTERNAL MEDICINE

## 2023-05-08 PROCEDURE — D9220AH HC ANESTHESIA PROFESSIONAL FEE: Mod: QZ,P3 | Performed by: NURSE ANESTHETIST, CERTIFIED REGISTERED

## 2023-05-08 PROCEDURE — 63600175 PHARM REV CODE 636 W HCPCS: Performed by: NURSE ANESTHETIST, CERTIFIED REGISTERED

## 2023-05-08 PROCEDURE — 88305 TISSUE EXAM BY PATHOLOGIST: CPT | Mod: 26,,, | Performed by: PATHOLOGY

## 2023-05-08 PROCEDURE — 43239 PR EGD, FLEX, W/BIOPSY, SGL/MULTI: ICD-10-PCS | Mod: ,,, | Performed by: INTERNAL MEDICINE

## 2023-05-08 PROCEDURE — 37000008 HC ANESTHESIA 1ST 15 MINUTES: Performed by: INTERNAL MEDICINE

## 2023-05-08 PROCEDURE — 37000009 HC ANESTHESIA EA ADD 15 MINS: Performed by: INTERNAL MEDICINE

## 2023-05-08 PROCEDURE — 88305 TISSUE EXAM BY PATHOLOGIST: CPT | Performed by: PATHOLOGY

## 2023-05-08 PROCEDURE — 88305 TISSUE EXAM BY PATHOLOGIST: ICD-10-PCS | Mod: 26,,, | Performed by: PATHOLOGY

## 2023-05-08 PROCEDURE — 25000003 PHARM REV CODE 250: Performed by: NURSE ANESTHETIST, CERTIFIED REGISTERED

## 2023-05-08 RX ORDER — PROPOFOL 10 MG/ML
VIAL (ML) INTRAVENOUS
Status: DISCONTINUED | OUTPATIENT
Start: 2023-05-08 | End: 2023-05-08

## 2023-05-08 RX ORDER — LIDOCAINE HYDROCHLORIDE 20 MG/ML
INJECTION, SOLUTION EPIDURAL; INFILTRATION; INTRACAUDAL; PERINEURAL
Status: DISCONTINUED | OUTPATIENT
Start: 2023-05-08 | End: 2023-05-08

## 2023-05-08 RX ORDER — SODIUM CHLORIDE 9 MG/ML
INJECTION, SOLUTION INTRAVENOUS CONTINUOUS
Status: CANCELLED | OUTPATIENT
Start: 2023-05-08

## 2023-05-08 RX ORDER — SODIUM CHLORIDE 0.9 % (FLUSH) 0.9 %
10 SYRINGE (ML) INJECTION
Status: CANCELLED | OUTPATIENT
Start: 2023-05-08

## 2023-05-08 RX ADMIN — SODIUM CHLORIDE, SODIUM LACTATE, POTASSIUM CHLORIDE, AND CALCIUM CHLORIDE: .6; .31; .03; .02 INJECTION, SOLUTION INTRAVENOUS at 09:05

## 2023-05-08 RX ADMIN — LIDOCAINE HYDROCHLORIDE 60 MG: 20 INJECTION, SOLUTION EPIDURAL; INFILTRATION; INTRACAUDAL; PERINEURAL at 09:05

## 2023-05-08 RX ADMIN — PROPOFOL 150 MG: 10 INJECTION, EMULSION INTRAVENOUS at 09:05

## 2023-05-08 NOTE — OP NOTE
[facility]  [place]    Date of procedure:  05/08/2023    Procedure:  EGD with cold forceps biopsy    Provider:  Khushboo Dunlap MD    Indication:  Dysphagia    ASA:  3    Complications:  none    Estimated blood loss:  minimal    Medications:  General anesthesia per CRNA    Procedure in Detail:  Prior to the procedure, a History and Physical was performed, and patient medications and allergies were reviewed. The patient is competent. The risks and benefits of the procedure and the sedation options and risks were discussed with the patient. All questions were answered and informed consent was obtained. Patient identification and proposed procedure were verified by the physician, the nurse, and the anesthetist in the pre-procedure area and in the procedure room. Mental Status Examination: alert and oriented.   Airway Examination: normal oropharyngeal airway and neck mobility. Respiratory Examination: clear to auscultation. CV Examination: normal. Prophylactic Antibiotics: The patient does not require prophylactic antibiotics. Prior Anticoagulants: The patient has taken no previous anticoagulant or antiplatelet agents.  After reviewing the risks and benefits, the patient was deemed in satisfactory condition to undergo the procedure. The anesthesia plan was to use monitored anesthesia care (MAC) with General Anesthesia. Immediately prior to administration of medications, the patient was re-assessed for adequacy to receive sedatives. The heart rate, respiratory rate, oxygen saturations, blood pressure, adequacy of pulmonary ventilation, and response to care were monitored throughout the procedure. The physical status of the patient was re-assessed after the procedure.  After obtaining informed consent, the endoscope was passed under direct vision. Throughout the procedure, the patient's blood pressure, pulse, and oxygen saturations were monitored continuously. The Olympus scope GIF- was introduced  through the mouth, and advanced to the second part of the duodenum. The upper GI endoscopy was accomplished without difficulty. The patient tolerated the procedure well.    Findings:  The esophagus is tortuous with numerous extra waves.  There is a pouch in the distal esophagus holding vegetable matter.  Both the UES and LES were easy to cross and are not tight.  Cold forceps biopsies were taken from the proximal esophagus and placed in jar 2.  Mild gastritis was noted.  Cold forceps biopsies were taken from the antrum and placed in jar 1.  The duodenum is normal.    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  Gastritis biopsied for HP  Empiric biopsies taken for EoE.    Specimens collected:  2    Recommendations:   Discharge pt to home  Patient has a contact number available for emergencies. The signs and symptoms of potential delayed complications were discussed with the patient. Return to normal activities tomorrow. Written discharge instructions were provided to the patient.  Continue current medications  Continue current diet  Await biopsy results.  RTC 3 weeks to discuss whether to proceed with manometry.

## 2023-05-08 NOTE — ANESTHESIA PREPROCEDURE EVALUATION
05/08/2023  Megha Mendoza is a 78 y.o., female.    Pre-op Assessment    I have reviewed the Patient Summary Reports.    I have reviewed the Nursing Notes.    I have reviewed the Medications.     Review of Systems  Anesthesia Hx:  No problems with previous Anesthesia    Social:  Non-Smoker Previous smoker for 25+ years   Hematology/Oncology:     Oncology Normal     EENT/Dental:EENT/Dental Normal   Cardiovascular:   Exercise tolerance: good Hypertension, well controlled CAD asymptomatic   Functional Capacity 4 METS  Carotoid Artery Disease, bilateral , Left stenosis is 50% , Right stenosis is  50% Hypertension    Pulmonary:   COPD, mild    Hepatic/GI:   GERD, well controlled    Musculoskeletal:  Musculoskeletal Normal    Neurological:  Neurology Normal    Endocrine:  Endocrine Normal    Dermatological:  Skin Normal    Psych:  Psychiatric Normal           Physical Exam  General:  Well nourished      Airway/Jaw/Neck:  Airway Findings: Mouth Opening: Normal   Tongue: Large   General Airway Assessment: Adult Mallampati: II  TM Distance: Normal, at least 6 cm       Dental:  Dental Findings: Upper Dentures, Edentulous               Anesthesia Plan  Type of Anesthesia, risks & benefits discussed:  Anesthesia Type:  general    Patient's Preference:   Plan Factors:          Intra-op Monitoring Plan:   Intra-op Monitoring Plan Comments:   Post Op Pain Control Plan:   Post Op Pain Control Plan Comments:     Induction:   IV  Beta Blocker:  Patient is not currently on a Beta-Blocker (No further documentation required).       Informed Consent: Informed consent signed with the Patient and all parties understand the risks and agree with anesthesia plan.  All questions answered.  Anesthesia consent signed with patient.  ASA Score: 3     Day of Surgery Review of History & Physical: I have interviewed and examined the patient. I  have reviewed the patient's H&P dated: 5/8/23.  There are no significant changes.            Ready For Surgery From Anesthesia Perspective.           Physical Exam  General: Well nourished    Airway:  Mallampati: II   Mouth Opening: Normal  TM Distance: Normal, at least 6 cm  Tongue: Large    Dental:  Upper Dentures, Edentulous          Anesthesia Plan  Type of Anesthesia, risks & benefits discussed:    Anesthesia Type: general  Induction:  IV  Informed Consent: Informed consent signed with the Patient and all parties understand the risks and agree with anesthesia plan.  All questions answered.   ASA Score: 3  Day of Surgery Review of History & Physical: I have interviewed and examined the patient. I have reviewed the patient's H&P dated: 5/8/23.     Ready For Surgery From Anesthesia Perspective.       .

## 2023-05-08 NOTE — DISCHARGE INSTRUCTIONS
Call your doctor for any unusual pain,bleeding or fever.   If sedated do not drive, smoke or drink alcohol for 10 hours  Avoid heavy lifting,straining or running for 3 days  You may not have a bowel movement for 1-3 days after procedure  You may return to normal activities the day after  You may experience some bloating and excessive gas.  This can be relieved by walking, eating lightly,or a heating pad can be applied to the abdomen.   A small amount of blood from the rectum is not serious, especially if hemorrhoids are present,  Go to ER if you notice any;   Chills and/or fever over 101   Persistent vomiting or vomiting blood   Severe abdominal pain, other gas cramp   Severe chest pain   Black tarry stools   Bright red bleeding from your rectum (greater than 1 tablespoon    If EGD, please do not eat or drink until  _________________    Call your doctor for any unusual pain,bleeding or fever.

## 2023-05-08 NOTE — TRANSFER OF CARE
Anesthesia Transfer of Care Note    Patient: Megha Mendoza    Procedure(s) Performed: Procedure(s) (LRB):  EGD (ESOPHAGOGASTRODUODENOSCOPY) (N/A)    Patient location: PACU    Anesthesia Type: general    Transport from OR: Transported from OR on 6-10 L/min O2 by face mask with adequate spontaneous ventilation    Post pain: adequate analgesia    Post assessment: no apparent anesthetic complications and tolerated procedure well    Post vital signs: stable    Level of consciousness: sedated    Nausea/Vomiting: no nausea/vomiting    Complications: none    Transfer of care protocol was followed      Last vitals:   Visit Vitals  BP (!) 149/66 (BP Location: Left arm, Patient Position: Lying)   Pulse 64   Temp 36.1 °C (97 °F) (Skin)   Resp 18   SpO2 (!) 93%   Breastfeeding No

## 2023-05-08 NOTE — ANESTHESIA POSTPROCEDURE EVALUATION
Anesthesia Post Evaluation    Patient: Megha Mendoza    Procedure(s) Performed: Procedure(s) (LRB):  EGD (ESOPHAGOGASTRODUODENOSCOPY) (N/A)    Final Anesthesia Type: general      Patient location during evaluation: PACU  Patient participation: Yes- Able to Participate  Level of consciousness: awake and alert, oriented and awake  Post-procedure vital signs: reviewed and stable  Pain management: adequate  Airway patency: patent    PONV status at discharge: No PONV  Anesthetic complications: no      Cardiovascular status: blood pressure returned to baseline  Respiratory status: unassisted, spontaneous ventilation and room air  Hydration status: euvolemic  Follow-up not needed.  Comments: [facility]          Vitals Value Taken Time   /69 05/08/23 1000   Temp  05/08/23 1041   Pulse 61 05/08/23 1000   Resp 19 05/08/23 1000   SpO2 99 % 05/08/23 1000         Event Time   Out of Recovery 10:13:47         Pain/Lou Score: Lou Score: 10 (5/8/2023  9:53 AM)

## 2023-05-09 LAB
CHOLEST SERPL-MSCNC: 177 MG/DL (ref 0–200)
CHOLEST/HDLC SERPL: 1.9 {RATIO}
FINAL PATHOLOGIC DIAGNOSIS: NORMAL
GROSS: NORMAL
HDLC SERPL-MCNC: 92 MG/DL (ref 35–70)
LDL CHOLESTEROL DIRECT: 65 MG/DL
Lab: NORMAL
NON HDL CHOL. (LDL+VLDL): 85
TRIGL SERPL-MCNC: 76 MG/DL (ref 40–160)
VLDL CHOLESTEROL: 15 MG/DL

## 2023-05-11 ENCOUNTER — HOSPITAL ENCOUNTER (OUTPATIENT)
Dept: RADIOLOGY | Facility: HOSPITAL | Age: 78
Discharge: HOME | End: 2023-05-11
Attending: INTERNAL MEDICINE
Payer: MEDICARE

## 2023-05-11 DIAGNOSIS — R79.89 ELEVATED D-DIMER: ICD-10-CM

## 2023-05-11 DIAGNOSIS — R06.09 DOE (DYSPNEA ON EXERTION): ICD-10-CM

## 2023-05-11 PROCEDURE — 25500020 PHARM REV CODE 255

## 2023-05-11 PROCEDURE — 71275 CT ANGIOGRAPHY CHEST: CPT | Mod: TC

## 2023-05-11 PROCEDURE — 71275 CTA CHEST NON CORONARY (PE STUDIES): ICD-10-PCS | Mod: 26,,, | Performed by: RADIOLOGY

## 2023-05-11 PROCEDURE — 71275 CT ANGIOGRAPHY CHEST: CPT | Mod: 26,,, | Performed by: RADIOLOGY

## 2023-05-11 RX ADMIN — IOHEXOL 75 ML: 350 INJECTION, SOLUTION INTRAVENOUS at 09:05

## 2023-05-15 ENCOUNTER — TELEPHONE (OUTPATIENT)
Dept: GASTROENTEROLOGY | Facility: CLINIC | Age: 78
End: 2023-05-15
Payer: MEDICARE

## 2023-05-15 NOTE — TELEPHONE ENCOUNTER
----- Message from Vanessa Arce sent at 5/15/2023  2:33 PM CDT -----  Regarding: call back  Contact: [phone]  TEST RESULTS:   Patient would like to get test results.  Name of test (lab, mammo, etc.): Biopsy   Date of test: 5/8/2023

## 2023-05-15 NOTE — TELEPHONE ENCOUNTER
Informed patient that she is scheduled for a follow up appointment. Patient will keep that appointment.

## 2023-05-31 DIAGNOSIS — J41.0 SIMPLE CHRONIC BRONCHITIS: ICD-10-CM

## 2023-05-31 RX ORDER — BUDESONIDE AND FORMOTEROL FUMARATE DIHYDRATE 80; 4.5 UG/1; UG/1
AEROSOL RESPIRATORY (INHALATION)
Qty: 30.6 G | Refills: 1 | Status: SHIPPED | OUTPATIENT
Start: 2023-05-31 | End: 2024-02-29 | Stop reason: SDUPTHER

## 2023-05-31 NOTE — TELEPHONE ENCOUNTER
No care due was identified.  [facility] Embedded Care Due Messages. Reference number: [id number].   5/31/2023 2:40:16 PM CDT

## 2023-05-31 NOTE — TELEPHONE ENCOUNTER
----- Message from Santos Tucker sent at 2023  1:49 PM CDT -----  Contact: patient  Megha Mendoza  MRN: [id number]  : 1945  PCP: Tomy Baron  Home Phone      [phone]  Work Phone      Not on file.  Mobile          [phone]      MESSAGE:   Pt requesting refill or new Rx.   Is this a refill or new RX:  refill  RX name and strength: Symbicort inhaler  Last office visit: 23  Is this a 30-day or 90-day RX:  ???  Pharmacy name and location:  [facility]  Comments:      Phone:  [phone]    PCP: Loraine

## 2023-05-31 NOTE — TELEPHONE ENCOUNTER
LOV: 04/19/23    Med refill request: SYMBICORT 80-4.5 mcg/actuation HFAA    Med pended. Thanks

## 2023-06-01 ENCOUNTER — PATIENT OUTREACH (OUTPATIENT)
Dept: ADMINISTRATIVE | Facility: HOSPITAL | Age: 78
End: 2023-06-01
Payer: MEDICARE

## 2023-06-13 ENCOUNTER — OFFICE VISIT (OUTPATIENT)
Dept: GASTROENTEROLOGY | Facility: CLINIC | Age: 78
End: 2023-06-13
Payer: MEDICARE

## 2023-06-13 VITALS — SYSTOLIC BLOOD PRESSURE: 154 MMHG | DIASTOLIC BLOOD PRESSURE: 70 MMHG | BODY MASS INDEX: 28.58 KG/M2 | WEIGHT: 141.5 LBS

## 2023-06-13 DIAGNOSIS — R42 POSTURAL DIZZINESS WITH PRESYNCOPE: ICD-10-CM

## 2023-06-13 DIAGNOSIS — R55 POSTURAL DIZZINESS WITH PRESYNCOPE: ICD-10-CM

## 2023-06-13 DIAGNOSIS — R13.14 PHARYNGOESOPHAGEAL DYSPHAGIA: Primary | ICD-10-CM

## 2023-06-13 PROCEDURE — 99999 PR PBB SHADOW E&M-EST. PATIENT-LVL IV: CPT | Mod: PBBFAC,,, | Performed by: INTERNAL MEDICINE

## 2023-06-13 PROCEDURE — 99214 OFFICE O/P EST MOD 30 MIN: CPT | Mod: S$GLB,,, | Performed by: INTERNAL MEDICINE

## 2023-06-13 PROCEDURE — 1159F MED LIST DOCD IN RCRD: CPT | Mod: CPTII,S$GLB,, | Performed by: INTERNAL MEDICINE

## 2023-06-13 PROCEDURE — 1160F RVW MEDS BY RX/DR IN RCRD: CPT | Mod: CPTII,S$GLB,, | Performed by: INTERNAL MEDICINE

## 2023-06-13 PROCEDURE — 99214 PR OFFICE/OUTPT VISIT, EST, LEVL IV, 30-39 MIN: ICD-10-PCS | Mod: S$GLB,,, | Performed by: INTERNAL MEDICINE

## 2023-06-13 PROCEDURE — 99999 PR PBB SHADOW E&M-EST. PATIENT-LVL IV: ICD-10-PCS | Mod: PBBFAC,,, | Performed by: INTERNAL MEDICINE

## 2023-06-13 PROCEDURE — 1160F PR REVIEW ALL MEDS BY PRESCRIBER/CLIN PHARMACIST DOCUMENTED: ICD-10-PCS | Mod: CPTII,S$GLB,, | Performed by: INTERNAL MEDICINE

## 2023-06-13 PROCEDURE — 1159F PR MEDICATION LIST DOCUMENTED IN MEDICAL RECORD: ICD-10-PCS | Mod: CPTII,S$GLB,, | Performed by: INTERNAL MEDICINE

## 2023-06-13 PROCEDURE — 3077F SYST BP >= 140 MM HG: CPT | Mod: CPTII,S$GLB,, | Performed by: INTERNAL MEDICINE

## 2023-06-13 PROCEDURE — 1101F PR PT FALLS ASSESS DOC 0-1 FALLS W/OUT INJ PAST YR: ICD-10-PCS | Mod: CPTII,S$GLB,, | Performed by: INTERNAL MEDICINE

## 2023-06-13 PROCEDURE — 3288F FALL RISK ASSESSMENT DOCD: CPT | Mod: CPTII,S$GLB,, | Performed by: INTERNAL MEDICINE

## 2023-06-13 PROCEDURE — 1101F PT FALLS ASSESS-DOCD LE1/YR: CPT | Mod: CPTII,S$GLB,, | Performed by: INTERNAL MEDICINE

## 2023-06-13 PROCEDURE — 3078F DIAST BP <80 MM HG: CPT | Mod: CPTII,S$GLB,, | Performed by: INTERNAL MEDICINE

## 2023-06-13 PROCEDURE — 3288F PR FALLS RISK ASSESSMENT DOCUMENTED: ICD-10-PCS | Mod: CPTII,S$GLB,, | Performed by: INTERNAL MEDICINE

## 2023-06-13 PROCEDURE — 1126F PR PAIN SEVERITY QUANTIFIED, NO PAIN PRESENT: ICD-10-PCS | Mod: CPTII,S$GLB,, | Performed by: INTERNAL MEDICINE

## 2023-06-13 PROCEDURE — 1126F AMNT PAIN NOTED NONE PRSNT: CPT | Mod: CPTII,S$GLB,, | Performed by: INTERNAL MEDICINE

## 2023-06-13 PROCEDURE — 3077F PR MOST RECENT SYSTOLIC BLOOD PRESSURE >= 140 MM HG: ICD-10-PCS | Mod: CPTII,S$GLB,, | Performed by: INTERNAL MEDICINE

## 2023-06-13 PROCEDURE — 3078F PR MOST RECENT DIASTOLIC BLOOD PRESSURE < 80 MM HG: ICD-10-PCS | Mod: CPTII,S$GLB,, | Performed by: INTERNAL MEDICINE

## 2023-06-13 RX ORDER — ROSUVASTATIN CALCIUM 40 MG/1
TABLET, COATED ORAL
COMMUNITY
Start: 2022-11-16

## 2023-06-13 NOTE — PROGRESS NOTES
"Subjective     Patient ID: Megha Mendoza is a 78 y.o. female.    Chief Complaint: Other (Discuss test results)    79 yo F here for f/u with complaints of pharyngeal dysphagia.  She states that nothing has really changed, but she is now able to notice that the spasms in the back of her throat are not really related to her swallowing issues.  Recent EGD showed a pouch in the distal esophagus holding vegetable matter; review of esophagram confirms that pouch.  LES was not tight.    She had hiatal hernia repair with Dr. Pearson 3/2022 ("half wrap") and began to have issues a few months afterward.  She states that food gets stuck in the back of her throat which causes "spasms that go into her ears and up around her head."  ENT gave PPI and Pepcid which did not help.  She then got Flexeril which has helped.  Prior to Flexeril these spasms would happen all the time, now they only happen when she is eating and food gets stuck.  She points to the suprasternal notch as location for food to stick; she states that if she clears her throat, the food will dislodge, and sometimes it comes up on its own.  If she eats or drinks too quickly, that also makes it stick.  Liquids cause issues as well.  Once the food clears that upper area, there is no sensation of food sticking in her chest.  The PPI has made her baseline heartburn better.    She also complains of presyncope.  She has seen Dr. Day for this and describes having a Holter monitor that she says was normal.  She gets a prodrome and does not lose consciousness, she is worried about her thyroid.  She already has a neurologist.    Review of Systems   Constitutional:  Negative for chills and fever.   HENT:  Positive for trouble swallowing.    Respiratory:  Negative for shortness of breath and wheezing.    Cardiovascular:  Negative for chest pain, palpitations and leg swelling.   Gastrointestinal:  Negative for reflux.   Neurological:  Negative for dizziness and speech difficulty. " "     Objective   BP (!) 154/70 (BP Location: Left arm, Patient Position: Sitting, BP Method: Medium (Manual))   Wt 64.2 kg (141 lb 8 oz)   BMI 28.58 kg/m²     Physical Exam  Constitutional:       Appearance: Normal appearance. She is well-developed.   HENT:      Head: Normocephalic and atraumatic.   Eyes:      Extraocular Movements: Extraocular movements intact.      Pupils: Pupils are equal, round, and reactive to light.   Pulmonary:      Effort: Pulmonary effort is normal. No respiratory distress.   Abdominal:      General: There is no distension.      Palpations: Abdomen is soft.   Musculoskeletal:         General: No signs of injury. Normal range of motion.   Neurological:      General: No focal deficit present.      Mental Status: She is alert and oriented to person, place, and time.   Psychiatric:         Mood and Affect: Mood normal.         Behavior: Behavior normal.         Thought Content: Thought content normal.         Judgment: Judgment normal.     Lab Results   Component Value Date    WBC 4.47 02/27/2023    HGB 12.2 02/27/2023    HCT 38.2 02/27/2023    MCV 93 02/27/2023     02/27/2023     Esophagram was independently visualized and reviewed by me and showed post op changes from hernia with small HH, no reflux, normal at UES, a notable "pouch" in the distal esophagus.     Old records from chart reviewed and summarized, significant for:  Colonoscopy 10/2022:  diverticulosis, poor prep  EGD 5/2023:  The esophagus is tortuous with numerous extra waves.  There is a pouch in the distal esophagus holding vegetable matter.  Both the UES and LES were easy to cross and are not tight.  Cold forceps biopsies were taken from the proximal esophagus and placed in jar 2, EoE (-).  Mild gastritis was noted.  Cold forceps biopsies were taken from the antrum and placed in jar 1, HP (-).  The duodenum is normal.     Assessment and Plan     1. Pharyngoesophageal dysphagia  -     Case Request Endoscopy: MANOMETRY, " ESOPHAGUS, WITH IMPEDANCE MEASUREMENT    2. Postural dizziness with presyncope  -     Ambulatory referral/consult to Endocrinology; Future; Expected date: 06/20/2023  -     She also needs to discuss her symptoms with her neurologist

## 2023-06-14 RX ORDER — CYCLOBENZAPRINE HCL 10 MG
10 TABLET ORAL NIGHTLY
Qty: 30 TABLET | Refills: 0 | Status: SHIPPED | OUTPATIENT
Start: 2023-06-14 | End: 2023-07-13

## 2023-06-14 NOTE — TELEPHONE ENCOUNTER
LOV 4/19/2023    Pt requesting RX refill for cyclobenzaprine (FLEXERIL) 10 MG tablet      pharmacy confirmed   rx pending  please advise

## 2023-06-14 NOTE — TELEPHONE ENCOUNTER
No care due was identified.  [facility] Embedded Care Due Messages. Reference number: [id number].   6/14/2023 8:01:17 AM CDT

## 2023-06-16 ENCOUNTER — TELEPHONE (OUTPATIENT)
Dept: ENDOSCOPY | Facility: HOSPITAL | Age: 78
End: 2023-06-16
Payer: MEDICARE

## 2023-06-16 NOTE — TELEPHONE ENCOUNTER
Spoke to patient to schedule procedure(s) Esophageal Manometry       Physician to perform procedure(s) Dr. LIZ Santana  Date of Procedure (s) 6/30/23  Arrival Time 12:00 PM  Time of Procedure(s) 1:00 PM   Location of Procedure(s) [address]  Type of Rx Prep sent to patient: N/A  Instructions provided to patient via [facility]    Patient was informed on the following information and verbalized understanding. Screening questionnaire reviewed with patient and complete. If procedure requires anesthesia, a responsible adult needs to be present to accompany the patient home, patient cannot drive after receiving anesthesia. Appointment details are tentative, especially check-in time. Patient will receive a prep-op call 4 days prior to confirm check-in time for procedure. If applicable the patient should contact their pharmacy to verify Rx for procedure prep is ready for pick-up. Patient was advised to call the scheduling department at [phone] if pharmacy states no Rx is available. Patient was advised to call the endoscopy scheduling department if any questions or concerns arise.      [facility] Scheduling Department

## 2023-06-30 ENCOUNTER — HOSPITAL ENCOUNTER (OUTPATIENT)
Facility: HOSPITAL | Age: 78
Discharge: HOME | End: 2023-06-30
Attending: INTERNAL MEDICINE | Admitting: INTERNAL MEDICINE
Payer: MEDICARE

## 2023-06-30 VITALS
RESPIRATION RATE: 16 BRPM | SYSTOLIC BLOOD PRESSURE: 160 MMHG | DIASTOLIC BLOOD PRESSURE: 78 MMHG | WEIGHT: 141 LBS | BODY MASS INDEX: 28.43 KG/M2 | HEIGHT: 59 IN | OXYGEN SATURATION: 100 % | HEART RATE: 74 BPM | TEMPERATURE: 98 F

## 2023-06-30 DIAGNOSIS — R13.10 DYSPHAGIA: ICD-10-CM

## 2023-06-30 PROCEDURE — 91010 ESOPHAGUS MOTILITY STUDY: CPT | Mod: TC | Performed by: INTERNAL MEDICINE

## 2023-06-30 PROCEDURE — 91010 PR ESOPHAGEAL MOTILITY STUDY, MA2METRY: ICD-10-PCS | Mod: 26,GC,, | Performed by: INTERNAL MEDICINE

## 2023-06-30 PROCEDURE — 25000003 PHARM REV CODE 250: Performed by: INTERNAL MEDICINE

## 2023-06-30 PROCEDURE — 91037 PR GERD TST W/ NASAL IMPEDENCE ELECTROD: ICD-10-PCS | Mod: 26,,, | Performed by: INTERNAL MEDICINE

## 2023-06-30 PROCEDURE — 91037 ESOPH IMPED FUNCTION TEST: CPT | Mod: TC | Performed by: INTERNAL MEDICINE

## 2023-06-30 PROCEDURE — 91037 ESOPH IMPED FUNCTION TEST: CPT | Mod: 26,,, | Performed by: INTERNAL MEDICINE

## 2023-06-30 PROCEDURE — 91010 ESOPHAGUS MOTILITY STUDY: CPT | Mod: 26,GC,, | Performed by: INTERNAL MEDICINE

## 2023-06-30 RX ORDER — LIDOCAINE HYDROCHLORIDE 20 MG/ML
JELLY TOPICAL ONCE
Status: COMPLETED | OUTPATIENT
Start: 2023-06-30 | End: 2023-06-30

## 2023-06-30 RX ADMIN — LIDOCAINE HYDROCHLORIDE 10 ML: 20 JELLY TOPICAL at 12:06

## 2023-06-30 NOTE — OR NURSING
Esophageal manometry completed. Tolerated well. AVS and discharge instructions reviewed and understood. No pain at time of discharge.

## 2023-07-08 NOTE — [employer] PATIENT INSTRUCTIONS
Discharge Summary/Instructions after an Endoscopic Procedure  Patient Name: Megha Mendoza  Patient MRN: [id number]  Patient YOB: 1945 Friday, June 30, 2023  Joseluis Santana MD  Dear patient,  As a result of recent federal legislation (The Federal Cures Act), you may   receive lab or pathology results from your procedure in your [facility]   account before your physician is able to contact you. Your physician or   their representative will relay the results to you with their   recommendations at their soonest availability.  Thank you,  RESTRICTIONS:  During your procedure today, you received medications for sedation.  These   medications may affect your judgment, balance and coordination.  Therefore,   for 24 hours, you have the following restrictions:   - DO NOT drive a car, operate machinery, make legal/financial decisions,   sign important papers or drink alcohol.    ACTIVITY:  Today: no heavy lifting, straining or running due to procedural   sedation/anesthesia.  The following day: return to full activity including work.  DIET:  Eat and drink normally unless instructed otherwise.     TREATMENT FOR COMMON SIDE EFFECTS:  - Mild abdominal pain, nausea, belching, bloating or excessive gas:  rest,   eat lightly and use a heating pad.  - Sore Throat: treat with throat lozenges and/or gargle with warm salt   water.  - Because air was used during the procedure, expelling large amounts of air   from your rectum or belching is normal.  - If a bowel prep was taken, you may not have a bowel movement for 1-3 days.    This is normal.  SYMPTOMS TO WATCH FOR AND REPORT TO YOUR PHYSICIAN:  1. Abdominal pain or bloating, other than gas cramps.  2. Chest pain.  3. Back pain.  4. Signs of infection such as: chills or fever occurring within 24 hours   after the procedure.  5. Rectal bleeding, which would show as bright red, maroon, or black stools.   (A tablespoon of blood from the rectum is not serious, especially if    hemorrhoids are present.)  6. Vomiting.  7. Weakness or dizziness.  GO DIRECTLY TO THE NEAREST EMERGENCY ROOM IF YOU HAVE ANY OF THE FOLLOWING:      Difficulty breathing              Chills and/or fever over 101 F   Persistent vomiting and/or vomiting blood   Severe abdominal pain   Severe chest pain   Black, tarry stools   Bleeding- more than one tablespoon   Any other symptom or condition that you feel may need urgent attention  Your doctor recommends these additional instructions:  If any biopsies were taken, your doctors clinic will contact you in 1 to 2   weeks with any results.  - Return to referring physician.   For questions, problems or results please call your physician - Joseluis Santana MD at Work:  [phone].  [facility], EMERGENCY ROOM PHONE NUMBER: [phone]  IF A COMPLICATION OR EMERGENCY SITUATION ARISES AND YOU ARE UNABLE TO REACH   YOUR PHYSICIAN - GO DIRECTLY TO THE EMERGENCY ROOM.  Joseluis Santana MD  7/8/2023 3:24:15 PM  This report has been verified and signed electronically.  Dear patient,  As a result of recent federal legislation (The Federal Cures Act), you may   receive lab or pathology results from your procedure in your [facility]   account before your physician is able to contact you. Your physician or   their representative will relay the results to you with their   recommendations at their soonest availability.  Thank you,  [employer]ATION

## 2023-07-12 NOTE — TELEPHONE ENCOUNTER
No care due was identified.  [facility] Embedded Care Due Messages. Reference number: [id number].   7/12/2023 3:51:52 PM CDT

## 2023-07-13 RX ORDER — CYCLOBENZAPRINE HCL 10 MG
TABLET ORAL
Qty: 30 TABLET | Refills: 0 | Status: SHIPPED | OUTPATIENT
Start: 2023-07-13 | End: 2023-09-29 | Stop reason: SDUPTHER

## 2023-07-17 ENCOUNTER — TELEPHONE (OUTPATIENT)
Dept: GASTROENTEROLOGY | Facility: CLINIC | Age: 78
End: 2023-07-17
Payer: MEDICARE

## 2023-07-17 NOTE — TELEPHONE ENCOUNTER
Contacted patient and informed her Dr Dunlap does not currently have a sooner appt. Patient has appt schduled for August 21, 2023 at 2:30. Will send Dr Dunlap a message to let her know patient is still having difficulty swallowing. Will call patient back to let her know if Dr Dunlap has any advice she can give her for now until her appt.           ----- Message from Maureen Argueta sent at 7/17/2023 12:43 PM CDT -----  Type:  Sooner Apoointment Request    Caller is requesting a sooner appointment.  Caller declined first available appointment listed below.  Caller will not accept being placed on the waitlist and is requesting a message be sent to doctor.  Name of Caller:pt  When is the first available appointment?8/21  Symptoms:difficulty swallowing   Would the patient rather a call back or a response via [facility]? both  Best Call Back Number:[phone]  Additional Information:

## 2023-07-18 ENCOUNTER — TELEPHONE (OUTPATIENT)
Dept: GASTROENTEROLOGY | Facility: CLINIC | Age: 78
End: 2023-07-18
Payer: MEDICARE

## 2023-07-18 NOTE — TELEPHONE ENCOUNTER
Contacted patients with results. Patient stated she is still having trouble swallowing after manometry. Informed patient to continue taking PPI and follow up with an ENT if she hasn't already. Informed patient she has an appt on August 21, 2023 and to discuss any other concerns further to Dr Dunlap at her appt.                 ----- Message from Khushboo Dunlap MD sent at 7/14/2023  4:30 PM CDT -----  Manometry is normal.  This means that the esophagus is fine.  Keep appts with ENT for her pharyngeal issues, cont current meds.  RTC as needed

## 2023-07-31 ENCOUNTER — INFUSION (OUTPATIENT)
Dept: INFUSION THERAPY | Facility: HOSPITAL | Age: 78
End: 2023-07-31
Attending: FAMILY MEDICINE
Payer: MEDICARE

## 2023-07-31 VITALS — SYSTOLIC BLOOD PRESSURE: 123 MMHG | HEART RATE: 74 BPM | DIASTOLIC BLOOD PRESSURE: 51 MMHG | RESPIRATION RATE: 18 BRPM

## 2023-07-31 DIAGNOSIS — M85.80 OSTEOPENIA, UNSPECIFIED LOCATION: Primary | ICD-10-CM

## 2023-07-31 PROCEDURE — 63600175 PHARM REV CODE 636 W HCPCS: Mod: JZ,TB | Performed by: FAMILY MEDICINE

## 2023-07-31 PROCEDURE — 96372 THER/PROPH/DIAG INJ SC/IM: CPT

## 2023-07-31 RX ADMIN — DENOSUMAB 60 MG: 60 INJECTION SUBCUTANEOUS at 09:07

## 2023-08-21 ENCOUNTER — OFFICE VISIT (OUTPATIENT)
Dept: GASTROENTEROLOGY | Facility: CLINIC | Age: 78
End: 2023-08-21
Payer: MEDICARE

## 2023-08-21 VITALS
DIASTOLIC BLOOD PRESSURE: 80 MMHG | SYSTOLIC BLOOD PRESSURE: 140 MMHG | WEIGHT: 142.5 LBS | BODY MASS INDEX: 28.73 KG/M2 | OXYGEN SATURATION: 98 % | HEIGHT: 59 IN | HEART RATE: 76 BPM

## 2023-08-21 DIAGNOSIS — K22.0 SPASM OF THROAT MUSCLE: ICD-10-CM

## 2023-08-21 DIAGNOSIS — K21.9 GASTROESOPHAGEAL REFLUX DISEASE WITHOUT ESOPHAGITIS: Chronic | ICD-10-CM

## 2023-08-21 DIAGNOSIS — R13.14 PHARYNGOESOPHAGEAL DYSPHAGIA: Primary | ICD-10-CM

## 2023-08-21 PROCEDURE — 1160F RVW MEDS BY RX/DR IN RCRD: CPT | Mod: CPTII,S$GLB,, | Performed by: INTERNAL MEDICINE

## 2023-08-21 PROCEDURE — 1159F MED LIST DOCD IN RCRD: CPT | Mod: CPTII,S$GLB,, | Performed by: INTERNAL MEDICINE

## 2023-08-21 PROCEDURE — 1159F PR MEDICATION LIST DOCUMENTED IN MEDICAL RECORD: ICD-10-PCS | Mod: CPTII,S$GLB,, | Performed by: INTERNAL MEDICINE

## 2023-08-21 PROCEDURE — 3079F PR MOST RECENT DIASTOLIC BLOOD PRESSURE 80-89 MM HG: ICD-10-PCS | Mod: CPTII,S$GLB,, | Performed by: INTERNAL MEDICINE

## 2023-08-21 PROCEDURE — 3288F FALL RISK ASSESSMENT DOCD: CPT | Mod: CPTII,S$GLB,, | Performed by: INTERNAL MEDICINE

## 2023-08-21 PROCEDURE — 99999 PR PBB SHADOW E&M-EST. PATIENT-LVL V: ICD-10-PCS | Mod: PBBFAC,,, | Performed by: INTERNAL MEDICINE

## 2023-08-21 PROCEDURE — 99214 OFFICE O/P EST MOD 30 MIN: CPT | Mod: S$GLB,,, | Performed by: INTERNAL MEDICINE

## 2023-08-21 PROCEDURE — 3288F PR FALLS RISK ASSESSMENT DOCUMENTED: ICD-10-PCS | Mod: CPTII,S$GLB,, | Performed by: INTERNAL MEDICINE

## 2023-08-21 PROCEDURE — 1160F PR REVIEW ALL MEDS BY PRESCRIBER/CLIN PHARMACIST DOCUMENTED: ICD-10-PCS | Mod: CPTII,S$GLB,, | Performed by: INTERNAL MEDICINE

## 2023-08-21 PROCEDURE — 99999 PR PBB SHADOW E&M-EST. PATIENT-LVL V: CPT | Mod: PBBFAC,,, | Performed by: INTERNAL MEDICINE

## 2023-08-21 PROCEDURE — 3077F PR MOST RECENT SYSTOLIC BLOOD PRESSURE >= 140 MM HG: ICD-10-PCS | Mod: CPTII,S$GLB,, | Performed by: INTERNAL MEDICINE

## 2023-08-21 PROCEDURE — 1126F AMNT PAIN NOTED NONE PRSNT: CPT | Mod: CPTII,S$GLB,, | Performed by: INTERNAL MEDICINE

## 2023-08-21 PROCEDURE — 3077F SYST BP >= 140 MM HG: CPT | Mod: CPTII,S$GLB,, | Performed by: INTERNAL MEDICINE

## 2023-08-21 PROCEDURE — 1126F PR PAIN SEVERITY QUANTIFIED, NO PAIN PRESENT: ICD-10-PCS | Mod: CPTII,S$GLB,, | Performed by: INTERNAL MEDICINE

## 2023-08-21 PROCEDURE — 1101F PT FALLS ASSESS-DOCD LE1/YR: CPT | Mod: CPTII,S$GLB,, | Performed by: INTERNAL MEDICINE

## 2023-08-21 PROCEDURE — 1101F PR PT FALLS ASSESS DOC 0-1 FALLS W/OUT INJ PAST YR: ICD-10-PCS | Mod: CPTII,S$GLB,, | Performed by: INTERNAL MEDICINE

## 2023-08-21 PROCEDURE — 99214 PR OFFICE/OUTPT VISIT, EST, LEVL IV, 30-39 MIN: ICD-10-PCS | Mod: S$GLB,,, | Performed by: INTERNAL MEDICINE

## 2023-08-21 PROCEDURE — 3079F DIAST BP 80-89 MM HG: CPT | Mod: CPTII,S$GLB,, | Performed by: INTERNAL MEDICINE

## 2023-08-21 RX ORDER — OMEPRAZOLE 40 MG/1
40 CAPSULE, DELAYED RELEASE ORAL DAILY
Qty: 90 CAPSULE | Refills: 3 | Status: SHIPPED | OUTPATIENT
Start: 2023-08-21 | End: 2024-08-20

## 2023-08-23 NOTE — PROGRESS NOTES
"Subjective     Patient ID: Megha Mendoza is a 78 y.o. female.    Chief Complaint: trouble swallowing and Gastroesophageal Reflux    77 yo F here for f/u with complaints of pharyngeal dysphagia.  She states that nothing has really changed, but she is now able to notice that the spasms in the back of her throat are not really related to her swallowing issues.  Recent EGD showed a pouch in the distal esophagus holding vegetable matter; review of esophagram confirms that pouch.  LES was not tight and manometry is normal.  She is very concerned about the "pouch" in her esophagus which I unfortunately showed to her on imaging.  She now states that food gets stuck in her chest and she has to vomit it up to dislodge.  She also has looked extensively on [facility] to interpret manometry numbers, and all symptoms.     She had hiatal hernia repair with Dr. Pearson 3/2022 ("half wrap") and began to have issues a few months afterward.  She states that food gets stuck in the back of her throat which causes "spasms that go into her ears and up around her head."  ENT gave PPI and Pepcid which did not help.  She then got Flexeril which has helped.  Prior to Flexeril these spasms would happen all the time, now they only happen when she is eating and food gets stuck.  She points to the suprasternal notch as location for food to stick; she states that if she clears her throat, the food will dislodge, and sometimes it comes up on its own.  If she eats or drinks too quickly, that also makes it stick.  Liquids cause issues as well.  Once the food clears that upper area, there is no sensation of food sticking in her chest.  The PPI has made her baseline heartburn better.    Review of Systems   Constitutional:  Negative for chills and fever.   HENT:  Positive for sore throat and trouble swallowing.    Respiratory:  Negative for shortness of breath and wheezing.    Cardiovascular:  Negative for chest pain, palpitations and leg swelling. " "  Neurological:  Negative for dizziness and speech difficulty.      Objective   BP (!) 140/80 (BP Location: Left arm, Patient Position: Sitting, BP Method: Medium (Manual))   Pulse 76   Ht 4' 11" (1.499 m)   Wt 64.6 kg (142 lb 8 oz)   SpO2 98%   BMI 28.78 kg/m²     Physical Exam  Constitutional:       Appearance: Normal appearance. She is well-developed.   HENT:      Head: Normocephalic and atraumatic.   Eyes:      Extraocular Movements: Extraocular movements intact.      Pupils: Pupils are equal, round, and reactive to light.   Pulmonary:      Effort: Pulmonary effort is normal. No respiratory distress.   Abdominal:      General: There is no distension.      Palpations: Abdomen is soft.   Musculoskeletal:         General: No signs of injury. Normal range of motion.      Cervical back: Normal range of motion and neck supple.   Neurological:      General: No focal deficit present.      Mental Status: She is alert and oriented to person, place, and time.   Psychiatric:         Mood and Affect: Mood normal.         Behavior: Behavior normal.         Thought Content: Thought content normal.         Judgment: Judgment normal.     Lab Results   Component Value Date    WBC 4.47 02/27/2023    HGB 12.2 02/27/2023    HCT 38.2 02/27/2023    MCV 93 02/27/2023     02/27/2023     Esophagram was independently visualized and reviewed by me and showed post op changes from hernia with small HH, no reflux, normal at UES, a notable "pouch" in the distal esophagus.     Old records from chart reviewed and summarized, significant for:  Colonoscopy 10/2022:  diverticulosis, poor prep  EGD 5/2023:  The esophagus is tortuous with numerous extra waves.  There is a pouch in the distal esophagus holding vegetable matter.  Both the UES and LES were easy to cross and are not tight.  Cold forceps biopsies were taken from the proximal esophagus and placed in jar 2, EoE (-).  Mild gastritis was noted.  Cold forceps biopsies were taken " from the antrum and placed in jar 1, HP (-).  The duodenum is normal.     Assessment and Plan     1. Pharyngoesophageal dysphagia  -     Fl Modified Barium Swallow Speech; Future; Expected date: 08/21/2023  -     SLP video swallow; Future; Expected date: 08/21/2023  -     NM Gastric Emptying; Future; Expected date: 08/21/2023    2. Gastroesophageal reflux disease without esophagitis  -     Fl Modified Barium Swallow Speech; Future; Expected date: 08/21/2023  -     SLP video swallow; Future; Expected date: 08/21/2023  -     NM Gastric Emptying; Future; Expected date: 08/21/2023  -     omeprazole (PRILOSEC) 40 MG capsule; Take 1 capsule (40 mg total) by mouth once daily.  Dispense: 90 capsule; Refill: 3    3. Spasm of throat muscle  -     Ambulatory referral/consult to ENT; Future; Expected date: 08/28/2023, to Dr. Panda bradley.

## 2023-08-24 ENCOUNTER — PATIENT MESSAGE (OUTPATIENT)
Dept: OTOLARYNGOLOGY | Facility: CLINIC | Age: 78
End: 2023-08-24
Payer: MEDICARE

## 2023-08-28 ENCOUNTER — CLINICAL SUPPORT (OUTPATIENT)
Dept: REHABILITATION | Facility: HOSPITAL | Age: 78
End: 2023-08-28
Payer: MEDICARE

## 2023-08-28 ENCOUNTER — HOSPITAL ENCOUNTER (OUTPATIENT)
Dept: RADIOLOGY | Facility: HOSPITAL | Age: 78
Discharge: HOME | End: 2023-08-28
Attending: INTERNAL MEDICINE
Payer: MEDICARE

## 2023-08-28 DIAGNOSIS — K21.9 GASTROESOPHAGEAL REFLUX DISEASE WITHOUT ESOPHAGITIS: Chronic | ICD-10-CM

## 2023-08-28 DIAGNOSIS — R13.14 PHARYNGOESOPHAGEAL DYSPHAGIA: Primary | ICD-10-CM

## 2023-08-28 DIAGNOSIS — K21.9 GASTROESOPHAGEAL REFLUX DISEASE WITHOUT ESOPHAGITIS: ICD-10-CM

## 2023-08-28 DIAGNOSIS — R13.14 PHARYNGOESOPHAGEAL DYSPHAGIA: ICD-10-CM

## 2023-08-28 PROCEDURE — 92610 EVALUATE SWALLOWING FUNCTION: CPT | Mod: PN

## 2023-08-28 PROCEDURE — 74230 X-RAY XM SWLNG FUNCJ C+: CPT | Mod: TC

## 2023-08-28 PROCEDURE — 92611 MOTION FLUOROSCOPY/SWALLOW: CPT | Mod: PN

## 2023-08-28 PROCEDURE — 25500020 PHARM REV CODE 255: Performed by: INTERNAL MEDICINE

## 2023-08-28 PROCEDURE — A9698 NON-RAD CONTRAST MATERIALNOC: HCPCS | Performed by: INTERNAL MEDICINE

## 2023-08-28 RX ADMIN — BARIUM SULFATE 175 ML: 0.81 POWDER, FOR SUSPENSION ORAL at 03:08

## 2023-08-28 NOTE — Clinical Note
Hey! Oropharyngeal swallow looks pretty good. CP bar identified but not causing any bolus flow issues. Noted barium tablet to lodge at the LES, cleared with sip of water. Screenshots are in my note, but better visualized in PACS. The series are labeled so you know which consistency was given. May consider esophagram since it looks like the last one was in 6/2022 if we want to go that route prior to seeing Panda. Thanks for the referral!

## 2023-08-28 NOTE — PROGRESS NOTES
[facility]  MODIFIED BARIUM SWALLOW STUDY      Date: 8/28/2023     Name: Megha Mendoza   MRN: [id number]    Therapy Diagnosis: WFL oral and pharyngeal phases of the swallow; esophageal dysphagia    Physician: Khushboo Dunlap MD  Physician Orders: SLP Video Swallow  Medical Diagnosis from Referral: R13.14 (ICD-10-CM) - Pharyngoesophageal dysphagia K21.9 (ICD-10-CM) - Gastroesophageal reflux disease without esophagitis     Date of Evaluation:  8/28/2023    Time In:  1506  Time Out:  1550  Total Billable Time: 44     Procedure Min.   Swallow and Oral Function Evaluation   10   Fl Modified Barium Swallow Speech  34     Precautions: Standard  Subjective   Patient arrived to radiology suite  engaged and cooperative within the evaluation procedure.    Date of Onset: Spasms for 1 year; does not provide definitive response to duration of swallowing problems    Past Medical History: Megha Mendoza  has a past medical history of Arthritis, Back pain, Bronchitis, chronic, Carotid artery disease, Colitis, acute, COPD (chronic obstructive pulmonary disease), Coronary artery disease, Diverticulitis, DJD (degenerative joint disease), GERD (gastroesophageal reflux disease), Hemorrhoids, Hiatal hernia, Hyperlipidemia, Irritable bowel syndrome with constipation, Mixed stress and urge urinary incontinence (3/30/2021), DARA (obstructive sleep apnea), Osteoporosis, Respiratory distress, and Trouble in sleeping.  Megha Mendoza  has a past surgical history that includes Hemorrhoid surgery (2009); Tubal ligation; blockages in artaries; Total knee arthroplasty (Right); Hysterectomy (1995); Colonoscopy; Cardiac catheterization (5/1/15); Joint replacement; Joint replacement (Left, 08/02/2017); Hand Tenodesis (Bilateral, 10/04/2019); Eye surgery; Hernia repair (03/2022); Colonoscopy (N/A, 10/11/2022); Esophagogastroduodenoscopy (N/A, 5/8/2023); and Esophageal manometry with measurement of impedance (N/A,  "6/30/2023).    The patient is a 78 y.o. female who complains of food getting stuck during the swallow with occasional need to regurgitate the food. She now takes very small bites as such and is avoiding meats other than ground beef. Patient also reports "spasms" in her throat, causing her significant pain and irritation. She does not associate these spasms with eating/drinking until lately.    Patient is currently taking the following medications: has a current medication list which includes the following prescription(s): acetaminophen, albuterol, apple cider vinegar, aspirin, b complex vitamins, bacillus coagulans, cetirizine, co-enzyme q-10, cyclobenzaprine, prolia, estradiol, famotidine, ipratropium, krill-omega-3-dha-epa-lipids, multivitamin, nitroglycerin, omeprazole, rosuvastatin, and symbicort.    History was provided by patient, and/or taken from chart review:   -Current diet at home: IDDSI 7/0 (regular/thin)  -Recommended diet from previous study: not previously completed  -Therapy received: no prior ST  -Imaging:   CTA Chest 5/11/23: Impression: No evidence for aortic dissection or pulmonary embolus. Centrilobular emphysematous disease.  Scattered calcified granulomas are noted.  There is a solid 3 mm nodule along the right minor fissure which likely represents an intra fissural lymph node.  For a solid nodule <6 mm, Fleischner Society 2017 guidelines recommend no routine follow up for a low risk patient, or follow-up with non-contrast chest CT at 12 months in a high risk patient.  Calcified left hilar lymph nodes with a large calcified granuloma in the lingula.  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  -Surgery:   EGD 5/2023: The esophagus is tortuous with numerous extra waves.  There is a pouch in the distal esophagus holding vegetable matter.  Both the UES and LES were easy to cross and are not tight.  Cold forceps biopsies were taken from the proximal esophagus and placed in jar " 2, EoE (-).  Mild gastritis was noted.  Cold forceps biopsies were taken from the antrum and placed in jar 1, HP (-).  The duodenum is normal.   HRM 6/2023: Normal esophageal manometry. No evidence of disorder of peristalsis.     In consideration of the interrelationships between body systems and swallowing, History was provided by patient, and/or taken from chart review. The following are medical conditions present in the patient's history which can result in or be attributed to dysphagia:  Respiratory Chronic Obstructive Pulmonary Disease (COPD)   Cardiovascular None noted in this category   Digestive GERD  esophageal dysmotility    Infections  None noted in this category   Urinary None noted in this category   Endocrine None noted in this category   Nervous None noted in this category   Skeletal None noted in this category   Immune None noted in this category   Cancer None noted in this category   Psychiatric  None noted in this category   Congenital  None noted in this category   Other None noted in this category     The following observations were made:   -Mental status: Alert and Cooperative  -Factors affecting performance: no difficulties participating in the study  -Feeding Method: independent in self-feeding    Respiratory Status:   -Respiratory Status: room air    Medical Hx and Allergies:    Review of patient's allergies indicates:   Allergen Reactions    Keflex [cephalexin] Rash     Solid patches - rash like skin thickened    Hibiclens [chlorhexidine gluconate] Itching    Sulfa (sulfonamide antibiotics) Rash     Medications that May Affect Swallowing  None noted in patient's chart or with reported medications today    Pain Scale:  0/10 on VAS currently.   Pain Location: n/a    Reflux Symptom Index (RSI) was completed to assess the possible presence and/or severity of LPR symptoms and any relationship between this condition and the pt's dysphagia; score of ~15 may indicate LPR):   Hoarseness or a problem  "with your voice: 5  Clearing your throat: 5  Excess throat or mucous post-nasal drip:5  Difficulty swallowing food, liquid, or pills:2  Coughing after you ate or after lying down: 0  Breathing difficulties or choking episodes: 0  Troublesome or annoying cough: 2  Sensation or something sticking in your throat: 3  Heartburn, chest pain, indigestion, or stomach acid coming up: 1    TOTAL: 23/45    Objective     Modified Barium Swallow Study  Purpose: to evaluate anatomy and physiology of the oropharyngeal swallow, to determine effectiveness of rehabilitation strategies, and to determine diet consistency and intervention recommendations. The study was performed using the "Gold Standard" of 30 fps with as low as reasonably achievable (ALARA) exposure.     The patient was seen in radiology seated in High Kirkpatrick's position in a video imaging chair for lateral views of the larynx and an A/P view. The study was conducted using Varibar thin liquid (IDDSI 0), Varibar nectar liquid (IDDSI 2), Varibar pudding (IDDSI 4), Peaches covered in Varibar powder (IDDSI 6/2), and solid coated in Varibar pudding (IDDSI 7). She tolerated the procedure well.     A cranial nerve examination revealed the following:  Cranial Nerve Examination  Cranial Nerve 5: Trigeminal Nerve  Motor Jaw Posture at rest: Closed  Mandible Elevation/Depression: WFL  Mandible lateralization: WFL  Abnormal movement: absent Interpretation: Intact bilaterally    Sensory Forehead: WFL  Cheek: WFL  Jaw: WFL  Facial Pain: None noted Interpretation: Intact bilaterally      Cranial Nerve 7: Facial Nerve  Motor Facial Symmetry: WNL  Wrinkle Forehead: WFL  Close eyes tightly: WFL  Labial Protrusion: WFL  Labial Retraction: WFL  Buccal Strength with Labial Seal: WFL  Abnormal movement: absent Interpretation: Intact bilaterally    Sensory Formal testing not completed.       Cranial Nerves IX and X: Glossopharyngeal and Vagus Nerves  Motor Palatal Symmetry (Rest): " WNL  Palatal Symmetry (Movement): WNL  Cough: Perceptually weak  Voice Prior to PO intake: Clear  Resonance: Normal  Abnormal movement: absent Interpretation: Intact bilaterally      Cranial Nerve XII: Hypoglossal Nerve  Motor Tongue at rest: WNL  Lingual Protrusion: WNL  Lingual Protrusion against Resistance: WNL  Lingual Lateralization: WNL  Abnormal movement: absent Interpretation: Intact bilaterally      Other information:  Volitional Swallow: Able to palpate laryngeal rise  Mucosal Quality: No abnormal findings  Secretion Management: no overt deficits noted/observed  Dentition: Upper dentures only    CONSISTENCIES ADMINISTERED:    Consistency  Presentation  Findings Rosenbeck's Penetration/Aspiration Scale (PAS) Strategy Attempted    Thin (IDDSI 0) Method: Self-fed    Volume: 5ml x2 Self-regulated cup sip x2 Rapid consecutive cup sip x1 2 peaches in juice x1    Projection: lateral view; AP view    Oral phase: trace residuals on lingual surface, cleared with subsequent swallow    Pharyngeal phase: initiation of the swallow occurs at various locations depending on volume/presentation as follows: vallecula for 5mL teaspoon sip with bolus hold, laryngeal surface of the epiglottis for 5mL teaspoon sip without bolus hold; and pyriform sinus for single cup sip; for consecutive sip, barium appears to flow over the epiglottis and into the pyriforms prior to swallow initiation    Esophageal screen: retrograde flow of bolus below the UES observed and delayed emptying/retention of bolus    Incidental finding of a cricopharyngeal (CP) bar at the level of C5-C6 which does not appear to impede bolus flow   Best: (1) Material does not enter the airway    Worst: (2) Material enters the airway, remains above the vocal folds, and is ejected from the airway  Trace penetration occurred during the swallow   No strategies completed or needed   Nectar thick (mildly thick/IDDSI 2) Method: Self-fed    Volume: 5ml x1 Self-regulated cup  "sip x1    Projection: lateral view Oral phase: trace spillage to the floor of the mouth was swallowed again following initial swallow; good anterior-posterior transit    Pharyngeal phase: initiation of the swallow at the ramus of the mandible; good airway protection; no pharyngeal residue    Esophogeal screen: WFL and cricopharyngeal bar present at the level of C6/C7 withno impact to bolus flow   Best: (1) Material does not enter the airway    Worst: (1) Material does not enter the airway     No strategies completed or needed   Puree (extremely thick/ IDDSI 4) Method: Self-fed    Volume: 5ml x2    Projection: lateral view; AP view  Oral phase: piecemeal deglutition with 4 swallows per 1 5mL trial; patient reported behavioral modification to ensure that "it doesn't get stuck"  Trace residuals on lingual surface cleared with secondary swallow    Pharyngeal phase: WFL    Esophageal screen: retrograde flow of bolus below the UES observed and delayed emptying/retention of bolus      5mL pudding  Retention      LES 5mL pudding Best: (1) Material does not enter the airway    Worst: (1) Material does not enter the airway   Patient utilized multiple swallows per one bolus   Mixed consistency (thin/ IDDSI 0 + soft and bite sized/ IDDSI 6) Method: Self-fed     Volume: 2 peaches in juice x1    Projection: lateral view   Oral phase: 4 swallows per 2 peaches with liquid; prolonged mastication and manipulation with slow anterior-posterior transit and sluggish tongue motion    Pharyngeal phase: spillage of liquid content to the pyriform sinus prior to swallow initiation; good airway protection; independent use of multiple swallows; no pharyngeal residue; sluggish return of epiglottis on 3rd swallow    Esophageal screen: retrograde flow of bolus below the UES observed and delayed emptying/retention of bolus   Best: (1) Material does not enter the airway      Worst: (1) Material does not enter the airway     No strategies completed " or needed   Solid (regular/ IDDSI 7) Method: Self-fed    Volume: bite x1, upturned face    Projection: lateral view  Oral phase: prolonged mastication and manipulation due to lack of occlusal spaces; munching mastication pattern; piecemeal deglutition with 3 swallows per 1 bite of solid; no oral residue following all swallows    Pharyngeal phase: WFL    Esophageal screen: WFL Best: (1) Material does not enter the airway    Worst: (1) Material does not enter the airway No strategies completed or needed   Barium tablet  -Self-administered in water    View:  - Lateral view  Timely and efficient oropharyngeal clearance     (1) Material does not enter the airway No strategies completed or needed     The Mercedes Assessment of Swallowing Ability (MASA) is a screening tool for identifying swallowing disorders in patients. The MASA contains 24 clinical items that are measured via 5 point to 10 point rating scales. Clinical areas of assessment include: alertness, cooperation, auditory comprehension, aphasia, apraxia, dysarthria, saliva control/management, lip seal, tongue movement, tongue strength, tongue coordination, oral preparation, respiration, respiratory rate for swallowing, gag reflex, palatal movement, bolus clearance, oral transit time, cough reflex, voluntary cough, voice, tracheostomy, pharyngeal phase, and pharyngeal response.The total score of the MASA is 200 points and the results of the MASA are interpreted as follows:  ?178 =   no abnormality  168-177= mild dysphagia  139-167 = moderate dysphagia    ?138 = severe dysphagia  Patient score = 190     Category Score   Alertness 10   Co-operation 8   Auditory Comprehension 10   Respiration 10   Respiratory Rate 5   Aphasia (Dysphasia) 5   Apraxia (Dyspraxia) 5   Dysarthria 5   Saliva 5   Lip Seal 5   Tongue Movement 10   Tongue Strength 10   Tongue Coordination 10   Oral Preparation 6   Gag 5   Palate 10   Bolus Clearance 8   Oral Transit 8   Cough reflex 5   Cough  voluntary 10   Voice 10   Trachae 10   Pharyngeal Phase 10   Pharyngeal Response 10       Functional Oral Intake Scale (FOIS)  The Functional Oral Intake Scale (FOIS) is an ordinal scale that is used to assess the current status and meaningful change in the oral intake. FOIS levels include:    TUBE DEPENDENT (levels 1-3) 1. No oral intake  2. Tube dependent with minimal/inconsistent oral intake  3. Tube supplements with consistent oral intake      TOTAL ORAL INTAKE (levels 4-7) 4. Total oral intake of a single consistency  5. Total oral intake of multiple consistencies requiring special preparation  6. Total oral intake with no special preparation, but must avoid specific foods or liquid items  7. Total oral intake with no restrictions     Patient is currently judged to be at FOIS level 6.      The Modified Barium Swallowing Impairment Profile: MBSiMP:   Oral Phase   1) Lip Closure: 0 - No labial escape  2) Tongue Control During Bolus Hold: 0 - Cohesive bolus between tongue to palatal seal  3) Bolus Preparation/Mastication: 1 - Slow prolonged chewing/mashing with complete re-collection  4) Bolus Transport/Lingual Motion: 1 - Delayed initiation of tongue motion  5) Oral Residue: 3 - Majority of bolus remaining  6) Initiation of Pharyngeal Swallow: 3 - Bolus head in pyriforms    Pharyngeal Phase  7) Soft Palate Elevation: 0 - No bolus between soft palate (SP)/pharyngeal wall (PW)  8) Laryngeal Elevation: 0 - Complete superior movement of thyroid cartilage with complete approximation of arytenoids to epiglottic petiole  9) Anterior Hyoid Excursion: 0 - Complete anterior movement  10) Epiglottic Movement: 0 - Complete inversion  11) Laryngeal Vestibular Closure: 1 - Incomplete; narrow column air/contrast in laryngeal vestibule  12) Pharyngeal Stripping Wave: 0 - Present; complete  13) Pharyngeal Contraction: 0 - Complete  14) Pharyngoesophageal Segment Openin - Complete distention and complete duration; no  obstruction of flow  15) Tongue Base (TB) Retraction: 0 - No contrast between TB and posterior pharyngeal wall (PW)  16) Pharyngeal Residue: 1 - Trace residue within or on pharyngeal structures    Esophageal Phase:  17) Esophageal Clearance Upright Position: 2 - Esophageal retention with retrograde flow below pharyngoesophageal segment (PES)    Oral Phase Impairment Total Score: 8  Pharyngeal Phase Impairment Total Score: 2  Esophageal Phase Impairment Total Score: 2    Total MBSiMP Score: 12  Treatment   Total Treatment Time:  10 minutes  Patient educated regarding results and recommendations of the evaluation. See the recommendations section below.    Education: Plan of Care, role of SLP in care, and anatomy and physiology of swallow mechanism as it relates to MBSS findings and recommendations were discussed with the patient. Patient expressed understanding. All questions were answered.     Assessment     Megha Mendoza is a 78 y.o. female referred for Modified Barium Swallow Study with a medical diagnosis of pharyngeal dysphagia. Patient endorses a chief complaint of globus sensation with regurgitation of solids occasionally. Patient has been avoiding meats in her diet due to the difficulties of mastication and manipulation as well as globus sensation with frequent need to regurgitate. Instrumental assessment today was largely within functional limits in regard to oropharyngeal function. There were moments where barium spilled quickly into the pharynx and patient accommodated these occurrences well with only one moment of flash penetration of thin liquids with consecutive sips of liquid. Incidentally, a cricopharyngeal bar was identified, though it does not impede bolus flow. Esophageal screening was completed notable for esophageal retention with retrograde flow below the pharyngoesophageal segment. There is concern for a lodged 13mm barium tablet through at the lower esophageal segment which was cleared with thin  liquid wash. As for neck and throat spasms which was patient's primary complaint, this did not occur during the evaluation. Patient will benefit from input from gastroenterology colleagues and/or radiologists for further workup, as clinically indicated.     Plan     Continue current diet consistencies as tolerated by patient  Follow up with GI colleagues and/or radiologists for esophageal findings today  From a swallowing standpoint, patient does not need dysphagia treatment; continue universal aspiration and reflux precautions     Recommendations:     Consistency Recommendations:  Thin liquids (IDDSI 0) and soft & bite-sized  consistencies (IDDSI 6).   Risk Management: use good oral hygiene , sit upright for all PO intake, and behavioral reflux precautions  Specialist Referrals: GI (already seeing patient)  Ancillary Tests: Consider Consider esophagram - last completed was 6/2022 following hiatal hernia surgery; patient has already completed HRM/EGD.  Therapy: Dysphagia therapy is not recommended at this time.  Follow-up exam: Follow up swallow study is not indicated at this time.    Please contact [facility] at [phone] if there are questions or concerns.    Therapist's Name:   Patience Keyes MS, CCC-SLP, CBIS  Speech-Language Pathologist  Certified Brain Injury Specialist  8/29/2023

## 2023-08-29 ENCOUNTER — OFFICE VISIT (OUTPATIENT)
Dept: FAMILY MEDICINE | Facility: CLINIC | Age: 78
End: 2023-08-29
Payer: MEDICARE

## 2023-08-29 VITALS
RESPIRATION RATE: 15 BRPM | HEART RATE: 82 BPM | BODY MASS INDEX: 29.19 KG/M2 | SYSTOLIC BLOOD PRESSURE: 132 MMHG | WEIGHT: 144.81 LBS | OXYGEN SATURATION: 98 % | DIASTOLIC BLOOD PRESSURE: 84 MMHG | HEIGHT: 59 IN

## 2023-08-29 DIAGNOSIS — R91.1 LUNG NODULE: ICD-10-CM

## 2023-08-29 DIAGNOSIS — I70.0 ATHEROSCLEROSIS OF AORTA: ICD-10-CM

## 2023-08-29 DIAGNOSIS — I65.23 BILATERAL CAROTID ARTERY STENOSIS: ICD-10-CM

## 2023-08-29 DIAGNOSIS — M85.89 OTHER SPECIFIED DISORDERS OF BONE DENSITY AND STRUCTURE, MULTIPLE SITES: Primary | ICD-10-CM

## 2023-08-29 DIAGNOSIS — E78.5 DYSLIPIDEMIA: ICD-10-CM

## 2023-08-29 DIAGNOSIS — K21.9 GASTROESOPHAGEAL REFLUX DISEASE WITHOUT ESOPHAGITIS: Chronic | ICD-10-CM

## 2023-08-29 DIAGNOSIS — R13.14 PHARYNGOESOPHAGEAL DYSPHAGIA: ICD-10-CM

## 2023-08-29 DIAGNOSIS — I25.10 CORONARY ARTERY DISEASE INVOLVING NATIVE CORONARY ARTERY OF NATIVE HEART WITHOUT ANGINA PECTORIS: ICD-10-CM

## 2023-08-29 PROBLEM — R19.5 POSITIVE FIT (FECAL IMMUNOCHEMICAL TEST): Status: RESOLVED | Noted: 2022-10-11 | Resolved: 2023-08-29

## 2023-08-29 PROCEDURE — 99999 PR PBB SHADOW E&M-EST. PATIENT-LVL IV: ICD-10-PCS | Mod: PBBFAC,,, | Performed by: FAMILY MEDICINE

## 2023-08-29 PROCEDURE — 99214 PR OFFICE/OUTPT VISIT, EST, LEVL IV, 30-39 MIN: ICD-10-PCS | Mod: S$GLB,,, | Performed by: FAMILY MEDICINE

## 2023-08-29 PROCEDURE — 99999 PR PBB SHADOW E&M-EST. PATIENT-LVL IV: CPT | Mod: PBBFAC,,, | Performed by: FAMILY MEDICINE

## 2023-08-29 PROCEDURE — 1126F PR PAIN SEVERITY QUANTIFIED, NO PAIN PRESENT: ICD-10-PCS | Mod: CPTII,S$GLB,, | Performed by: FAMILY MEDICINE

## 2023-08-29 PROCEDURE — 1159F PR MEDICATION LIST DOCUMENTED IN MEDICAL RECORD: ICD-10-PCS | Mod: CPTII,S$GLB,, | Performed by: FAMILY MEDICINE

## 2023-08-29 PROCEDURE — 3288F FALL RISK ASSESSMENT DOCD: CPT | Mod: CPTII,S$GLB,, | Performed by: FAMILY MEDICINE

## 2023-08-29 PROCEDURE — 3079F DIAST BP 80-89 MM HG: CPT | Mod: CPTII,S$GLB,, | Performed by: FAMILY MEDICINE

## 2023-08-29 PROCEDURE — 3079F PR MOST RECENT DIASTOLIC BLOOD PRESSURE 80-89 MM HG: ICD-10-PCS | Mod: CPTII,S$GLB,, | Performed by: FAMILY MEDICINE

## 2023-08-29 PROCEDURE — 3075F SYST BP GE 130 - 139MM HG: CPT | Mod: CPTII,S$GLB,, | Performed by: FAMILY MEDICINE

## 2023-08-29 PROCEDURE — 3288F PR FALLS RISK ASSESSMENT DOCUMENTED: ICD-10-PCS | Mod: CPTII,S$GLB,, | Performed by: FAMILY MEDICINE

## 2023-08-29 PROCEDURE — 99214 OFFICE O/P EST MOD 30 MIN: CPT | Mod: S$GLB,,, | Performed by: FAMILY MEDICINE

## 2023-08-29 PROCEDURE — 3075F PR MOST RECENT SYSTOLIC BLOOD PRESS GE 130-139MM HG: ICD-10-PCS | Mod: CPTII,S$GLB,, | Performed by: FAMILY MEDICINE

## 2023-08-29 PROCEDURE — 1126F AMNT PAIN NOTED NONE PRSNT: CPT | Mod: CPTII,S$GLB,, | Performed by: FAMILY MEDICINE

## 2023-08-29 PROCEDURE — 1101F PT FALLS ASSESS-DOCD LE1/YR: CPT | Mod: CPTII,S$GLB,, | Performed by: FAMILY MEDICINE

## 2023-08-29 PROCEDURE — 1101F PR PT FALLS ASSESS DOC 0-1 FALLS W/OUT INJ PAST YR: ICD-10-PCS | Mod: CPTII,S$GLB,, | Performed by: FAMILY MEDICINE

## 2023-08-29 PROCEDURE — 1159F MED LIST DOCD IN RCRD: CPT | Mod: CPTII,S$GLB,, | Performed by: FAMILY MEDICINE

## 2023-08-29 NOTE — PLAN OF CARE
[facility]  MODIFIED BARIUM SWALLOW STUDY      Date: 8/28/2023     Name: Megha eMndoza   MRN: [id number]    Therapy Diagnosis: WFL oral and pharyngeal phases of the swallow; esophageal dysphagia    Physician: Khushboo Dunlap MD  Physician Orders: SLP Video Swallow  Medical Diagnosis from Referral: R13.14 (ICD-10-CM) - Pharyngoesophageal dysphagia K21.9 (ICD-10-CM) - Gastroesophageal reflux disease without esophagitis     Date of Evaluation:  8/28/2023    Time In:  1506  Time Out:  1550  Total Billable Time: 44     Procedure Min.   Swallow and Oral Function Evaluation   10   Fl Modified Barium Swallow Speech  34     Precautions: Standard  Subjective   Patient arrived to radiology suite  engaged and cooperative within the evaluation procedure.    Date of Onset: Spasms for 1 year; does not provide definitive response to duration of swallowing problems    Past Medical History: Megha Mendoza  has a past medical history of Arthritis, Back pain, Bronchitis, chronic, Carotid artery disease, Colitis, acute, COPD (chronic obstructive pulmonary disease), Coronary artery disease, Diverticulitis, DJD (degenerative joint disease), GERD (gastroesophageal reflux disease), Hemorrhoids, Hiatal hernia, Hyperlipidemia, Irritable bowel syndrome with constipation, Mixed stress and urge urinary incontinence (3/30/2021), DARA (obstructive sleep apnea), Osteoporosis, Respiratory distress, and Trouble in sleeping.  Megha Mendoza  has a past surgical history that includes Hemorrhoid surgery (2009); Tubal ligation; blockages in artaries; Total knee arthroplasty (Right); Hysterectomy (1995); Colonoscopy; Cardiac catheterization (5/1/15); Joint replacement; Joint replacement (Left, 08/02/2017); Hand Tenodesis (Bilateral, 10/04/2019); Eye surgery; Hernia repair (03/2022); Colonoscopy (N/A, 10/11/2022); Esophagogastroduodenoscopy (N/A, 5/8/2023); and Esophageal manometry with measurement of impedance (N/A,  "6/30/2023).    The patient is a 78 y.o. female who complains of food getting stuck during the swallow with occasional need to regurgitate the food. She now takes very small bites as such and is avoiding meats other than ground beef. Patient also reports "spasms" in her throat, causing her significant pain and irritation. She does not associate these spasms with eating/drinking until lately.    Patient is currently taking the following medications: has a current medication list which includes the following prescription(s): acetaminophen, albuterol, apple cider vinegar, aspirin, b complex vitamins, bacillus coagulans, cetirizine, co-enzyme q-10, cyclobenzaprine, prolia, estradiol, famotidine, ipratropium, krill-omega-3-dha-epa-lipids, multivitamin, nitroglycerin, omeprazole, rosuvastatin, and symbicort.    History was provided by patient, and/or taken from chart review:   -Current diet at home: IDDSI 7/0 (regular/thin)  -Recommended diet from previous study: not previously completed  -Therapy received: no prior ST  -Imaging:   CTA Chest 5/11/23: Impression: No evidence for aortic dissection or pulmonary embolus. Centrilobular emphysematous disease.  Scattered calcified granulomas are noted.  There is a solid 3 mm nodule along the right minor fissure which likely represents an intra fissural lymph node.  For a solid nodule <6 mm, Fleischner Society 2017 guidelines recommend no routine follow up for a low risk patient, or follow-up with non-contrast chest CT at 12 months in a high risk patient.  Calcified left hilar lymph nodes with a large calcified granuloma in the lingula.  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  -Surgery:   EGD 5/2023: The esophagus is tortuous with numerous extra waves.  There is a pouch in the distal esophagus holding vegetable matter.  Both the UES and LES were easy to cross and are not tight.  Cold forceps biopsies were taken from the proximal esophagus and placed in jar " 2, EoE (-).  Mild gastritis was noted.  Cold forceps biopsies were taken from the antrum and placed in jar 1, HP (-).  The duodenum is normal.   HRM 6/2023: Normal esophageal manometry. No evidence of disorder of peristalsis.     In consideration of the interrelationships between body systems and swallowing, History was provided by patient, and/or taken from chart review. The following are medical conditions present in the patient's history which can result in or be attributed to dysphagia:  Respiratory Chronic Obstructive Pulmonary Disease (COPD)   Cardiovascular None noted in this category   Digestive GERD  esophageal dysmotility    Infections  None noted in this category   Urinary None noted in this category   Endocrine None noted in this category   Nervous None noted in this category   Skeletal None noted in this category   Immune None noted in this category   Cancer None noted in this category   Psychiatric  None noted in this category   Congenital  None noted in this category   Other None noted in this category     The following observations were made:   -Mental status: Alert and Cooperative  -Factors affecting performance: no difficulties participating in the study  -Feeding Method: independent in self-feeding    Respiratory Status:   -Respiratory Status: room air    Medical Hx and Allergies:    Review of patient's allergies indicates:   Allergen Reactions    Keflex [cephalexin] Rash     Solid patches - rash like skin thickened    Hibiclens [chlorhexidine gluconate] Itching    Sulfa (sulfonamide antibiotics) Rash     Medications that May Affect Swallowing  None noted in patient's chart or with reported medications today    Pain Scale:  0/10 on VAS currently.   Pain Location: n/a    Reflux Symptom Index (RSI) was completed to assess the possible presence and/or severity of LPR symptoms and any relationship between this condition and the pt's dysphagia; score of ~15 may indicate LPR):   Hoarseness or a problem  "with your voice: 5  Clearing your throat: 5  Excess throat or mucous post-nasal drip:5  Difficulty swallowing food, liquid, or pills:2  Coughing after you ate or after lying down: 0  Breathing difficulties or choking episodes: 0  Troublesome or annoying cough: 2  Sensation or something sticking in your throat: 3  Heartburn, chest pain, indigestion, or stomach acid coming up: 1    TOTAL: 23/45    Objective     Modified Barium Swallow Study  Purpose: to evaluate anatomy and physiology of the oropharyngeal swallow, to determine effectiveness of rehabilitation strategies, and to determine diet consistency and intervention recommendations. The study was performed using the "Gold Standard" of 30 fps with as low as reasonably achievable (ALARA) exposure.     The patient was seen in radiology seated in High Kirkpatrick's position in a video imaging chair for lateral views of the larynx and an A/P view. The study was conducted using Varibar thin liquid (IDDSI 0), Varibar nectar liquid (IDDSI 2), Varibar pudding (IDDSI 4), Peaches covered in Varibar powder (IDDSI 6/2), and solid coated in Varibar pudding (IDDSI 7). She tolerated the procedure well.     A cranial nerve examination revealed the following:  Cranial Nerve Examination  Cranial Nerve 5: Trigeminal Nerve  Motor Jaw Posture at rest: Closed  Mandible Elevation/Depression: WFL  Mandible lateralization: WFL  Abnormal movement: absent Interpretation: Intact bilaterally    Sensory Forehead: WFL  Cheek: WFL  Jaw: WFL  Facial Pain: None noted Interpretation: Intact bilaterally      Cranial Nerve 7: Facial Nerve  Motor Facial Symmetry: WNL  Wrinkle Forehead: WFL  Close eyes tightly: WFL  Labial Protrusion: WFL  Labial Retraction: WFL  Buccal Strength with Labial Seal: WFL  Abnormal movement: absent Interpretation: Intact bilaterally    Sensory Formal testing not completed.       Cranial Nerves IX and X: Glossopharyngeal and Vagus Nerves  Motor Palatal Symmetry (Rest): " WNL  Palatal Symmetry (Movement): WNL  Cough: Perceptually weak  Voice Prior to PO intake: Clear  Resonance: Normal  Abnormal movement: absent Interpretation: Intact bilaterally      Cranial Nerve XII: Hypoglossal Nerve  Motor Tongue at rest: WNL  Lingual Protrusion: WNL  Lingual Protrusion against Resistance: WNL  Lingual Lateralization: WNL  Abnormal movement: absent Interpretation: Intact bilaterally      Other information:  Volitional Swallow: Able to palpate laryngeal rise  Mucosal Quality: No abnormal findings  Secretion Management: no overt deficits noted/observed  Dentition: Upper dentures only    CONSISTENCIES ADMINISTERED:    Consistency  Presentation  Findings Rosenbeck's Penetration/Aspiration Scale (PAS) Strategy Attempted    Thin (IDDSI 0) Method: Self-fed    Volume: 5ml x2 Self-regulated cup sip x2 Rapid consecutive cup sip x1 2 peaches in juice x1    Projection: lateral view; AP view    Oral phase: trace residuals on lingual surface, cleared with subsequent swallow    Pharyngeal phase: initiation of the swallow occurs at various locations depending on volume/presentation as follows: vallecula for 5mL teaspoon sip with bolus hold, laryngeal surface of the epiglottis for 5mL teaspoon sip without bolus hold; and pyriform sinus for single cup sip; for consecutive sip, barium appears to flow over the epiglottis and into the pyriforms prior to swallow initiation    Esophageal screen: retrograde flow of bolus below the UES observed and delayed emptying/retention of bolus    Incidental finding of a cricopharyngeal (CP) bar at the level of C5-C6 which does not appear to impede bolus flow   Best: (1) Material does not enter the airway    Worst: (2) Material enters the airway, remains above the vocal folds, and is ejected from the airway  Trace penetration occurred during the swallow   No strategies completed or needed   Nectar thick (mildly thick/IDDSI 2) Method: Self-fed    Volume: 5ml x1 Self-regulated cup  "sip x1    Projection: lateral view Oral phase: trace spillage to the floor of the mouth was swallowed again following initial swallow; good anterior-posterior transit    Pharyngeal phase: initiation of the swallow at the ramus of the mandible; good airway protection; no pharyngeal residue    Esophogeal screen: WFL and cricopharyngeal bar present at the level of C6/C7 withno impact to bolus flow   Best: (1) Material does not enter the airway    Worst: (1) Material does not enter the airway     No strategies completed or needed   Puree (extremely thick/ IDDSI 4) Method: Self-fed    Volume: 5ml x2    Projection: lateral view; AP view  Oral phase: piecemeal deglutition with 4 swallows per 1 5mL trial; patient reported behavioral modification to ensure that "it doesn't get stuck"  Trace residuals on lingual surface cleared with secondary swallow    Pharyngeal phase: WFL    Esophageal screen: retrograde flow of bolus below the UES observed and delayed emptying/retention of bolus      5mL pudding  Retention      LES 5mL pudding Best: (1) Material does not enter the airway    Worst: (1) Material does not enter the airway   Patient utilized multiple swallows per one bolus   Mixed consistency (thin/ IDDSI 0 + soft and bite sized/ IDDSI 6) Method: Self-fed     Volume: 2 peaches in juice x1    Projection: lateral view   Oral phase: 4 swallows per 2 peaches with liquid; prolonged mastication and manipulation with slow anterior-posterior transit and sluggish tongue motion    Pharyngeal phase: spillage of liquid content to the pyriform sinus prior to swallow initiation; good airway protection; independent use of multiple swallows; no pharyngeal residue; sluggish return of epiglottis on 3rd swallow    Esophageal screen: retrograde flow of bolus below the UES observed and delayed emptying/retention of bolus   Best: (1) Material does not enter the airway      Worst: (1) Material does not enter the airway     No strategies completed " or needed   Solid (regular/ IDDSI 7) Method: Self-fed    Volume: bite x1, upturned face    Projection: lateral view  Oral phase: prolonged mastication and manipulation due to lack of occlusal spaces; munching mastication pattern; piecemeal deglutition with 3 swallows per 1 bite of solid; no oral residue following all swallows    Pharyngeal phase: WFL    Esophageal screen: WFL Best: (1) Material does not enter the airway    Worst: (1) Material does not enter the airway No strategies completed or needed   Barium tablet  -Self-administered in water    View:  - Lateral view  Timely and efficient oropharyngeal clearance     (1) Material does not enter the airway No strategies completed or needed     The Mercedes Assessment of Swallowing Ability (MASA) is a screening tool for identifying swallowing disorders in patients. The MASA contains 24 clinical items that are measured via 5 point to 10 point rating scales. Clinical areas of assessment include: alertness, cooperation, auditory comprehension, aphasia, apraxia, dysarthria, saliva control/management, lip seal, tongue movement, tongue strength, tongue coordination, oral preparation, respiration, respiratory rate for swallowing, gag reflex, palatal movement, bolus clearance, oral transit time, cough reflex, voluntary cough, voice, tracheostomy, pharyngeal phase, and pharyngeal response.The total score of the MASA is 200 points and the results of the MASA are interpreted as follows:  ?178 =   no abnormality  168-177= mild dysphagia  139-167 = moderate dysphagia    ?138 = severe dysphagia  Patient score = 190     Category Score   Alertness 10   Co-operation 8   Auditory Comprehension 10   Respiration 10   Respiratory Rate 5   Aphasia (Dysphasia) 5   Apraxia (Dyspraxia) 5   Dysarthria 5   Saliva 5   Lip Seal 5   Tongue Movement 10   Tongue Strength 10   Tongue Coordination 10   Oral Preparation 6   Gag 5   Palate 10   Bolus Clearance 8   Oral Transit 8   Cough reflex 5   Cough  voluntary 10   Voice 10   Trachae 10   Pharyngeal Phase 10   Pharyngeal Response 10       Functional Oral Intake Scale (FOIS)  The Functional Oral Intake Scale (FOIS) is an ordinal scale that is used to assess the current status and meaningful change in the oral intake. FOIS levels include:    TUBE DEPENDENT (levels 1-3) 1. No oral intake  2. Tube dependent with minimal/inconsistent oral intake  3. Tube supplements with consistent oral intake      TOTAL ORAL INTAKE (levels 4-7) 4. Total oral intake of a single consistency  5. Total oral intake of multiple consistencies requiring special preparation  6. Total oral intake with no special preparation, but must avoid specific foods or liquid items  7. Total oral intake with no restrictions     Patient is currently judged to be at FOIS level 6.      The Modified Barium Swallowing Impairment Profile: MBSiMP:   Oral Phase   1) Lip Closure: 0 - No labial escape  2) Tongue Control During Bolus Hold: 0 - Cohesive bolus between tongue to palatal seal  3) Bolus Preparation/Mastication: 1 - Slow prolonged chewing/mashing with complete re-collection  4) Bolus Transport/Lingual Motion: 1 - Delayed initiation of tongue motion  5) Oral Residue: 3 - Majority of bolus remaining  6) Initiation of Pharyngeal Swallow: 3 - Bolus head in pyriforms    Pharyngeal Phase  7) Soft Palate Elevation: 0 - No bolus between soft palate (SP)/pharyngeal wall (PW)  8) Laryngeal Elevation: 0 - Complete superior movement of thyroid cartilage with complete approximation of arytenoids to epiglottic petiole  9) Anterior Hyoid Excursion: 0 - Complete anterior movement  10) Epiglottic Movement: 0 - Complete inversion  11) Laryngeal Vestibular Closure: 1 - Incomplete; narrow column air/contrast in laryngeal vestibule  12) Pharyngeal Stripping Wave: 0 - Present; complete  13) Pharyngeal Contraction: 0 - Complete  14) Pharyngoesophageal Segment Openin - Complete distention and complete duration; no  obstruction of flow  15) Tongue Base (TB) Retraction: 0 - No contrast between TB and posterior pharyngeal wall (PW)  16) Pharyngeal Residue: 1 - Trace residue within or on pharyngeal structures    Esophageal Phase:  17) Esophageal Clearance Upright Position: 2 - Esophageal retention with retrograde flow below pharyngoesophageal segment (PES)    Oral Phase Impairment Total Score: 8  Pharyngeal Phase Impairment Total Score: 2  Esophageal Phase Impairment Total Score: 2    Total MBSiMP Score: 12  Treatment   Total Treatment Time: 10 minutes  Patient educated regarding results and recommendations of the evaluation. See the recommendations section below.    Education: Plan of Care, role of SLP in care, and anatomy and physiology of swallow mechanism as it relates to MBSS findings and recommendations were discussed with the patient. Patient expressed understanding. All questions were answered.     Assessment     Megha Mendoza is a 78 y.o. female referred for Modified Barium Swallow Study with a medical diagnosis of pharyngeal dysphagia. Patient endorses a chief complaint of globus sensation with regurgitation of solids occasionally. Patient has been avoiding meats in her diet due to the difficulties of mastication and manipulation as well as globus sensation with frequent need to regurgitate. Instrumental assessment today was largely within functional limits in regard to oropharyngeal function. There were moments where barium spilled quickly into the pharynx and patient accommodated these occurrences well with only one moment of flash penetration of thin liquids with consecutive sips of liquid. Incidentally, a cricopharyngeal bar was identified, though it does not impede bolus flow. Esophageal screening was completed notable for esophageal retention with retrograde flow below the pharyngoesophageal segment. There is concern for a lodged 13mm barium tablet through at the lower esophageal segment which was cleared with thin  liquid wash. As for neck and throat spasms which was patient's primary complaint, this did not occur during the evaluation. Patient will benefit from input from gastroenterology colleagues and/or radiologists for further workup, as clinically indicated.     Plan     Continue current diet consistencies as tolerated by patient  Follow up with GI colleagues and/or radiologists for esophageal findings today  From a swallowing standpoint, patient does not need dysphagia treatment; continue universal aspiration and reflux precautions     Recommendations:     Consistency Recommendations: Thin liquids (IDDSI 0) and soft & bite-sized  consistencies (IDDSI 6).   Risk Management: use good oral hygiene , sit upright for all PO intake, and behavioral reflux precautions  Specialist Referrals: GI (already seeing patient)  Ancillary Tests: Consider Consider esophagram - last completed was 6/2022 following hiatal hernia surgery; patient has already completed HRM/EGD.  Therapy: Dysphagia therapy is not recommended at this time.  Follow-up exam: Follow up swallow study is not indicated at this time.    Please contact [facility] at [phone] if there are questions or concerns.    Therapist's Name:   Patience Keyes MS, CCC-SLP, CBIS  Speech-Language Pathologist  Certified Brain Injury Specialist  8/29/2023

## 2023-08-29 NOTE — PROGRESS NOTES
Subjective:       Patient ID: Megha Mendoza is a 78 y.o. female.    Chief Complaint: Follow-up (Pt would like to discuss her different testing)    Pt is a 78 y.o. female who presents for evaluation and management of   Encounter Diagnoses   Name Primary?    Other specified disorders of bone density and structure, multiple sites Yes    Pharyngoesophageal dysphagia     Gastroesophageal reflux disease without esophagitis     Dyslipidemia     Bilateral carotid artery stenosis     Atherosclerosis of aorta     Coronary artery disease involving native coronary artery of native heart without angina pectoris     Lung nodule    .  Doing well on current meds. Denies any side effects. Prevention is up to date.  Review of Systems   Constitutional:  Negative for chills and fever.   Respiratory:  Negative for shortness of breath.    Cardiovascular:  Negative for chest pain and palpitations.   Gastrointestinal:  Negative for abdominal pain, blood in stool, constipation and nausea.        Esophageal dysphagia    Genitourinary:  Negative for difficulty urinating.   Musculoskeletal:         Foot pain    Psychiatric/Behavioral:  Negative for dysphoric mood, sleep disturbance and suicidal ideas. The patient is not nervous/anxious.        Objective:      Physical Exam  Constitutional:       Appearance: She is well-developed.   HENT:      Head: Normocephalic and atraumatic.      Right Ear: External ear normal.      Left Ear: External ear normal.      Nose: Nose normal.   Eyes:      Pupils: Pupils are equal, round, and reactive to light.   Neck:      Thyroid: No thyromegaly.      Vascular: No JVD.      Trachea: No tracheal deviation.   Cardiovascular:      Rate and Rhythm: Normal rate.      Heart sounds: Normal heart sounds. No murmur heard.  Pulmonary:      Effort: Pulmonary effort is normal. No respiratory distress.      Breath sounds: Normal breath sounds. No wheezing or rales.   Chest:      Chest wall: No tenderness.   Abdominal:       "General: Bowel sounds are normal. There is no distension.      Palpations: Abdomen is soft. There is no mass.      Tenderness: There is no abdominal tenderness. There is no guarding or rebound.   Musculoskeletal:         General: No tenderness. Normal range of motion.      Cervical back: Normal range of motion and neck supple.   Lymphadenopathy:      Cervical: No cervical adenopathy.   Skin:     General: Skin is warm and dry.      Coloration: Skin is not pale.      Findings: No erythema or rash.   Neurological:      Mental Status: She is alert and oriented to person, place, and time.      Cranial Nerves: No cranial nerve deficit.      Motor: No abnormal muscle tone.      Coordination: Coordination normal.      Deep Tendon Reflexes: Reflexes are normal and symmetric. Reflexes normal.   Psychiatric:         Behavior: Behavior normal.         Thought Content: Thought content normal.         Judgment: Judgment normal.         Assessment:       1. Other specified disorders of bone density and structure, multiple sites    2. Pharyngoesophageal dysphagia    3. Gastroesophageal reflux disease without esophagitis    4. Dyslipidemia    5. Bilateral carotid artery stenosis    6. Atherosclerosis of aorta    7. Coronary artery disease involving native coronary artery of native heart without angina pectoris    8. Lung nodule        Plan:   1. Other specified disorders of bone density and structure, multiple sites  -     DXA Bone Density Axial Skeleton 1 or more sites; Future; Expected date: 08/29/2023    2. Pharyngoesophageal dysphagia  Overview:  Saw GI---EGD and modified barium swallow done. No stenosis or correctable cause of her symptoms identified. Dr. Dunlap sent her to an "esophageal specialist" per pt      3. Gastroesophageal reflux disease without esophagitis  Overview:  On PPI   Can't take bisphosphonate for osteo   Large hiatal hernia. Possible surgery being considered  Has some dysphagia as well---GI has her on PPI " and high dose H2 blocker. Dilation would not be helpful        4. Dyslipidemia  Overview:  Lab Results   Component Value Date    CHOL 177 05/09/2023    CHOL 135 02/27/2023    CHOL 154 02/02/2022     Lab Results   Component Value Date    HDL 92 (A) 05/09/2023    HDL 52 02/27/2023    HDL 73 02/02/2022     Lab Results   Component Value Date    LDLCALC 69.8 02/27/2023    LDLCALC 57.6 (L) 02/02/2022    LDLCALC 56.6 (L) 02/02/2021     Lab Results   Component Value Date    TRIG 76 05/09/2023    TRIG 66 02/27/2023    TRIG 117 02/02/2022     Lab Results   Component Value Date    CHOLHDL 38.5 02/27/2023    CHOLHDL 47.4 02/02/2022    CHOLHDL 48.4 02/02/2021     Continue statin       5. Bilateral carotid artery stenosis  Overview:  Monitored per Dr. Day q 6mo  On statin and ASA         6. Atherosclerosis of aorta  Overview:  Seen on CTA 5/2023   On statin, ASA        7. Coronary artery disease involving native coronary artery of native heart without angina pectoris  Overview:  Statin  Lab Results   Component Value Date    LDLCALC 57.6 (L) 02/02/2022     ASA          8. Lung nodule  Overview:  On CTA 5/2023. Need repeat CT in 1 year due to her h/o smoking           No follow-ups on file.

## 2023-08-30 ENCOUNTER — HOSPITAL ENCOUNTER (OUTPATIENT)
Dept: RADIOLOGY | Facility: HOSPITAL | Age: 78
Discharge: HOME | End: 2023-08-30
Attending: INTERNAL MEDICINE
Payer: MEDICARE

## 2023-08-30 DIAGNOSIS — R13.14 PHARYNGOESOPHAGEAL DYSPHAGIA: ICD-10-CM

## 2023-08-30 DIAGNOSIS — K21.9 GASTROESOPHAGEAL REFLUX DISEASE WITHOUT ESOPHAGITIS: ICD-10-CM

## 2023-08-30 PROCEDURE — 78264 GASTRIC EMPTYING IMG STUDY: CPT | Mod: 26,,, | Performed by: STUDENT IN AN ORGANIZED HEALTH CARE EDUCATION/TRAINING PROGRAM

## 2023-08-30 PROCEDURE — 78264 NM GASTRIC EMPTYING: ICD-10-PCS | Mod: 26,,, | Performed by: STUDENT IN AN ORGANIZED HEALTH CARE EDUCATION/TRAINING PROGRAM

## 2023-08-30 PROCEDURE — 78264 GASTRIC EMPTYING IMG STUDY: CPT | Mod: TC

## 2023-09-06 ENCOUNTER — HOSPITAL ENCOUNTER (OUTPATIENT)
Dept: RADIOLOGY | Facility: HOSPITAL | Age: 78
Discharge: HOME | End: 2023-09-06
Attending: FAMILY MEDICINE
Payer: MEDICARE

## 2023-09-06 DIAGNOSIS — M85.89 OTHER SPECIFIED DISORDERS OF BONE DENSITY AND STRUCTURE, MULTIPLE SITES: ICD-10-CM

## 2023-09-06 PROCEDURE — 77080 DXA BONE DENSITY AXIAL: CPT | Mod: TC

## 2023-09-06 PROCEDURE — 77080 DXA BONE DENSITY AXIAL: CPT | Mod: 26,,, | Performed by: RADIOLOGY

## 2023-09-06 PROCEDURE — 77080 DXA BONE DENSITY AXIAL SKELETON 1 OR MORE SITES: ICD-10-PCS | Mod: 26,,, | Performed by: RADIOLOGY

## 2023-09-15 ENCOUNTER — TELEPHONE (OUTPATIENT)
Dept: GASTROENTEROLOGY | Facility: CLINIC | Age: 78
End: 2023-09-15
Payer: MEDICARE

## 2023-09-15 NOTE — TELEPHONE ENCOUNTER
"Called pt and went over GES results. Informed pt her stomach is emptying very slowly and she will need to follow a gastroparesis diet. Informed pt she can look up what she can and can not have for the diet. Informed pt to continue taking all medicines she is currently on. Informed pt when she sees Dr Hale at [facility] she can ask her to send her to the surgeon who manages gastric pacemakers. Pt had verbal understanding.        ----- Message from Khushboo Dunlap MD sent at 8/30/2023  2:50 PM CDT -----  Her stomach is emptying very slowly.  This worsens her symptoms that things are not going down well.  She needs to follow a "gastroparesis diet" and continue the meds she is on.  When she sees Dr. Hale at [facility] in November she can ask her to send her to the surgeon who manages gastric pacemakers, if she is still having issues.  The diet change is very important.  "

## 2023-09-29 ENCOUNTER — TELEPHONE (OUTPATIENT)
Dept: FAMILY MEDICINE | Facility: CLINIC | Age: 78
End: 2023-09-29
Payer: MEDICARE

## 2023-09-29 ENCOUNTER — HOSPITAL ENCOUNTER (EMERGENCY)
Facility: HOSPITAL | Age: 78
Discharge: HOME | End: 2023-09-29
Attending: SURGERY
Payer: MEDICARE

## 2023-09-29 VITALS
HEART RATE: 65 BPM | RESPIRATION RATE: 16 BRPM | TEMPERATURE: 98 F | DIASTOLIC BLOOD PRESSURE: 65 MMHG | SYSTOLIC BLOOD PRESSURE: 144 MMHG | OXYGEN SATURATION: 95 %

## 2023-09-29 DIAGNOSIS — S43.101A SEPARATION OF RIGHT ACROMIOCLAVICULAR JOINT, INITIAL ENCOUNTER: Primary | ICD-10-CM

## 2023-09-29 DIAGNOSIS — S49.91XA RIGHT SHOULDER INJURY: ICD-10-CM

## 2023-09-29 DIAGNOSIS — R07.81 RIB PAIN ON RIGHT SIDE: ICD-10-CM

## 2023-09-29 PROCEDURE — 25000003 PHARM REV CODE 250: Performed by: SURGERY

## 2023-09-29 PROCEDURE — 99284 EMERGENCY DEPT VISIT MOD MDM: CPT | Mod: 25

## 2023-09-29 RX ORDER — ACETAMINOPHEN 500 MG
1000 TABLET ORAL
Status: COMPLETED | OUTPATIENT
Start: 2023-09-29 | End: 2023-09-29

## 2023-09-29 RX ORDER — TRAMADOL HYDROCHLORIDE 50 MG/1
50 TABLET ORAL EVERY 6 HOURS PRN
Qty: 15 TABLET | Refills: 0 | Status: SHIPPED | OUTPATIENT
Start: 2023-09-29 | End: 2023-10-03

## 2023-09-29 RX ORDER — IBUPROFEN 800 MG/1
800 TABLET ORAL
Status: DISCONTINUED | OUTPATIENT
Start: 2023-09-29 | End: 2023-09-29 | Stop reason: HOSPADM

## 2023-09-29 RX ORDER — TRAMADOL HYDROCHLORIDE 50 MG/1
50 TABLET ORAL
Status: COMPLETED | OUTPATIENT
Start: 2023-09-29 | End: 2023-09-29

## 2023-09-29 RX ORDER — CYCLOBENZAPRINE HCL 10 MG
10 TABLET ORAL NIGHTLY
Qty: 30 TABLET | Refills: 0 | Status: SHIPPED | OUTPATIENT
Start: 2023-09-29

## 2023-09-29 RX ADMIN — TRAMADOL HYDROCHLORIDE 50 MG: 50 TABLET, COATED ORAL at 10:09

## 2023-09-29 RX ADMIN — ACETAMINOPHEN 1000 MG: 500 TABLET ORAL at 09:09

## 2023-09-29 NOTE — TELEPHONE ENCOUNTER
Pt requesting refill on Flexeril 10 mg. Needing sent to [facility]. Pt also made aware not to take Tramadol and Flexeril together. Pt's daughter Samantha RODRIGUEZ.     Med pended. Thanks

## 2023-09-29 NOTE — TELEPHONE ENCOUNTER
----- Message from Santos Tucker sent at 2023 10:51 AM CDT -----  Contact: Daughter - Samantha Mendoza  MRN: [id number]  : 1945  PCP: Tomy Baron  Home Phone      [phone]  Work Phone      Not on file.  Mobile          [phone]      MESSAGE: seen in ER today for a fall from hr porch -- told to f/u with PCP in 2 days -- requesting appt with Dr Baron    Call Samantha @ [phone]    PCP: Loraine

## 2023-09-29 NOTE — TELEPHONE ENCOUNTER
No care due was identified.  [facility] Embedded Care Due Messages. Reference number: [id number].   9/29/2023 4:27:46 PM CDT

## 2023-09-29 NOTE — ED PROVIDER NOTES
Encounter Date: 9/29/2023       History     Chief Complaint   Patient presents with    Fall     Megha Mendoza is a 78 y.o. female that presents with right shoulder pain today  Awkward fall on right shoulder with right rib, right shoulder & a head injury PTA  Alert appropriate with no observed loss of consciousness with the injury or fall  Right shoulder pain with any range of motion with no obvious dislocation noted  Good , normal tone, good distal pulses & capillary refill on ER evaluation    The history is provided by the patient.     Review of patient's allergies indicates:   Allergen Reactions    Keflex [cephalexin] Rash     Solid patches - rash like skin thickened    Hibiclens [chlorhexidine gluconate] Itching    Sulfa (sulfonamide antibiotics) Rash     Past Medical History:   Diagnosis Date    Arthritis     Back pain     Bronchitis, chronic     Carotid artery disease     50% blockage bilateral    Colitis, acute     COPD (chronic obstructive pulmonary disease)     Coronary artery disease     Diverticulitis     DJD (degenerative joint disease)     GERD (gastroesophageal reflux disease)     severe    Hemorrhoids     Hiatal hernia     Hyperlipidemia     Irritable bowel syndrome with constipation     Mixed stress and urge urinary incontinence 3/30/2021    DARA (obstructive sleep apnea)     Osteoporosis     Respiratory distress     Trouble in sleeping      Past Surgical History:   Procedure Laterality Date    blockages in artaries      CARDIAC CATHETERIZATION  5/1/15    COLONOSCOPY      COLONOSCOPY N/A 10/11/2022    Procedure: COLONOSCOPY;  Surgeon: Tomy Baron MD;  Location: [facility];  Service: Endoscopy;  Laterality: N/A;  Pt needing miralax prep and EKG.    ESOPHAGEAL MANOMETRY WITH MEASUREMENT OF IMPEDANCE N/A 6/30/2023    Procedure: MANOMETRY, ESOPHAGUS, WITH IMPEDANCE MEASUREMENT;  Surgeon: Joseluis Santana MD;  Location: [facility] ([address] [place] [address]);  Service: Endoscopy;  Laterality: N/A;  Referred by   alix Dunlap [place] -   called to confirm couple times, rang then said call not completed and no VM MAL    ESOPHAGOGASTRODUODENOSCOPY N/A 2023    Procedure: EGD (ESOPHAGOGASTRODUODENOSCOPY);  Surgeon: Khushboo Dunlap MD;  Location: [facility];  Service: Endoscopy;  Laterality: N/A;    EYE SURGERY      HAND TENODESIS Bilateral 10/04/2019    HEMORRHOID SURGERY  2009    HERNIA REPAIR  2022    HYSTERECTOMY  1995    complete    JOINT REPLACEMENT      JOINT REPLACEMENT Left 2017    TOTAL KNEE ARTHROPLASTY Right     TUBAL LIGATION       Family History   Problem Relation Age of Onset    Diabetes Mother     Hypertension Mother     Arthritis Mother     Stroke Mother     Stroke Father     Heart disease Father         congenital heart disease    Cancer Sister         lung    COPD Sister      Social History     Tobacco Use    Smoking status: Former     Current packs/day: 0.00     Average packs/day: 0.8 packs/day for 46.0 years (34.5 ttl pk-yrs)     Types: Cigarettes     Start date: 10/14/1959     Quit date: 10/14/2005     Years since quittin.9    Smokeless tobacco: Never    Tobacco comments:     Stopped and started several times   Substance Use Topics    Alcohol use: No    Drug use: No     Review of Systems   Constitutional: Negative.    HENT: Negative.     Eyes: Negative.    Respiratory: Negative.     Cardiovascular: Negative.    Gastrointestinal: Negative.    Genitourinary: Negative.    Musculoskeletal:         (+) right shoulder, right rib & back pain after fall today   Skin: Negative.    Neurological:  Positive for headaches.   Psychiatric/Behavioral: Negative.         Physical Exam     Initial Vitals   BP Pulse Resp Temp SpO2   23 0831 23 0831 23 0831 23 0918 23 0831   (!) 144/65 65 18 97.8 °F (36.6 °C) 95 %      MAP       --                Physical Exam    Nursing note and vitals reviewed.  Constitutional: Vital signs are normal. She appears well-developed and  well-nourished. She is cooperative.   HENT:   Head: Normocephalic and atraumatic.   Eyes: Conjunctivae, EOM and lids are normal. Pupils are equal, round, and reactive to light.   Neck: Trachea normal and phonation normal. Neck supple. No JVD present.   Normal range of motion.   Full passive range of motion without pain.     Cardiovascular:  Normal rate, regular rhythm, S1 normal, S2 normal, normal heart sounds, intact distal pulses and normal pulses.           Pulmonary/Chest: Effort normal and breath sounds normal.   Abdominal: Abdomen is soft and flat. Bowel sounds are normal.   Musculoskeletal:      Cervical back: Full passive range of motion without pain, normal range of motion and neck supple.      Comments: (+) tenderness along the right AC joint     Neurological: She is alert and oriented to person, place, and time. She has normal strength.   Skin: Skin is warm, dry and intact. Capillary refill takes less than 2 seconds.         ED Course   Procedures  Labs Reviewed - No data to display       Imaging Results              X-Ray Ribs 2 View Right (Final result)  Result time 09/29/23 09:36:21      Final result by Christy Ingram MD (09/29/23 09:36:21)                   Impression:      No evidence for displaced right sided rib fractures.      Electronically signed by: Christy Ingram MD  Date:    09/29/2023  Time:    09:36               Narrative:    EXAMINATION:  XR RIBS 2 VIEW RIGHT    CLINICAL HISTORY:  Pleurodynia    TECHNIQUE:  Three views of the right ribs    COMPARISON:  None    FINDINGS:  Examination of the right chest for ribs demonstrates no evidence for pneumothorax.  No evidence for pleural reaction.  No evidence for displaced rib fractures.  Nondisplaced rib fractures are not excluded and if indicated, follow-up examination in 7 to 10 days to demonstrate healing callus formation associated with nonvisualized / nondisplaced rib fractures.                                       X-Ray Lumbar Spine Ap  And Lateral (Final result)  Result time 09/29/23 09:28:52      Final result by Christy Ingram MD (09/29/23 09:28:52)                   Impression:      As above.      Electronically signed by: Christy Ingram MD  Date:    09/29/2023  Time:    09:28               Narrative:    EXAMINATION:  XR LUMBAR SPINE AP AND LATERAL    CLINICAL HISTORY:  Fall;    TECHNIQUE:  AP, lateral and spot images were performed of the lumbar spine.    COMPARISON:  None    FINDINGS:  Alignment: Levo scoliotic curvature of the lumbar spine.  There is retrolisthesis of T12 on L1 and L1 on L2.  Mild anterolisthesis of L4 on L5.    Vertebrae: Vertebral body heights are maintained.  No suspicious appearing lytic or blastic lesions.    Discs and facets: Multilevel disc space narrowing with endplate sclerosis and marginal osteophytosis.  Multilevel facet arthropathy.    Miscellaneous: Vascular calcifications.                                       X-Ray Shoulder Complete 2 View Right (Final result)  Result time 09/29/23 09:23:31      Final result by Christy Ingram MD (09/29/23 09:23:31)                   Impression:      Imaging findings suggesting AC joint separation.No fracture.      Electronically signed by: Christy Ingram MD  Date:    09/29/2023  Time:    09:23               Narrative:    EXAMINATION:  XR SHOULDER COMPLETE 2 OR MORE VIEWS RIGHT    CLINICAL HISTORY:  Unspecified injury of right shoulder and upper arm, initial encounter    TECHNIQUE:  Three views of the right shoulder    COMPARISON:  None.    FINDINGS:  The acromioclavicular joint is widened and there is cranial migration of the distal clavicle in respect to the acromion suggesting AC joint separation.  No definite fracture is seen.  No evidence of lytic or blastic lesions.                                       CT Head Without Contrast (Final result)  Result time 09/29/23 08:51:23      Final result by Christy Ingram MD (09/29/23 08:51:23)                   Impression:       No acute intracranial findings.    Age-appropriate cerebral volume loss with mild moderate patchy decreased attenuation supratentorial white matter while nonspecific suggestive for chronic ischemic change.    No evidence for acute intracranial hemorrhage.  Clinical correlation and further evaluation as warranted.      Electronically signed by: Christy Ingram MD  Date:    09/29/2023  Time:    08:51               Narrative:    EXAMINATION:  CT HEAD WITHOUT CONTRAST    CLINICAL HISTORY:  Facial trauma, blunt;    TECHNIQUE:  Multiple sequential 5 mm axial images of the head without contrast.  Coronal and sagittal reformatted imaging from the axial acquisition.    COMPARISON:  11/25/2014    FINDINGS:  There is mild age-appropriate generalized cerebral volume loss.  Compensatory enlargement of the ventricle sulci and cisterns without hydrocephalus.  There is no midline shift or mass effect.  There is mild moderate ill-defined decreased attenuation in the supratentorial white matter while nonspecific suggestive for chronic ischemic change.  There is no evidence for acute intracranial hemorrhage or sulcal effacement.  There is no midline shift or mass effect.  Prominent vascular calcifications.  Visualized paranasal sinuses and mastoid air cells are clear..                                       Medications   ibuprofen tablet 800 mg (800 mg Oral Not Given 9/29/23 0913)   traMADoL tablet 50 mg (has no administration in time range)   acetaminophen tablet 1,000 mg (1,000 mg Oral Given 9/29/23 0913)     Medical Decision Making  78-year-old female with a head injury as well as right shoulder, right rib & back pain  Differential diagnosis includes sprain, strain, fracture, dislocation rotator cuff injury  Differential diagnosis also includes intracranial hemorrhage, skull fracture, concussion    Problems Addressed:  Rib pain on right side: complicated acute illness or injury  Right shoulder injury: complicated acute illness  or injury  Separation of right acromioclavicular joint, initial encounter: complicated acute illness or injury    Amount and/or Complexity of Data Reviewed  Radiology: ordered and independent interpretation performed.    ED Management & Risk of Complications, Morbidity, Mortality:  (-) CT of the head with (-) right rib series, normal lumbar x-ray on ER assessment  X-ray shows AC joint separation, sling & swath applied in the emergency room today  Ultram for pain & follow-up with primary care physician on ED discharge today  This patient does not need to be hospitalized on ER evaluation today  The patient's diagnosis is not limited by social determinants of health  Does not require surgery or procedure (major or minor), no risk factors  Pt/Family counseled to return to the ER with any concerning symptoms       Clinical Impression:   Final diagnoses:  [S49.91XA] Right shoulder injury  [R07.81] Rib pain on right side  [S43.101A] Separation of right acromioclavicular joint, initial encounter (Primary)        ED Disposition Condition    Discharge Stable          ED Prescriptions       Medication Sig Dispense Start Date End Date Auth. Provider    traMADoL (ULTRAM) 50 mg tablet Take 1 tablet (50 mg total) by mouth every 6 (six) hours as needed for Pain. 15 tablet 9/29/2023 10/3/2023 Abdias Sands MD          Follow-up Information       Follow up With Specialties Details Why Contact Info    Tomy Baron MD Family Medicine Schedule an appointment as soon as possible for a visit in 2 days  [address]  [facility] [address]  [phone]               Abdias Sands MD  09/29/23 0983

## 2023-09-29 NOTE — ED TRIAGE NOTES
78 y.o. female presents to ER Room/bed info not found   Chief Complaint   Patient presents with    Fall   .   Pt reports fell off of porch jpta, c/o pain to right shoulder and back, no LOC

## 2023-10-02 ENCOUNTER — OFFICE VISIT (OUTPATIENT)
Dept: FAMILY MEDICINE | Facility: CLINIC | Age: 78
End: 2023-10-02
Payer: MEDICARE

## 2023-10-02 VITALS
SYSTOLIC BLOOD PRESSURE: 122 MMHG | HEIGHT: 59 IN | WEIGHT: 149.56 LBS | BODY MASS INDEX: 30.15 KG/M2 | RESPIRATION RATE: 15 BRPM | DIASTOLIC BLOOD PRESSURE: 72 MMHG | HEART RATE: 72 BPM | OXYGEN SATURATION: 96 %
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
## 2023-10-02 DIAGNOSIS — S43.004A SHOULDER SEPARATION, RIGHT, INITIAL ENCOUNTER: ICD-10-CM

## 2023-10-02 DIAGNOSIS — M54.50 ACUTE RIGHT-SIDED LOW BACK PAIN WITHOUT SCIATICA: ICD-10-CM

## 2023-10-02 PROCEDURE — 3288F PR FALLS RISK ASSESSMENT DOCUMENTED: ICD-10-PCS | Mod: CPTII,S$GLB,, | Performed by: FAMILY MEDICINE

## 2023-10-02 PROCEDURE — 1125F PR PAIN SEVERITY QUANTIFIED, PAIN PRESENT: ICD-10-PCS | Mod: CPTII,S$GLB,, | Performed by: FAMILY MEDICINE

## 2023-10-02 PROCEDURE — 99213 OFFICE O/P EST LOW 20 MIN: CPT | Mod: S$GLB,,, | Performed by: FAMILY MEDICINE

## 2023-10-02 PROCEDURE — 3074F PR MOST RECENT SYSTOLIC BLOOD PRESSURE < 130 MM HG: ICD-10-PCS | Mod: CPTII,S$GLB,, | Performed by: FAMILY MEDICINE

## 2023-10-02 PROCEDURE — 99999 PR PBB SHADOW E&M-EST. PATIENT-LVL IV: ICD-10-PCS | Mod: PBBFAC,,, | Performed by: FAMILY MEDICINE

## 2023-10-02 PROCEDURE — 1100F PTFALLS ASSESS-DOCD GE2>/YR: CPT | Mod: CPTII,S$GLB,, | Performed by: FAMILY MEDICINE

## 2023-10-02 PROCEDURE — 3078F DIAST BP <80 MM HG: CPT | Mod: CPTII,S$GLB,, | Performed by: FAMILY MEDICINE

## 2023-10-02 PROCEDURE — 1159F MED LIST DOCD IN RCRD: CPT | Mod: CPTII,S$GLB,, | Performed by: FAMILY MEDICINE

## 2023-10-02 PROCEDURE — 99999 PR PBB SHADOW E&M-EST. PATIENT-LVL IV: CPT | Mod: PBBFAC,,, | Performed by: FAMILY MEDICINE

## 2023-10-02 PROCEDURE — 3074F SYST BP LT 130 MM HG: CPT | Mod: CPTII,S$GLB,, | Performed by: FAMILY MEDICINE

## 2023-10-02 PROCEDURE — 3078F PR MOST RECENT DIASTOLIC BLOOD PRESSURE < 80 MM HG: ICD-10-PCS | Mod: CPTII,S$GLB,, | Performed by: FAMILY MEDICINE

## 2023-10-02 PROCEDURE — 1100F PR PT FALLS ASSESS DOC 2+ FALLS/FALL W/INJURY/YR: ICD-10-PCS | Mod: CPTII,S$GLB,, | Performed by: FAMILY MEDICINE

## 2023-10-02 PROCEDURE — 99213 PR OFFICE/OUTPT VISIT, EST, LEVL III, 20-29 MIN: ICD-10-PCS | Mod: S$GLB,,, | Performed by: FAMILY MEDICINE

## 2023-10-02 PROCEDURE — 1159F PR MEDICATION LIST DOCUMENTED IN MEDICAL RECORD: ICD-10-PCS | Mod: CPTII,S$GLB,, | Performed by: FAMILY MEDICINE

## 2023-10-02 PROCEDURE — 1125F AMNT PAIN NOTED PAIN PRSNT: CPT | Mod: CPTII,S$GLB,, | Performed by: FAMILY MEDICINE

## 2023-10-02 PROCEDURE — 3288F FALL RISK ASSESSMENT DOCD: CPT | Mod: CPTII,S$GLB,, | Performed by: FAMILY MEDICINE

## 2023-10-02 RX ORDER — TIZANIDINE 4 MG/1
4 TABLET ORAL EVERY 6 HOURS PRN
Qty: 30 TABLET | Refills: 1 | Status: SHIPPED | OUTPATIENT
Start: 2023-10-02 | End: 2023-10-12

## 2023-10-02 NOTE — PROGRESS NOTES
Subjective:       Patient ID: Megha Mendoza is a 78 y.o. female.    Chief Complaint: ER follow up (Pt was seen in ER due to a fall)    Pt is a 78 y.o. female who presents for evaluation and management of   Encounter Diagnoses   Name Primary?    Fall, initial encounter Yes    Shoulder separation, right, initial encounter     Acute right-sided low back pain without sciatica    .fell off a porch when the banister she was leaning against gave way   4 foot fall on to right shoulder  Went to ED where rib, lumbar xrays were normal, but AC separation on shoulder xray.   CT head negative      Doing well on current meds. Denies any side effects. Prevention is up to date.  Review of Systems   Musculoskeletal:  Positive for arthralgias and back pain.   Neurological:  Negative for weakness and numbness.       Objective:      Physical Exam  Constitutional:       Appearance: She is well-developed.   HENT:      Head: Normocephalic and atraumatic.      Right Ear: External ear normal.      Left Ear: External ear normal.      Nose: Nose normal.   Eyes:      Pupils: Pupils are equal, round, and reactive to light.   Neck:      Thyroid: No thyromegaly.      Vascular: No JVD.      Trachea: No tracheal deviation.   Cardiovascular:      Rate and Rhythm: Normal rate.      Heart sounds: Normal heart sounds. No murmur heard.  Pulmonary:      Effort: Pulmonary effort is normal. No respiratory distress.      Breath sounds: Normal breath sounds. No wheezing or rales.   Chest:      Chest wall: No tenderness.   Abdominal:      General: Bowel sounds are normal. There is no distension.      Palpations: Abdomen is soft. There is no mass.      Tenderness: There is no abdominal tenderness. There is no guarding or rebound.   Musculoskeletal:         General: No tenderness.      Cervical back: Normal range of motion and neck supple.      Comments: AC TTP   In shoulder harness    Lymphadenopathy:      Cervical: No cervical adenopathy.   Skin:     General:  Skin is warm and dry.      Coloration: Skin is not pale.      Findings: No erythema or rash.   Neurological:      Mental Status: She is alert and oriented to person, place, and time.      Cranial Nerves: No cranial nerve deficit.      Motor: No abnormal muscle tone.      Coordination: Coordination normal.      Deep Tendon Reflexes: Reflexes are normal and symmetric. Reflexes normal.   Psychiatric:         Behavior: Behavior normal.         Thought Content: Thought content normal.         Judgment: Judgment normal.         Assessment:       1. Fall, initial encounter    2. Shoulder separation, right, initial encounter    3. Acute right-sided low back pain without sciatica        Plan:   1. Fall, initial encounter    2. Shoulder separation, right, initial encounter    3. Acute right-sided low back pain without sciatica  -     tiZANidine (ZANAFLEX) 4 MG tablet; Take 1 tablet (4 mg total) by mouth every 6 (six) hours as needed (muscle spasm/back pain).  Dispense: 30 tablet; Refill: 1    ED records reviewed with patient   Rec immobilization until pain free  RTC 1 month. Will consider PT at that time....       No follow-ups on file.

## 2023-10-09 ENCOUNTER — OFFICE VISIT (OUTPATIENT)
Dept: OTOLARYNGOLOGY | Facility: CLINIC | Age: 78
End: 2023-10-09
Payer: MEDICARE

## 2023-10-09 VITALS — SYSTOLIC BLOOD PRESSURE: 160 MMHG | HEART RATE: 68 BPM | DIASTOLIC BLOOD PRESSURE: 78 MMHG

## 2023-10-09 DIAGNOSIS — K22.0 SPASM OF THROAT MUSCLE: ICD-10-CM

## 2023-10-09 DIAGNOSIS — K59.89 VISCERAL HYPERSENSITIVITY SYNDROME: ICD-10-CM

## 2023-10-09 DIAGNOSIS — K22.4 CRICOPHARYNGEUS MUSCLE DYSFUNCTION: ICD-10-CM

## 2023-10-09 DIAGNOSIS — R13.14 DYSPHAGIA, PHARYNGOESOPHAGEAL: Primary | ICD-10-CM

## 2023-10-09 PROCEDURE — 1126F AMNT PAIN NOTED NONE PRSNT: CPT | Mod: CPTII,S$GLB,, | Performed by: OTOLARYNGOLOGY

## 2023-10-09 PROCEDURE — 1160F PR REVIEW ALL MEDS BY PRESCRIBER/CLIN PHARMACIST DOCUMENTED: ICD-10-PCS | Mod: CPTII,S$GLB,, | Performed by: OTOLARYNGOLOGY

## 2023-10-09 PROCEDURE — 99204 OFFICE O/P NEW MOD 45 MIN: CPT | Mod: 25,S$GLB,, | Performed by: OTOLARYNGOLOGY

## 2023-10-09 PROCEDURE — 1159F PR MEDICATION LIST DOCUMENTED IN MEDICAL RECORD: ICD-10-PCS | Mod: CPTII,S$GLB,, | Performed by: OTOLARYNGOLOGY

## 2023-10-09 PROCEDURE — 99204 PR OFFICE/OUTPT VISIT, NEW, LEVL IV, 45-59 MIN: ICD-10-PCS | Mod: 25,S$GLB,, | Performed by: OTOLARYNGOLOGY

## 2023-10-09 PROCEDURE — 31575 PR LARYNGOSCOPY, FLEXIBLE; DIAGNOSTIC: ICD-10-PCS | Mod: S$GLB,,, | Performed by: OTOLARYNGOLOGY

## 2023-10-09 PROCEDURE — 1160F RVW MEDS BY RX/DR IN RCRD: CPT | Mod: CPTII,S$GLB,, | Performed by: OTOLARYNGOLOGY

## 2023-10-09 PROCEDURE — 31575 DIAGNOSTIC LARYNGOSCOPY: CPT | Mod: S$GLB,,, | Performed by: OTOLARYNGOLOGY

## 2023-10-09 PROCEDURE — 1159F MED LIST DOCD IN RCRD: CPT | Mod: CPTII,S$GLB,, | Performed by: OTOLARYNGOLOGY

## 2023-10-09 PROCEDURE — 99999 PR PBB SHADOW E&M-EST. PATIENT-LVL III: ICD-10-PCS | Mod: PBBFAC,,, | Performed by: OTOLARYNGOLOGY

## 2023-10-09 PROCEDURE — 1126F PR PAIN SEVERITY QUANTIFIED, NO PAIN PRESENT: ICD-10-PCS | Mod: CPTII,S$GLB,, | Performed by: OTOLARYNGOLOGY

## 2023-10-09 PROCEDURE — 3077F SYST BP >= 140 MM HG: CPT | Mod: CPTII,S$GLB,, | Performed by: OTOLARYNGOLOGY

## 2023-10-09 PROCEDURE — 3078F DIAST BP <80 MM HG: CPT | Mod: CPTII,S$GLB,, | Performed by: OTOLARYNGOLOGY

## 2023-10-09 PROCEDURE — 3078F PR MOST RECENT DIASTOLIC BLOOD PRESSURE < 80 MM HG: ICD-10-PCS | Mod: CPTII,S$GLB,, | Performed by: OTOLARYNGOLOGY

## 2023-10-09 PROCEDURE — 99999 PR PBB SHADOW E&M-EST. PATIENT-LVL III: CPT | Mod: PBBFAC,,, | Performed by: OTOLARYNGOLOGY

## 2023-10-09 PROCEDURE — 3077F PR MOST RECENT SYSTOLIC BLOOD PRESSURE >= 140 MM HG: ICD-10-PCS | Mod: CPTII,S$GLB,, | Performed by: OTOLARYNGOLOGY

## 2023-10-09 NOTE — PROGRESS NOTES
[facility]  Department of Otorhinolaryngology and Communication Sciences    Megha Mendoza is a 78 y.o. female who presents to the [facility] for consultation at the kind request of Dr. Kasia Hale for further evaluation of swallowing function.    She complains of difficulty swallowing.  She characterizes obstructive dysphagia to large solid food boluses.  She tends to not find significant benefit from liquid wash post swallow.  She even characterizes obstructive dysphagia to large liquid boluses fairly consistently.  These symptoms are often accompanied by regurgitation of ingested contents.  She is no difficulty swallowing pills, as long as she swallows them 1 at a time.  She also characterizes spasms which began in her throat and trace across the top of her head.  Onset of her symptoms was fairly sudden, beginning about 1 month following toupet fundoplication in March 2022.  Symptoms are stable.  She denies any exacerbating or alleviating factors.  She denies any trouble with her voice.  She did not have any difficulty swallowing prior to the fundoplication surgery.    6/29/2022 esophagram : Postsurgical changes from prior hiatal hernia repair with a small sliding hiatal hernia.  No evidence of reflux.    5/2023 EGD tortuous with numerous extra waves; pouch in the distal esophagus holding vegetable matter; neither UES nor LES tight    6/30/2023 HRM Normal esophageal manometry. No evidence of disorder of peristalsis. Nevertheless, 100% incomplete bolus clearance.    8/23/2023: MBSS - poor fluoro capture; obstructive CP bar noted    Recent gastric emptying study also is indicative of gastroparesis.    Per Dr. Dunlap's last note: Recent EGD showed a pouch in the distal esophagus holding vegetable matter; review of esophagram confirms that pouch    Past Medical History  She has a past medical history of Arthritis, Back pain, Bronchitis, chronic, Carotid artery disease, Colitis, acute, COPD (chronic  obstructive pulmonary disease), Coronary artery disease, Diverticulitis, DJD (degenerative joint disease), GERD (gastroesophageal reflux disease), Hemorrhoids, Hiatal hernia, Hyperlipidemia, Irritable bowel syndrome with constipation, Mixed stress and urge urinary incontinence, DARA (obstructive sleep apnea), Osteoporosis, Respiratory distress, and Trouble in sleeping.    Past Surgical History  She has a past surgical history that includes Hemorrhoid surgery (2009); Tubal ligation; blockages in artaries; Total knee arthroplasty (Right); Hysterectomy (1995); Colonoscopy; Cardiac catheterization (5/1/15); Joint replacement; Joint replacement (Left, 08/02/2017); Hand Tenodesis (Bilateral, 10/04/2019); Eye surgery; Hernia repair (03/2022); Colonoscopy (N/A, 10/11/2022); Esophagogastroduodenoscopy (N/A, 5/8/2023); and Esophageal manometry with measurement of impedance (N/A, 6/30/2023).    Family History  Her family history includes Arthritis in her mother; COPD in her sister; Cancer in her sister; Diabetes in her mother; Heart disease in her father; Hypertension in her mother; Stroke in her father and mother.    Social History  She reports that she quit smoking about 17 years ago. Her smoking use included cigarettes. She started smoking about 64 years ago. She has a 34.5 pack-year smoking history. She has never used smokeless tobacco. She reports that she does not drink alcohol and does not use drugs.    Allergies  She is allergic to keflex [cephalexin], hibiclens [chlorhexidine gluconate], and sulfa (sulfonamide antibiotics).    Medications  She has a current medication list which includes the following prescription(s): acetaminophen, albuterol, apple cider vinegar, aspirin, b complex vitamins, bacillus coagulans, cetirizine, co-enzyme q-10, cyclobenzaprine, prolia, estradiol, famotidine, ipratropium, krill-omega-3-dha-epa-lipids, multivitamin, nitroglycerin, omeprazole, rosuvastatin, symbicort, and tizanidine.    Review  of Systems   Constitutional:  Negative for fever.   HENT:  Negative for sore throat.    Eyes:  Negative for visual disturbance.   Respiratory:  Negative for wheezing.    Cardiovascular:  Negative for chest pain.   Gastrointestinal:  Negative for nausea.   Musculoskeletal:  Negative for arthralgias.   Skin:  Negative for rash.   Neurological:  Negative for tremors.   Hematological:  Does not bruise/bleed easily.   Psychiatric/Behavioral:  The patient is not nervous/anxious.           Objective:     VS reviewed  Physical Exam  Constitutional: comfortable, well dressed  Psychiatric: appropriate affect  Respiratory: comfortably breathing, symmetric chest rise, no stridor  Voice: gentle slight roughness  Cardiovascular: upper extremities non-edematous  Lymphatic: no cervical lymphadenopathy  Neurologic: alert and oriented to time, place, person, and situation; cranial nerves 3-12 grossly intact  Head: normocephalic  Eyes: conjunctivae and sclerae clear  Ears: normal pinnae, normal external auditory canals, tympanic membranes intact  Nose: mucosa pink and noncongested, no masses, no mucopurulence, no polyps  Oral cavity / oropharynx: no mucosal lesions  Neck: soft, full range of motion, laryngotracheal complex palpable with appropriate landmarks, larynx elevates on swallowing  Indirect laryngoscopy: limited due to gag    Procedure  Flexible Laryngoscopy (71790): Laryngoscopy is indicated for assessment of upper aerodigestive structure and function. This was carried out transnasally with a FreeWheel chip videoendoscope. After verbal consent was obtained, the patient was positioned and the nose was topically decongested with 1% phenylephrine and topically anesthetized with 4% lidocaine. The endoscope was passed through the most patent nasal cavity and positioned to image the nasopharynx, larynx, and hypopharynx in detail. The following features were examined: nasopharyngeal, laryngeal, hypopharyngeal masses; velopharyngeal  strength, closure, and symmetry of motion; vocal fold range and symmetry of motion; laryngeal mucosal edema, erythema, inflammation, and hydration; salivary pooling; and gross laryngeal sensation. The equipment was removed. The patient tolerated the procedure well without complication. All findings were normal except:  - mild bilateral vocal fold bowing  - no pooling  - attenuated pharyngeal squeeze      Data Reviewed  See HPI      Assessment:     Megha Mendoza is a 78 y.o. female with chronic esophageal dysphagia following toupet fundoplication in 10/2022.  Recent diagnostics indicate gastroparesis, distal esophageal pouch with retained food contents, tortuous esophagus, minimally obstructive cricopharyngeal bar, reduced pharyngeal squeeze.       Plan:        I had a discussion with the patient and her daughter  regarding her condition and the further workup and management options.      I demonstrated her examination to her in the video monitor.  Reassurance was provided.    I counseled her regarding the multifactorial nature of her problem.  I do not expect there is an easier straightforward solution.    I did recommend that she follow up with Dr. Hale to discuss the results of her workup thus far.  Specifically, I encouraged her to ask about the diagnosis and management of visceral hypersensitivity, which might be contributing to her problem.    On the one hand, UES dilation is a low risk procedural intervention which could be considered.  Nevertheless, in the setting of retained food in the distal esophagus on EGD, I do not anticipate targeting the UES to be effective for addressing her problem.    She will follow up with me as needed.    All questions were answered, and the patient is in agreement with the above.     Collins Mosqueda M.D.  [facility]  Department of Otorhinolaryngology and Communication Sciences

## 2023-10-25 ENCOUNTER — TELEPHONE (OUTPATIENT)
Dept: FAMILY MEDICINE | Facility: CLINIC | Age: 78
End: 2023-10-25

## 2023-10-25 ENCOUNTER — CLINICAL SUPPORT (OUTPATIENT)
Dept: FAMILY MEDICINE | Facility: CLINIC | Age: 78
End: 2023-10-25
Payer: MEDICARE

## 2023-10-25 DIAGNOSIS — R30.0 DYSURIA: Primary | ICD-10-CM

## 2023-10-25 DIAGNOSIS — R31.9 URINARY TRACT INFECTION WITH HEMATURIA, SITE UNSPECIFIED: Primary | ICD-10-CM

## 2023-10-25 DIAGNOSIS — N39.0 URINARY TRACT INFECTION WITH HEMATURIA, SITE UNSPECIFIED: Primary | ICD-10-CM

## 2023-10-25 PROCEDURE — 87186 SC STD MICRODIL/AGAR DIL: CPT | Performed by: FAMILY MEDICINE

## 2023-10-25 PROCEDURE — 87077 CULTURE AEROBIC IDENTIFY: CPT | Performed by: FAMILY MEDICINE

## 2023-10-25 PROCEDURE — 81000 POCT URINE SEDIMENT EXAM: ICD-10-PCS | Mod: S$GLB,,, | Performed by: FAMILY MEDICINE

## 2023-10-25 PROCEDURE — 81000 URINALYSIS NONAUTO W/SCOPE: CPT | Mod: S$GLB,,, | Performed by: FAMILY MEDICINE

## 2023-10-25 PROCEDURE — 87088 URINE BACTERIA CULTURE: CPT | Performed by: FAMILY MEDICINE

## 2023-10-25 PROCEDURE — 87086 URINE CULTURE/COLONY COUNT: CPT | Performed by: FAMILY MEDICINE

## 2023-10-25 RX ORDER — NITROFURANTOIN 25; 75 MG/1; MG/1
100 CAPSULE ORAL 2 TIMES DAILY
Qty: 14 CAPSULE | Refills: 0 | Status: SHIPPED | OUTPATIENT
Start: 2023-10-25

## 2023-10-25 NOTE — TELEPHONE ENCOUNTER
Contacted pt and gave urinalysis results per Dr. Baron. Pt notified abx for UTI have been sent to [facility] pharmacy. Pt vu and states she will  meds tomorrow morning.

## 2023-10-25 NOTE — TELEPHONE ENCOUNTER
----- Message from Risa Stubbs sent at 10/25/2023  9:18 AM CDT -----  Contact: self  Megha Mendoza  MRN: [id number]  : 1945  PCP: Tomy Baron  Home Phone      [phone]  Work Phone      Not on file.  Mobile          [phone]      MESSAGE:   Patient is calling because because she has blood in her urine and also in pain,she also says she is burning.    [phone]

## 2023-10-25 NOTE — TELEPHONE ENCOUNTER
Spoke to pt, states she gets UTI often and is concerned she may be having one. States burning with urination. Lab appt scheduled for U/A. Pt VU.

## 2023-10-26 ENCOUNTER — OFFICE VISIT (OUTPATIENT)
Dept: GASTROENTEROLOGY | Facility: CLINIC | Age: 78
End: 2023-10-26
Payer: MEDICARE

## 2023-10-26 VITALS — HEIGHT: 59 IN | WEIGHT: 148.5 LBS | BODY MASS INDEX: 29.94 KG/M2

## 2023-10-26 DIAGNOSIS — R13.14 PHARYNGOESOPHAGEAL DYSPHAGIA: ICD-10-CM

## 2023-10-26 DIAGNOSIS — K21.9 GASTROESOPHAGEAL REFLUX DISEASE WITHOUT ESOPHAGITIS: Chronic | ICD-10-CM

## 2023-10-26 LAB
BACTERIA SPEC CULT: NORMAL /HPF
BILIRUB SERPL-MCNC: NORMAL MG/DL
BLOOD URINE, POC: NORMAL
CASTS: NORMAL
COLOR, POC UA: NORMAL
CRYSTALS: NORMAL
GLUCOSE UR QL STRIP: NORMAL
KETONES UR QL STRIP: NORMAL
LEUKOCYTE ESTERASE URINE, POC: NORMAL
NITRITE, POC UA: NORMAL
PH, POC UA: 6
PROTEIN, POC: 11
RBC CELLS COUNTED: NORMAL
SPECIFIC GRAVITY, POC UA: 1.03
UROBILINOGEN, POC UA: 0.2
WHITE BLOOD CELLS: NORMAL

## 2023-10-26 PROCEDURE — 99999 PR PBB SHADOW E&M-EST. PATIENT-LVL III: CPT | Mod: PBBFAC,,, | Performed by: INTERNAL MEDICINE

## 2023-10-26 PROCEDURE — 1125F AMNT PAIN NOTED PAIN PRSNT: CPT | Mod: CPTII,S$GLB,, | Performed by: INTERNAL MEDICINE

## 2023-10-26 PROCEDURE — 1100F PTFALLS ASSESS-DOCD GE2>/YR: CPT | Mod: CPTII,S$GLB,, | Performed by: INTERNAL MEDICINE

## 2023-10-26 PROCEDURE — 99213 PR OFFICE/OUTPT VISIT, EST, LEVL III, 20-29 MIN: ICD-10-PCS | Mod: S$GLB,,, | Performed by: INTERNAL MEDICINE

## 2023-10-26 PROCEDURE — 1100F PR PT FALLS ASSESS DOC 2+ FALLS/FALL W/INJURY/YR: ICD-10-PCS | Mod: CPTII,S$GLB,, | Performed by: INTERNAL MEDICINE

## 2023-10-26 PROCEDURE — 1159F MED LIST DOCD IN RCRD: CPT | Mod: CPTII,S$GLB,, | Performed by: INTERNAL MEDICINE

## 2023-10-26 PROCEDURE — 3288F PR FALLS RISK ASSESSMENT DOCUMENTED: ICD-10-PCS | Mod: CPTII,S$GLB,, | Performed by: INTERNAL MEDICINE

## 2023-10-26 PROCEDURE — 1125F PR PAIN SEVERITY QUANTIFIED, PAIN PRESENT: ICD-10-PCS | Mod: CPTII,S$GLB,, | Performed by: INTERNAL MEDICINE

## 2023-10-26 PROCEDURE — 99213 OFFICE O/P EST LOW 20 MIN: CPT | Mod: S$GLB,,, | Performed by: INTERNAL MEDICINE

## 2023-10-26 PROCEDURE — 1159F PR MEDICATION LIST DOCUMENTED IN MEDICAL RECORD: ICD-10-PCS | Mod: CPTII,S$GLB,, | Performed by: INTERNAL MEDICINE

## 2023-10-26 PROCEDURE — 99999 PR PBB SHADOW E&M-EST. PATIENT-LVL III: ICD-10-PCS | Mod: PBBFAC,,, | Performed by: INTERNAL MEDICINE

## 2023-10-26 PROCEDURE — 3288F FALL RISK ASSESSMENT DOCD: CPT | Mod: CPTII,S$GLB,, | Performed by: INTERNAL MEDICINE

## 2023-10-26 NOTE — PROGRESS NOTES
"[facility]    Referral from Dr. Dunlap    CC: dysphagia    HPI 78 y.o. female who is s/p partial fundoplication in March of 2022 who presents with daily solid food dysphagia since her surgery with associated regurgitation, chest discomfort and spasms in the throat.  When she was eating a regular diet her symptoms were daily.  She has recently changed to a soft diet and her dysphagia and spasms are improved.    She has a gastric emptying study that shows gastroparesis but she denies nausea and vomiting.    She has GERD but she reports it is controlled with Omeprazole 40mg daily and famotidine 20mg nightly.    She recently had diarrhea but this has resolved and her BM's have returned to normal.    She only has upper dentures.  She is unable to get lower dentures.  This limits her chewing ability.    EGD with Dr. Dunlap showed a pouch in the lower esophagus with food material present.    Esophageal manometry was normal.    Past Medical History  Past Medical History:   Diagnosis Date    Arthritis     Back pain     Bronchitis, chronic     Carotid artery disease     50% blockage bilateral    Colitis, acute     COPD (chronic obstructive pulmonary disease)     Coronary artery disease     Diverticulitis     DJD (degenerative joint disease)     GERD (gastroesophageal reflux disease)     severe    Hemorrhoids     Hiatal hernia     Hyperlipidemia     Irritable bowel syndrome with constipation     Mixed stress and urge urinary incontinence 3/30/2021    DARA (obstructive sleep apnea)     Osteoporosis     Respiratory distress     Trouble in sleeping        Physical Examination  Ht 4' 11" (1.499 m)   Wt 67.4 kg (148 lb 7.7 oz)   BMI 29.99 kg/m²   General appearance: alert, cooperative, no distress  HENT: Normocephalic, atraumatic, neck symmetrical, no nasal discharge   Abdomen: soft, non-tender;non distended, no rebound or guarding  Neurologic: Alert and oriented X 3,  moving all four extremities, intact " sensation to light touch    Labs:  Lab Results   Component Value Date    WBC 4.47 02/27/2023    HGB 12.2 02/27/2023    HCT 38.2 02/27/2023    MCV 93 02/27/2023     02/27/2023         CMP  Sodium   Date Value Ref Range Status   04/10/2023 141 136 - 145 mmol/L Final     Potassium   Date Value Ref Range Status   04/10/2023 4.5 3.5 - 5.1 mmol/L Final     Chloride   Date Value Ref Range Status   04/10/2023 104 95 - 110 mmol/L Final     CO2   Date Value Ref Range Status   04/10/2023 29 23 - 29 mmol/L Final     Glucose   Date Value Ref Range Status   04/10/2023 96 70 - 110 mg/dL Final     BUN   Date Value Ref Range Status   04/10/2023 19 8 - 23 mg/dL Final     Creatinine   Date Value Ref Range Status   04/10/2023 0.9 0.5 - 1.4 mg/dL Final     Calcium   Date Value Ref Range Status   04/10/2023 10.2 8.7 - 10.5 mg/dL Final     Total Protein   Date Value Ref Range Status   04/10/2023 7.3 6.0 - 8.4 g/dL Final     Albumin   Date Value Ref Range Status   04/10/2023 3.7 3.5 - 5.2 g/dL Final     Total Bilirubin   Date Value Ref Range Status   04/10/2023 0.4 0.1 - 1.0 mg/dL Final     Comment:     For infants and newborns, interpretation of results should be based  on gestational age, weight and in agreement with clinical  observations.    Premature Infant recommended reference ranges:  Up to 24 hours.............<8.0 mg/dL  Up to 48 hours............<12.0 mg/dL  3-5 days..................<15.0 mg/dL  6-29 days.................<15.0 mg/dL       Alkaline Phosphatase   Date Value Ref Range Status   04/10/2023 45 (L) 55 - 135 U/L Final     AST   Date Value Ref Range Status   04/10/2023 22 10 - 40 U/L Final     ALT   Date Value Ref Range Status   04/10/2023 21 10 - 44 U/L Final     Anion Gap   Date Value Ref Range Status   04/10/2023 8 8 - 16 mmol/L Final     eGFR   Date Value Ref Range Status   04/10/2023 >60 >60 mL/min/1.73 m^2 Final       Assessment:   The patient is a 79 yo female with dysphagia that began in March 2022 after  her partial fundoplication.  Overall with diet changes her symptoms have improved.  She had an EGD that should a pouch in her lower esophagus with retained food and a normal esophageal manometry.  We discussed that her symptoms are most likely secondary to her surgery and that I would recommend a timed barium esophagram with barium tablet as the next step in her work up and possible referral to a surgeon to discuss takedown of her fundoplication.  She is not interested in this.  We also discussed that we could consider a low dose TCA to try to improve her symptoms but she is also not interested in this.    Plan:  -Dysphagia diet and precautions discussed with the patient.  -Return to clinic as needed.    Kasia Hale MD

## 2023-10-27 LAB — BACTERIA UR CULT: ABNORMAL

## 2023-11-03 ENCOUNTER — TELEPHONE (OUTPATIENT)
Dept: FAMILY MEDICINE | Facility: CLINIC | Age: 78
End: 2023-11-03
Payer: MEDICARE

## 2023-11-03 NOTE — TELEPHONE ENCOUNTER
----- Message from Tayo Ramsay sent at 2023  1:43 PM CDT -----  Contact: pt  Megha Mendoza  MRN: [id number]  : 1945  PCP: Tomy Baron  Home Phone      [phone]  Work Phone      Not on file.  Mobile          [phone]      MESSAGE:     Pt called in regards to rescheduled appt for dislocated shoulder. She said her next appt is too far out. She would like to discuss a possible sooner appt than her next appt.      Please advise  [phone]

## 2023-11-06 ENCOUNTER — PATIENT OUTREACH (OUTPATIENT)
Dept: ADMINISTRATIVE | Facility: HOSPITAL | Age: 78
End: 2023-11-06
Payer: MEDICARE

## 2023-11-06 NOTE — PROGRESS NOTES
[facility] eAWV Gap Report.  Chart reviewed, immunization record updated.  No new results noted on [facility] or [facility] web site.  Care Everywhere updated.   Patient care coordination note  Next PCP visit 12/12/2023.  Spoke to patient, scheduled eAWV for 1/3/2024 with Kasia Turcios NP.

## 2023-11-07 ENCOUNTER — OFFICE VISIT (OUTPATIENT)
Dept: INTERNAL MEDICINE | Facility: CLINIC | Age: 78
End: 2023-11-07
Payer: MEDICARE

## 2023-11-07 VITALS
RESPIRATION RATE: 18 BRPM | HEIGHT: 60 IN | HEART RATE: 80 BPM | BODY MASS INDEX: 28.91 KG/M2 | SYSTOLIC BLOOD PRESSURE: 136 MMHG | DIASTOLIC BLOOD PRESSURE: 78 MMHG | WEIGHT: 147.25 LBS | OXYGEN SATURATION: 98 %

## 2023-11-07 DIAGNOSIS — M25.511 ACUTE PAIN OF RIGHT SHOULDER: Primary | ICD-10-CM

## 2023-11-07 DIAGNOSIS — M25.511 PAIN IN RIGHT ACROMIOCLAVICULAR JOINT: ICD-10-CM

## 2023-11-07 PROCEDURE — 1125F PR PAIN SEVERITY QUANTIFIED, PAIN PRESENT: ICD-10-PCS | Mod: CPTII,S$GLB,, | Performed by: NURSE PRACTITIONER

## 2023-11-07 PROCEDURE — 99999 PR PBB SHADOW E&M-EST. PATIENT-LVL V: CPT | Mod: PBBFAC,,, | Performed by: NURSE PRACTITIONER

## 2023-11-07 PROCEDURE — 1125F AMNT PAIN NOTED PAIN PRSNT: CPT | Mod: CPTII,S$GLB,, | Performed by: NURSE PRACTITIONER

## 2023-11-07 PROCEDURE — 1101F PR PT FALLS ASSESS DOC 0-1 FALLS W/OUT INJ PAST YR: ICD-10-PCS | Mod: CPTII,S$GLB,, | Performed by: NURSE PRACTITIONER

## 2023-11-07 PROCEDURE — 99213 PR OFFICE/OUTPT VISIT, EST, LEVL III, 20-29 MIN: ICD-10-PCS | Mod: S$GLB,,, | Performed by: NURSE PRACTITIONER

## 2023-11-07 PROCEDURE — 3075F PR MOST RECENT SYSTOLIC BLOOD PRESS GE 130-139MM HG: ICD-10-PCS | Mod: CPTII,S$GLB,, | Performed by: NURSE PRACTITIONER

## 2023-11-07 PROCEDURE — 3288F PR FALLS RISK ASSESSMENT DOCUMENTED: ICD-10-PCS | Mod: CPTII,S$GLB,, | Performed by: NURSE PRACTITIONER

## 2023-11-07 PROCEDURE — 99213 OFFICE O/P EST LOW 20 MIN: CPT | Mod: S$GLB,,, | Performed by: NURSE PRACTITIONER

## 2023-11-07 PROCEDURE — 1159F PR MEDICATION LIST DOCUMENTED IN MEDICAL RECORD: ICD-10-PCS | Mod: CPTII,S$GLB,, | Performed by: NURSE PRACTITIONER

## 2023-11-07 PROCEDURE — 3078F PR MOST RECENT DIASTOLIC BLOOD PRESSURE < 80 MM HG: ICD-10-PCS | Mod: CPTII,S$GLB,, | Performed by: NURSE PRACTITIONER

## 2023-11-07 PROCEDURE — 99999 PR PBB SHADOW E&M-EST. PATIENT-LVL V: ICD-10-PCS | Mod: PBBFAC,,, | Performed by: NURSE PRACTITIONER

## 2023-11-07 PROCEDURE — 3078F DIAST BP <80 MM HG: CPT | Mod: CPTII,S$GLB,, | Performed by: NURSE PRACTITIONER

## 2023-11-07 PROCEDURE — 1101F PT FALLS ASSESS-DOCD LE1/YR: CPT | Mod: CPTII,S$GLB,, | Performed by: NURSE PRACTITIONER

## 2023-11-07 PROCEDURE — 1159F MED LIST DOCD IN RCRD: CPT | Mod: CPTII,S$GLB,, | Performed by: NURSE PRACTITIONER

## 2023-11-07 PROCEDURE — 3075F SYST BP GE 130 - 139MM HG: CPT | Mod: CPTII,S$GLB,, | Performed by: NURSE PRACTITIONER

## 2023-11-07 PROCEDURE — 3288F FALL RISK ASSESSMENT DOCD: CPT | Mod: CPTII,S$GLB,, | Performed by: NURSE PRACTITIONER

## 2023-11-07 NOTE — PROGRESS NOTES
Subjective:       Patient ID: Megha Mendoza is a 78 y.o. female.    Chief Complaint: Shoulder Pain (R. Shoulder pain- x 5-6 weeks PS:4)    Patient is unknown, to me and presents with   Chief Complaint   Patient presents with    Shoulder Pain     R. Shoulder pain- x 5-6 weeks PS:4   .  Denies chest pain and shortness of breath.  Patient who is unknown to me but known to fellow colleague. She is here for follow up post fall/ injury and resulted in a right shoulder injury. Patient reports that it is improving but still with limitations in rom. She will be leaving out of state at the end of this month so would like to have some exercises for right shoulder discomfort. She may need to see ortho and do PT, but states that she wants to wait until she comes back.     Shoulder Pain   Pertinent negatives include no fever.     Review of Systems   Constitutional: Negative.  Negative for activity change, appetite change, chills, diaphoresis, fatigue, fever and unexpected weight change.   Respiratory: Negative.  Negative for apnea, cough, choking, chest tightness, shortness of breath, wheezing and stridor.    Cardiovascular: Negative.  Negative for chest pain, palpitations and leg swelling.   Musculoskeletal:  Positive for arthralgias. Negative for back pain, gait problem, joint swelling, myalgias, neck pain and neck stiffness.   Skin: Negative.    Hematological: Negative.        Objective:      Physical Exam  Constitutional:       General: She is not in acute distress.     Appearance: Normal appearance. She is not ill-appearing, toxic-appearing or diaphoretic.   Neck:      Vascular: No carotid bruit.   Cardiovascular:      Rate and Rhythm: Regular rhythm.      Heart sounds: Normal heart sounds. No murmur heard.  Pulmonary:      Effort: Pulmonary effort is normal. No respiratory distress.      Breath sounds: Normal breath sounds. No stridor. No wheezing, rhonchi or rales.   Chest:      Chest wall: No tenderness.   Musculoskeletal:  "        General: Tenderness and signs of injury present. No swelling or deformity.      Right shoulder: Tenderness and bony tenderness present. No swelling or deformity. Decreased range of motion.        Arms:       Cervical back: Normal range of motion and neck supple. No rigidity or tenderness.      Right lower leg: No edema.      Left lower leg: No edema.   Lymphadenopathy:      Cervical: No cervical adenopathy.   Skin:     General: Skin is warm and dry.      Capillary Refill: Capillary refill takes less than 2 seconds.      Coloration: Skin is not jaundiced or pale.      Findings: No bruising, erythema, lesion or rash.   Neurological:      General: No focal deficit present.      Mental Status: She is alert.         Assessment:       1. Acute pain of right shoulder    2. Pain in right acromioclavicular joint        Plan:   1. Acute pain of right shoulder    2. Pain in right acromioclavicular joint     "This note will not be shared with the patient."  Gave literature for shoulder exercises  I highly recommend ortho and PT when she returns  She will follow up with her provider once she returns from her trip  Rtc as scheduled   "

## 2023-12-12 ENCOUNTER — OFFICE VISIT (OUTPATIENT)
Dept: FAMILY MEDICINE | Facility: CLINIC | Age: 78
End: 2023-12-12
Payer: MEDICARE

## 2023-12-12 VITALS
DIASTOLIC BLOOD PRESSURE: 72 MMHG | SYSTOLIC BLOOD PRESSURE: 126 MMHG | BODY MASS INDEX: 28.91 KG/M2 | HEIGHT: 60 IN | OXYGEN SATURATION: 88 % | RESPIRATION RATE: 16 BRPM | HEART RATE: 111 BPM | WEIGHT: 147.25 LBS

## 2023-12-12 DIAGNOSIS — S43.101D SEPARATION OF RIGHT ACROMIOCLAVICULAR JOINT, SUBSEQUENT ENCOUNTER: Primary | ICD-10-CM

## 2023-12-12 PROCEDURE — 1100F PTFALLS ASSESS-DOCD GE2>/YR: CPT | Mod: CPTII,S$GLB,, | Performed by: FAMILY MEDICINE

## 2023-12-12 PROCEDURE — 1126F PR PAIN SEVERITY QUANTIFIED, NO PAIN PRESENT: ICD-10-PCS | Mod: CPTII,S$GLB,, | Performed by: FAMILY MEDICINE

## 2023-12-12 PROCEDURE — 3074F PR MOST RECENT SYSTOLIC BLOOD PRESSURE < 130 MM HG: ICD-10-PCS | Mod: CPTII,S$GLB,, | Performed by: FAMILY MEDICINE

## 2023-12-12 PROCEDURE — 3078F PR MOST RECENT DIASTOLIC BLOOD PRESSURE < 80 MM HG: ICD-10-PCS | Mod: CPTII,S$GLB,, | Performed by: FAMILY MEDICINE

## 2023-12-12 PROCEDURE — 1159F PR MEDICATION LIST DOCUMENTED IN MEDICAL RECORD: ICD-10-PCS | Mod: CPTII,S$GLB,, | Performed by: FAMILY MEDICINE

## 2023-12-12 PROCEDURE — 3288F PR FALLS RISK ASSESSMENT DOCUMENTED: ICD-10-PCS | Mod: CPTII,S$GLB,, | Performed by: FAMILY MEDICINE

## 2023-12-12 PROCEDURE — 99999 PR PBB SHADOW E&M-EST. PATIENT-LVL IV: CPT | Mod: PBBFAC,,, | Performed by: FAMILY MEDICINE

## 2023-12-12 PROCEDURE — 1126F AMNT PAIN NOTED NONE PRSNT: CPT | Mod: CPTII,S$GLB,, | Performed by: FAMILY MEDICINE

## 2023-12-12 PROCEDURE — 99999 PR PBB SHADOW E&M-EST. PATIENT-LVL IV: ICD-10-PCS | Mod: PBBFAC,,, | Performed by: FAMILY MEDICINE

## 2023-12-12 PROCEDURE — 1159F MED LIST DOCD IN RCRD: CPT | Mod: CPTII,S$GLB,, | Performed by: FAMILY MEDICINE

## 2023-12-12 PROCEDURE — 3288F FALL RISK ASSESSMENT DOCD: CPT | Mod: CPTII,S$GLB,, | Performed by: FAMILY MEDICINE

## 2023-12-12 PROCEDURE — 3074F SYST BP LT 130 MM HG: CPT | Mod: CPTII,S$GLB,, | Performed by: FAMILY MEDICINE

## 2023-12-12 PROCEDURE — 99213 OFFICE O/P EST LOW 20 MIN: CPT | Mod: S$GLB,,, | Performed by: FAMILY MEDICINE

## 2023-12-12 PROCEDURE — 99213 PR OFFICE/OUTPT VISIT, EST, LEVL III, 20-29 MIN: ICD-10-PCS | Mod: S$GLB,,, | Performed by: FAMILY MEDICINE

## 2023-12-12 PROCEDURE — 3078F DIAST BP <80 MM HG: CPT | Mod: CPTII,S$GLB,, | Performed by: FAMILY MEDICINE

## 2023-12-12 PROCEDURE — 1100F PR PT FALLS ASSESS DOC 2+ FALLS/FALL W/INJURY/YR: ICD-10-PCS | Mod: CPTII,S$GLB,, | Performed by: FAMILY MEDICINE

## 2023-12-12 RX ORDER — VALSARTAN 80 MG/1
80 TABLET ORAL
COMMUNITY

## 2023-12-12 NOTE — PROGRESS NOTES
Subjective:       Patient ID: Megha Mendoza is a 78 y.o. female.    Chief Complaint: Follow-up (Pt here for f/u from fall in September, shoulder separation. )    Pt is a 78 y.o. female who presents for evaluation and management of   Encounter Diagnosis   Name Primary?    Separation of right acromioclavicular joint, subsequent encounter Yes   .Been a little over 2 months now   Her pain is improved as is her ROM, but she is annoyed by the clicking sensation she feels in her AC joint     Doing well on current meds. Denies any side effects. Prevention is up to date.    Review of Systems   Musculoskeletal:  Positive for arthralgias. Negative for joint swelling.       Objective:      Physical Exam  Constitutional:       Appearance: She is well-developed.   HENT:      Head: Normocephalic and atraumatic.      Right Ear: External ear normal.      Left Ear: External ear normal.      Nose: Nose normal.   Eyes:      Pupils: Pupils are equal, round, and reactive to light.   Neck:      Thyroid: No thyromegaly.      Vascular: No JVD.      Trachea: No tracheal deviation.   Cardiovascular:      Rate and Rhythm: Normal rate.      Heart sounds: Normal heart sounds. No murmur heard.  Pulmonary:      Effort: Pulmonary effort is normal. No respiratory distress.      Breath sounds: Normal breath sounds. No wheezing or rales.   Chest:      Chest wall: No tenderness.   Abdominal:      General: Bowel sounds are normal. There is no distension.      Palpations: Abdomen is soft. There is no mass.      Tenderness: There is no abdominal tenderness. There is no guarding or rebound.   Musculoskeletal:         General: No tenderness.      Cervical back: Normal range of motion and neck supple.      Comments: Mild TTP AC joint   Distal clavicle riding high    Lymphadenopathy:      Cervical: No cervical adenopathy.   Skin:     General: Skin is warm and dry.      Coloration: Skin is not pale.      Findings: No erythema or rash.   Neurological:       Mental Status: She is alert and oriented to person, place, and time.      Cranial Nerves: No cranial nerve deficit.      Motor: No abnormal muscle tone.      Coordination: Coordination normal.      Deep Tendon Reflexes: Reflexes are normal and symmetric. Reflexes normal.   Psychiatric:         Behavior: Behavior normal.         Thought Content: Thought content normal.         Judgment: Judgment normal.         Assessment:       1. Separation of right acromioclavicular joint, subsequent encounter        Plan:   1. Separation of right acromioclavicular joint, subsequent encounter  -     Ambulatory referral/consult to Orthopedics; Future; Expected date: 12/19/2023    Functional status has improved since last visit. But she is annoyed by the popping sensation in her AC joint   Will refer to ortho per pt request.      No follow-ups on file.

## 2024-01-03 ENCOUNTER — OFFICE VISIT (OUTPATIENT)
Dept: INTERNAL MEDICINE | Facility: CLINIC | Age: 79
End: 2024-01-03
Payer: MEDICARE

## 2024-01-03 VITALS
DIASTOLIC BLOOD PRESSURE: 60 MMHG | WEIGHT: 149.25 LBS | HEART RATE: 66 BPM | RESPIRATION RATE: 18 BRPM | OXYGEN SATURATION: 100 % | BODY MASS INDEX: 29.3 KG/M2 | SYSTOLIC BLOOD PRESSURE: 122 MMHG | HEIGHT: 60 IN

## 2024-01-03 DIAGNOSIS — M25.511 PAIN IN RIGHT ACROMIOCLAVICULAR JOINT: ICD-10-CM

## 2024-01-03 DIAGNOSIS — E78.5 DYSLIPIDEMIA: ICD-10-CM

## 2024-01-03 DIAGNOSIS — R26.9 ABNORMALITY OF GAIT AND MOBILITY: ICD-10-CM

## 2024-01-03 DIAGNOSIS — I70.0 ATHEROSCLEROSIS OF AORTA: ICD-10-CM

## 2024-01-03 DIAGNOSIS — M79.672 CHRONIC PAIN OF BOTH FEET: ICD-10-CM

## 2024-01-03 DIAGNOSIS — M79.671 CHRONIC PAIN OF BOTH FEET: ICD-10-CM

## 2024-01-03 DIAGNOSIS — Z00.00 ENCOUNTER FOR PREVENTIVE HEALTH EXAMINATION: Primary | ICD-10-CM

## 2024-01-03 DIAGNOSIS — I25.10 CORONARY ARTERY DISEASE INVOLVING NATIVE CORONARY ARTERY OF NATIVE HEART WITHOUT ANGINA PECTORIS: ICD-10-CM

## 2024-01-03 DIAGNOSIS — K22.0 SPASM OF THROAT MUSCLE: ICD-10-CM

## 2024-01-03 DIAGNOSIS — G89.29 CHRONIC PAIN OF BOTH FEET: ICD-10-CM

## 2024-01-03 DIAGNOSIS — D69.2 SENILE PURPURA: ICD-10-CM

## 2024-01-03 DIAGNOSIS — I65.23 BILATERAL CAROTID ARTERY STENOSIS: ICD-10-CM

## 2024-01-03 DIAGNOSIS — J96.11 CHRONIC RESPIRATORY FAILURE WITH HYPOXIA: ICD-10-CM

## 2024-01-03 DIAGNOSIS — J43.9 PULMONARY EMPHYSEMA, UNSPECIFIED EMPHYSEMA TYPE: ICD-10-CM

## 2024-01-03 PROBLEM — M87.851 OTHER OSTEONECROSIS, RIGHT FEMUR: Status: ACTIVE | Noted: 2024-01-03

## 2024-01-03 PROCEDURE — G0439 PPPS, SUBSEQ VISIT: HCPCS | Mod: S$GLB,,, | Performed by: NURSE PRACTITIONER

## 2024-01-03 PROCEDURE — 99999 PR PBB SHADOW E&M-EST. PATIENT-LVL V: CPT | Mod: PBBFAC,,, | Performed by: NURSE PRACTITIONER

## 2024-01-03 NOTE — ASSESSMENT & PLAN NOTE
Patient with Hypoxic Respiratory failure which is Chronic.  she is on home oxygen at 2 LPM. Supplemental oxygen was provided and noted- [unfilled].   Signs/symptoms of respiratory failure include- increased work of breathing. Contributing diagnoses includes - COPD Labs and images were reviewed. Patient Has not had a recent ABG. Will treat underlying causes and adjust management of respiratory failure as follows-   Nocturnal O2

## 2024-01-03 NOTE — Clinical Note
Mrs. Mendoza  was seen today for AMW. Health maintenance reviewed.  Overdue health maintenance shows due for influenza, shingles, RSV and covid; she declines at present.  May go to pharmacy for flu and shingrix in future  LA  reviewed; Patient is not using or prescribed any Opioids Exam stable.  ACP documents reviewed and provided.    Thank you,   Kasia JOSÉp_C

## 2024-01-03 NOTE — ASSESSMENT & PLAN NOTE
Lab Results   Component Value Date    WBC 4.47 02/27/2023    HGB 12.2 02/27/2023    HCT 38.2 02/27/2023    MCV 93 02/27/2023     02/27/2023     Stable prevention discussed

## 2024-01-03 NOTE — PATIENT INSTRUCTIONS
Counseling and Referral of Other Preventative  (Italic type indicates deductible and co-insurance are waived)    Patient Name: Megha Mendoza  Today's Date: 1/3/2024    Health Maintenance       Date Due Completion Date    Shingles Vaccine (1 of 2) Never done ---    RSV Vaccine (Age 60+ and Pregnant patients) (1 - 1-dose 60+ series) Never done ---    Influenza Vaccine (1) 09/01/2023 2/2/2021    COVID-19 Vaccine (3 - 2023-24 season) 09/01/2023 1/31/2021    Lipid Panel 05/09/2024 5/9/2023    Aspirin/Antiplatelet Therapy 12/12/2024 12/12/2023    DEXA Scan 09/06/2026 9/6/2023    TETANUS VACCINE 02/01/2028 2/1/2018        No orders of the defined types were placed in this encounter.    The following information is provided to all patients.  This information is to help you find resources for any of the problems found today that may be affecting your health:                  Living healthy guide: www.[facility].[place].gov      Understanding Diabetes: www.diabetes.org      Eating healthy: www.cdc.gov/healthyweight      [facility] [place] home safety checklist: www.cdc.gov/steadi/patient.html      Agency on Aging: www.goea.[place].gov      Alcoholics anonymous (AA): www.aa.org      Physical Activity: www.bernice.nih.gov/jo8weqq      Tobacco use: www.quitwithusla.org

## 2024-01-03 NOTE — ASSESSMENT & PLAN NOTE
Lab Results   Component Value Date    LDLCALC 69.8 02/27/2023     Stable with Rx    Was doing T-25 work out, now feels heart is \"heavy\". Worked out a few days ago.

## 2024-01-03 NOTE — PROGRESS NOTES
"  Megha Mendoza presented for a  Medicare AWV and comprehensive Health Risk Assessment today. The following components were reviewed and updated:    Medical history  Family History  Social history  Allergies and Current Medications  Health Risk Assessment  Health Maintenance  Care Team         ** See Completed Assessments for Annual Wellness Visit within the encounter summary.**         The following assessments were completed:  Living Situation  CAGE  Depression Screening  Timed Get Up and Go  Whisper Test  Cognitive Function Screening  Nutrition Screening  ADL Screening  PAQ Screening        Vitals:    01/03/24 1436   BP: 122/60   Pulse: 66   Resp: 18   SpO2: 100%   Weight: 67.7 kg (149 lb 4 oz)   Height: 4' 11.5" (1.511 m)     Body mass index is 29.64 kg/m².  Physical Exam  Vitals reviewed.   Constitutional:       General: She is not in acute distress.     Appearance: Normal appearance. She is not ill-appearing, toxic-appearing or diaphoretic.   Neck:      Vascular: No carotid bruit.   Cardiovascular:      Rate and Rhythm: Regular rhythm.      Heart sounds: Normal heart sounds. No murmur heard.  Pulmonary:      Effort: Pulmonary effort is normal. No respiratory distress.      Breath sounds: Normal breath sounds. No stridor. No wheezing, rhonchi or rales.   Chest:      Chest wall: No tenderness.   Musculoskeletal:         General: Tenderness and signs of injury present. No swelling or deformity.      Right shoulder: Tenderness and bony tenderness present. No swelling or deformity. Decreased range of motion.        Arms:       Cervical back: Normal range of motion and neck supple. No rigidity or tenderness.      Right lower leg: No edema.      Left lower leg: No edema.   Lymphadenopathy:      Cervical: No cervical adenopathy.   Skin:     General: Skin is warm and dry.      Capillary Refill: Capillary refill takes less than 2 seconds.      Coloration: Skin is not jaundiced or pale.      Findings: Bruising (see " picture , right hand) present. No erythema, lesion or rash.   Neurological:      General: No focal deficit present.      Mental Status: She is alert.               Diagnoses and health risks identified today and associated recommendations/orders:    1. Encounter for preventive health examination  Mrs. Mendoza  was seen today for AMW. Health maintenance reviewed.   Overdue health maintenance shows due for influenza, shingles, RSV and covid; she declines at present.  May go to pharmacy for flu and shingrix in future   LA  reviewed; Patient is not using or prescribed any Opioids  Exam stable.   ACP documents reviewed and provided.        2. Coronary artery disease involving native coronary artery of native heart without angina pectoris  Overview:  Mild CAD 2015 per cardiology   Statin  Lab Results   Component Value Date    LDLCALC 57.6 (L) 02/02/2022   Per cardiology - No significant reversible defect on perfusion study today December 2023.   ASA          3. Atherosclerosis of aorta  Overview:  Seen on CTA 5/2023   On statin, ASA      Assessment & Plan:  Stable with Rx       4. Bilateral carotid artery stenosis  Overview:  5/9/23 Carotid US per [facility], Dr. Day   Carotid Ultrasound 1.The study quality is average. 2.40-59% stenosis in the mid right internal carotid artery based on Bluth Criteria. 3.40-59% stenosis in the proximal left internal carotid artery based on Bluth Criteria. 4.Antegrade right vertebral artery flow. Antegrade left vertebral artery flow.     Monitored per Dr. Day q 6mo  Statin   aspirin  On statin and ASA       Assessment & Plan:  Stable with Rx       5. Chronic respiratory failure with hypoxia  Overview:  Dx and managed per Dr. Crane  Home o2     Assessment & Plan:  Patient with Hypoxic Respiratory failure which is Chronic.  she is on home oxygen at 2 LPM. Supplemental oxygen was provided and noted- [unfilled].   Signs/symptoms of respiratory failure include- increased work of breathing.  Contributing diagnoses includes - COPD Labs and images were reviewed. Patient Has not had a recent ABG. Will treat underlying causes and adjust management of respiratory failure as follows-   Nocturnal O2       6. Pulmonary emphysema, unspecified emphysema type  Overview:  PFTs in 2014 per Dr. Crane showed moderate obstruction   5/11/23 Ct of chest ; Centrilobular emphysematous disease   COPD with chronic dyspnea status post COVID infection November 2021.   ccasional increased dyspnea on exertion. Followed by Dr. Crane. Abn D-dimer CT May 2023 is negative for PE. Emphysema present. Small nodules. Now on home oxygen at night.   symbicort   Albuterol nebulizer     Assessment & Plan:  Stable with nocturnal O2, inhaler rx   No acute symptoms       7. Dyslipidemia  Overview:  Chronic   Rosuvastatin,   Omega 3 fish oil   Lab Results   Component Value Date    CHOL 177 05/09/2023    CHOL 135 02/27/2023    CHOL 154 02/02/2022     Lab Results   Component Value Date    HDL 92 (A) 05/09/2023    HDL 52 02/27/2023    HDL 73 02/02/2022     Lab Results   Component Value Date    LDLCALC 69.8 02/27/2023    LDLCALC 57.6 (L) 02/02/2022    LDLCALC 56.6 (L) 02/02/2021     Lab Results   Component Value Date    TRIG 76 05/09/2023    TRIG 66 02/27/2023    TRIG 117 02/02/2022     Lab Results   Component Value Date    CHOLHDL 38.5 02/27/2023    CHOLHDL 47.4 02/02/2022    CHOLHDL 48.4 02/02/2021     Continue statin     Assessment & Plan:  Lab Results   Component Value Date    LDLCALC 69.8 02/27/2023     Stable with Rx       8. Abnormality of gait and mobility  Overview:  She noted difficulty with ambulating and climbing stairs due to bilaeral foot pain  She ambulates independently     Assessment & Plan:  Stable ambulation on exm       9. Spasm of throat muscle  Overview:  Long hx of dysphagia, spasm  + hiatal hernia post surgery   Following with GI   Options dicussed to have surgery vs amitriptyline       Assessment & Plan:  Planned for f/u with  GI  Eating small portions  Has been stable , no weight loss       10. Pain in right acromioclavicular joint  Overview:  S/p fall   Following with ortho      Assessment & Plan:  Planned for MRI and F/U   Continue tylenol       11. Chronic pain of both feet  Overview:  Notes she has chronic pain to both feet from ? Raynauds and Mortons neuroma  Has seen podiatry   States she has tried gabapentin in the past but the only thing that makes pain bearable is tylenol 2 tablets twice daily     Assessment & Plan:  Stable with Tylenol       12. Senile purpura  Overview:  Notes easily bruises;       Assessment & Plan:  Lab Results   Component Value Date    WBC 4.47 02/27/2023    HGB 12.2 02/27/2023    HCT 38.2 02/27/2023    MCV 93 02/27/2023     02/27/2023     Stable prevention discussed              Provided Megha with a 5-10 year written screening schedule and personal prevention plan. Recommendations were developed using the USPSTF age appropriate recommendations. Education, counseling, and referrals were provided as needed. After Visit Summary printed and given to patient which includes a list of additional screenings\tests needed.    Follow up in about 1 year (around 1/3/2025).    Kasia Turcios, CARLOSI offered to discuss advanced care planning, including how to pick a person who would make decisions for you if you were unable to make them for yourself, called a health care power of , and what kind of decisions you might make such as use of life sustaining treatments such as ventilators and tube feeding when faced with a life limiting illness recorded on a living will that they will need to know. (How you want to be cared for as you near the end of your natural life)     X Patient is interested in learning more about how to make advanced directives.  I provided them paperwork and offered to discuss this with them.

## 2024-01-22 ENCOUNTER — TELEPHONE (OUTPATIENT)
Dept: FAMILY MEDICINE | Facility: CLINIC | Age: 79
End: 2024-01-22

## 2024-01-23 NOTE — TELEPHONE ENCOUNTER
----- Message from Summer Terrell RN sent at 1/22/2024  9:56 AM CST -----  Dr GARCIA;  Mrs Cleveland's prolia therapy plan needs re-signing.  Her labs are up to date and wnl and her dexa was done ib Aug with good results.  Thanks,  Summer

## 2024-01-31 ENCOUNTER — INFUSION (OUTPATIENT)
Dept: INFUSION THERAPY | Facility: HOSPITAL | Age: 79
End: 2024-01-31
Attending: FAMILY MEDICINE
Payer: MEDICARE

## 2024-01-31 VITALS
TEMPERATURE: 97 F | HEART RATE: 79 BPM | RESPIRATION RATE: 20 BRPM | DIASTOLIC BLOOD PRESSURE: 44 MMHG | BODY MASS INDEX: 29.35 KG/M2 | HEIGHT: 60 IN | SYSTOLIC BLOOD PRESSURE: 136 MMHG | WEIGHT: 149.5 LBS

## 2024-01-31 DIAGNOSIS — M85.80 OSTEOPENIA, UNSPECIFIED LOCATION: Primary | ICD-10-CM

## 2024-01-31 PROCEDURE — 63600175 PHARM REV CODE 636 W HCPCS: Mod: JZ,TB | Performed by: FAMILY MEDICINE

## 2024-01-31 PROCEDURE — 96372 THER/PROPH/DIAG INJ SC/IM: CPT

## 2024-01-31 RX ADMIN — DENOSUMAB 60 MG: 60 INJECTION SUBCUTANEOUS at 09:01

## 2024-02-29 ENCOUNTER — CLINICAL SUPPORT (OUTPATIENT)
Dept: FAMILY MEDICINE | Facility: CLINIC | Age: 79
End: 2024-02-29
Payer: MEDICARE

## 2024-02-29 ENCOUNTER — OFFICE VISIT (OUTPATIENT)
Dept: FAMILY MEDICINE | Facility: CLINIC | Age: 79
End: 2024-02-29
Payer: MEDICARE

## 2024-02-29 VITALS
HEART RATE: 66 BPM | BODY MASS INDEX: 29.39 KG/M2 | OXYGEN SATURATION: 94 % | DIASTOLIC BLOOD PRESSURE: 72 MMHG | SYSTOLIC BLOOD PRESSURE: 120 MMHG | WEIGHT: 149.69 LBS | HEIGHT: 60 IN | RESPIRATION RATE: 16 BRPM

## 2024-02-29 DIAGNOSIS — M85.80 OSTEOPENIA, UNSPECIFIED LOCATION: ICD-10-CM

## 2024-02-29 DIAGNOSIS — D69.2 SENILE PURPURA: ICD-10-CM

## 2024-02-29 DIAGNOSIS — N39.0 URINARY TRACT INFECTION WITH HEMATURIA, SITE UNSPECIFIED: ICD-10-CM

## 2024-02-29 DIAGNOSIS — I70.0 ATHEROSCLEROSIS OF AORTA: Primary | ICD-10-CM

## 2024-02-29 DIAGNOSIS — J43.9 PULMONARY EMPHYSEMA, UNSPECIFIED EMPHYSEMA TYPE: ICD-10-CM

## 2024-02-29 DIAGNOSIS — K21.9 GASTROESOPHAGEAL REFLUX DISEASE WITHOUT ESOPHAGITIS: Chronic | ICD-10-CM

## 2024-02-29 DIAGNOSIS — R39.15 URINARY URGENCY: ICD-10-CM

## 2024-02-29 DIAGNOSIS — R31.9 URINARY TRACT INFECTION WITH HEMATURIA, SITE UNSPECIFIED: Primary | ICD-10-CM

## 2024-02-29 DIAGNOSIS — N39.46 MIXED STRESS AND URGE INCONTINENCE: ICD-10-CM

## 2024-02-29 DIAGNOSIS — R26.9 ABNORMALITY OF GAIT AND MOBILITY: ICD-10-CM

## 2024-02-29 DIAGNOSIS — I65.23 BILATERAL CAROTID ARTERY STENOSIS: ICD-10-CM

## 2024-02-29 DIAGNOSIS — J44.89 OBSTRUCTIVE CHRONIC BRONCHITIS WITHOUT EXACERBATION: ICD-10-CM

## 2024-02-29 DIAGNOSIS — M81.0 OSTEOPOROSIS, POSTMENOPAUSAL: ICD-10-CM

## 2024-02-29 DIAGNOSIS — R31.9 URINARY TRACT INFECTION WITH HEMATURIA, SITE UNSPECIFIED: ICD-10-CM

## 2024-02-29 DIAGNOSIS — J96.11 CHRONIC RESPIRATORY FAILURE WITH HYPOXIA: ICD-10-CM

## 2024-02-29 DIAGNOSIS — J41.0 SIMPLE CHRONIC BRONCHITIS: ICD-10-CM

## 2024-02-29 DIAGNOSIS — N39.0 URINARY TRACT INFECTION WITH HEMATURIA, SITE UNSPECIFIED: Primary | ICD-10-CM

## 2024-02-29 DIAGNOSIS — K22.0 SPASM OF THROAT MUSCLE: ICD-10-CM

## 2024-02-29 DIAGNOSIS — E78.5 DYSLIPIDEMIA: ICD-10-CM

## 2024-02-29 PROBLEM — M87.851 OTHER OSTEONECROSIS, RIGHT FEMUR: Status: RESOLVED | Noted: 2024-01-03 | Resolved: 2024-02-29

## 2024-02-29 PROBLEM — R42 POSTURAL DIZZINESS WITH PRESYNCOPE: Status: RESOLVED | Noted: 2023-06-13 | Resolved: 2024-02-29

## 2024-02-29 PROBLEM — R55 POSTURAL DIZZINESS WITH PRESYNCOPE: Status: RESOLVED | Noted: 2023-06-13 | Resolved: 2024-02-29

## 2024-02-29 LAB
BACTERIA SPEC CULT: NORMAL /HPF
BILIRUB SERPL-MCNC: NORMAL MG/DL
BLOOD URINE, POC: NORMAL
CASTS: NORMAL
COLOR, POC UA: YELLOW
CRYSTALS: NORMAL
GLUCOSE UR QL STRIP: NORMAL
KETONES UR QL STRIP: NORMAL
LEUKOCYTE ESTERASE URINE, POC: NORMAL
NITRITE, POC UA: NORMAL
PH, POC UA: 7
PROTEIN, POC: NORMAL
RBC CELLS COUNTED: NORMAL
SPECIFIC GRAVITY, POC UA: 1.02
UROBILINOGEN, POC UA: 1
WHITE BLOOD CELLS: NORMAL

## 2024-02-29 PROCEDURE — 99999 PR PBB SHADOW E&M-EST. PATIENT-LVL IV: CPT | Mod: PBBFAC,,, | Performed by: FAMILY MEDICINE

## 2024-02-29 PROCEDURE — 99214 OFFICE O/P EST MOD 30 MIN: CPT | Mod: S$GLB,,, | Performed by: FAMILY MEDICINE

## 2024-02-29 PROCEDURE — 81000 URINALYSIS NONAUTO W/SCOPE: CPT | Mod: S$GLB,,, | Performed by: FAMILY MEDICINE

## 2024-02-29 RX ORDER — OXYBUTYNIN CHLORIDE 10 MG/1
10 TABLET, EXTENDED RELEASE ORAL DAILY
Qty: 30 TABLET | Refills: 11 | Status: SHIPPED | OUTPATIENT
Start: 2024-02-29 | End: 2025-02-28

## 2024-02-29 RX ORDER — BUDESONIDE AND FORMOTEROL FUMARATE DIHYDRATE 80; 4.5 UG/1; UG/1
AEROSOL RESPIRATORY (INHALATION)
Qty: 30.6 G | Refills: 1 | Status: SHIPPED | OUTPATIENT
Start: 2024-02-29

## 2024-02-29 RX ORDER — CYCLOBENZAPRINE HCL 10 MG
10 TABLET ORAL NIGHTLY
Qty: 30 TABLET | Refills: 0 | Status: CANCELLED | OUTPATIENT
Start: 2024-02-29

## 2024-02-29 RX ORDER — DENOSUMAB 60 MG/ML
60 INJECTION SUBCUTANEOUS
Qty: 1 EACH | Refills: 1 | Status: CANCELLED | OUTPATIENT
Start: 2024-02-29

## 2024-02-29 NOTE — PROGRESS NOTES
Subjective:       Patient ID: Megha Mendoza is a 78 y.o. female.    Chief Complaint: Follow-up (Pt here for 6 mth f/u. )    Pt is a 78 y.o. female who presents for evaluation and management of   Encounter Diagnoses   Name Primary?    Osteoporosis, postmenopausal     Urinary tract infection with hematuria, site unspecified     Atherosclerosis of aorta Yes    Abnormality of gait and mobility     Bilateral carotid artery stenosis     Simple chronic bronchitis     Chronic respiratory failure with hypoxia     Dyslipidemia     Pulmonary emphysema, unspecified emphysema type     Gastroesophageal reflux disease without esophagitis     Obstructive chronic bronchitis without exacerbation     Osteopenia, unspecified location     Spasm of throat muscle     Senile purpura     Urinary urgency     Mixed stress and urge incontinence    .  Doing well on current meds. Denies any side effects. Prevention is up to date.    Review of Systems   Constitutional:  Negative for chills and fever.   Respiratory:  Negative for shortness of breath.    Cardiovascular:  Negative for chest pain and palpitations.   Gastrointestinal:  Negative for abdominal pain, blood in stool, constipation and nausea.   Genitourinary:  Negative for difficulty urinating.   Psychiatric/Behavioral:  Negative for dysphoric mood, sleep disturbance and suicidal ideas. The patient is not nervous/anxious.        Objective:      Physical Exam  Constitutional:       Appearance: She is well-developed.   HENT:      Head: Normocephalic and atraumatic.      Right Ear: External ear normal.      Left Ear: External ear normal.      Nose: Nose normal.   Eyes:      Pupils: Pupils are equal, round, and reactive to light.   Neck:      Thyroid: No thyromegaly.      Vascular: No JVD.      Trachea: No tracheal deviation.   Cardiovascular:      Rate and Rhythm: Normal rate.      Heart sounds: Normal heart sounds. No murmur heard.  Pulmonary:      Effort: Pulmonary effort is normal. No  respiratory distress.      Breath sounds: Normal breath sounds. No wheezing or rales.   Chest:      Chest wall: No tenderness.   Abdominal:      General: Bowel sounds are normal. There is no distension.      Palpations: Abdomen is soft. There is no mass.      Tenderness: There is no abdominal tenderness. There is no guarding or rebound.   Musculoskeletal:         General: No tenderness. Normal range of motion.      Cervical back: Normal range of motion and neck supple.   Lymphadenopathy:      Cervical: No cervical adenopathy.   Skin:     General: Skin is warm and dry.      Coloration: Skin is not pale.      Findings: No erythema or rash.   Neurological:      Mental Status: She is alert and oriented to person, place, and time.      Cranial Nerves: No cranial nerve deficit.      Motor: No abnormal muscle tone.      Coordination: Coordination normal.      Deep Tendon Reflexes: Reflexes are normal and symmetric. Reflexes normal.   Psychiatric:         Behavior: Behavior normal.         Thought Content: Thought content normal.         Judgment: Judgment normal.         Assessment:       1. Atherosclerosis of aorta    2. Osteoporosis, postmenopausal    3. Urinary tract infection with hematuria, site unspecified    4. Abnormality of gait and mobility    5. Bilateral carotid artery stenosis    6. Simple chronic bronchitis    7. Chronic respiratory failure with hypoxia    8. Dyslipidemia    9. Pulmonary emphysema, unspecified emphysema type    10. Gastroesophageal reflux disease without esophagitis    11. Obstructive chronic bronchitis without exacerbation    12. Osteopenia, unspecified location    13. Spasm of throat muscle    14. Senile purpura    15. Urinary urgency    16. Mixed stress and urge incontinence        Plan:   1. Atherosclerosis of aorta  Overview:  Seen on CTA 5/2023   On statin, ASA        2. Osteoporosis, postmenopausal    3. Urinary tract infection with hematuria, site unspecified    4. Abnormality of gait  and mobility  Overview:  She noted difficulty with ambulating and climbing stairs due to bilaeral foot pain  She ambulates independently       5. Bilateral carotid artery stenosis  Overview:  5/9/23 Carotid US per [facility], Dr. Day   Carotid Ultrasound 1.The study quality is average. 2.40-59% stenosis in the mid right internal carotid artery based on Bluth Criteria. 3.40-59% stenosis in the proximal left internal carotid artery based on Bluth Criteria. 4.Antegrade right vertebral artery flow. Antegrade left vertebral artery flow.     Monitored per Dr. Day q 6mo  Statin   aspirin  On statin and ASA         6. Simple chronic bronchitis  -     SYMBICORT 80-4.5 mcg/actuation HFAA; INHALE 2 PUFFS BY MOUTH TWICE DAILY .  CONTROLLER  Dispense: 30.6 g; Refill: 1    7. Chronic respiratory failure with hypoxia  Overview:  Dx and managed per Dr. Crane  Home o2       8. Dyslipidemia  Overview:  Chronic   Rosuvastatin,   Omega 3 fish oil   Lab Results   Component Value Date    CHOL 177 05/09/2023    CHOL 135 02/27/2023    CHOL 154 02/02/2022     Lab Results   Component Value Date    HDL 92 (A) 05/09/2023    HDL 52 02/27/2023    HDL 73 02/02/2022     Lab Results   Component Value Date    LDLCALC 69.8 02/27/2023    LDLCALC 57.6 (L) 02/02/2022    LDLCALC 56.6 (L) 02/02/2021     Lab Results   Component Value Date    TRIG 76 05/09/2023    TRIG 66 02/27/2023    TRIG 117 02/02/2022     Lab Results   Component Value Date    CHOLHDL 38.5 02/27/2023    CHOLHDL 47.4 02/02/2022    CHOLHDL 48.4 02/02/2021     Continue statin       9. Pulmonary emphysema, unspecified emphysema type  Overview:  PFTs in 2014 per Dr. Crane showed moderate obstruction   5/11/23 Ct of chest ; Centrilobular emphysematous disease   COPD with chronic dyspnea status post COVID infection November 2021.   ccasional increased dyspnea on exertion. Followed by Dr. Crane. Abn D-dimer CT May 2023 is negative for PE. Emphysema present. Small nodules. Now on home oxygen at night.    symbicort   Albuterol nebulizer       10. Gastroesophageal reflux disease without esophagitis  Overview:  On PPI   Can't take bisphosphonate for osteo   Large hiatal hernia. Possible surgery being considered  Has some dysphagia as well---GI has her on PPI and high dose H2 blocker. Dilation would not be helpful        11. Obstructive chronic bronchitis without exacerbation  Overview:  Sees Dr. Crane   She is on home O2 for nighttime       12. Osteopenia, unspecified location  Overview:  Originally osteoporosis. Continue prolia       13. Spasm of throat muscle  Overview:  Long hx of dysphagia, spasm  + hiatal hernia post surgery   Following with GI   Options dicussed to have surgery vs amitriptyline         14. Senile purpura  Overview:  Notes easily bruises; ok top continue baby ASA           15. Urinary urgency  -     POCT urinalysis, dipstick or tablet reag; Future; Expected date: 02/29/2024  -     POCT URINE SEDIMENT EXAM; Future; Expected date: 02/29/2024    16. Mixed stress and urge incontinence  Overview:  She has failed Kegel exercises   Trial of ditropan    Consider referral to Uro Gyn     Orders:  -     oxybutynin (DITROPAN-XL) 10 MG 24 hr tablet; Take 1 tablet (10 mg total) by mouth once daily.  Dispense: 30 tablet; Refill: 11      No follow-ups on file.

## 2024-03-12 LAB
CHOLEST SERPL-MSCNC: 146 MG/DL (ref 0–200)
CHOLEST/HDLC SERPL: 2.2 {RATIO}
HDLC SERPL-MCNC: 66 MG/DL (ref 35–70)
LDL CHOLESTEROL DIRECT: 60 MG/DL
NON HDL CHOL. (LDL+VLDL): 80
TRIGL SERPL-MCNC: 106 MG/DL (ref 40–160)
VLDL CHOLESTEROL: 21 MG/DL

## 2024-04-05 ENCOUNTER — HOSPITAL ENCOUNTER (EMERGENCY)
Facility: HOSPITAL | Age: 79
Discharge: LEFT BEFORE DISCHARGE | End: 2024-04-05
Attending: STUDENT IN AN ORGANIZED HEALTH CARE EDUCATION/TRAINING PROGRAM
Payer: MEDICARE

## 2024-04-05 ENCOUNTER — ANESTHESIA (OUTPATIENT)
Dept: ENDOSCOPY | Facility: HOSPITAL | Age: 79
End: 2024-04-05
Payer: MEDICARE

## 2024-04-05 ENCOUNTER — HOSPITAL ENCOUNTER (EMERGENCY)
Facility: HOSPITAL | Age: 79
Discharge: HOME | End: 2024-04-06
Attending: EMERGENCY MEDICINE
Payer: MEDICARE

## 2024-04-05 ENCOUNTER — ANESTHESIA EVENT (OUTPATIENT)
Dept: ENDOSCOPY | Facility: HOSPITAL | Age: 79
End: 2024-04-05
Payer: MEDICARE

## 2024-04-05 VITALS
HEIGHT: 60 IN | WEIGHT: 142.5 LBS | TEMPERATURE: 98 F | DIASTOLIC BLOOD PRESSURE: 64 MMHG | BODY MASS INDEX: 27.98 KG/M2 | SYSTOLIC BLOOD PRESSURE: 150 MMHG | RESPIRATION RATE: 16 BRPM | OXYGEN SATURATION: 94 % | HEART RATE: 64 BPM

## 2024-04-05 DIAGNOSIS — K21.9 GASTROESOPHAGEAL REFLUX DISEASE WITHOUT ESOPHAGITIS: Chronic | ICD-10-CM

## 2024-04-05 DIAGNOSIS — R13.10 DYSPHAGIA, UNSPECIFIED TYPE: Primary | ICD-10-CM

## 2024-04-05 DIAGNOSIS — T18.128A FOOD IMPACTION OF ESOPHAGUS, INITIAL ENCOUNTER: Primary | ICD-10-CM

## 2024-04-05 DIAGNOSIS — R07.9 CHEST PAIN: ICD-10-CM
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
## 2024-04-05 LAB
ALBUMIN SERPL BCP-MCNC: 4.4 G/DL (ref 3.5–5.2)
ALP SERPL-CCNC: 35 U/L (ref 55–135)
ALT SERPL W/O P-5'-P-CCNC: 18 U/L (ref 10–44)
ANION GAP SERPL CALC-SCNC: 13 MMOL/L (ref 8–16)
AST SERPL-CCNC: 26 U/L (ref 10–40)
BASOPHILS # BLD AUTO: 0.02 K/UL (ref 0–0.2)
BASOPHILS NFR BLD: 0.4 % (ref 0–1.9)
BILIRUB SERPL-MCNC: 0.6 MG/DL (ref 0.1–1)
BUN SERPL-MCNC: 26 MG/DL (ref 8–23)
CALCIUM SERPL-MCNC: 9.8 MG/DL (ref 8.7–10.5)
CHLORIDE SERPL-SCNC: 106 MMOL/L (ref 95–110)
CO2 SERPL-SCNC: 22 MMOL/L (ref 23–29)
CREAT SERPL-MCNC: 0.9 MG/DL (ref 0.5–1.4)
DIFFERENTIAL METHOD BLD: NORMAL
EOSINOPHIL # BLD AUTO: 0.1 K/UL (ref 0–0.5)
EOSINOPHIL NFR BLD: 1.4 % (ref 0–8)
ERYTHROCYTE [DISTWIDTH] IN BLOOD BY AUTOMATED COUNT: 12.9 % (ref 11.5–14.5)
EST. GFR  (NO RACE VARIABLE): >60 ML/MIN/1.73 M^2
GLUCOSE SERPL-MCNC: 64 MG/DL (ref 70–110)
HCT VFR BLD AUTO: 41.9 % (ref 37–48.5)
HGB BLD-MCNC: 13.7 G/DL (ref 12–16)
IMM GRANULOCYTES # BLD AUTO: 0.01 K/UL (ref 0–0.04)
IMM GRANULOCYTES NFR BLD AUTO: 0.2 % (ref 0–0.5)
LIPASE SERPL-CCNC: 23 U/L (ref 4–60)
LYMPHOCYTES # BLD AUTO: 1.5 K/UL (ref 1–4.8)
LYMPHOCYTES NFR BLD: 25.8 % (ref 18–48)
MCH RBC QN AUTO: 30.9 PG (ref 27–31)
MCHC RBC AUTO-ENTMCNC: 32.7 G/DL (ref 32–36)
MCV RBC AUTO: 95 FL (ref 82–98)
MONOCYTES # BLD AUTO: 0.4 K/UL (ref 0.3–1)
MONOCYTES NFR BLD: 7.4 % (ref 4–15)
NEUTROPHILS # BLD AUTO: 3.7 K/UL (ref 1.8–7.7)
NEUTROPHILS NFR BLD: 64.8 % (ref 38–73)
NRBC BLD-RTO: 0 /100 WBC
OHS QRS DURATION: 76 MS
OHS QTC CALCULATION: 428 MS
PLATELET # BLD AUTO: 221 K/UL (ref 150–450)
PMV BLD AUTO: 9.5 FL (ref 9.2–12.9)
POCT GLUCOSE: 70 MG/DL (ref 70–110)
POCT GLUCOSE: 72 MG/DL (ref 70–110)
POTASSIUM SERPL-SCNC: 4 MMOL/L (ref 3.5–5.1)
PROT SERPL-MCNC: 7.8 G/DL (ref 6–8.4)
RBC # BLD AUTO: 4.43 M/UL (ref 4–5.4)
SODIUM SERPL-SCNC: 141 MMOL/L (ref 136–145)
TROPONIN I SERPL DL<=0.01 NG/ML-MCNC: <0.006 NG/ML (ref 0–0.03)
WBC # BLD AUTO: 5.65 K/UL (ref 3.9–12.7)

## 2024-04-05 PROCEDURE — 82962 GLUCOSE BLOOD TEST: CPT

## 2024-04-05 PROCEDURE — 85025 COMPLETE CBC W/AUTO DIFF WBC: CPT | Performed by: STUDENT IN AN ORGANIZED HEALTH CARE EDUCATION/TRAINING PROGRAM

## 2024-04-05 PROCEDURE — 99285 EMERGENCY DEPT VISIT HI MDM: CPT | Mod: 25

## 2024-04-05 PROCEDURE — 96374 THER/PROPH/DIAG INJ IV PUSH: CPT

## 2024-04-05 PROCEDURE — 25000003 PHARM REV CODE 250: Performed by: STUDENT IN AN ORGANIZED HEALTH CARE EDUCATION/TRAINING PROGRAM

## 2024-04-05 PROCEDURE — 80053 COMPREHEN METABOLIC PANEL: CPT | Performed by: STUDENT IN AN ORGANIZED HEALTH CARE EDUCATION/TRAINING PROGRAM

## 2024-04-05 PROCEDURE — 63600175 PHARM REV CODE 636 W HCPCS: Mod: JZ,TB | Performed by: STUDENT IN AN ORGANIZED HEALTH CARE EDUCATION/TRAINING PROGRAM

## 2024-04-05 PROCEDURE — 37000008 HC ANESTHESIA 1ST 15 MINUTES: Performed by: STUDENT IN AN ORGANIZED HEALTH CARE EDUCATION/TRAINING PROGRAM

## 2024-04-05 PROCEDURE — 27201042 HC RETRIEVAL NET: Performed by: STUDENT IN AN ORGANIZED HEALTH CARE EDUCATION/TRAINING PROGRAM

## 2024-04-05 PROCEDURE — 27202303 HC GRASPER, SPECIALTY: Performed by: STUDENT IN AN ORGANIZED HEALTH CARE EDUCATION/TRAINING PROGRAM

## 2024-04-05 PROCEDURE — 96372 THER/PROPH/DIAG INJ SC/IM: CPT | Mod: 59 | Performed by: STUDENT IN AN ORGANIZED HEALTH CARE EDUCATION/TRAINING PROGRAM

## 2024-04-05 PROCEDURE — 83690 ASSAY OF LIPASE: CPT | Performed by: STUDENT IN AN ORGANIZED HEALTH CARE EDUCATION/TRAINING PROGRAM

## 2024-04-05 PROCEDURE — 27201702 HC BASKET, RETRIEVAL/LITHOTRIPTOR: Performed by: STUDENT IN AN ORGANIZED HEALTH CARE EDUCATION/TRAINING PROGRAM

## 2024-04-05 PROCEDURE — 99285 EMERGENCY DEPT VISIT HI MDM: CPT | Mod: 25,27

## 2024-04-05 PROCEDURE — 37000009 HC ANESTHESIA EA ADD 15 MINS: Performed by: STUDENT IN AN ORGANIZED HEALTH CARE EDUCATION/TRAINING PROGRAM

## 2024-04-05 PROCEDURE — 93005 ELECTROCARDIOGRAM TRACING: CPT

## 2024-04-05 PROCEDURE — 63600175 PHARM REV CODE 636 W HCPCS: Performed by: STUDENT IN AN ORGANIZED HEALTH CARE EDUCATION/TRAINING PROGRAM

## 2024-04-05 PROCEDURE — 84484 ASSAY OF TROPONIN QUANT: CPT | Performed by: STUDENT IN AN ORGANIZED HEALTH CARE EDUCATION/TRAINING PROGRAM

## 2024-04-05 PROCEDURE — 93010 ELECTROCARDIOGRAM REPORT: CPT | Mod: ,,, | Performed by: INTERNAL MEDICINE

## 2024-04-05 PROCEDURE — 43247 EGD REMOVE FOREIGN BODY: CPT | Performed by: STUDENT IN AN ORGANIZED HEALTH CARE EDUCATION/TRAINING PROGRAM

## 2024-04-05 PROCEDURE — D9220A PRA ANESTHESIA: Mod: ANES,,, | Performed by: STUDENT IN AN ORGANIZED HEALTH CARE EDUCATION/TRAINING PROGRAM

## 2024-04-05 PROCEDURE — 96361 HYDRATE IV INFUSION ADD-ON: CPT

## 2024-04-05 PROCEDURE — 27201089 HC SNARE, DISP (ANY): Performed by: STUDENT IN AN ORGANIZED HEALTH CARE EDUCATION/TRAINING PROGRAM

## 2024-04-05 PROCEDURE — D9220A PRA ANESTHESIA: Mod: CRNA,,, | Performed by: NURSE ANESTHETIST, CERTIFIED REGISTERED

## 2024-04-05 RX ORDER — METOCLOPRAMIDE HYDROCHLORIDE 5 MG/ML
10 INJECTION INTRAMUSCULAR; INTRAVENOUS
Status: COMPLETED | OUTPATIENT
Start: 2024-04-05 | End: 2024-04-05

## 2024-04-05 RX ORDER — GLUCAGON 1 MG
1 KIT INJECTION
Status: COMPLETED | OUTPATIENT
Start: 2024-04-05 | End: 2024-04-05

## 2024-04-05 RX ORDER — DEXTROSE MONOHYDRATE AND SODIUM CHLORIDE 5; .9 G/100ML; G/100ML
1000 INJECTION, SOLUTION INTRAVENOUS CONTINUOUS
Status: DISCONTINUED | OUTPATIENT
Start: 2024-04-05 | End: 2024-04-05 | Stop reason: HOSPADM

## 2024-04-05 RX ADMIN — SODIUM CHLORIDE 1000 ML: 9 INJECTION, SOLUTION INTRAVENOUS at 12:04

## 2024-04-05 RX ADMIN — LIDOCAINE HYDROCHLORIDE 100 MG: 20 INJECTION, SOLUTION EPIDURAL; INFILTRATION; INTRACAUDAL; PERINEURAL at 11:04

## 2024-04-05 RX ADMIN — METOCLOPRAMIDE 10 MG: 5 INJECTION, SOLUTION INTRAMUSCULAR; INTRAVENOUS at 12:04

## 2024-04-05 RX ADMIN — GLUCAGON HYDROCHLORIDE 1 MG: KIT at 12:04

## 2024-04-05 RX ADMIN — PROPOFOL 140 MG: 10 INJECTION, EMULSION INTRAVENOUS at 11:04

## 2024-04-05 RX ADMIN — SUCCINYLCHOLINE CHLORIDE 140 MG: 20 INJECTION, SOLUTION INTRAMUSCULAR; INTRAVENOUS at 11:04

## 2024-04-05 RX ADMIN — SODIUM CHLORIDE, SODIUM LACTATE, POTASSIUM CHLORIDE, AND CALCIUM CHLORIDE: .6; .31; .03; .02 INJECTION, SOLUTION INTRAVENOUS at 11:04

## 2024-04-05 RX ADMIN — DEXTROSE AND SODIUM CHLORIDE 1000 ML: 5; 900 INJECTION, SOLUTION INTRAVENOUS at 02:04

## 2024-04-05 NOTE — ED NOTES
Patient given a very small sip of water . Patient could not tolerate . Patient stated she feels like the water is stuck and not going down.DR. CHAMPAGNE NOTIFIED

## 2024-04-05 NOTE — ED NOTES
RECEIVED CALL FROM TRANSFER CENTER PATIENT HAS ACCEPTANCE AT [facility]. ACCEPTING PROVIDER DR. MAYFIELD, REPORT NUMBER [phone]

## 2024-04-05 NOTE — ED NOTES
[facility] called for updated ETA.  States that due to emergencies in the area, it will be about another hour.

## 2024-04-05 NOTE — ED NOTES
Report received from JOE Sharif.  [facility].

## 2024-04-05 NOTE — ED PROVIDER NOTES
"Encounter Date: 4/5/2024       History     Chief Complaint   Patient presents with    Dysphagia     Pt reports "I have tortuous esophagus and it is flaring up. I have been not able to eat or drink much since Tuesday". Complains of burning in her chest.     78 year old female with a PMHx of CAD, COPD, HLD presents to the ED with vomiting, fatigue, chest burning. Patient has a history of partial fundoplication 3/2022 (by Dr. Pearson from [facility]) and has daily solid dysphagia. She was seen previously by Dr. Hale (GI) 10/26/23. At the time, her symptoms were thought to be due to her surgery and she was recommended to undergo a time barium esophagram with barium tablet and possible referral to a surgeon to takedown her fundoplication but she wasn't interested. She states since Tuesday, she hasn't been able to tolerate any foods or fluids and that everything comes back up. She took some sips of coffee and it came back up this morning. She feels fatigue from not drinking/eating. She reports chest burning that is similar to her previous regurgitation episodes. The last time she ate any solid food was Tuesday.        Review of patient's allergies indicates:   Allergen Reactions    Cephalexin Rash and Other (See Comments)     Solid patches - rash like skin thickened    Hibiclens [chlorhexidine gluconate] Itching    Sulfa (sulfonamide antibiotics) Rash     Past Medical History:   Diagnosis Date    Arthritis     Back pain     Bronchitis, chronic     Carotid artery disease     50% blockage bilateral    Colitis, acute     COPD (chronic obstructive pulmonary disease)     Coronary artery disease     Diverticulitis     DJD (degenerative joint disease)     GERD (gastroesophageal reflux disease)     severe    Hemorrhoids     Hiatal hernia     Hyperlipidemia     Irritable bowel syndrome with constipation     Mixed stress and urge urinary incontinence 3/30/2021    DARA (obstructive sleep apnea)     Osteoporosis     Respiratory distress  "    Trouble in sleeping      Past Surgical History:   Procedure Laterality Date    blockages in artaries      CARDIAC CATHETERIZATION  5/1/15    COLONOSCOPY      COLONOSCOPY N/A 10/11/2022    Procedure: COLONOSCOPY;  Surgeon: Tomy Baron MD;  Location: [facility];  Service: Endoscopy;  Laterality: N/A;  Pt needing miralax prep and EKG.    ESOPHAGEAL MANOMETRY WITH MEASUREMENT OF IMPEDANCE N/A 2023    Procedure: MANOMETRY, ESOPHAGUS, WITH IMPEDANCE MEASUREMENT;  Surgeon: Joseluis Santana MD;  Location: [facility] (4TH FLR);  Service: Endoscopy;  Laterality: N/A;  Referred by Dr. Dunlap, instr portal -ml   called to confirm couple times, rang then said call not completed and no VM MAL    ESOPHAGOGASTRODUODENOSCOPY N/A 2023    Procedure: EGD (ESOPHAGOGASTRODUODENOSCOPY);  Surgeon: Khushboo Dunlap MD;  Location: [facility];  Service: Endoscopy;  Laterality: N/A;    EYE SURGERY      HAND TENODESIS Bilateral 10/04/2019    HEMORRHOID SURGERY  2009    HERNIA REPAIR  2022    HYSTERECTOMY  1995    complete    JOINT REPLACEMENT      JOINT REPLACEMENT Left 2017    TOTAL KNEE ARTHROPLASTY Right     TUBAL LIGATION       Family History   Problem Relation Age of Onset    Diabetes Mother     Hypertension Mother     Arthritis Mother     Stroke Mother     Stroke Father     Heart disease Father         congenital heart disease    Cancer Sister         lung    COPD Sister      Social History     Tobacco Use    Smoking status: Former     Current packs/day: 0.00     Average packs/day: 0.8 packs/day for 46.0 years (34.5 ttl pk-yrs)     Types: Cigarettes     Start date: 10/14/1959     Quit date: 10/14/2005     Years since quittin.4     Passive exposure: Past    Smokeless tobacco: Never    Tobacco comments:     Stopped and started several times   Substance Use Topics    Alcohol use: No    Drug use: Never     Review of Systems   Constitutional:  Positive for fatigue. Negative for chills and fever.   HENT:   Negative for congestion, rhinorrhea, sneezing and trouble swallowing.    Eyes:  Negative for discharge and redness.   Respiratory:  Negative for cough and shortness of breath.    Cardiovascular:  Positive for chest pain. Negative for palpitations.   Gastrointestinal:  Positive for vomiting. Negative for abdominal pain, diarrhea and nausea.   Genitourinary:  Negative for dysuria, frequency, vaginal bleeding and vaginal discharge.   Musculoskeletal:  Negative for back pain and neck pain.   Skin:  Negative for rash and wound.   Neurological:  Negative for weakness, numbness and headaches.       Physical Exam     Initial Vitals [04/05/24 1144]   BP Pulse Resp Temp SpO2   (!) 149/69 71 18 98 °F (36.7 °C) 98 %      MAP       --         Physical Exam    Nursing note and vitals reviewed.  Constitutional: She appears well-developed. She is not diaphoretic. No distress.   HENT:   Head: Normocephalic and atraumatic.   Right Ear: External ear normal.   Left Ear: External ear normal.   Eyes: Right eye exhibits no discharge. Left eye exhibits no discharge. No scleral icterus.   Neck: Neck supple.   Cardiovascular:  Normal rate and regular rhythm.           Pulmonary/Chest: Breath sounds normal. No stridor. No respiratory distress. She has no wheezes. She has no rhonchi. She has no rales.   Abdominal: Abdomen is soft. There is no abdominal tenderness. There is no guarding.   Musculoskeletal:         General: No edema.      Cervical back: Neck supple.     Neurological: She is alert and oriented to person, place, and time.   Skin: Skin is warm and dry. Capillary refill takes less than 2 seconds.   Psychiatric: She has a normal mood and affect.         ED Course   Procedures  Labs Reviewed   COMPREHENSIVE METABOLIC PANEL - Abnormal; Notable for the following components:       Result Value    CO2 22 (*)     Glucose 64 (*)     BUN 26 (*)     Alkaline Phosphatase 35 (*)     All other components within normal limits   CBC W/ AUTO  DIFFERENTIAL   LIPASE   TROPONIN I   POCT GLUCOSE   POCT GLUCOSE MONITORING CONTINUOUS     EKG Readings: (Independently Interpreted)   Previous EKG: Compared with most recent EKG Previous EKG Date: 4/19/2023.   NSR at 61 bpm with sinus arrhythmia. LAD. Normal intervals. No STEMI.     ECG Results              EKG 12-lead (Final result)        Collection Time Result Time QRS Duration OHS QTC Calculation    04/05/24 11:52:49 04/05/24 13:27:16 76 428                     Final result by Interface, Lab In [facility] (04/05/24 13:27:21)                   Narrative:    Test Reason : R07.9    Vent. Rate : 061 BPM     Atrial Rate : 061 BPM     P-R Int : 154 ms          QRS Dur : 076 ms      QT Int : 426 ms       P-R-T Axes : 059 -70 045 degrees     QTc Int : 428 ms    Normal sinus rhythm with sinus arrhythmia  Left axis deviation  Abnormal ECG  When compared with ECG of 19-APR-2023 09:18,  No significant change was found  Confirmed by Federico POP, Santos MOREAU ([id number]) on 4/5/2024 1:27:10 PM    Referred By: [email or web address]   SELF           Confirmed By:Santos Caballero MD                                  Imaging Results              CT Chest Without Contrast (Final result)  Result time 04/05/24 13:22:21      Final result by Wilmer Benavides MD (04/05/24 13:22:21)                   Impression:      1. Prominent focus of ingested material in the region the distal esophagus above the GE junction.  Favor a new small/moderate hiatal hernia containing portion of stomach, however cannot entirely exclude food impaction the distal esophagus as clinically questioned.  Proximal to this region the esophagus is mildly patulous.  Further evaluation can be obtained with UGI fluoroscopic exam or EGD, as clinically indicated.  2. Pulmonary emphysema.  3. Sequela of prior granulomatous disease.  4. Additional findings as above.      Electronically signed by: Wilmer Benavides  Date:    04/05/2024  Time:    13:22               Narrative:    EXAMINATION:  CT CHEST  WITHOUT CONTRAST    CLINICAL HISTORY:  Aspiration;possible food impaction;    TECHNIQUE:  Low dose axial images, sagittal and coronal reformations were obtained from the thoracic inlet to the lung bases. Contrast was not administered.    COMPARISON:  CTA chest 05/11/2023, CT chest 12/14/2021    FINDINGS:  Base of neck structures in thoracic soft tissues are unremarkable.    Thoracic aorta is normal caliber and contains moderate calcific atherosclerosis.  Heart is normal size.  No pericardial effusion.  Calcification of the aortic annulus and coronary artery calcific atherosclerosis.    No pathologically enlarged thoracic lymph nodes.    Prominent focus of ingested material in the region of the distal esophagus above the GE junction (series 2, image 79).  Favor a new small/moderate hiatal hernia containing portion of stomach, however cannot exclude food impaction in the distal esophagus.  Mildly patulous esophagus.    Central airways are patent.    Mild/moderate centrilobular emphysema with an upper lung zone predominance.  Sequela of prior granulomatous disease with calcified left hilar lymph nodes and calcified granulomas in the bilateral lungs.  Few stable noncalcified pulmonary micro nodules, unchanged compared to 12/14/2021 suggesting benign etiology.  No new nodule.  No consolidation, pleural effusion, or pneumothorax.    Few small calcified granulomas in the spleen.  Partially visualized gallbladder is moderately distended.  Colonic diverticulosis.    Degenerative changes of the osseous structures.  No acute fracture or aggressive bone lesion.  Exaggerated kyphotic curvature of the thoracic spine.                                       Medications   dextrose 5 % and 0.9 % NaCl infusion (has no administration in time range)   sodium chloride 0.9% bolus 1,000 mL 1,000 mL (1,000 mLs Intravenous New Bag 4/5/24 1244)   metoclopramide injection 10 mg (10 mg Intravenous Given 4/5/24 1230)   glucagon (human  recombinant) injection 1 mg (1 mg Intramuscular Given 4/5/24 1221)     Medical Decision Making  Ddx: food impaction, GERD, ACS, PNA, PTX, dehydration, electrolyte derangement, cardiac dysrhythmia    78 year old female with vomiting with fluids, foods. Glucose 64. Rest of her labs, ECG unremarkable. Given 1L NS bolus, reglan, and IM glucagon. Repeat glucose 70. Will start on D5NS to prevent glucose from dropping, especially since she's not tolerating PO.     CT chest with prominent focus of ingested material in the region the distal esophagus above the GE junction. Patient unable to tolerate PO fluids with spitting back up. Will request transfer for GI evaluation.    2:24 PM  Patient accepted to GI at [facility].    Amount and/or Complexity of Data Reviewed  Labs: ordered.  Radiology: ordered.    Risk  Prescription drug management.                                      Clinical Impression:  Final diagnoses:  [R07.9] Chest pain  [R13.10] Dysphagia, unspecified type (Primary)          ED Disposition Condition    Transfer to Another Facility Stable                Twin Hay DO  04/05/24 [id number]

## 2024-04-06 VITALS
WEIGHT: 140 LBS | BODY MASS INDEX: 27.8 KG/M2 | DIASTOLIC BLOOD PRESSURE: 75 MMHG | RESPIRATION RATE: 19 BRPM | TEMPERATURE: 99 F | SYSTOLIC BLOOD PRESSURE: 126 MMHG | OXYGEN SATURATION: 98 % | HEART RATE: 85 BPM

## 2024-04-06 PROCEDURE — 25000003 PHARM REV CODE 250: Performed by: STUDENT IN AN ORGANIZED HEALTH CARE EDUCATION/TRAINING PROGRAM

## 2024-04-06 PROCEDURE — 63600175 PHARM REV CODE 636 W HCPCS: Performed by: STUDENT IN AN ORGANIZED HEALTH CARE EDUCATION/TRAINING PROGRAM

## 2024-04-06 RX ORDER — PROPOFOL 10 MG/ML
VIAL (ML) INTRAVENOUS
Status: DISCONTINUED | OUTPATIENT
Start: 2024-04-05 | End: 2024-04-06

## 2024-04-06 RX ORDER — FENTANYL CITRATE 50 UG/ML
INJECTION, SOLUTION INTRAMUSCULAR; INTRAVENOUS
Status: DISCONTINUED | OUTPATIENT
Start: 2024-04-06 | End: 2024-04-06

## 2024-04-06 RX ORDER — SODIUM CHLORIDE 0.9 % (FLUSH) 0.9 %
10 SYRINGE (ML) INJECTION DAILY PRN
Status: DISCONTINUED | OUTPATIENT
Start: 2024-04-06 | End: 2024-04-06 | Stop reason: HOSPADM

## 2024-04-06 RX ORDER — ONDANSETRON HYDROCHLORIDE 2 MG/ML
INJECTION, SOLUTION INTRAVENOUS
Status: DISCONTINUED | OUTPATIENT
Start: 2024-04-06 | End: 2024-04-06

## 2024-04-06 RX ORDER — EPHEDRINE SULFATE 50 MG/ML
INJECTION, SOLUTION INTRAVENOUS
Status: DISCONTINUED | OUTPATIENT
Start: 2024-04-06 | End: 2024-04-06

## 2024-04-06 RX ORDER — DEXAMETHASONE SODIUM PHOSPHATE 4 MG/ML
INJECTION, SOLUTION INTRA-ARTICULAR; INTRALESIONAL; INTRAMUSCULAR; INTRAVENOUS; SOFT TISSUE
Status: DISCONTINUED | OUTPATIENT
Start: 2024-04-06 | End: 2024-04-06

## 2024-04-06 RX ORDER — LIDOCAINE HYDROCHLORIDE 20 MG/ML
INJECTION, SOLUTION EPIDURAL; INFILTRATION; INTRACAUDAL; PERINEURAL
Status: DISCONTINUED | OUTPATIENT
Start: 2024-04-05 | End: 2024-04-06

## 2024-04-06 RX ORDER — OMEPRAZOLE 40 MG/1
40 CAPSULE, DELAYED RELEASE ORAL 2 TIMES DAILY
Qty: 180 CAPSULE | Refills: 0 | Status: SHIPPED | OUTPATIENT
Start: 2024-04-06 | End: 2024-05-22

## 2024-04-06 RX ORDER — HYDROMORPHONE HYDROCHLORIDE 2 MG/ML
0.2 INJECTION, SOLUTION INTRAMUSCULAR; INTRAVENOUS; SUBCUTANEOUS EVERY 5 MIN PRN
Status: DISCONTINUED | OUTPATIENT
Start: 2024-04-06 | End: 2024-04-06 | Stop reason: HOSPADM

## 2024-04-06 RX ORDER — SUCCINYLCHOLINE CHLORIDE 20 MG/ML
INJECTION INTRAMUSCULAR; INTRAVENOUS
Status: DISCONTINUED | OUTPATIENT
Start: 2024-04-05 | End: 2024-04-06

## 2024-04-06 RX ORDER — PROCHLORPERAZINE EDISYLATE 5 MG/ML
5 INJECTION INTRAMUSCULAR; INTRAVENOUS EVERY 30 MIN PRN
Status: DISCONTINUED | OUTPATIENT
Start: 2024-04-06 | End: 2024-04-06 | Stop reason: HOSPADM

## 2024-04-06 RX ADMIN — ONDANSETRON 4 MG: 2 INJECTION, SOLUTION INTRAMUSCULAR; INTRAVENOUS at 12:04

## 2024-04-06 RX ADMIN — EPHEDRINE SULFATE 10 MG: 50 INJECTION INTRAVENOUS at 12:04

## 2024-04-06 RX ADMIN — FENTANYL CITRATE 50 MCG: 50 INJECTION INTRAMUSCULAR; INTRAVENOUS at 12:04

## 2024-04-06 RX ADMIN — FENTANYL CITRATE 50 MCG: 50 INJECTION INTRAMUSCULAR; INTRAVENOUS at 02:04

## 2024-04-06 RX ADMIN — DEXAMETHASONE SODIUM PHOSPHATE 4 MG: 4 INJECTION, SOLUTION INTRAMUSCULAR; INTRAVENOUS at 12:04

## 2024-04-06 NOTE — TRANSFER OF CARE
Anesthesia Transfer of Care Note    Patient: Megha Mendoza    Procedure(s) Performed: Procedure(s) (LRB):  EGD (ESOPHAGOGASTRODUODENOSCOPY) (N/A)    Patient location: PACU    Anesthesia Type: general    Transport from OR: Transported from OR on room air with adequate spontaneous ventilation    Post pain: adequate analgesia    Post assessment: no apparent anesthetic complications    Post vital signs: stable    Level of consciousness: awake    Nausea/Vomiting: no nausea/vomiting    Complications: none    Transfer of care protocol was followed      Last vitals: Visit Vitals  BP (!) 180/72 (BP Location: Left arm, Patient Position: Lying)   Pulse 70   Temp 36.4 °C (97.5 °F) (Temporal)   Resp 18   Wt 63.5 kg (140 lb)   SpO2 97%   BMI 27.80 kg/m²

## 2024-04-06 NOTE — H&P
[facility]    CC: Dysphagia     HPI 78 y.o. female with a history of GERD and partial fundoplication in 3/2022 who reports dysphagia to solids since her operation. She had EGD in 5/2023 which revealed tortuous esophagus with retained food in esophageal pouch. Then underwent manometry which was normal. Her last GI follow up was with Dr. Hale who recommended barium esophagram and possible surgical referral but she was not open to this at the time. Now she is unable to tolerate PO take since having meal with beef on 4/2 which lead to presentation today.  CT with ingested material in distal esophagus.     Outside records reviewed and summarized here        Past Medical History  Past Medical History:   Diagnosis Date    Arthritis     Back pain     Bronchitis, chronic     Carotid artery disease     50% blockage bilateral    Colitis, acute     COPD (chronic obstructive pulmonary disease)     Coronary artery disease     Diverticulitis     DJD (degenerative joint disease)     GERD (gastroesophageal reflux disease)     severe    Hemorrhoids     Hiatal hernia     Hyperlipidemia     Irritable bowel syndrome with constipation     Mixed stress and urge urinary incontinence 3/30/2021    DARA (obstructive sleep apnea)     Osteoporosis     Respiratory distress     Trouble in sleeping        Physical Examination  BP (!) 158/65 (BP Location: Right arm, Patient Position: Sitting)   Pulse (!) 59   Temp 98.3 °F (36.8 °C) (Oral)   Resp 20   Wt 63.5 kg (140 lb)   SpO2 96%   BMI 27.80 kg/m²   General appearance: alert, cooperative, no distress  Lungs: normal work of breathing, no respiratory distress   Abdomen: soft, non-tender; no organomegaly    Recent Labs   Lab 04/05/24  1219   WBC 5.65   RBC 4.43   HGB 13.7   HCT 41.9      MCV 95   MCH 30.9   MCHC 32.7     Imaging:    CT chest with suspected food bolus in distal esophagus, possible hiatal hernia     Assessment:    78 y.o. female with a history of GERD and partial  fundoplication in 3/2022 who reports dysphagia to solids since her operation. Prior EGD with esophageal pouch and retained food. She was tolerating dysphagia diet previously but now presenting with inability to tolerate PO after ingesting beef. Imaging suggestive of food bolus in distal esophagus as above.   This is an acute illness with threat to life and bodily function that requires an emergent endoscopic evaluation.     Plan:  - insert two PIVs   - maintain NPO status   - EGD now     Benton Crouch MD  [facility]

## 2024-04-06 NOTE — ANESTHESIA POSTPROCEDURE EVALUATION
Anesthesia Post Evaluation    Patient: Megha Mendoza    Procedure(s) Performed: Procedure(s) (LRB):  EGD (ESOPHAGOGASTRODUODENOSCOPY) (N/A)    Final Anesthesia Type: general      Patient location during evaluation: GI PACU  Patient participation: Yes- Able to Participate  Level of consciousness: awake and alert, oriented and awake  Post-procedure vital signs: reviewed and stable  Pain management: adequate  Airway patency: patent  DARA mitigation strategies: Multimodal analgesia  PONV status at discharge: No PONV  Anesthetic complications: no      Cardiovascular status: blood pressure returned to baseline and hemodynamically stable  Respiratory status: unassisted and spontaneous ventilation  Hydration status: euvolemic  Follow-up not needed.              Vitals Value Taken Time   /75 04/06/24 0430   Temp 37.3 °C (99.1 °F) 04/06/24 0401   Pulse 85 04/06/24 0430   Resp 19 04/06/24 0430   SpO2 98 % 04/06/24 0430         Event Time   Out of Recovery 04/06/2024 04:00:00         Pain/Lou Score: Pain Rating Post Med Admin: 4 (4/5/2024 11:48 AM)  Lou Score: 10 (4/6/2024  4:30 AM)

## 2024-04-06 NOTE — ED PROVIDER NOTES
Emergency Department Encounter  Provider Note  Encounter Date: 4/5/2024    Patient Name: Megha Mendoza  MRN: [id number]    History of Present Illness   HPI  History of Present Illness:    Chief Complaint:   Chief Complaint   Patient presents with    Transfer      Pt transferred ED to ED from [facility] for GI consult.      78-year-old female transfer from another hospital for food bolus/food impaction.  Patient states that she has had this sensation since Tuesday, 4 days ago.  CT shows possible food products.  Patient is protecting airway, able to handle own secretions.    The following PMH/PSH/SocHx/FamHx has been reviewed by myself:  Past Medical History:   Diagnosis Date    Arthritis     Back pain     Bronchitis, chronic     Carotid artery disease     50% blockage bilateral    Colitis, acute     COPD (chronic obstructive pulmonary disease)     Coronary artery disease     Diverticulitis     DJD (degenerative joint disease)     GERD (gastroesophageal reflux disease)     severe    Hemorrhoids     Hiatal hernia     Hyperlipidemia     Irritable bowel syndrome with constipation     Mixed stress and urge urinary incontinence 3/30/2021    DARA (obstructive sleep apnea)     Osteoporosis     Respiratory distress     Trouble in sleeping      Past Surgical History:   Procedure Laterality Date    blockages in artaries      CARDIAC CATHETERIZATION  5/1/15    COLONOSCOPY      COLONOSCOPY N/A 10/11/2022    Procedure: COLONOSCOPY;  Surgeon: Tomy Baron MD;  Location: [facility];  Service: Endoscopy;  Laterality: N/A;  Pt needing miralax prep and EKG.    ESOPHAGEAL MANOMETRY WITH MEASUREMENT OF IMPEDANCE N/A 6/30/2023    Procedure: MANOMETRY, ESOPHAGUS, WITH IMPEDANCE MEASUREMENT;  Surgeon: Joseluis Santana MD;  Location: [facility] ([address] [place] [address]);  Service: Endoscopy;  Laterality: N/A;  Referred by alix [facility][1]  6/26 called to confirm couple times, rang then said call not completed and no VM MAL     ESOPHAGOGASTRODUODENOSCOPY N/A 2023    Procedure: EGD (ESOPHAGOGASTRODUODENOSCOPY);  Surgeon: Khushboo Dunlap MD;  Location: [facility];  Service: Endoscopy;  Laterality: N/A;    EYE SURGERY      HAND TENODESIS Bilateral 10/04/2019    HEMORRHOID SURGERY  2009    HERNIA REPAIR  2022    HYSTERECTOMY  1995    complete    JOINT REPLACEMENT      JOINT REPLACEMENT Left 2017    TOTAL KNEE ARTHROPLASTY Right     TUBAL LIGATION       Social History     Tobacco Use    Smoking status: Former     Current packs/day: 0.00     Average packs/day: 0.8 packs/day for 46.0 years (34.5 ttl pk-yrs)     Types: Cigarettes     Start date: 10/14/1959     Quit date: 10/14/2005     Years since quittin.4     Passive exposure: Past    Smokeless tobacco: Never    Tobacco comments:     Stopped and started several times   Substance Use Topics    Alcohol use: No    Drug use: Never     Family History   Problem Relation Age of Onset    Diabetes Mother     Hypertension Mother     Arthritis Mother     Stroke Mother     Stroke Father     Heart disease Father         congenital heart disease    Cancer Sister         lung    COPD Sister      Allergies reviewed:  Review of patient's allergies indicates:   Allergen Reactions    Cephalexin Rash and Other (See Comments)     Solid patches - rash like skin thickened    Hibiclens [chlorhexidine gluconate] Itching    Sulfa (sulfonamide antibiotics) Rash     Medications reviewed:  Medication List with Changes/Refills   Current Medications    ACETAMINOPHEN (TYLENOL) 500 MG TABLET    Take 650 mg by mouth every 6 (six) hours as needed for Pain.    ALBUTEROL (PROVENTIL) 2.5 MG /3 ML (0.083 %) NEBULIZER SOLUTION    Take 2.5 mg by nebulization every 12 (twelve) hours as needed for Wheezing.    APPLE CIDER VINEGAR 500 MG TAB    Take 1 tablet by mouth once daily.    ASPIRIN 81 MG CHEW    Take 81 mg by mouth once daily.     B COMPLEX VITAMINS TABLET    Take 1 tablet by mouth once daily.    BACILLUS  COAGULANS (DIGESTIVE ADVANTAGE ORAL)    Take 1 tablet by mouth once daily.    CETIRIZINE (ZYRTEC) 10 MG TABLET    Take 10 mg by mouth once daily.    CO-ENZYME Q-10 30 MG CAPSULE    Take 100 mg by mouth once daily.    CYCLOBENZAPRINE (FLEXERIL) 10 MG TABLET    Take 1 tablet (10 mg total) by mouth every evening.    DENOSUMAB (PROLIA) 60 MG/ML SYRG    Inject 1 mL (60 mg total) into the skin every 6 (six) months.    ESTRADIOL (ESTRACE) 0.01 % (0.1 MG/GRAM) VAGINAL CREAM    0.5 grams with applicator or dime-sized amount with finger in vagina nightly x 2 weeks, then twice a week thereafter    FAMOTIDINE (PEPCID) 40 MG TABLET    Take 40 mg by mouth nightly.    IPRATROPIUM (ATROVENT) 0.02 % NEBULIZER SOLUTION    Take 500 mcg by nebulization every 12 (twelve) hours.    KRILL-OMEGA-3-DHA-EPA-LIPIDS 305-29-10-50 MG CAP    Take 1 capsule by mouth nightly.     MULTIVITAMIN (THERAGRAN) TABLET    Take 1 tablet by mouth once daily.    NITROFURANTOIN, MACROCRYSTAL-MONOHYDRATE, (MACROBID) 100 MG CAPSULE    Take 1 capsule (100 mg total) by mouth 2 (two) times daily.    NITROGLYCERIN (NITROSTAT) 0.4 MG SL TABLET    Place 0.4 mg under the tongue every 5 (five) minutes as needed for Chest pain.    OMEPRAZOLE (PRILOSEC) 40 MG CAPSULE    Take 1 capsule (40 mg total) by mouth once daily.    OXYBUTYNIN (DITROPAN-XL) 10 MG 24 HR TABLET    Take 1 tablet (10 mg total) by mouth once daily.    ROSUVASTATIN (CRESTOR) 40 MG TAB    rosuvastatin 40 mg tablet, [RxNorm: 239423]    SYMBICORT 80-4.5 MCG/ACTUATION HFAA    INHALE 2 PUFFS BY MOUTH TWICE DAILY .  CONTROLLER    VALSARTAN (DIOVAN) 80 MG TABLET    Take 80 mg by mouth.       Physical Exam     Initial Vitals [04/05/24 2202]   BP Pulse Resp Temp SpO2   (!) 158/65 (!) 59 20 98.3 °F (36.8 °C) 96 %      MAP       --           Triage vital signs reviewed.    Constitutional: Well-nourished, well-developed. Not in acute distress.  HENT: Normocephalic, atraumatic. Moist mucous membranes.  Eyes: No  conjunctival injection.  Resp: Normal respiratory effort, breathing unlabored.  Cardio: Regular rate and rhythm.  GI: Abdomen non-distended.   MSK: Extremities without any deformities noted. No lower extremity edema.  Skin: Warm and dry. No rashes or lesions noted.  Neuro: Awake and alert. Moves all extremities.    ED Course   Procedures    Medical Decision Making    History Acquisition     The history is provided by the patient.   Assessment requiring an independent historian and why historian was needed:  Family at bedside giving most of history, patient is elderly    Review of prior external/non ED notes:  ED note from another hospital for food impaction.  History of partial fundoplication    Differential Diagnoses   Based on available information and initial assessment, the working Differential Diagnosis includes, but is not limited to:  [place]'s angina, epiglottitis, foreign body aspiration, retropharyngeal abscess, peritonsillar abscess, esophageal perforation, mediastinitis, esophagitis, sialolithiasis, parotitis, laryngitis, tracheitis, dental/periapical abscess, TMJ disorder, pneumonia, Streptococcal/bacterial pharyngitis, viral pharyngitis.  .    EKG   EKG ordered and independently reviewed by me:     Labs   Lab tests ordered and independently reviewed by me:    Labs Reviewed - No data to display    Imaging   Imaging ordered and independently reviewed by me:   Imaging Results    None            Additional Consideration   Megha Mendoza  presents to the Emergency Department today with action seen on CT from outside hospital.  Patient handling secretions and has a patent airway.  GI consulted.    Additional testing considered but not indicated during the course of this workup: further imaging and labwork, not indicated  Co-morbidities/chronic illness/exacerbation of chronic illness considered which impacted clinical decision making:  Advanced age  Procedures done in the ED or plan for the OR: Yes, describe:   Endoscopy  Social determinants of care considered during development of treatment plan include: Decreased medical literacy  Discussion of management or test interpretation with external provider: Yes, describe:  GI physician , Dr Crouch, pt to endoscopy  DNR discussion: No    The patient's list of active medications and allergies as documented per RN staff has been reviewed.  Medications given in the ED and/or prescribed: Medications - No data to display          ED Course as of 04/05/24 2249 Fri Apr 05, 2024 2239 Gi at bedside [CS]      ED Course User Index  [CS] Kiara Wisdom MD       Explanation of disposition: Admit   Critical Care:    I have personally provided 25 minutes of critical care time exclusive of time spent on separately billable procedures. Time includes review of laboratory data, radiology results, discussion with consultants, and monitoring for potential decompensation. Interventions were performed as documented above.      Clinical Impression:     1. Food impaction of esophagus, initial encounter         ED Disposition Condition    Observation Stable               Kiara Wisdom MD  04/05/24 [id number]

## 2024-04-06 NOTE — ED NOTES
Patient wanting to leave due to prolonged wait for transport per [facility].  Has been waiting since 1430.  Family will drive patient to [facility].  ER transfer packet sent with patient.  [facility]

## 2024-04-06 NOTE — ED NOTES
Call placed to [facility] for updated ETA.  States it will be another hour or 2.

## 2024-04-06 NOTE — PLAN OF CARE
[facility] Plan of Care     EGD completed, large amount of food retained in the esophagus which was successfully removed.   - Discharge patient to home.   - Full liquid diet.   - Increasing omeprazole to BID   - Return local GI where she has established care, will need repeat EGD in 8 weeks  - Advise outpatient surgical referral as previously recommended      Benton Crouch MD  [facility]

## 2024-04-06 NOTE — PROVIDER PROGRESS NOTES - EMERGENCY DEPT.
Encounter Date: 4/5/2024    ED Physician Progress Notes        Physician Note:   Patient is frustrated with the delay in eye movements timing, and is electing to leave against medical advice.  Patient understands the risks, including vomiting, aspiration, pulmonary complications, difficulty breathing, permanent damage to organs, permanent disability, death.  Patient and family state that they will drive directly to [facility].

## 2024-04-06 NOTE — ANESTHESIA PREPROCEDURE EVALUATION
04/05/2024  [facility]  Anesthesia Pre-Operative Evaluation         Patient Name: Megha Mendoza  YOB: 1945  MRN: [id number]    SUBJECTIVE:     Pre-operative evaluation for Procedure(s) (LRB):  EGD (ESOPHAGOGASTRODUODENOSCOPY) (N/A)     04/05/2024    Megha Mendoza is a 78 y.o. female   Patient now presents for the above procedure(s).    TTE:   none documented.     Patient Active Problem List   Diagnosis    Chronic pain of left knee    Osteopenia    Former smoker    Dyslipidemia    Gastroesophageal reflux disease without esophagitis    Primary osteoarthritis of left knee    History of diverticulitis    Coronary artery disease involving native coronary artery without angina pectoris    Carotid disease, bilateral    Chronic insomnia    Atherosclerosis of aorta    Knee pain, left    Mixed stress and urge incontinence    Chronic constipation    Vaginal atrophy    Emphysema/COPD    Obstructive chronic bronchitis without exacerbation    Pharyngoesophageal dysphagia    Spasm of throat muscle    Lung nodule    Acute pain of right shoulder    Pain in right acromioclavicular joint    Chronic respiratory failure with hypoxia    Chronic pain of both feet    Senile purpura    Abnormality of gait and mobility       Review of patient's allergies indicates:   Allergen Reactions    Cephalexin Rash and Other (See Comments)     Solid patches - rash like skin thickened    Hibiclens [chlorhexidine gluconate] Itching    Sulfa (sulfonamide antibiotics) Rash       Current Inpatient Medications:      No current facility-administered medications on file prior to encounter.     Current Outpatient Medications on File Prior to Encounter   Medication Sig Dispense Refill    acetaminophen (TYLENOL) 500 MG tablet Take 650 mg by mouth every 6 (six) hours as needed for Pain.      albuterol (PROVENTIL) 2.5 mg /3 mL  (0.083 %) nebulizer solution Take 2.5 mg by nebulization every 12 (twelve) hours as needed for Wheezing.      apple cider vinegar 500 mg Tab Take 1 tablet by mouth once daily.      aspirin 81 MG Chew Take 81 mg by mouth once daily.       b complex vitamins tablet Take 1 tablet by mouth once daily.      BACILLUS COAGULANS (DIGESTIVE ADVANTAGE ORAL) Take 1 tablet by mouth once daily.      cetirizine (ZYRTEC) 10 MG tablet Take 10 mg by mouth once daily.      co-enzyme Q-10 30 mg capsule Take 100 mg by mouth once daily.      cyclobenzaprine (FLEXERIL) 10 MG tablet Take 1 tablet (10 mg total) by mouth every evening. 30 tablet 0    denosumab (PROLIA) 60 mg/mL Syrg Inject 1 mL (60 mg total) into the skin every 6 (six) months. 1 each 1    estradioL (ESTRACE) 0.01 % (0.1 mg/gram) vaginal cream 0.5 grams with applicator or dime-sized amount with finger in vagina nightly x 2 weeks, then twice a week thereafter 42.5 g 11    famotidine (PEPCID) 40 MG tablet Take 40 mg by mouth nightly.      ipratropium (ATROVENT) 0.02 % nebulizer solution Take 500 mcg by nebulization every 12 (twelve) hours.      krill-omega-3-dha-epa-lipids 863-91-46-50 mg Cap Take 1 capsule by mouth nightly.       multivitamin (THERAGRAN) tablet Take 1 tablet by mouth once daily.      nitrofurantoin, macrocrystal-monohydrate, (MACROBID) 100 MG capsule Take 1 capsule (100 mg total) by mouth 2 (two) times daily. 14 capsule 0    nitroGLYCERIN (NITROSTAT) 0.4 MG SL tablet Place 0.4 mg under the tongue every 5 (five) minutes as needed for Chest pain.      omeprazole (PRILOSEC) 40 MG capsule Take 1 capsule (40 mg total) by mouth once daily. 90 capsule 3    oxybutynin (DITROPAN-XL) 10 MG 24 hr tablet Take 1 tablet (10 mg total) by mouth once daily. 30 tablet 11    rosuvastatin (CRESTOR) 40 MG Tab rosuvastatin 40 mg tablet, [RxNorm: 015096]      SYMBICORT 80-4.5 mcg/actuation HFAA INHALE 2 PUFFS BY MOUTH TWICE DAILY .  CONTROLLER 30.6 g 1    valsartan (DIOVAN) 80 MG  tablet Take 80 mg by mouth.         Past Surgical History:   Procedure Laterality Date    blockages in artaries      CARDIAC CATHETERIZATION  5/1/15    COLONOSCOPY      COLONOSCOPY N/A 10/11/2022    Procedure: COLONOSCOPY;  Surgeon: Tomy Baron MD;  Location: [facility];  Service: Endoscopy;  Laterality: N/A;  Pt needing miralax prep and EKG.    ESOPHAGEAL MANOMETRY WITH MEASUREMENT OF IMPEDANCE N/A 6/30/2023    Procedure: MANOMETRY, ESOPHAGUS, WITH IMPEDANCE MEASUREMENT;  Surgeon: Joseluis Santana MD;  Location: [facility] (4TH FLR);  Service: Endoscopy;  Laterality: N/A;  Referred by Dr. Dunlap, instr portal -ml  6/26 called to confirm couple times, rang then said call not completed and no VM MAL    ESOPHAGOGASTRODUODENOSCOPY N/A 5/8/2023    Procedure: EGD (ESOPHAGOGASTRODUODENOSCOPY);  Surgeon: Khushboo Dunlap MD;  Location: [facility];  Service: Endoscopy;  Laterality: N/A;    EYE SURGERY      HAND TENODESIS Bilateral 10/04/2019    HEMORRHOID SURGERY  2009    HERNIA REPAIR  03/2022    HYSTERECTOMY  1995    complete    JOINT REPLACEMENT      JOINT REPLACEMENT Left 08/02/2017    TOTAL KNEE ARTHROPLASTY Right     TUBAL LIGATION         OBJECTIVE:     Vital Signs Range (Last 24H):  Temp:  [36.6 °C (97.9 °F)-36.8 °C (98.3 °F)]   Pulse:  [48-71]   Resp:  [13-20]   BP: (115-158)/(56-69)   SpO2:  [94 %-99 %]       Significant Labs:  Lab Results   Component Value Date    WBC 5.65 04/05/2024    HGB 13.7 04/05/2024    HCT 41.9 04/05/2024     04/05/2024    CHOL 177 05/09/2023    TRIG 76 05/09/2023    HDL 92 (A) 05/09/2023    LDLDIRECT 65 05/09/2023    ALT 18 04/05/2024    AST 26 04/05/2024     04/05/2024    K 4.0 04/05/2024     04/05/2024    CREATININE 0.9 04/05/2024    BUN 26 (H) 04/05/2024    CO2 22 (L) 04/05/2024    TSH 0.898 02/27/2023    HGBA1C 5.5 02/27/2023       Diagnostic Studies: No relevant studies.    EKG:   Results for orders placed or performed during the hospital encounter of  04/05/24   EKG 12-lead    Collection Time: 04/05/24 11:52 AM   Result Value Ref Range    QRS Duration 76 ms    [facility] QTC Calculation 428 ms    Narrative    Test Reason : R07.9    Vent. Rate : 061 BPM     Atrial Rate : 061 BPM     P-R Int : 154 ms          QRS Dur : 076 ms      QT Int : 426 ms       P-R-T Axes : 059 -70 045 degrees     QTc Int : 428 ms    Normal sinus rhythm with sinus arrhythmia  Left axis deviation  Abnormal ECG  When compared with ECG of 19-APR-2023 09:18,  No significant change was found  Confirmed by Federico POP, Santos MOREAU ([id number]) on 4/5/2024 1:27:10 PM    Referred By: [email or web address]   SELF           Confirmed By:Santos Caballero MD       ASSESSMENT/PLAN:           Pre-op Assessment    I have reviewed the Patient Summary Reports.     I have reviewed the Nursing Notes. I have reviewed the NPO Status.   I have reviewed the Medications.     Review of Systems  Anesthesia Hx:  No problems with previous Anesthesia             Denies Family Hx of Anesthesia complications.    Denies Personal Hx of Anesthesia complications.                    Social:  Smoker Previous smoker for 25+ years      Hematology/Oncology:    Oncology Normal                                   EENT/Dental:  EENT/Dental Normal           Cardiovascular:  Exercise tolerance: good   Hypertension, well controlled   CAD  asymptomatic              Functional Capacity 4 METS            Carotoid Artery Disease, bilateral , Left stenosis is 50% , Right stenosis is  50%       Hypertension         Pulmonary:   COPD (home O2)     Sleep Apnea                Hepatic/GI:    Hiatal Hernia, GERD   Dysphagia  Food caught in throat          Musculoskeletal:  Arthritis               Neurological:  Neurology Normal                                      Endocrine:  Endocrine Normal            Dermatological:  Skin Normal    Psych:  Psychiatric Normal                    Physical Exam  General: Well nourished    Airway:  Mallampati: II   Mouth Opening: Normal  TM  Distance: Normal, at least 6 cm  Tongue: Large    Dental:  Upper Dentures, Edentulous        Anesthesia Plan  Type of Anesthesia, risks & benefits discussed:    Anesthesia Type: Gen ETT  Intra-op Monitoring Plan: Standard ASA Monitors  Post Op Pain Control Plan: multimodal analgesia  Induction:  rapid sequence  Airway Plan: Video, Post-Induction  Informed Consent: Informed consent signed with the Patient and all parties understand the risks and agree with anesthesia plan.  All questions answered.   ASA Score: 3  Day of Surgery Review of History & Physical: H&P Update referred to the surgeon/provider.    Ready For Surgery From Anesthesia Perspective.     .

## 2024-04-06 NOTE — ANESTHESIA PROCEDURE NOTES
Intubation    Date/Time: 4/5/2024 11:57 PM    Performed by: Allen Hayden MD  Authorized by: Allen Hayden MD    Intubation:     Induction:  Rapid sequence induction    Intubated:  Postinduction    Mask Ventilation:  N/a    Attempts:  1    Attempted By:  CRNA    Method of Intubation:  Video laryngoscopy    Blade:  Bhardwaj 3    Laryngeal View Grade: Grade I - full view of cords      Difficult Airway Encountered?: No      Complications:  None    Airway Device:  Oral endotracheal tube    Airway Device Size:  7.0    Style/Cuff Inflation:  Cuffed (inflated to minimal occlusive pressure)    Secured at:  The lips    Placement Verified By:  Capnometry    Complicating Factors:  None    Findings Post-Intubation:  BS equal bilateral

## 2024-04-08 PROBLEM — J96.11 CHRONIC RESPIRATORY FAILURE WITH HYPOXIA: Status: RESOLVED | Noted: 2024-01-03 | Resolved: 2024-04-08

## 2024-04-08 NOTE — [employer] PATIENT INSTRUCTIONS
Discharge Summary/Instructions after an Endoscopic Procedure  Patient Name: Megha Mendoza  Patient MRN: [id number]  Patient YOB: 1945 Friday, April 5, 2024  Carmela Sequeira MD  Dear patient,  As a result of recent federal legislation (The Federal Cures Act), you may   receive lab or pathology results from your procedure in your [facility]   account before your physician is able to contact you. Your physician or   their representative will relay the results to you with their   recommendations at their soonest availability.  Thank you,  Your health is very important to us during the Covid Crisis. Following your   procedure today, you will receive a daily text for 2 weeks asking about   signs or symptoms of Covid 19.  Please respond to this text when you   receive it so we can follow up and keep you as safe as possible.   RESTRICTIONS:  During your procedure today, you received medications for sedation.  These   medications may affect your judgment, balance and coordination.  Therefore,   for 24 hours, you have the following restrictions:   - DO NOT drive a car, operate machinery, make legal/financial decisions,   sign important papers or drink alcohol.    ACTIVITY:  Today: no heavy lifting, straining or running due to procedural   sedation/anesthesia.  The following day: return to full activity including work.  DIET:  Eat and drink normally unless instructed otherwise.     TREATMENT FOR COMMON SIDE EFFECTS:  - Mild abdominal pain, nausea, belching, bloating or excessive gas:  rest,   eat lightly and use a heating pad.  - Sore Throat: treat with throat lozenges and/or gargle with warm salt   water.  - Because air was used during the procedure, expelling large amounts of air   from your rectum or belching is normal.  - If a bowel prep was taken, you may not have a bowel movement for 1-3 days.    This is normal.  SYMPTOMS TO WATCH FOR AND REPORT TO YOUR PHYSICIAN:  1. Abdominal pain or bloating, other than  gas cramps.  2. Chest pain.  3. Back pain.  4. Signs of infection such as: chills or fever occurring within 24 hours   after the procedure.  5. Rectal bleeding, which would show as bright red, maroon, or black stools.   (A tablespoon of blood from the rectum is not serious, especially if   hemorrhoids are present.)  6. Vomiting.  7. Weakness or dizziness.  GO DIRECTLY TO THE NEAREST EMERGENCY ROOM IF YOU HAVE ANY OF THE FOLLOWING:      Difficulty breathing              Chills and/or fever over 101 F   Persistent vomiting and/or vomiting blood   Severe abdominal pain   Severe chest pain   Black, tarry stools   Bleeding- more than one tablespoon   Any other symptom or condition that you feel may need urgent attention  Your doctor recommends these additional instructions:  If any biopsies were taken, your doctors clinic will contact you in 1 to 2   weeks with any results.  - Discharge patient to home.   - Full liquid diet.   - Starting Protonix 40mg BID   - Return local GI where she has established care, will need repeat EGD in 8   weeks  - Advise surgical referral as previously recommended  For questions, problems or results please call your physician - Carmela Sequeira MD.  EMERGENCY PHONE NUMBER: [phone],  LAB RESULTS: [phone]  IF A COMPLICATION OR EMERGENCY SITUATION ARISES AND YOU ARE UNABLE TO REACH   YOUR PHYSICIAN - GO DIRECTLY TO THE EMERGENCY ROOM.  Carmela Sequeira MD  4/8/2024 8:07:59 AM  [employer]

## 2024-04-10 ENCOUNTER — TELEPHONE (OUTPATIENT)
Dept: FAMILY MEDICINE | Facility: CLINIC | Age: 79
End: 2024-04-10
Payer: MEDICARE

## 2024-04-10 RX ORDER — PROMETHAZINE HYDROCHLORIDE AND DEXTROMETHORPHAN HYDROBROMIDE 6.25; 15 MG/5ML; MG/5ML
5 SYRUP ORAL EVERY 6 HOURS PRN
Qty: 180 ML | Refills: 0 | Status: SHIPPED | OUTPATIENT
Start: 2024-04-10 | End: 2024-04-20

## 2024-04-10 NOTE — TELEPHONE ENCOUNTER
----- Message from Risa Stubbs sent at 4/10/2024 10:37 AM CDT -----  Contact: self  Megha Mendoza  MRN: [id number]  : 1945  PCP: Tomy Baron  Home Phone      [phone]  Work Phone      Not on file.  Mobile          [phone]      MESSAGE:   Patient is asking for the nurse to give her a call regarding her incontinence and cough, please advise      [phone]

## 2024-04-10 NOTE — TELEPHONE ENCOUNTER
Contacted/spoke to pt, states she was seen in ED on 04/05 due to being unable to keep any food/fluids down. States she was then transferred to [place] and had EGD due to food impaction. Pt states she now has a constant cough x 2 days and OTC cough syrups have not helped. Pt requesting if provider can prescribe Tessalon pearls and this has previously helped. Pt also states she has an US scheduled w/ Dr. Day and is needing cough controlled for tomorrow.     Please advise. Thanks    Pharm: [facility] in [place]

## 2024-04-15 ENCOUNTER — TELEPHONE (OUTPATIENT)
Dept: FAMILY MEDICINE | Facility: CLINIC | Age: 79
End: 2024-04-15
Payer: MEDICARE

## 2024-04-15 ENCOUNTER — PATIENT MESSAGE (OUTPATIENT)
Dept: GASTROENTEROLOGY | Facility: CLINIC | Age: 79
End: 2024-04-15
Payer: MEDICARE

## 2024-04-15 NOTE — TELEPHONE ENCOUNTER
----- Message from Santos Tucker sent at 4/15/2024 11:54 AM CDT -----  Contact: Patient  Megha Mendoza  MRN: [id number]  : 1945  PCP: Tomy Baron  Home Phone      [phone]  Work Phone      Not on file.  Mobile          [phone]      MESSAGE: GI emergency last week - now having cough -- requesting to speak with nurse     Call [phone]    PCP: Loraine

## 2024-04-15 NOTE — TELEPHONE ENCOUNTER
Contacted/spoke to pt, states she is unable to take cough syrup medication sent by provider due to her bronchitis. Pt states cough has not went away. Appt scheduled for 04/17 at 0915. Pt gave understanding.

## 2024-04-17 ENCOUNTER — OFFICE VISIT (OUTPATIENT)
Dept: FAMILY MEDICINE | Facility: CLINIC | Age: 79
End: 2024-04-17
Payer: MEDICARE

## 2024-04-17 VITALS
SYSTOLIC BLOOD PRESSURE: 122 MMHG | BODY MASS INDEX: 29.22 KG/M2 | OXYGEN SATURATION: 98 % | HEIGHT: 60 IN | DIASTOLIC BLOOD PRESSURE: 64 MMHG | WEIGHT: 148.81 LBS | HEART RATE: 62 BPM | RESPIRATION RATE: 17 BRPM

## 2024-04-17 DIAGNOSIS — R05.9 COUGH, UNSPECIFIED TYPE: Primary | ICD-10-CM

## 2024-04-17 PROCEDURE — 1159F MED LIST DOCD IN RCRD: CPT | Mod: CPTII,S$GLB,, | Performed by: FAMILY MEDICINE

## 2024-04-17 PROCEDURE — 3074F SYST BP LT 130 MM HG: CPT | Mod: CPTII,S$GLB,, | Performed by: FAMILY MEDICINE

## 2024-04-17 PROCEDURE — 3288F FALL RISK ASSESSMENT DOCD: CPT | Mod: CPTII,S$GLB,, | Performed by: FAMILY MEDICINE

## 2024-04-17 PROCEDURE — 1101F PT FALLS ASSESS-DOCD LE1/YR: CPT | Mod: CPTII,S$GLB,, | Performed by: FAMILY MEDICINE

## 2024-04-17 PROCEDURE — 1126F AMNT PAIN NOTED NONE PRSNT: CPT | Mod: CPTII,S$GLB,, | Performed by: FAMILY MEDICINE

## 2024-04-17 PROCEDURE — 3078F DIAST BP <80 MM HG: CPT | Mod: CPTII,S$GLB,, | Performed by: FAMILY MEDICINE

## 2024-04-17 PROCEDURE — 99213 OFFICE O/P EST LOW 20 MIN: CPT | Mod: S$GLB,,, | Performed by: FAMILY MEDICINE

## 2024-04-17 PROCEDURE — 99999 PR PBB SHADOW E&M-EST. PATIENT-LVL IV: CPT | Mod: PBBFAC,,, | Performed by: FAMILY MEDICINE

## 2024-04-17 RX ORDER — GABAPENTIN 100 MG/1
CAPSULE ORAL
COMMUNITY
Start: 2024-03-15 | End: 2024-04-17

## 2024-04-17 RX ORDER — BENZONATATE 200 MG/1
200 CAPSULE ORAL 3 TIMES DAILY PRN
Qty: 30 CAPSULE | Refills: 1 | Status: SHIPPED | OUTPATIENT
Start: 2024-04-17 | End: 2024-04-27

## 2024-04-17 NOTE — PROGRESS NOTES
Subjective:       Patient ID: Megha Mendoza is a 79 y.o. female.    Chief Complaint: Cough (Pt here for cough X 1 wk. )    Pt is a 79 y.o. female who presents for evaluation and management of   Encounter Diagnosis   Name Primary?    Cough, unspecified type Yes   .9 DAYS   Started as productive but now is Mostly dry   No fever   Basically she just wants to be evaluated and make sure she is not contagious     Doing well on current meds. Denies any side effects. Prevention is up to date.    Review of Systems   Constitutional:  Negative for fever.   Respiratory:  Positive for cough. Negative for wheezing.        Objective:      Physical Exam  Constitutional:       Appearance: She is well-developed.   HENT:      Head: Normocephalic and atraumatic.      Right Ear: External ear normal.      Left Ear: External ear normal.      Nose: Nose normal.   Eyes:      Pupils: Pupils are equal, round, and reactive to light.   Neck:      Thyroid: No thyromegaly.      Vascular: No JVD.      Trachea: No tracheal deviation.   Cardiovascular:      Rate and Rhythm: Normal rate.      Heart sounds: Normal heart sounds. No murmur heard.  Pulmonary:      Effort: Pulmonary effort is normal. No respiratory distress.      Breath sounds: Normal breath sounds. No wheezing or rales.   Chest:      Chest wall: No tenderness.   Abdominal:      General: Bowel sounds are normal. There is no distension.      Palpations: Abdomen is soft. There is no mass.      Tenderness: There is no abdominal tenderness. There is no guarding or rebound.   Musculoskeletal:         General: No tenderness. Normal range of motion.      Cervical back: Normal range of motion and neck supple.   Lymphadenopathy:      Cervical: No cervical adenopathy.   Skin:     General: Skin is warm and dry.      Coloration: Skin is not pale.      Findings: No erythema or rash.   Neurological:      Mental Status: She is alert and oriented to person, place, and time.      Cranial Nerves: No cranial  nerve deficit.      Motor: No abnormal muscle tone.      Coordination: Coordination normal.      Deep Tendon Reflexes: Reflexes are normal and symmetric. Reflexes normal.   Psychiatric:         Behavior: Behavior normal.         Thought Content: Thought content normal.         Judgment: Judgment normal.         Assessment:       1. Cough, unspecified type        Plan:   1. Cough, unspecified type    Improving. Resolving URI   She is ok to be around people....     RTC if condition acutely worsens or any other concerns, otherwise RTC as scheduled    No follow-ups on file.

## 2024-05-07 ENCOUNTER — TELEPHONE (OUTPATIENT)
Dept: FAMILY MEDICINE | Facility: CLINIC | Age: 79
End: 2024-05-07
Payer: MEDICARE

## 2024-05-07 NOTE — TELEPHONE ENCOUNTER
Contacted/spoke to pt, c/o feet and leg swelling x 2 weeks and is concerned whether oxybutynin (DITROPAN-XL) 10 MG 24 hr tablet or any current medications is causing swelling. Pt denies any pain or warm to touch. Pt also states she has been on a liquid diet for about a month.     Please advise. Thanks

## 2024-05-07 NOTE — TELEPHONE ENCOUNTER
----- Message from Santos Tucker sent at 2024  9:49 AM CDT -----  Contact: Patient  Megha Mendoza  MRN: [id number]  : 1945  PCP: Tomy Baron  Home Phone      [phone]  Work Phone      Not on file.  Mobile          [phone]      MESSAGE: having issues with meds -- feet & leg swelling X 2 wks    Call [phone]    PCP: Loraine

## 2024-05-08 ENCOUNTER — PATIENT OUTREACH (OUTPATIENT)
Dept: ADMINISTRATIVE | Facility: HOSPITAL | Age: 79
End: 2024-05-08
Payer: MEDICARE

## 2024-05-08 NOTE — TELEPHONE ENCOUNTER
Contacted/spoke to pt and notified of providers recommendations. Appt scheduled for 05/09 at 3 pm . Pt gave understanding.

## 2024-05-08 NOTE — PROGRESS NOTES
Received external [facility] Enhanced Annual Wellness Visit collected/completed on 3/26/2024, updated to .  Uploaded Lipid Panel collected on 3/12/2024 from [facility] Everywhere, updated to .

## 2024-05-08 NOTE — TELEPHONE ENCOUNTER
She is 100% not on any medication that would cause swelling in the legs   She would need to make appt with me so we can get to the bottom of it thanks   aD

## 2024-05-09 ENCOUNTER — OFFICE VISIT (OUTPATIENT)
Dept: FAMILY MEDICINE | Facility: CLINIC | Age: 79
End: 2024-05-09
Payer: MEDICARE

## 2024-05-09 VITALS
DIASTOLIC BLOOD PRESSURE: 60 MMHG | OXYGEN SATURATION: 96 % | SYSTOLIC BLOOD PRESSURE: 118 MMHG | BODY MASS INDEX: 28.71 KG/M2 | WEIGHT: 146.25 LBS | HEIGHT: 60 IN | RESPIRATION RATE: 16 BRPM | HEART RATE: 64 BPM

## 2024-05-09 DIAGNOSIS — R60.9 EDEMA, UNSPECIFIED TYPE: Primary | ICD-10-CM

## 2024-05-09 PROCEDURE — 3074F SYST BP LT 130 MM HG: CPT | Mod: CPTII,S$GLB,, | Performed by: FAMILY MEDICINE

## 2024-05-09 PROCEDURE — 99213 OFFICE O/P EST LOW 20 MIN: CPT | Mod: S$GLB,,, | Performed by: FAMILY MEDICINE

## 2024-05-09 PROCEDURE — 1160F RVW MEDS BY RX/DR IN RCRD: CPT | Mod: CPTII,S$GLB,, | Performed by: FAMILY MEDICINE

## 2024-05-09 PROCEDURE — 1101F PT FALLS ASSESS-DOCD LE1/YR: CPT | Mod: CPTII,S$GLB,, | Performed by: FAMILY MEDICINE

## 2024-05-09 PROCEDURE — 99999 PR PBB SHADOW E&M-EST. PATIENT-LVL IV: CPT | Mod: PBBFAC,,, | Performed by: FAMILY MEDICINE

## 2024-05-09 PROCEDURE — 3078F DIAST BP <80 MM HG: CPT | Mod: CPTII,S$GLB,, | Performed by: FAMILY MEDICINE

## 2024-05-09 PROCEDURE — 3288F FALL RISK ASSESSMENT DOCD: CPT | Mod: CPTII,S$GLB,, | Performed by: FAMILY MEDICINE

## 2024-05-09 PROCEDURE — 1125F AMNT PAIN NOTED PAIN PRSNT: CPT | Mod: CPTII,S$GLB,, | Performed by: FAMILY MEDICINE

## 2024-05-09 PROCEDURE — 1159F MED LIST DOCD IN RCRD: CPT | Mod: CPTII,S$GLB,, | Performed by: FAMILY MEDICINE

## 2024-05-09 RX ORDER — FUROSEMIDE 20 MG/1
10 TABLET ORAL DAILY PRN
Qty: 15 TABLET | Refills: 5 | Status: SHIPPED | OUTPATIENT
Start: 2024-05-09 | End: 2024-11-05

## 2024-05-09 RX ORDER — GABAPENTIN 100 MG/1
100 CAPSULE ORAL
COMMUNITY
Start: 2024-05-02

## 2024-05-09 NOTE — PROGRESS NOTES
Subjective:       Patient ID: Megha Mendoza is a 79 y.o. female.    Chief Complaint: Leg Swelling (Pt here for leg swelling X 2 wks. )    Pt is a 79 y.o. female who presents for evaluation and management of   Encounter Diagnosis   Name Primary?    Edema, unspecified type Yes   .for about a week   She notes she is on a liquid diet for the last 3 weeks for esophageal issues     Doing well on current meds. Denies any side effects. Prevention is up to date.  Review of Systems   Respiratory:  Negative for shortness of breath.    Cardiovascular:  Positive for leg swelling. Negative for chest pain.       Objective:      Physical Exam  Constitutional:       Appearance: She is well-developed.   HENT:      Head: Normocephalic and atraumatic.      Right Ear: External ear normal.      Left Ear: External ear normal.      Nose: Nose normal.   Eyes:      Pupils: Pupils are equal, round, and reactive to light.   Neck:      Thyroid: No thyromegaly.      Vascular: No JVD.      Trachea: No tracheal deviation.   Cardiovascular:      Rate and Rhythm: Normal rate.      Heart sounds: Normal heart sounds. No murmur heard.  Pulmonary:      Effort: Pulmonary effort is normal. No respiratory distress.      Breath sounds: Normal breath sounds. No wheezing or rales.   Chest:      Chest wall: No tenderness.   Abdominal:      General: Bowel sounds are normal. There is no distension.      Palpations: Abdomen is soft. There is no mass.      Tenderness: There is no abdominal tenderness. There is no guarding or rebound.   Musculoskeletal:         General: No tenderness. Normal range of motion.      Cervical back: Normal range of motion and neck supple.      Comments: Trace edema   BLE   No Calf TTP   Neg Juan   No palp cord      Lymphadenopathy:      Cervical: No cervical adenopathy.   Skin:     General: Skin is warm and dry.      Coloration: Skin is not pale.      Findings: No erythema or rash.   Neurological:      Mental Status: She is alert and  oriented to person, place, and time.      Cranial Nerves: No cranial nerve deficit.      Motor: No abnormal muscle tone.      Coordination: Coordination normal.      Deep Tendon Reflexes: Reflexes are normal and symmetric. Reflexes normal.   Psychiatric:         Behavior: Behavior normal.         Thought Content: Thought content normal.         Judgment: Judgment normal.         Assessment:       1. Edema, unspecified type        Plan:   1. Edema, unspecified type  -     furosemide (LASIX) 20 MG tablet; Take 0.5 tablets (10 mg total) by mouth daily as needed (swelling).  Dispense: 15 tablet; Refill: 5    Her swelling is minimal and is cosmetic. IT is not a medical issue at this time. Might be related to increased sodium intake on her liquid diet (soup broths, etc....) or possibly the gabapentin, but I would continue kyrie as it is really helping her RLS     RTC if condition acutely worsens or any other concerns, otherwise RTC as scheduled    No follow-ups on file.

## 2024-05-22 ENCOUNTER — OFFICE VISIT (OUTPATIENT)
Dept: GASTROENTEROLOGY | Facility: CLINIC | Age: 79
End: 2024-05-22
Payer: MEDICARE

## 2024-05-22 ENCOUNTER — TELEPHONE (OUTPATIENT)
Dept: ENDOSCOPY | Facility: HOSPITAL | Age: 79
End: 2024-05-22
Payer: MEDICARE

## 2024-05-22 VITALS
SYSTOLIC BLOOD PRESSURE: 104 MMHG | HEART RATE: 59 BPM | BODY MASS INDEX: 29.48 KG/M2 | DIASTOLIC BLOOD PRESSURE: 58 MMHG | HEIGHT: 59 IN | WEIGHT: 146.25 LBS

## 2024-05-22 VITALS — HEIGHT: 60 IN | BODY MASS INDEX: 28.66 KG/M2 | WEIGHT: 146 LBS

## 2024-05-22 DIAGNOSIS — R13.10 DYSPHAGIA, UNSPECIFIED TYPE: Primary | ICD-10-CM

## 2024-05-22 DIAGNOSIS — K31.84 GASTROPARESIS: Primary | ICD-10-CM

## 2024-05-22 DIAGNOSIS — R13.10 DYSPHAGIA, UNSPECIFIED TYPE: ICD-10-CM

## 2024-05-22 DIAGNOSIS — K21.9 GASTROESOPHAGEAL REFLUX DISEASE, UNSPECIFIED WHETHER ESOPHAGITIS PRESENT: ICD-10-CM

## 2024-05-22 PROCEDURE — 99214 OFFICE O/P EST MOD 30 MIN: CPT | Mod: S$GLB,,, | Performed by: INTERNAL MEDICINE

## 2024-05-22 PROCEDURE — 3288F FALL RISK ASSESSMENT DOCD: CPT | Mod: CPTII,S$GLB,, | Performed by: INTERNAL MEDICINE

## 2024-05-22 PROCEDURE — 1125F AMNT PAIN NOTED PAIN PRSNT: CPT | Mod: CPTII,S$GLB,, | Performed by: INTERNAL MEDICINE

## 2024-05-22 PROCEDURE — 3074F SYST BP LT 130 MM HG: CPT | Mod: CPTII,S$GLB,, | Performed by: INTERNAL MEDICINE

## 2024-05-22 PROCEDURE — 1159F MED LIST DOCD IN RCRD: CPT | Mod: CPTII,S$GLB,, | Performed by: INTERNAL MEDICINE

## 2024-05-22 PROCEDURE — 3078F DIAST BP <80 MM HG: CPT | Mod: CPTII,S$GLB,, | Performed by: INTERNAL MEDICINE

## 2024-05-22 PROCEDURE — 1100F PTFALLS ASSESS-DOCD GE2>/YR: CPT | Mod: CPTII,S$GLB,, | Performed by: INTERNAL MEDICINE

## 2024-05-22 PROCEDURE — 99999 PR PBB SHADOW E&M-EST. PATIENT-LVL IV: CPT | Mod: PBBFAC,,, | Performed by: INTERNAL MEDICINE

## 2024-05-22 RX ORDER — METOCLOPRAMIDE 5 MG/1
5 TABLET ORAL 4 TIMES DAILY PRN
Qty: 120 TABLET | Refills: 3 | Status: SHIPPED | OUTPATIENT
Start: 2024-05-22

## 2024-05-22 RX ORDER — ESOMEPRAZOLE MAGNESIUM 40 MG/1
40 CAPSULE, DELAYED RELEASE ORAL 2 TIMES DAILY
Qty: 60 CAPSULE | Refills: 11 | Status: SHIPPED | OUTPATIENT
Start: 2024-05-22 | End: 2025-05-22

## 2024-05-22 NOTE — TELEPHONE ENCOUNTER
----- Message from Kasia Hale MD sent at 5/22/2024  2:20 PM CDT -----  Regarding: EGD with Endoflip and Dilation-ok to use a hold spot if you have one  [facility] PROCEDURE ORDERS    CLEARANCE FOR PROCEDURES:  [ ] Not needed        Procedures  [ ] EGD with EndoFlip and Dilation        FLOOR:    [ ] 2nd Floor    Reason for 2nd Floor:   [ ] Gastroparesis       PREP    [ ] Full liquid diet 3 days          Motility Studies (esophageal manometry/anorectal manometry)  Hold narcotics x 1 days if able   Hold TCA x 1 days if able  Propofol/lidocaine only during sedation.  Discuss with Dr. Hale if additional sedation needed.   Hold baclofen for thee days (at least one day) if able   Hold muscle relaxants x 1 day if able     Anticoagulation/antiplt agents:   No    ORDER OF TESTING:  Day 1: EGD with Endoflip with Esophageal dilation       [ ] No special requirements - pt lives locally     SCHEDULING PRIORITY  [ ] Urgent if possible, ok to use a hold spot    URGENCY     [x] Medically NOT Urgent      DIAGNOSIS   [ ] Dysphagia        PHYSICIAN  [ ]  Ok with Dr. Hale

## 2024-05-22 NOTE — TELEPHONE ENCOUNTER
Spoke to Megha to schedule procedure(s) Upper Endoscopy (EGD)       Physician to perform procedure(s) Dr. LIZ Hale   Date of Procedure (s) 5/28/24  Arrival Time 7:00 AM  Time of Procedure(s) 8:00 AM   Location of Procedure(s) [address]  Type of Rx Prep sent to patient: Other  Instructions provided to patient via [facility]    Patient was informed on the following information and verbalized understanding. Screening questionnaire reviewed with patient and complete. If procedure requires anesthesia, a responsible adult needs to be present to accompany the patient home, patient cannot drive after receiving anesthesia. Appointment details are tentative, especially check-in time. Patient will receive a prep-op call 7 days prior to confirm check-in time for procedure. If applicable the patient should contact their pharmacy to verify Rx for procedure prep is ready for pick-up. Patient was advised to call the scheduling department at [phone] if pharmacy states no Rx is available. Patient was advised to call the endoscopy scheduling department if any questions or concerns arise.       Endoscopy Scheduling Department

## 2024-05-22 NOTE — PROGRESS NOTES
"[facility] Note    Referral from Dr. Dunlap    CC: dysphagia    HPI 79 y.o. female with past medical history of COPD, gastroparesis, who presents with chronic, daily, worsening dysphagia to solids with associated chest pain and regurgitation.  When I saw her previously this was not bothersome.  Since then she has had a food bolus impaction and her symptoms have worsened.  Currently she is only taking in liquids because soft foods cause dysphagia.  She has esophageal spasms at times.    Her symptoms began in 2022 after her partial fundoplication/hiatal hernia repair.    She had an esophageal manometry in 2023 that was normal.    She is having acid reflux on her liquid diet not controlled with Omeprazole 40mg twice per day.    She has gastroparesis.  She has intermittent symptoms of nausea, abdominal bloating and hardness of the abdomen after eating.    Past Medical History  Past Medical History:   Diagnosis Date    Arthritis     Back pain     Bronchitis, chronic     Carotid artery disease     50% blockage bilateral    Colitis, acute     COPD (chronic obstructive pulmonary disease)     Coronary artery disease     Diverticulitis     DJD (degenerative joint disease)     GERD (gastroesophageal reflux disease)     severe    Hemorrhoids     Hiatal hernia     Hyperlipidemia     Irritable bowel syndrome with constipation     Mixed stress and urge urinary incontinence 3/30/2021    DARA (obstructive sleep apnea)     Osteoporosis     Respiratory distress     Trouble in sleeping          Physical Examination  BP (!) 104/58   Pulse (!) 59   Ht 4' 11" (1.499 m)   Wt 66.3 kg (146 lb 4.4 oz)   BMI 29.54 kg/m²   General appearance: alert, cooperative, no distress  HENT: Normocephalic, atraumatic, neck symmetrical, no nasal discharge   Abdomen: soft, non-tender; non distended, no rebound or guarding  Neurologic: Alert and oriented X 3, moving all four extremities, intact sensation to light touch  Labs:  Lab Results "   Component Value Date    WBC 5.65 04/05/2024    HGB 13.7 04/05/2024    HCT 41.9 04/05/2024    MCV 95 04/05/2024     04/05/2024         CMP  Sodium   Date Value Ref Range Status   04/05/2024 141 136 - 145 mmol/L Final     Potassium   Date Value Ref Range Status   04/05/2024 4.0 3.5 - 5.1 mmol/L Final     Chloride   Date Value Ref Range Status   04/05/2024 106 95 - 110 mmol/L Final     CO2   Date Value Ref Range Status   04/05/2024 22 (L) 23 - 29 mmol/L Final     Glucose   Date Value Ref Range Status   04/05/2024 64 (L) 70 - 110 mg/dL Final     BUN   Date Value Ref Range Status   04/05/2024 26 (H) 8 - 23 mg/dL Final     Creatinine   Date Value Ref Range Status   04/05/2024 0.9 0.5 - 1.4 mg/dL Final     Calcium   Date Value Ref Range Status   04/05/2024 9.8 8.7 - 10.5 mg/dL Final     Total Protein   Date Value Ref Range Status   04/05/2024 7.8 6.0 - 8.4 g/dL Final     Albumin   Date Value Ref Range Status   04/05/2024 4.4 3.5 - 5.2 g/dL Final     Total Bilirubin   Date Value Ref Range Status   04/05/2024 0.6 0.1 - 1.0 mg/dL Final     Comment:     For infants and newborns, interpretation of results should be based  on gestational age, weight and in agreement with clinical  observations.    Premature Infant recommended reference ranges:  Up to 24 hours.............<8.0 mg/dL  Up to 48 hours............<12.0 mg/dL  3-5 days..................<15.0 mg/dL  6-29 days.................<15.0 mg/dL       Alkaline Phosphatase   Date Value Ref Range Status   04/05/2024 35 (L) 55 - 135 U/L Final     AST   Date Value Ref Range Status   04/05/2024 26 10 - 40 U/L Final     ALT   Date Value Ref Range Status   04/05/2024 18 10 - 44 U/L Final     Anion Gap   Date Value Ref Range Status   04/05/2024 13 8 - 16 mmol/L Final     eGFR   Date Value Ref Range Status   04/05/2024 >60 >60 mL/min/1.73 m^2 Final           Assessment:   1. Gastroparesis    2. Gastroesophageal reflux disease, unspecified whether esophagitis present    3.  Dysphagia, unspecified type    4.     Food bolus impaction.    Plan:  -Stop Omeprazole.  Start Esomeprazole 40mg twice per day.  -Schedule esophagram.  -Schedule EGD with Endoflip and esophageal dilation.  -Consider referral to surgery for wrap takedown or re do based on the above work up and response to treatment.  -Start Reglan 5mg as needed for gastroparesis symptoms.  The risk of tardive dyskinesia was discussed with the patient.  AIMS score 0 today.    Kasia Hale MD

## 2024-05-23 ENCOUNTER — TELEPHONE (OUTPATIENT)
Dept: GASTROENTEROLOGY | Facility: CLINIC | Age: 79
End: 2024-05-23
Payer: MEDICARE

## 2024-05-27 ENCOUNTER — TELEPHONE (OUTPATIENT)
Dept: ENDOSCOPY | Facility: HOSPITAL | Age: 79
End: 2024-05-27
Payer: MEDICARE

## 2024-05-27 NOTE — TELEPHONE ENCOUNTER
Contacted Pt for endoscopy pre-call for upcoming procedure.  Pre-call complete, Pt does not have any further questions. Arrival time:07:00 am

## 2024-05-28 ENCOUNTER — ANESTHESIA EVENT (OUTPATIENT)
Dept: ENDOSCOPY | Facility: HOSPITAL | Age: 79
End: 2024-05-28
Payer: MEDICARE

## 2024-05-28 ENCOUNTER — HOSPITAL ENCOUNTER (OUTPATIENT)
Facility: HOSPITAL | Age: 79
Discharge: HOME | End: 2024-05-28
Attending: INTERNAL MEDICINE | Admitting: INTERNAL MEDICINE
Payer: MEDICARE

## 2024-05-28 ENCOUNTER — ANESTHESIA (OUTPATIENT)
Dept: ENDOSCOPY | Facility: HOSPITAL | Age: 79
End: 2024-05-28
Payer: MEDICARE

## 2024-05-28 VITALS
DIASTOLIC BLOOD PRESSURE: 60 MMHG | SYSTOLIC BLOOD PRESSURE: 122 MMHG | HEART RATE: 48 BPM | OXYGEN SATURATION: 95 % | RESPIRATION RATE: 20 BRPM | BODY MASS INDEX: 29.23 KG/M2 | TEMPERATURE: 98 F | WEIGHT: 145 LBS | HEIGHT: 59 IN

## 2024-05-28 DIAGNOSIS — R13.10 DYSPHAGIA: ICD-10-CM

## 2024-05-28 PROCEDURE — 27201012 HC FORCEPS, HOT/COLD, DISP: Performed by: INTERNAL MEDICINE

## 2024-05-28 PROCEDURE — 91040 ESOPH BALLOON DISTENSION TST: CPT | Performed by: INTERNAL MEDICINE

## 2024-05-28 PROCEDURE — 37000008 HC ANESTHESIA 1ST 15 MINUTES: Performed by: INTERNAL MEDICINE

## 2024-05-28 PROCEDURE — 88305 TISSUE EXAM BY PATHOLOGIST: CPT | Mod: 59 | Performed by: PATHOLOGY

## 2024-05-28 PROCEDURE — 88305 TISSUE EXAM BY PATHOLOGIST: CPT | Mod: 26,,, | Performed by: PATHOLOGY

## 2024-05-28 PROCEDURE — 43249 ESOPH EGD DILATION <30 MM: CPT | Mod: ,,, | Performed by: INTERNAL MEDICINE

## 2024-05-28 PROCEDURE — 43239 EGD BIOPSY SINGLE/MULTIPLE: CPT | Mod: 59,,, | Performed by: INTERNAL MEDICINE

## 2024-05-28 PROCEDURE — 37000009 HC ANESTHESIA EA ADD 15 MINS: Performed by: INTERNAL MEDICINE

## 2024-05-28 PROCEDURE — 25000003 PHARM REV CODE 250: Performed by: NURSE ANESTHETIST, CERTIFIED REGISTERED

## 2024-05-28 PROCEDURE — 63600175 PHARM REV CODE 636 W HCPCS: Performed by: NURSE ANESTHETIST, CERTIFIED REGISTERED

## 2024-05-28 PROCEDURE — 91040 ESOPH BALLOON DISTENSION TST: CPT | Mod: 26,,, | Performed by: INTERNAL MEDICINE

## 2024-05-28 PROCEDURE — 43249 ESOPH EGD DILATION <30 MM: CPT | Performed by: INTERNAL MEDICINE

## 2024-05-28 PROCEDURE — D9220A PRA ANESTHESIA: Mod: CRNA,,, | Performed by: NURSE ANESTHETIST, CERTIFIED REGISTERED

## 2024-05-28 PROCEDURE — 43239 EGD BIOPSY SINGLE/MULTIPLE: CPT | Mod: 59 | Performed by: INTERNAL MEDICINE

## 2024-05-28 PROCEDURE — D9220A PRA ANESTHESIA: Mod: ANES,,, | Performed by: ANESTHESIOLOGY

## 2024-05-28 PROCEDURE — C1726 CATH, BAL DIL, NON-VASCULAR: HCPCS | Performed by: INTERNAL MEDICINE

## 2024-05-28 RX ORDER — PROPOFOL 10 MG/ML
VIAL (ML) INTRAVENOUS
Status: DISCONTINUED | OUTPATIENT
Start: 2024-05-28 | End: 2024-05-28

## 2024-05-28 RX ORDER — SODIUM CHLORIDE 0.9 % (FLUSH) 0.9 %
3 SYRINGE (ML) INJECTION
Status: DISCONTINUED | OUTPATIENT
Start: 2024-05-28 | End: 2024-05-28 | Stop reason: HOSPADM

## 2024-05-28 RX ADMIN — SODIUM CHLORIDE: 0.9 INJECTION, SOLUTION INTRAVENOUS at 08:05

## 2024-05-28 RX ADMIN — PROPOFOL 75 MG: 10 INJECTION, EMULSION INTRAVENOUS at 08:05

## 2024-05-28 NOTE — H&P
Short Stay Endoscopy History and Physical    PCP - Tomy Baron MD     Procedure - EGD with Endoflip and dilation  ASA - per anesthesia  Mallampati - per anesthesia  History of Anesthesia problems - no  Family history Anesthesia problems -  no   Plan of anesthesia - General    HPI:  This is a 79 y.o. female here for evaluation of : Dysphagia      ROS:  Constitutional: No fevers, chills, No weight loss  CV: No chest pain  Pulm: No cough, No shortness of breath  Ophtho: No vision changes  GI: see HPI  Derm: No rash    Medical History:  has a past medical history of Arthritis, Back pain, Bronchitis, chronic, Carotid artery disease, Colitis, acute, COPD (chronic obstructive pulmonary disease), Coronary artery disease, Diverticulitis, DJD (degenerative joint disease), GERD (gastroesophageal reflux disease), Hemorrhoids, Hiatal hernia, Hyperlipidemia, Irritable bowel syndrome with constipation, Mixed stress and urge urinary incontinence (3/30/2021), DARA (obstructive sleep apnea), Osteoporosis, Respiratory distress, and Trouble in sleeping.    Surgical History:  has a past surgical history that includes Hemorrhoid surgery (2009); Tubal ligation; blockages in artaries; Total knee arthroplasty (Right); Hysterectomy (1995); Colonoscopy; Cardiac catheterization (5/1/15); Joint replacement; Joint replacement (Left, 08/02/2017); Hand Tenodesis (Bilateral, 10/04/2019); Eye surgery; Hernia repair (03/2022); Colonoscopy (N/A, 10/11/2022); Esophagogastroduodenoscopy (N/A, 5/8/2023); Esophageal manometry with measurement of impedance (N/A, 6/30/2023); and Esophagogastroduodenoscopy (N/A, 4/5/2024).    Family History: family history includes Arthritis in her mother; COPD in her sister; Cancer in her sister; Diabetes in her mother; Heart disease in her father; Hypertension in her mother; Stroke in her father and mother.. Otherwise no colon cancer, inflammatory bowel disease, or GI malignancies.    Social History:  reports  that she quit smoking about 18 years ago. Her smoking use included cigarettes. She started smoking about 64 years ago. She has a 34.5 pack-year smoking history. She has been exposed to tobacco smoke. She has never used smokeless tobacco. She reports that she does not drink alcohol and does not use drugs.    Review of patient's allergies indicates:   Allergen Reactions    Cephalexin Rash and Other (See Comments)     Solid patches - rash like skin thickened    Hibiclens [chlorhexidine gluconate] Itching    Sulfa (sulfonamide antibiotics) Rash       Medications:   Medications Prior to Admission   Medication Sig Dispense Refill Last Dose    acetaminophen (TYLENOL) 500 MG tablet Take 650 mg by mouth every 6 (six) hours as needed for Pain.   5/27/2024    apple cider vinegar 500 mg Tab Take 1 tablet by mouth once daily.   5/27/2024    BACILLUS COAGULANS (DIGESTIVE ADVANTAGE ORAL) Take 1 tablet by mouth once daily.   5/27/2024    cetirizine (ZYRTEC) 10 MG tablet Take 10 mg by mouth once daily.   5/27/2024    co-enzyme Q-10 30 mg capsule Take 100 mg by mouth once daily.   5/27/2024    esomeprazole (NEXIUM) 40 MG capsule Take 1 capsule (40 mg total) by mouth 2 (two) times daily. 60 capsule 11 5/27/2024    furosemide (LASIX) 20 MG tablet Take 0.5 tablets (10 mg total) by mouth daily as needed (swelling). 15 tablet 5 5/27/2024    gabapentin (NEURONTIN) 100 MG capsule 100 mg.   5/27/2024    krill-omega-3-dha-epa-lipids 287-13-88-50 mg Cap Take 1 capsule by mouth nightly.    5/27/2024    metoclopramide HCl (REGLAN) 5 MG tablet Take 1 tablet (5 mg total) by mouth 4 (four) times daily as needed (nausea, bloating, slow stomach emptying symptoms). 120 tablet 3 Past Month    multivitamin (THERAGRAN) tablet Take 1 tablet by mouth once daily.   5/27/2024    oxybutynin (DITROPAN-XL) 10 MG 24 hr tablet Take 1 tablet (10 mg total) by mouth once daily. 30 tablet 11 5/27/2024    rosuvastatin (CRESTOR) 40 MG Tab rosuvastatin 40 mg tablet,  [RxNorm: 313745]   5/27/2024    SYMBICORT 80-4.5 mcg/actuation HFAA INHALE 2 PUFFS BY MOUTH TWICE DAILY .  CONTROLLER 30.6 g 1 Past Week    valsartan (DIOVAN) 80 MG tablet Take 80 mg by mouth.   5/27/2024    albuterol (PROVENTIL) 2.5 mg /3 mL (0.083 %) nebulizer solution Take 2.5 mg by nebulization every 12 (twelve) hours as needed for Wheezing.   More than a month    aspirin 81 MG Chew Take 81 mg by mouth once daily.  (Patient not taking: Reported on 5/22/2024)       denosumab (PROLIA) 60 mg/mL Syrg Inject 1 mL (60 mg total) into the skin every 6 (six) months. 1 each 1 More than a month    famotidine (PEPCID) 40 MG tablet Take 40 mg by mouth nightly. (Patient not taking: Reported on 5/22/2024)       ipratropium (ATROVENT) 0.02 % nebulizer solution Take 500 mcg by nebulization every 12 (twelve) hours.       nitrofurantoin, macrocrystal-monohydrate, (MACROBID) 100 MG capsule Take 1 capsule (100 mg total) by mouth 2 (two) times daily. (Patient not taking: Reported on 5/22/2024) 14 capsule 0     nitroGLYCERIN (NITROSTAT) 0.4 MG SL tablet Place 0.4 mg under the tongue every 5 (five) minutes as needed for Chest pain.   More than a month    OXYGEN-AIR DELIVERY SYSTEMS MISC oxygen, [RxNorm: 0]          Physical Exam:    Vital Signs:   Vitals:    05/28/24 0747   BP: 137/60   Pulse: (!) 58   Resp: 16   Temp: 97.2 °F (36.2 °C)       Gen: NAD, lying comfortably  HENT: NCAT, oropharynx clear  Eyes: anicteric sclerae, EOMI grossly  Neck: supple, no visible masses/goiter  Cardiac: RRR  Lungs: non-labored breathing  Abd: soft, NT/ND, normoactive BS  Ext: no LE edema, warm, well perfused  Skin: skin intact on exposed body parts, no visible rashes, lesions  Neuro: A&Ox4, neuro exam grossly intact, moves all extremities  Psych: appropriate mood, affect      Labs:  Lab Results   Component Value Date    WBC 5.65 04/05/2024    HGB 13.7 04/05/2024    HCT 41.9 04/05/2024     04/05/2024    CHOL 146 03/12/2024    TRIG 106 03/12/2024     HDL 66 03/12/2024    LDLDIRECT 60 03/12/2024    ALT 18 04/05/2024    AST 26 04/05/2024     04/05/2024    K 4.0 04/05/2024     04/05/2024    CREATININE 0.9 04/05/2024    BUN 26 (H) 04/05/2024    CO2 22 (L) 04/05/2024    TSH 0.898 02/27/2023    HGBA1C 5.5 02/27/2023       Plan:  EGD with Endoflip and dilation    I have explained the risks and benefits of endoscopy procedures to the patient including but not limited to bleeding, perforation, infection, and death.  The patient was asked if they understand and allowed to ask any further questions to their satisfaction.      Kasia Hale MD

## 2024-05-28 NOTE — TRANSFER OF CARE
"Anesthesia Transfer of Care Note    Patient: Megha Mendoza    Procedure(s) Performed: Procedure(s) (LRB):  EGD (ESOPHAGOGASTRODUODENOSCOPY) (N/A)    Patient location: [facility]    Anesthesia Type: general    Transport from OR: Transported from OR on 2-3 L/min O2 by NC with adequate spontaneous ventilation    Post pain: adequate analgesia    Post assessment: no apparent anesthetic complications    Post vital signs: stable    Level of consciousness: awake    Nausea/Vomiting: no nausea/vomiting    Complications: none    Transfer of care protocol was followed      Last vitals: Visit Vitals  /56  (BP Location: Left arm, Patient Position: Lying)   Pulse (!) 58   Temp 36.2 °C (97.2 °F)   Resp 16   Ht 4' 11" (1.499 m)   Wt 65.8 kg (145 lb)   SpO2 95%   Breastfeeding No   BMI 29.29 kg/m²     "

## 2024-05-28 NOTE — PROVATION PATIENT INSTRUCTIONS
Discharge Summary/Instructions after an Endoscopic Procedure  Patient Name: Megha Mendoza  Patient MRN: [id number]  Patient YOB: 1945  Tuesday, May 28, 2024  Kasia Hale MD  Dear patient,  As a result of recent federal legislation (The Federal Cures Act), you may   receive lab or pathology results from your procedure in your [facility]   account before your physician is able to contact you. Your physician or   their representative will relay the results to you with their   recommendations at their soonest availability.  Thank you,  RESTRICTIONS:  During your procedure today, you received medications for sedation.  These   medications may affect your judgment, balance and coordination.  Therefore,   for 24 hours, you have the following restrictions:   - DO NOT drive a car, operate machinery, make legal/financial decisions,   sign important papers or drink alcohol.    ACTIVITY:  Today: no heavy lifting, straining or running due to procedural   sedation/anesthesia.  The following day: return to full activity including work.  DIET:  Eat and drink normally unless instructed otherwise.     TREATMENT FOR COMMON SIDE EFFECTS:  - Mild abdominal pain, nausea, belching, bloating or excessive gas:  rest,   eat lightly and use a heating pad.  - Sore Throat: treat with throat lozenges and/or gargle with warm salt   water.  - Because air was used during the procedure, expelling large amounts of air   from your rectum or belching is normal.  - If a bowel prep was taken, you may not have a bowel movement for 1-3 days.    This is normal.  SYMPTOMS TO WATCH FOR AND REPORT TO YOUR PHYSICIAN:  1. Abdominal pain or bloating, other than gas cramps.  2. Chest pain.  3. Back pain.  4. Signs of infection such as: chills or fever occurring within 24 hours   after the procedure.  5. Rectal bleeding, which would show as bright red, maroon, or black stools.   (A tablespoon of blood from the rectum is not serious, especially if    hemorrhoids are present.)  6. Vomiting.  7. Weakness or dizziness.  GO DIRECTLY TO THE NEAREST EMERGENCY ROOM IF YOU HAVE ANY OF THE FOLLOWING:      Difficulty breathing              Chills and/or fever over 101 F   Persistent vomiting and/or vomiting blood   Severe abdominal pain   Severe chest pain   Black, tarry stools   Bleeding- more than one tablespoon   Any other symptom or condition that you feel may need urgent attention  Your doctor recommends these additional instructions:  If any biopsies were taken, your doctors clinic will contact you in 1 to 2   weeks with any results.  - Discharge patient to home.   - Resume previous diet.   - Continue present medications.   - Await pathology results.  For questions, problems or results please call your physician - Kasia Hale MD at Work:  [phone].  [facility], EMERGENCY ROOM PHONE NUMBER: [phone]  IF A COMPLICATION OR EMERGENCY SITUATION ARISES AND YOU ARE UNABLE TO REACH   YOUR PHYSICIAN - GO DIRECTLY TO THE EMERGENCY ROOM.  Kasia Hale MD  5/28/2024 9:07:48 AM  This report has been verified and signed electronically.  Dear patient,  As a result of recent federal legislation (The Federal Cures Act), you may   receive lab or pathology results from your procedure in your [facility]   account before your physician is able to contact you. Your physician or   their representative will relay the results to you with their   recommendations at their soonest availability.  Thank you,  PROVATION

## 2024-05-28 NOTE — ANESTHESIA PREPROCEDURE EVALUATION
05/28/2024  [facility]  Anesthesia Pre-Operative Evaluation         Patient Name: Megha Mendoza  YOB: 1945  MRN: [id number]    SUBJECTIVE:     Pre-operative evaluation for Procedure(s) (LRB):  EGD (ESOPHAGOGASTRODUODENOSCOPY) (N/A)     05/28/2024    Megha Mendoza is a 79 y.o. female w/ a pertinent PMHx of gastroparesis (3 days full liquid, 1 day clears), COPD (no obstruction per PFT, moderate red DLCO) on home O2 QHS, dysphagia here for esophageal dilation.     Full medical history listed below      LDA: None documented.    Prev airway: None documented.     GTTs: None documented.        Patient Active Problem List   Diagnosis    Chronic pain of left knee    Osteopenia    Former smoker    Dyslipidemia    Gastroesophageal reflux disease without esophagitis    Primary osteoarthritis of left knee    History of diverticulitis    Coronary artery disease involving native coronary artery without angina pectoris    Carotid disease, bilateral    Chronic insomnia    Atherosclerosis of aorta    Knee pain, left    Mixed stress and urge incontinence    Chronic constipation    Vaginal atrophy    Emphysema/COPD    Obstructive chronic bronchitis without exacerbation    Pharyngoesophageal dysphagia    Spasm of throat muscle    Lung nodule    Acute pain of right shoulder    Pain in right acromioclavicular joint    Chronic pain of both feet    Senile purpura    Abnormality of gait and mobility       Review of patient's allergies indicates:   Allergen Reactions    Cephalexin Rash and Other (See Comments)     Solid patches - rash like skin thickened    Hibiclens [chlorhexidine gluconate] Itching    Sulfa (sulfonamide antibiotics) Rash       Current Inpatient Medications:      No current facility-administered medications on file prior to encounter.     Current Outpatient Medications on File Prior to  Encounter   Medication Sig Dispense Refill    acetaminophen (TYLENOL) 500 MG tablet Take 650 mg by mouth every 6 (six) hours as needed for Pain.      albuterol (PROVENTIL) 2.5 mg /3 mL (0.083 %) nebulizer solution Take 2.5 mg by nebulization every 12 (twelve) hours as needed for Wheezing.      apple cider vinegar 500 mg Tab Take 1 tablet by mouth once daily.      aspirin 81 MG Chew Take 81 mg by mouth once daily.  (Patient not taking: Reported on 5/22/2024)      BACILLUS COAGULANS (DIGESTIVE ADVANTAGE ORAL) Take 1 tablet by mouth once daily.      cetirizine (ZYRTEC) 10 MG tablet Take 10 mg by mouth once daily.      co-enzyme Q-10 30 mg capsule Take 100 mg by mouth once daily.      denosumab (PROLIA) 60 mg/mL Syrg Inject 1 mL (60 mg total) into the skin every 6 (six) months. 1 each 1    esomeprazole (NEXIUM) 40 MG capsule Take 1 capsule (40 mg total) by mouth 2 (two) times daily. 60 capsule 11    famotidine (PEPCID) 40 MG tablet Take 40 mg by mouth nightly. (Patient not taking: Reported on 5/22/2024)      furosemide (LASIX) 20 MG tablet Take 0.5 tablets (10 mg total) by mouth daily as needed (swelling). 15 tablet 5    gabapentin (NEURONTIN) 100 MG capsule 100 mg.      ipratropium (ATROVENT) 0.02 % nebulizer solution Take 500 mcg by nebulization every 12 (twelve) hours.      krill-omega-3-dha-epa-lipids 948-04-62-50 mg Cap Take 1 capsule by mouth nightly.       metoclopramide HCl (REGLAN) 5 MG tablet Take 1 tablet (5 mg total) by mouth 4 (four) times daily as needed (nausea, bloating, slow stomach emptying symptoms). 120 tablet 3    multivitamin (THERAGRAN) tablet Take 1 tablet by mouth once daily.      nitrofurantoin, macrocrystal-monohydrate, (MACROBID) 100 MG capsule Take 1 capsule (100 mg total) by mouth 2 (two) times daily. (Patient not taking: Reported on 5/22/2024) 14 capsule 0    nitroGLYCERIN (NITROSTAT) 0.4 MG SL tablet Place 0.4 mg under the tongue every 5 (five) minutes as needed for Chest pain.       oxybutynin (DITROPAN-XL) 10 MG 24 hr tablet Take 1 tablet (10 mg total) by mouth once daily. 30 tablet 11    OXYGEN-AIR DELIVERY SYSTEMS MISC oxygen, [RxNorm: 0]      rosuvastatin (CRESTOR) 40 MG Tab rosuvastatin 40 mg tablet, [RxNorm: 683247]      SYMBICORT 80-4.5 mcg/actuation HFAA INHALE 2 PUFFS BY MOUTH TWICE DAILY .  CONTROLLER 30.6 g 1    valsartan (DIOVAN) 80 MG tablet Take 80 mg by mouth.         Past Surgical History:   Procedure Laterality Date    blockages in artaries      CARDIAC CATHETERIZATION  5/1/15    COLONOSCOPY      COLONOSCOPY N/A 10/11/2022    Procedure: COLONOSCOPY;  Surgeon: Tomy Baron MD;  Location: [facility];  Service: Endoscopy;  Laterality: N/A;  Pt needing miralax prep and EKG.    ESOPHAGEAL MANOMETRY WITH MEASUREMENT OF IMPEDANCE N/A 6/30/2023    Procedure: MANOMETRY, ESOPHAGUS, WITH IMPEDANCE MEASUREMENT;  Surgeon: Joseluis Santana MD;  Location: [facility] ([address] [place] [address]);  Service: Endoscopy;  Laterality: N/A;  Referred by Dr. Dunlap, [facility] -  6/26 called to confirm couple times, rang then said call not completed and no VM MAL    ESOPHAGOGASTRODUODENOSCOPY N/A 5/8/2023    Procedure: EGD (ESOPHAGOGASTRODUODENOSCOPY);  Surgeon: Khushboo Dunlap MD;  Location: [facility];  Service: Endoscopy;  Laterality: N/A;    ESOPHAGOGASTRODUODENOSCOPY N/A 4/5/2024    Procedure: EGD (ESOPHAGOGASTRODUODENOSCOPY);  Surgeon: Carmela Sequeira MD;  Location: [facility];  Service: Endoscopy;  Laterality: N/A;    EYE SURGERY      HAND TENODESIS Bilateral 10/04/2019    HEMORRHOID SURGERY  2009    HERNIA REPAIR  03/2022    HYSTERECTOMY  1995    complete    JOINT REPLACEMENT      JOINT REPLACEMENT Left 08/02/2017    TOTAL KNEE ARTHROPLASTY Right     TUBAL LIGATION         Social History     Socioeconomic History    Marital status:     Number of children: 4   Tobacco Use    Smoking status: Former     Current packs/day: 0.00     Average packs/day: 0.8 packs/day for 46.0 years (34.5 ttl  "pk-yrs)     Types: Cigarettes     Start date: 10/14/1959     Quit date: 10/14/2005     Years since quittin.6     Passive exposure: Past    Smokeless tobacco: Never    Tobacco comments:     Stopped and started several times   Substance and Sexual Activity    Alcohol use: No    Drug use: Never    Sexual activity: Not Currently     Birth control/protection: Surgical     Comment:      Social Determinants of Health     Financial Resource Strain: High Risk (1/3/2024)    Overall Financial Resource Strain (CARDIA)     Difficulty of Paying Living Expenses: Hard   Food Insecurity: Food Insecurity Present (1/3/2024)    Hunger Vital Sign     Worried About Running Out of Food in the Last Year: Sometimes true     Ran Out of Food in the Last Year: Sometimes true   Transportation Needs: No Transportation Needs (1/3/2024)    PRAPARE - Transportation     Lack of Transportation (Medical): No     Lack of Transportation (Non-Medical): No   Physical Activity: Sufficiently Active (1/3/2024)    Exercise Vital Sign     Days of Exercise per Week: 7 days     Minutes of Exercise per Session: 30 min   Stress: Stress Concern Present (1/3/2024)    [employer] - Occupational Stress Questionnaire     Feeling of Stress : To some extent   Housing Stability: Low Risk  (1/3/2024)    Housing Stability Vital Sign     Unable to Pay for Housing in the Last Year: No     Number of Places Lived in the Last Year: 1     Unstable Housing in the Last Year: No       OBJECTIVE:     Vital Signs Range (Last 24H):         CBC:   No results for input(s): "WBC", "RBC", "HGB", "HCT", "PLT", "MCV", "MCH", "MCHC" in the last 72 hours.    CMP: No results for input(s): "NA", "K", "CL", "CO2", "BUN", "CREATININE", "GLU", "MG", "PHOS", "CALCIUM", "ALBUMIN", "PROT", "ALKPHOS", "ALT", "AST", "BILITOT" in the last 72 hours.    INR:  No results for input(s): "PT", "INR", "PROTIME", "APTT" in the last 72 hours.    Diagnostic Studies: No " relevant studies.    EKG:   Results for orders placed or performed during the hospital encounter of 04/05/24   EKG 12-lead    Collection Time: 04/05/24 11:52 AM   Result Value Ref Range    QRS Duration 76 ms    [facility] QTC Calculation 428 ms    Narrative    Test Reason : R07.9    Vent. Rate : 061 BPM     Atrial Rate : 061 BPM     P-R Int : 154 ms          QRS Dur : 076 ms      QT Int : 426 ms       P-R-T Axes : 059 -70 045 degrees     QTc Int : 428 ms    Normal sinus rhythm with sinus arrhythmia  Left axis deviation  Abnormal ECG  When compared with ECG of 19-APR-2023 09:18,  No significant change was found  Confirmed by Federico POP, Santos MOREAU ([id number]) on 4/5/2024 1:27:10 PM    Referred By: [email or web address]   SELF           Confirmed By:Santos Caballero MD        2D ECHO:   No results found for this or any previous visit.         ASSESSMENT/PLAN:           Pre-op Assessment    I have reviewed the Patient Summary Reports.     I have reviewed the Nursing Notes. I have reviewed the NPO Status.   I have reviewed the Medications.     Review of Systems  Anesthesia Hx:   History of prior surgery of interest to airway management or planning:          Denies Family Hx of Anesthesia complications.    Denies Personal Hx of Anesthesia complications.                        Physical Exam  General: Well nourished, Cooperative, Alert and Oriented    Airway:  Mallampati: II   Mouth Opening: Normal  TM Distance: Normal  Tongue: Normal  Neck ROM: Normal ROM    Dental:  Intact    Chest/Lungs:  Clear to auscultation, Normal Respiratory Rate    Heart:  Rate: Normal  Rhythm: Regular Rhythm        Anesthesia Plan  Type of Anesthesia, risks & benefits discussed:    Anesthesia Type: Gen Natural Airway  Intra-op Monitoring Plan: Standard ASA Monitors  Post Op Pain Control Plan: multimodal analgesia and IV/PO Opioids PRN  Induction:  IV  Airway Plan: Video  Informed Consent: Informed consent signed with the Patient and all parties understand the risks and agree  with anesthesia plan.  All questions answered.   ASA Score: 3  Day of Surgery Review of History & Physical: H&P Update referred to the surgeon/provider.    Ready For Surgery From Anesthesia Perspective.     .

## 2024-05-28 NOTE — PLAN OF CARE
ID band verified and applied.  Plan of care reviewed with patient.  Patient verbalized understanding.     No

## 2024-05-28 NOTE — ANESTHESIA POSTPROCEDURE EVALUATION
Anesthesia Post Evaluation    Patient: Megha Mendoza    Procedure(s) Performed: Procedure(s) (LRB):  EGD (ESOPHAGOGASTRODUODENOSCOPY) (N/A)    Final Anesthesia Type: general      Patient location during evaluation: PACU  Patient participation: Yes- Able to Participate  Level of consciousness: awake and alert and oriented  Post-procedure vital signs: reviewed and stable  Pain management: adequate  Airway patency: patent    PONV status at discharge: No PONV  Anesthetic complications: no      Cardiovascular status: blood pressure returned to baseline and hemodynamically stable  Respiratory status: unassisted  Hydration status: euvolemic  Follow-up not needed.              Vitals Value Taken Time   /60 05/28/24 0945   Temp 36.5 °C (97.7 °F) 05/28/24 0913   Pulse 58 05/28/24 1001   Resp 63 05/28/24 1001   SpO2 96 % 05/28/24 1001   Vitals shown include unfiled device data.      No case tracking events are documented in the log.      Pain/Lou Score: Lou Score: 10 (5/28/2024 10:04 AM)

## 2024-05-30 LAB
FINAL PATHOLOGIC DIAGNOSIS: NORMAL
GROSS: NORMAL
Lab: NORMAL

## 2024-06-06 ENCOUNTER — HOSPITAL ENCOUNTER (OUTPATIENT)
Dept: RADIOLOGY | Facility: HOSPITAL | Age: 79
Discharge: HOME | End: 2024-06-06
Attending: INTERNAL MEDICINE
Payer: MEDICARE

## 2024-06-06 DIAGNOSIS — R13.10 DYSPHAGIA, UNSPECIFIED TYPE: ICD-10-CM

## 2024-06-06 PROCEDURE — 74220 X-RAY XM ESOPHAGUS 1CNTRST: CPT | Mod: TC

## 2024-06-06 PROCEDURE — 74220 X-RAY XM ESOPHAGUS 1CNTRST: CPT | Mod: 26,,, | Performed by: RADIOLOGY

## 2024-06-06 PROCEDURE — A9698 NON-RAD CONTRAST MATERIALNOC: HCPCS | Performed by: INTERNAL MEDICINE

## 2024-06-06 PROCEDURE — 25500020 PHARM REV CODE 255: Performed by: INTERNAL MEDICINE

## 2024-06-06 RX ADMIN — BARIUM SULFATE 275 ML: 0.6 SUSPENSION ORAL at 09:06

## 2024-06-06 RX ADMIN — BARIUM SULFATE 200 ML: 0.81 POWDER, FOR SUSPENSION ORAL at 09:06

## 2024-06-14 ENCOUNTER — TELEPHONE (OUTPATIENT)
Dept: GASTROENTEROLOGY | Facility: CLINIC | Age: 79
End: 2024-06-14
Payer: MEDICARE

## 2024-06-14 DIAGNOSIS — R13.10 DYSPHAGIA, UNSPECIFIED TYPE: Primary | ICD-10-CM

## 2024-06-14 DIAGNOSIS — K21.9 GASTROESOPHAGEAL REFLUX DISEASE, UNSPECIFIED WHETHER ESOPHAGITIS PRESENT: ICD-10-CM

## 2024-06-14 NOTE — PROGRESS NOTES
[facility] Note    Esophagram results discussed with the patient.  She would like to follow up with a surgeon to discuss take down of her fundoplication or redo due to her dysphagia following fundoplication.    Referral to Surgery placed.    Kasia Hael MD

## 2024-06-17 ENCOUNTER — TELEPHONE (OUTPATIENT)
Dept: GASTROENTEROLOGY | Facility: CLINIC | Age: 79
End: 2024-06-17
Payer: MEDICARE

## 2024-07-10 ENCOUNTER — OFFICE VISIT (OUTPATIENT)
Dept: SURGERY | Facility: CLINIC | Age: 79
End: 2024-07-10
Payer: MEDICARE

## 2024-07-10 VITALS
DIASTOLIC BLOOD PRESSURE: 66 MMHG | HEIGHT: 59 IN | WEIGHT: 147.5 LBS | HEART RATE: 63 BPM | BODY MASS INDEX: 29.73 KG/M2 | SYSTOLIC BLOOD PRESSURE: 152 MMHG

## 2024-07-10 DIAGNOSIS — R13.19 ESOPHAGEAL DYSPHAGIA: Primary | ICD-10-CM

## 2024-07-10 DIAGNOSIS — Z98.890 S/P LAPAROSCOPIC FUNDOPLICATION: ICD-10-CM

## 2024-07-10 DIAGNOSIS — E78.5 DYSLIPIDEMIA: ICD-10-CM

## 2024-07-10 DIAGNOSIS — J43.9 PULMONARY EMPHYSEMA, UNSPECIFIED EMPHYSEMA TYPE: ICD-10-CM

## 2024-07-10 DIAGNOSIS — G47.33 OBSTRUCTIVE SLEEP APNEA SYNDROME: ICD-10-CM

## 2024-07-10 PROCEDURE — 3288F FALL RISK ASSESSMENT DOCD: CPT | Mod: CPTII,S$GLB,, | Performed by: SURGERY

## 2024-07-10 PROCEDURE — 1159F MED LIST DOCD IN RCRD: CPT | Mod: CPTII,S$GLB,, | Performed by: SURGERY

## 2024-07-10 PROCEDURE — 99205 OFFICE O/P NEW HI 60 MIN: CPT | Mod: S$GLB,,, | Performed by: SURGERY

## 2024-07-10 PROCEDURE — 3078F DIAST BP <80 MM HG: CPT | Mod: CPTII,S$GLB,, | Performed by: SURGERY

## 2024-07-10 PROCEDURE — 1101F PT FALLS ASSESS-DOCD LE1/YR: CPT | Mod: CPTII,S$GLB,, | Performed by: SURGERY

## 2024-07-10 PROCEDURE — 1160F RVW MEDS BY RX/DR IN RCRD: CPT | Mod: CPTII,S$GLB,, | Performed by: SURGERY

## 2024-07-10 PROCEDURE — 3077F SYST BP >= 140 MM HG: CPT | Mod: CPTII,S$GLB,, | Performed by: SURGERY

## 2024-07-10 PROCEDURE — 1126F AMNT PAIN NOTED NONE PRSNT: CPT | Mod: CPTII,S$GLB,, | Performed by: SURGERY

## 2024-07-10 PROCEDURE — 99999 PR PBB SHADOW E&M-EST. PATIENT-LVL IV: CPT | Mod: PBBFAC,,, | Performed by: SURGERY

## 2024-07-10 NOTE — PROGRESS NOTES
GENERAL SURGERY CLINIC H&P    Subjective:     Megha Mendoza is a 79 y.o. female who was referred by Dr Hale following EGD. She presents to clinic to discuss take down of her fundoplication or redo due to her dysphagia following partial fundoplication and hiatal hernia repair.    Her symptoms began in 2022. She reports chronic, daily worsening dysphagia to solids with esophageal spasms and regurgitation. Over the past year, her symptoms have worsened. She reports an episode of food bolus impaction for which she required endoscopy and removal of food. She is able to tolerate foods now, but restricts to soft or wet foods. Esophageal manometry, barium swallow normal.  Reports acid reflux on liquid diet, which is not controlled with Omeprazole 40 mg BID. She has a history of gastroparesis, with intermittent symptoms bloating and hardness of the abdomen after eating.    PMH:   Past Medical History:   Diagnosis Date    Arthritis     Back pain     Bronchitis, chronic     Carotid artery disease     50% blockage bilateral    Colitis, acute     COPD (chronic obstructive pulmonary disease)     Coronary artery disease     Diverticulitis     DJD (degenerative joint disease)     GERD (gastroesophageal reflux disease)     severe    Hemorrhoids     Hiatal hernia     Hyperlipidemia     Irritable bowel syndrome with constipation     Mixed stress and urge urinary incontinence 3/30/2021    DARA (obstructive sleep apnea)     Osteoporosis     Respiratory distress     Trouble in sleeping        Past Surgical History:   Past Surgical History:   Procedure Laterality Date    blockages in artaries      CARDIAC CATHETERIZATION  5/1/15    COLONOSCOPY      COLONOSCOPY N/A 10/11/2022    Procedure: COLONOSCOPY;  Surgeon: Tomy Baron MD;  Location: [facility];  Service: Endoscopy;  Laterality: N/A;  Pt needing miralax prep and EKG.    ESOPHAGEAL MANOMETRY WITH MEASUREMENT OF IMPEDANCE N/A 6/30/2023    Procedure: MANOMETRY, ESOPHAGUS, WITH  IMPEDANCE MEASUREMENT;  Surgeon: Josleuis Santana MD;  Location: [facility] ENDO (4TH FLR);  Service: Endoscopy;  Laterality: N/A;  Referred by Dr. Dunlap, [facility] portal -ml   called to confirm couple times, rang then said call not completed and no VM MAL    ESOPHAGOGASTRODUODENOSCOPY N/A 2023    Procedure: EGD (ESOPHAGOGASTRODUODENOSCOPY);  Surgeon: Khushboo Dunlap MD;  Location: [facility] ENDO;  Service: Endoscopy;  Laterality: N/A;    ESOPHAGOGASTRODUODENOSCOPY N/A 2024    Procedure: EGD (ESOPHAGOGASTRODUODENOSCOPY);  Surgeon: Carmela Sequeira MD;  Location: [facility] ENDO;  Service: Endoscopy;  Laterality: N/A;    ESOPHAGOGASTRODUODENOSCOPY N/A 2024    Procedure: EGD (ESOPHAGOGASTRODUODENOSCOPY);  Surgeon: Kasia Hale MD;  Location: [facility] ENDO (2ND FLR);  Service: Endoscopy;  Laterality: N/A;  endoflip with esophageal dilation /prep instructions sent to pt via portal / 3 days full 1 day clear / spera pt / propfol only /4 liters of oxygen at night  2nd floor- gastroparesis  -precall complete-Kpvt    EYE SURGERY      HAND TENODESIS Bilateral 10/04/2019    HEMORRHOID SURGERY  2009    HERNIA REPAIR  2022    HYSTERECTOMY  1995    complete    JOINT REPLACEMENT      JOINT REPLACEMENT Left 2017    TOTAL KNEE ARTHROPLASTY Right     TUBAL LIGATION         Social History:  Social History     Socioeconomic History    Marital status:     Number of children: 4   Tobacco Use    Smoking status: Former     Current packs/day: 0.00     Average packs/day: 0.8 packs/day for 46.0 years (34.5 ttl pk-yrs)     Types: Cigarettes     Start date: 10/14/1959     Quit date: 10/14/2005     Years since quittin.7     Passive exposure: Past    Smokeless tobacco: Never    Tobacco comments:     Stopped and started several times   Substance and Sexual Activity    Alcohol use: No    Drug use: Never    Sexual activity: Not Currently     Birth control/protection: Surgical     Comment:      Social  Determinants of Health     Financial Resource Strain: Medium Risk (7/8/2024)    Overall Financial Resource Strain (CARDIA)     Difficulty of Paying Living Expenses: Somewhat hard   Food Insecurity: Food Insecurity Present (7/8/2024)    Hunger Vital Sign     Worried About Running Out of Food in the Last Year: Sometimes true     Ran Out of Food in the Last Year: Sometimes true   Transportation Needs: No Transportation Needs (1/3/2024)    PRAPARE - Transportation     Lack of Transportation (Medical): No     Lack of Transportation (Non-Medical): No   Physical Activity: Unknown (7/8/2024)    Exercise Vital Sign     Days of Exercise per Week: Patient declined     Minutes of Exercise per Session: 30 min   Stress: Stress Concern Present (7/8/2024)    [employer] - Occupational Stress Questionnaire     Feeling of Stress : To some extent   Housing Stability: Low Risk  (1/3/2024)    Housing Stability Vital Sign     Unable to Pay for Housing in the Last Year: No     Number of Places Lived in the Last Year: 1     Unstable Housing in the Last Year: No       Allergies:   Review of patient's allergies indicates:   Allergen Reactions    Cephalexin Rash and Other (See Comments)     Solid patches - rash like skin thickened    Hibiclens [chlorhexidine gluconate] Itching    Sulfa (sulfonamide antibiotics) Rash       Medications:  Current Outpatient Medications on File Prior to Visit   Medication Sig Dispense Refill    acetaminophen (TYLENOL) 500 MG tablet Take 650 mg by mouth every 6 (six) hours as needed for Pain.      albuterol (PROVENTIL) 2.5 mg /3 mL (0.083 %) nebulizer solution Take 2.5 mg by nebulization every 12 (twelve) hours as needed for Wheezing.      apple cider vinegar 500 mg Tab Take 1 tablet by mouth once daily.      aspirin 81 MG Chew Take 81 mg by mouth once daily.  (Patient not taking: Reported on 5/22/2024)      BACILLUS COAGULANS (DIGESTIVE ADVANTAGE ORAL) Take 1 tablet by mouth once  daily.      cetirizine (ZYRTEC) 10 MG tablet Take 10 mg by mouth once daily.      co-enzyme Q-10 30 mg capsule Take 100 mg by mouth once daily.      denosumab (PROLIA) 60 mg/mL Syrg Inject 1 mL (60 mg total) into the skin every 6 (six) months. 1 each 1    esomeprazole (NEXIUM) 40 MG capsule Take 1 capsule (40 mg total) by mouth 2 (two) times daily. 60 capsule 11    furosemide (LASIX) 20 MG tablet Take 0.5 tablets (10 mg total) by mouth daily as needed (swelling). 15 tablet 5    gabapentin (NEURONTIN) 100 MG capsule 100 mg.      ipratropium (ATROVENT) 0.02 % nebulizer solution Take 500 mcg by nebulization every 12 (twelve) hours.      krill-omega-3-dha-epa-lipids 737-52-20-50 mg Cap Take 1 capsule by mouth nightly.       metoclopramide HCl (REGLAN) 5 MG tablet Take 1 tablet (5 mg total) by mouth 4 (four) times daily as needed (nausea, bloating, slow stomach emptying symptoms). 120 tablet 3    multivitamin (THERAGRAN) tablet Take 1 tablet by mouth once daily.      nitroGLYCERIN (NITROSTAT) 0.4 MG SL tablet Place 0.4 mg under the tongue every 5 (five) minutes as needed for Chest pain.      oxybutynin (DITROPAN-XL) 10 MG 24 hr tablet Take 1 tablet (10 mg total) by mouth once daily. 30 tablet 11    OXYGEN-AIR DELIVERY SYSTEMS MISC oxygen, [RxNorm: 0]      rosuvastatin (CRESTOR) 40 MG Tab rosuvastatin 40 mg tablet, [RxNorm: 396420]      SYMBICORT 80-4.5 mcg/actuation HFAA INHALE 2 PUFFS BY MOUTH TWICE DAILY .  CONTROLLER 30.6 g 1    valsartan (DIOVAN) 80 MG tablet Take 80 mg by mouth.       No current facility-administered medications on file prior to visit.         Objective:     Vital Signs (Most Recent)       ROS A 10+ review of systems was performed with pertinent positives and negatives noted above in the history of present illness.  Other systems were negative unless otherwise specified.    Physical Exam:  Gen: awake, alert, in no acute distress  HEENT: normocephalic, atraumatic, EOMI, no scleral icterus  CV:  regular rate and rhythm  Pulm: equal chest rise bilaterally, normal work of breathing  Abd:  soft, non-tender, no guarding  Ext: WWP, skin warm and dry    Imaging  The following imaging was reviewed: Esophageal manometry,       Assessment:     Megha Mendoza is a 79 y.o. female with dysphagia s/p fundoplication. All workup since fundoplication has been normal. Patient has history of food impaction last month which required EGD and removal of food. Etiology of symptoms not clear at this time.    Plan:     - Plan to get medical records from [facility] and [facility] to identify pre- hiatal hernia repair work up as her current symptoms may be related to nerve damage from surgery or possibly due to anatomy. It is not clear at this time what the cause of her symptoms are. Pending medical records  - Plan to present imaging at swallow conference    Teetee Martinez DO  PGY-1

## 2024-07-23 PROBLEM — Z87.19 HISTORY OF BARRETT'S ESOPHAGUS: Status: RESOLVED | Noted: 2023-11-09 | Resolved: 2024-07-23

## 2024-07-23 PROBLEM — R06.02 SHORTNESS OF BREATH: Status: ACTIVE | Noted: 2023-05-10

## 2024-07-23 PROBLEM — Z96.652 HISTORY OF TOTAL LEFT KNEE REPLACEMENT: Status: RESOLVED | Noted: 2017-10-18 | Resolved: 2024-07-23

## 2024-07-23 PROBLEM — K22.0: Status: RESOLVED | Noted: 2023-08-21 | Resolved: 2024-07-23

## 2024-07-23 PROBLEM — Z98.890 HISTORY OF CARPAL TUNNEL SURGERY: Status: RESOLVED | Noted: 2019-11-20 | Resolved: 2024-07-23

## 2024-07-23 PROBLEM — K58.9 IRRITABLE BOWEL SYNDROME WITHOUT DIARRHEA: Status: ACTIVE | Noted: 2023-11-09

## 2024-07-23 PROBLEM — R79.89 ELEVATED D-DIMER: Status: RESOLVED | Noted: 2023-05-10 | Resolved: 2024-07-23

## 2024-07-29 ENCOUNTER — OFFICE VISIT (OUTPATIENT)
Dept: NEUROLOGY | Facility: CLINIC | Age: 79
End: 2024-07-29
Payer: MEDICARE

## 2024-07-29 ENCOUNTER — LAB VISIT (OUTPATIENT)
Dept: LAB | Facility: HOSPITAL | Age: 79
End: 2024-07-29
Attending: PSYCHIATRY & NEUROLOGY
Payer: MEDICARE

## 2024-07-29 VITALS
DIASTOLIC BLOOD PRESSURE: 92 MMHG | HEART RATE: 54 BPM | BODY MASS INDEX: 29.47 KG/M2 | RESPIRATION RATE: 14 BRPM | HEIGHT: 59 IN | WEIGHT: 146.19 LBS | SYSTOLIC BLOOD PRESSURE: 156 MMHG

## 2024-07-29 DIAGNOSIS — R20.2 NUMBNESS AND TINGLING OF BOTH FEET: Primary | ICD-10-CM

## 2024-07-29 DIAGNOSIS — R20.2 NUMBNESS AND TINGLING OF BOTH FEET: ICD-10-CM

## 2024-07-29 DIAGNOSIS — R20.0 NUMBNESS AND TINGLING OF BOTH FEET: Primary | ICD-10-CM

## 2024-07-29 DIAGNOSIS — R20.0 NUMBNESS AND TINGLING OF BOTH FEET: ICD-10-CM

## 2024-07-29 DIAGNOSIS — R73.01 IMPAIRED FASTING GLUCOSE: ICD-10-CM

## 2024-07-29 DIAGNOSIS — R76.8 ANA POSITIVE: ICD-10-CM

## 2024-07-29 LAB — VIT B12 SERPL-MCNC: 598 PG/ML (ref 210–950)

## 2024-07-29 PROCEDURE — 99204 OFFICE O/P NEW MOD 45 MIN: CPT | Mod: S$GLB,,, | Performed by: PSYCHIATRY & NEUROLOGY

## 2024-07-29 PROCEDURE — 86334 IMMUNOFIX E-PHORESIS SERUM: CPT | Mod: 26,,, | Performed by: PATHOLOGY

## 2024-07-29 PROCEDURE — 99999 PR PBB SHADOW E&M-EST. PATIENT-LVL IV: CPT | Mod: PBBFAC,,, | Performed by: PSYCHIATRY & NEUROLOGY

## 2024-07-29 PROCEDURE — 1160F RVW MEDS BY RX/DR IN RCRD: CPT | Mod: CPTII,S$GLB,, | Performed by: PSYCHIATRY & NEUROLOGY

## 2024-07-29 PROCEDURE — 84165 PROTEIN E-PHORESIS SERUM: CPT | Mod: 26,,, | Performed by: PATHOLOGY

## 2024-07-29 PROCEDURE — 1125F AMNT PAIN NOTED PAIN PRSNT: CPT | Mod: CPTII,S$GLB,, | Performed by: PSYCHIATRY & NEUROLOGY

## 2024-07-29 PROCEDURE — 84165 PROTEIN E-PHORESIS SERUM: CPT | Performed by: PSYCHIATRY & NEUROLOGY

## 2024-07-29 PROCEDURE — 36415 COLL VENOUS BLD VENIPUNCTURE: CPT | Performed by: PSYCHIATRY & NEUROLOGY

## 2024-07-29 PROCEDURE — 3077F SYST BP >= 140 MM HG: CPT | Mod: CPTII,S$GLB,, | Performed by: PSYCHIATRY & NEUROLOGY

## 2024-07-29 PROCEDURE — 3080F DIAST BP >= 90 MM HG: CPT | Mod: CPTII,S$GLB,, | Performed by: PSYCHIATRY & NEUROLOGY

## 2024-07-29 PROCEDURE — 82607 VITAMIN B-12: CPT | Performed by: PSYCHIATRY & NEUROLOGY

## 2024-07-29 PROCEDURE — 86334 IMMUNOFIX E-PHORESIS SERUM: CPT | Performed by: PSYCHIATRY & NEUROLOGY

## 2024-07-29 PROCEDURE — 1159F MED LIST DOCD IN RCRD: CPT | Mod: CPTII,S$GLB,, | Performed by: PSYCHIATRY & NEUROLOGY

## 2024-07-29 NOTE — PROGRESS NOTES
The patient is self referred     HPI: Megha Mendoza is a 79 y.o. female with history of pain in the feet for years    This has slowly worsened over time.    She has known OA and chronic pain     She has noted numbness in the feet for the last few years.    Numbness is noted in both feet with tingling often in the morning       Gabapentin is used for cramping in the legs. Does not seem to help her tingling    Has some mild swelling in the LE/ she states she has reviewed this with PCP    She has a long history of lower back pain with known disc changes there. She has seeing pain management for this prior. Some radicular pain to the legs      Has a history of + KB and has seen rheumatology prior/ states no rheumatism     She has seen podiatry and states she was told she has OA, Almaraz Neuroma, Plantar fasciitis, Bone spurs, and bunions     She is former smoker remotely     She does not drink alcohol    She is  from her  of over 40 years who  a few years ago    Review of Systems   Constitutional:  Negative for fever.   HENT:  Negative for nosebleeds.    Eyes:  Negative for double vision.   Respiratory:  Negative for hemoptysis.    Cardiovascular:  Positive for leg swelling.   Gastrointestinal:  Negative for blood in stool.   Genitourinary:  Negative for hematuria.   Musculoskeletal:  Positive for back pain.   Skin:  Negative for rash.   Neurological:  Positive for tingling.   Endo/Heme/Allergies:  Does not bruise/bleed easily.         I have reviewed all of this patient's past medical and surgical histories as well as family and social histories and active allergies and medications as documented in the electronic medical record.        Exam:  Gen Appearance, well developed/nourished in no apparent distress  CV: 2+ distal pulses with trace edema or swelling in the ankles  Neuro:  MS: Awake, alert, oriented to place, person, time, situation. Sustains attention. Recent/remote memory intact, Language is  full to spontaneous speech/repetition/naming/comprehension. Fund of Knowledge is full  CN: Optic discs are flat with normal vasculature, PERRL, Extraoccular movements and visual fields are full. Normal facial sensation and strength, Hearing symmetric, Tongue and Palate are midline and strong. Shoulder Shrug symmetric and strong.  Motor: Normal bulk, tone, no abnormal movements. 5/5 strength bilateral upper/lower extremities with 2+ reflexes and no clonus  Sensory: symmetric temp, and vibration but reduced in the feet to vibration. Romberg mildly positive  Cerebellar: Finger-nose,Heal-shin, Rapid alternating movements intact  Gait: Normal stance, no ataxia    Imaging:  Labs: 2023 CMP normal with A1c normal and TSH and CBC normal    2022 A1C elevated / prediabetic      Assessment/Plan: Megha Mendoza is a 79 y.o. female with at least two year of feet tingling  I recommend:     Exam suggests smaller fiber polyneuropathy   2.   Check B12, SPEP/CHELSEA.   -Note her history of impaired fasting glucose currently improved. Keep a good diet to prevent this from worsening. Also has a history of + KB but not thought to have inflammatory arthritis per her  3.  Could raise dose Gabapentin unless LE swelling worsens. She declines today  4.  Could consider EMG/NCS but this type of neuropathy may be EMG negative  5.  She has seen podiatry and states she was told she has OA, Almaraz Neuroma, Plantar fasciitis, Bone spurs, and bunions   -Note she has seen pain management (Dr Eubanks) regarding lumbar disc disease and lumbar radicular pain prior.     RTC 6 months to monitor

## 2024-07-30 LAB — INTERPRETATION SERPL IFE-IMP: NORMAL

## 2024-07-31 ENCOUNTER — INFUSION (OUTPATIENT)
Dept: INFUSION THERAPY | Facility: HOSPITAL | Age: 79
End: 2024-07-31
Attending: FAMILY MEDICINE
Payer: MEDICARE

## 2024-07-31 VITALS
HEART RATE: 48 BPM | DIASTOLIC BLOOD PRESSURE: 60 MMHG | RESPIRATION RATE: 20 BRPM | TEMPERATURE: 97 F | SYSTOLIC BLOOD PRESSURE: 148 MMHG

## 2024-07-31 DIAGNOSIS — M85.80 OSTEOPENIA, UNSPECIFIED LOCATION: Primary | ICD-10-CM

## 2024-07-31 LAB
ALBUMIN SERPL ELPH-MCNC: 4.21 G/DL (ref 3.35–5.55)
ALPHA1 GLOB SERPL ELPH-MCNC: 0.32 G/DL (ref 0.17–0.41)
ALPHA2 GLOB SERPL ELPH-MCNC: 1.2 G/DL (ref 0.43–0.99)
B-GLOBULIN SERPL ELPH-MCNC: 0.81 G/DL (ref 0.5–1.1)
GAMMA GLOB SERPL ELPH-MCNC: 0.76 G/DL (ref 0.67–1.58)
PATHOLOGIST INTERPRETATION IFE: NORMAL
PATHOLOGIST INTERPRETATION SPE: NORMAL
PROT SERPL-MCNC: 7.3 G/DL (ref 6–8.4)

## 2024-07-31 PROCEDURE — 63600175 PHARM REV CODE 636 W HCPCS: Mod: JZ,TB | Performed by: FAMILY MEDICINE

## 2024-07-31 PROCEDURE — 96372 THER/PROPH/DIAG INJ SC/IM: CPT

## 2024-07-31 RX ADMIN — DENOSUMAB 60 MG: 60 INJECTION SUBCUTANEOUS at 09:07

## 2024-07-31 NOTE — PROGRESS NOTES
Tolerated injection well  discharged to home in stable condition  pt states that cardiology is aware of heart rate and following her for this isssue

## 2024-08-05 ENCOUNTER — PATIENT MESSAGE (OUTPATIENT)
Dept: GASTROENTEROLOGY | Facility: CLINIC | Age: 79
End: 2024-08-05
Payer: MEDICARE

## 2024-08-05 ENCOUNTER — TELEPHONE (OUTPATIENT)
Dept: GASTROENTEROLOGY | Facility: CLINIC | Age: 79
End: 2024-08-05
Payer: MEDICARE

## 2024-08-13 ENCOUNTER — PATIENT MESSAGE (OUTPATIENT)
Dept: SURGERY | Facility: CLINIC | Age: 79
End: 2024-08-13
Payer: MEDICARE

## 2024-08-13 ENCOUNTER — CONFERENCE (OUTPATIENT)
Dept: SURGERY | Facility: CLINIC | Age: 79
End: 2024-08-13
Payer: MEDICARE

## 2024-08-13 NOTE — PROGRESS NOTES
[facility]   BENIGN FOREGUT MULTIDISCIPLINARY CONFERENCE  PATIENT REVIEW FORM  ____________________________________________________________    CLINIC #: [id number]    DATE: 08/13/2024    DIAGNOSIS:     [] Achalsia       [x] Gastropariesis           [] Functional Dyspepsia   [] Chronic Diarrhea      [x] Dysphagia   [] Pseudoachalasia       [] GERD   [x] Hiatal Hernia       [] Dysmotility   [] Dumping Syndrome  [] Cyclic Vomiting   [] IBS       [] Outlet Obstruction    [] Fecal Incontinence    [] Hirschsprung's Disease        PRESENTER:      [] Geoffrey    [] Nima   [] Panda        [x] Kimi  [] Edi   [] Carlo    PATIENT SUMMARY:   79F with CAD, DLD, COPD on supplemental O2, DARA, OA, IBS with chronic constipation, and h/o GERD s/p hiatal hernia repair with antireflux fundoplication 3/19/22 (Michel) presents for evaluation and discussion of management of persistent and progressive dysphagia to solids, esophageal spasms, and regurgitation, that began immediately post-op. She is a somewhat poor historian though redirectable.      UGI 6/6/24: Esophageal dysmotility. TBS with no e/o achalasia. Post-fundoplication hiatal hernia repair with prominent phrenic ampulla. Cricopharyngeal bar. 13-mm barium tablet passed readily into the stomach. No EILEEN.      EGD 5/28/24: Moderately tortuous upper and middle 1/3 with severely dilated lower 1/3 esophagus. 2-cm hiatal hernia. Erythematous mucosa in the gastric body and antrum. Fundoplication with intact wrap. Normal duodenum. EndoFlip without e/o EGJOO. Esophageal dilation at EGJ to 20-mm. Path: mild chronic gastritis and reactive/regenerative changes, distal esophageal squamous mucosa with mild reactive change w/o e/o eosinophils.     EGD 4/5/24: Large amount of food in the middle and lower 2/3 of the esophagus, successfully removed. Esophagitis underlying food bolus. Tortuous esophagus. Mild gastritis in the stomach.     CT chest 4/5/24: Prominent focus of  ingested material in the region of the distal esophagus above the GEJ. Favor a new small/moderate hiatal hernia containing portion of the stomach, however cannot entirely exclude food impaction. Proximal to this region the esophagus is mildly patulous. Pulmonary emphysema. Sequela of prior granulomatous disease.     GES 8/30/23: At 4 hour(s) the percentage of retention is 52 % (normal retention at 4 hours is 10% and lower). Abnormal gastric emptying time.     HREM 6/30/23: LES basal 24.2 -> 10.1. 90% intact swallows, 10% weak swallows. No hypercontractile swallows. 100% incomplete bolus clearance. Multiple rapid swallows indicate loss of inhibition with strong final contraction. No e/o residual fluid at the end of the study. Normal esophageal manometry. No e/o disorder of peristalsis.    RECOMMENDATIONS:   Robotic pyloromyotomy. Consider Levsin or other medications for symptom management in interim.    CONSULT NEEDED:        [x] Surgery    [] ENT    [x] GI    [] Speech Pathology                IMAGING:       [] Esophagram     [] MBS    [] CT ABD/PELVIS       [] GES   [] MRI    [] UGI w/SBFT   [] SITZ MARKER      [] EKG    [] U/S    [] DEFOGRAM    PROCEDURES:    [] EGD  [] Impedance  [] MANOMETRY  [x] SURGICAL INTERVENTION      [] COLONOSCOPY    [] ERCP    [] EUS    [] ENTEROSCOPY

## 2024-08-22 ENCOUNTER — CLINICAL SUPPORT (OUTPATIENT)
Dept: FAMILY MEDICINE | Facility: CLINIC | Age: 79
End: 2024-08-22
Payer: MEDICARE

## 2024-08-22 ENCOUNTER — OFFICE VISIT (OUTPATIENT)
Dept: FAMILY MEDICINE | Facility: CLINIC | Age: 79
End: 2024-08-22
Payer: MEDICARE

## 2024-08-22 VITALS
DIASTOLIC BLOOD PRESSURE: 70 MMHG | WEIGHT: 146.06 LBS | HEART RATE: 50 BPM | RESPIRATION RATE: 18 BRPM | OXYGEN SATURATION: 96 % | HEIGHT: 59 IN | SYSTOLIC BLOOD PRESSURE: 128 MMHG | BODY MASS INDEX: 29.44 KG/M2

## 2024-08-22 DIAGNOSIS — E78.5 DYSLIPIDEMIA: ICD-10-CM

## 2024-08-22 DIAGNOSIS — K21.9 GASTROESOPHAGEAL REFLUX DISEASE, UNSPECIFIED WHETHER ESOPHAGITIS PRESENT: ICD-10-CM

## 2024-08-22 DIAGNOSIS — M85.80 OSTEOPENIA, UNSPECIFIED LOCATION: ICD-10-CM

## 2024-08-22 DIAGNOSIS — J43.9 PULMONARY EMPHYSEMA, UNSPECIFIED EMPHYSEMA TYPE: ICD-10-CM

## 2024-08-22 DIAGNOSIS — I65.21 STENOSIS OF RIGHT CAROTID ARTERY: Primary | ICD-10-CM

## 2024-08-22 DIAGNOSIS — R73.03 PREDIABETES: ICD-10-CM

## 2024-08-22 DIAGNOSIS — N39.46 MIXED STRESS AND URGE INCONTINENCE: ICD-10-CM

## 2024-08-22 DIAGNOSIS — I25.10 CORONARY ARTERIOSCLEROSIS IN NATIVE ARTERY: ICD-10-CM

## 2024-08-22 DIAGNOSIS — J44.89 OBSTRUCTIVE CHRONIC BRONCHITIS WITHOUT EXACERBATION: ICD-10-CM

## 2024-08-22 DIAGNOSIS — D69.2 SENILE PURPURA: ICD-10-CM

## 2024-08-22 DIAGNOSIS — G47.33 OBSTRUCTIVE SLEEP APNEA SYNDROME: ICD-10-CM

## 2024-08-22 DIAGNOSIS — I70.0 ATHEROSCLEROSIS OF AORTA: ICD-10-CM

## 2024-08-22 LAB
ALBUMIN SERPL BCP-MCNC: 3.9 G/DL (ref 3.5–5.2)
ALP SERPL-CCNC: 36 U/L (ref 55–135)
ALT SERPL W/O P-5'-P-CCNC: 16 U/L (ref 10–44)
ANION GAP SERPL CALC-SCNC: 10 MMOL/L (ref 8–16)
AST SERPL-CCNC: 21 U/L (ref 10–40)
BILIRUB SERPL-MCNC: 0.3 MG/DL (ref 0.1–1)
BUN SERPL-MCNC: 22 MG/DL (ref 8–23)
CALCIUM SERPL-MCNC: 9.6 MG/DL (ref 8.7–10.5)
CHLORIDE SERPL-SCNC: 107 MMOL/L (ref 95–110)
CO2 SERPL-SCNC: 23 MMOL/L (ref 23–29)
CREAT SERPL-MCNC: 1 MG/DL (ref 0.5–1.4)
EST. GFR  (NO RACE VARIABLE): 57 ML/MIN/1.73 M^2
ESTIMATED AVG GLUCOSE: 111 MG/DL (ref 68–131)
GLUCOSE SERPL-MCNC: 95 MG/DL (ref 70–110)
HBA1C MFR BLD: 5.5 % (ref 4–5.6)
POTASSIUM SERPL-SCNC: 4.4 MMOL/L (ref 3.5–5.1)
PROT SERPL-MCNC: 7.1 G/DL (ref 6–8.4)
SODIUM SERPL-SCNC: 140 MMOL/L (ref 136–145)
TSH SERPL DL<=0.005 MIU/L-ACNC: 0.93 UIU/ML (ref 0.4–4)

## 2024-08-22 PROCEDURE — 80053 COMPREHEN METABOLIC PANEL: CPT | Performed by: FAMILY MEDICINE

## 2024-08-22 PROCEDURE — 1159F MED LIST DOCD IN RCRD: CPT | Mod: CPTII,S$GLB,, | Performed by: FAMILY MEDICINE

## 2024-08-22 PROCEDURE — 99214 OFFICE O/P EST MOD 30 MIN: CPT | Mod: S$GLB,,, | Performed by: FAMILY MEDICINE

## 2024-08-22 PROCEDURE — 1101F PT FALLS ASSESS-DOCD LE1/YR: CPT | Mod: CPTII,S$GLB,, | Performed by: FAMILY MEDICINE

## 2024-08-22 PROCEDURE — G2211 COMPLEX E/M VISIT ADD ON: HCPCS | Mod: S$GLB,,, | Performed by: FAMILY MEDICINE

## 2024-08-22 PROCEDURE — 3288F FALL RISK ASSESSMENT DOCD: CPT | Mod: CPTII,S$GLB,, | Performed by: FAMILY MEDICINE

## 2024-08-22 PROCEDURE — 1160F RVW MEDS BY RX/DR IN RCRD: CPT | Mod: CPTII,S$GLB,, | Performed by: FAMILY MEDICINE

## 2024-08-22 PROCEDURE — 99999 PR PBB SHADOW E&M-EST. PATIENT-LVL III: CPT | Mod: PBBFAC,,, | Performed by: FAMILY MEDICINE

## 2024-08-22 PROCEDURE — 84443 ASSAY THYROID STIM HORMONE: CPT | Performed by: FAMILY MEDICINE

## 2024-08-22 PROCEDURE — 3078F DIAST BP <80 MM HG: CPT | Mod: CPTII,S$GLB,, | Performed by: FAMILY MEDICINE

## 2024-08-22 PROCEDURE — 83036 HEMOGLOBIN GLYCOSYLATED A1C: CPT | Performed by: FAMILY MEDICINE

## 2024-08-22 PROCEDURE — 3074F SYST BP LT 130 MM HG: CPT | Mod: CPTII,S$GLB,, | Performed by: FAMILY MEDICINE

## 2024-08-22 PROCEDURE — 1125F AMNT PAIN NOTED PAIN PRSNT: CPT | Mod: CPTII,S$GLB,, | Performed by: FAMILY MEDICINE

## 2024-08-22 RX ORDER — OXYBUTYNIN CHLORIDE 10 MG/1
10 TABLET, EXTENDED RELEASE ORAL DAILY
Qty: 30 TABLET | Refills: 11 | Status: SHIPPED | OUTPATIENT
Start: 2024-08-22 | End: 2025-08-22

## 2024-08-22 NOTE — PROGRESS NOTES
Subjective:       Patient ID: Megha Mendoza is a 79 y.o. female.    Chief Complaint: Follow-up (Pt here for 6 mth f/u. )    Pt is a 79 y.o. female who presents for evaluation and management of   Encounter Diagnoses   Name Primary?    Stenosis of right carotid artery Yes    Senile purpura     Osteopenia, unspecified location     Obstructive chronic bronchitis without exacerbation     Gastroesophageal reflux disease, unspecified whether esophagitis present     Dyslipidemia     Obstructive sleep apnea syndrome     Mixed stress and urge incontinence     Pulmonary emphysema, unspecified emphysema type     Coronary arteriosclerosis in native artery     Atherosclerosis of aorta     Prediabetes    .  Doing well on current meds. Denies any side effects. Prevention is up to date.    Review of Systems   Constitutional:  Negative for chills and fever.   Respiratory:  Negative for shortness of breath.    Cardiovascular:  Positive for leg swelling. Negative for chest pain and palpitations.   Gastrointestinal:  Negative for abdominal pain, blood in stool, constipation and nausea.   Genitourinary:  Negative for difficulty urinating.        Urinary incontinence at times    Psychiatric/Behavioral:  Negative for dysphoric mood, sleep disturbance and suicidal ideas. The patient is not nervous/anxious.        Objective:      Physical Exam  Constitutional:       Appearance: She is well-developed.   HENT:      Head: Normocephalic and atraumatic.      Right Ear: External ear normal.      Left Ear: External ear normal.      Nose: Nose normal.   Eyes:      Pupils: Pupils are equal, round, and reactive to light.   Neck:      Thyroid: No thyromegaly.      Vascular: No JVD.      Trachea: No tracheal deviation.   Cardiovascular:      Rate and Rhythm: Normal rate.      Heart sounds: Normal heart sounds. No murmur heard.  Pulmonary:      Effort: Pulmonary effort is normal. No respiratory distress.      Breath sounds: Normal breath sounds. No  wheezing or rales.   Chest:      Chest wall: No tenderness.   Abdominal:      General: Bowel sounds are normal. There is no distension.      Palpations: Abdomen is soft. There is no mass.      Tenderness: There is no abdominal tenderness. There is no guarding or rebound.   Musculoskeletal:         General: No tenderness. Normal range of motion.      Cervical back: Normal range of motion and neck supple.      Comments: Trace edema   BLE   No Calf TTP   Neg Juan   No palp cord      Lymphadenopathy:      Cervical: No cervical adenopathy.   Skin:     General: Skin is warm and dry.      Coloration: Skin is not pale.      Findings: No erythema or rash.   Neurological:      Mental Status: She is alert and oriented to person, place, and time.      Cranial Nerves: No cranial nerve deficit.      Motor: No abnormal muscle tone.      Coordination: Coordination normal.      Deep Tendon Reflexes: Reflexes are normal and symmetric. Reflexes normal.   Psychiatric:         Behavior: Behavior normal.         Thought Content: Thought content normal.         Judgment: Judgment normal.         Assessment:       1. Stenosis of right carotid artery    2. Senile purpura    3. Osteopenia, unspecified location    4. Obstructive chronic bronchitis without exacerbation    5. Gastroesophageal reflux disease, unspecified whether esophagitis present    6. Dyslipidemia    7. Obstructive sleep apnea syndrome    8. Mixed stress and urge incontinence    9. Pulmonary emphysema, unspecified emphysema type    10. Coronary arteriosclerosis in native artery    11. Atherosclerosis of aorta    12. Prediabetes        Plan:   1. Stenosis of right carotid artery  Overview:  5/9/23 Carotid US per [facility]. Day   Carotid Ultrasound 1.The study quality is average. 2.40-59% stenosis in the mid right internal carotid artery based on Bluth Criteria. 3.40-59% stenosis in the proximal left internal carotid artery based on Bluth Criteria. 4.Antegrade right vertebral  artery flow. Antegrade left vertebral artery flow.    Monitored per Dr. Day q 6mo  Statin   aspirin  On statin and ASA      2. Senile purpura  Overview:  Notes easily bruises; ok top continue baby ASA     Lab Results   Component Value Date    WBC 5.65 04/05/2024    HGB 13.7 04/05/2024    HCT 41.9 04/05/2024    MCV 95 04/05/2024     04/05/2024                 3. Osteopenia, unspecified location  Overview:  Originally osteoporosis. Continue prolia       4. Obstructive chronic bronchitis without exacerbation  Overview:  Sees Dr. Crane   She is on home O2 for nighttime       5. Gastroesophageal reflux disease, unspecified whether esophagitis present  Overview:  On BID PPI   Can't take bisphosphonate for osteo   Large hiatal hernia. Already had a nissen. Possible surgery being considered---pylorotomy   Has some dysphagia as well---GI has her on PPI and high dose H2 blocker. Dilation would not be helpful      6. Dyslipidemia  Overview:  Chronic   Rosuvastatin,   Omega 3 fish oil   Lab Results  Component Value Date  CHOL 177 05/09/2023  CHOL 135 02/27/2023  CHOL 154 02/02/2022    Lab Results  Component Value Date  HDL 92 (A) 05/09/2023  HDL 52 02/27/2023  HDL 73 02/02/2022    Lab Results  Component Value Date  LDLCALC 69.8 02/27/2023  LDLCALC 57.6 (L) 02/02/2022  LDLCALC 56.6 (L) 02/02/2021    Lab Results  Component Value Date  TRIG 76 05/09/2023  TRIG 66 02/27/2023  TRIG 117 02/02/2022    Lab Results  Component Value Date  CHOLHDL 38.5 02/27/2023  CHOLHDL 47.4 02/02/2022  CHOLHDL 48.4 02/02/2021    Lab Results   Component Value Date    LDLCALC 69.8 02/27/2023       Continue statin    Orders:  -     Comprehensive Metabolic Panel; Future; Expected date: 08/22/2024  -     TSH; Future; Expected date: 08/22/2024    7. Obstructive sleep apnea syndrome  Overview:  Noncompliant with cpap. She is convinced she does not need it. She does use O2 at night         8. Mixed stress and urge incontinence  Overview:  She has failed  Araceli exercises   Trial of ditropan---  Consider referral to Uro Gyn but she is not interested right now    Orders:  -     oxybutynin (DITROPAN-XL) 10 MG 24 hr tablet; Take 1 tablet (10 mg total) by mouth once daily.  Dispense: 30 tablet; Refill: 11    9. Pulmonary emphysema, unspecified emphysema type  Overview:  PFTs in 2014 per Dr. Crane showed moderate obstruction   5/11/23 Ct of chest ; Centrilobular emphysematous disease   COPD with chronic dyspnea status post COVID infection November 2021.   ccasional increased dyspnea on exertion. Followed by Dr. Crane. Abn D-dimer CT May 2023 is negative for PE. Emphysema present. Small nodules. Now on home oxygen at night.   symbicort   Albuterol nebulizer       10. Coronary arteriosclerosis in native artery  Overview:  Mild CAD 2015 per cardiology   Statin  Lab Results  Component Value Date  LDLCALC 57.6 (L) 02/02/2022  Per cardiology - No significant reversible defect on perfusion study today December 2023.   ASA      11. Atherosclerosis of aorta  Overview:  Seen on CTA 5/2023   On statin, ASA        12. Prediabetes  -     Hemoglobin A1C; Future; Expected date: 08/22/2024      No follow-ups on file.

## 2024-09-04 ENCOUNTER — OFFICE VISIT (OUTPATIENT)
Dept: SURGERY | Facility: CLINIC | Age: 79
End: 2024-09-04
Payer: MEDICARE

## 2024-09-04 VITALS
WEIGHT: 143.19 LBS | DIASTOLIC BLOOD PRESSURE: 63 MMHG | SYSTOLIC BLOOD PRESSURE: 140 MMHG | HEIGHT: 59 IN | BODY MASS INDEX: 28.87 KG/M2 | HEART RATE: 54 BPM

## 2024-09-04 DIAGNOSIS — G47.33 OBSTRUCTIVE SLEEP APNEA SYNDROME: ICD-10-CM

## 2024-09-04 DIAGNOSIS — I35.1 AORTIC VALVE INSUFFICIENCY, ETIOLOGY OF CARDIAC VALVE DISEASE UNSPECIFIED: ICD-10-CM

## 2024-09-04 DIAGNOSIS — K31.84 GASTROPARESIS: Primary | ICD-10-CM

## 2024-09-04 DIAGNOSIS — Z98.890 S/P LAPAROSCOPIC FUNDOPLICATION: ICD-10-CM

## 2024-09-04 DIAGNOSIS — I65.21 STENOSIS OF RIGHT CAROTID ARTERY: ICD-10-CM

## 2024-09-04 DIAGNOSIS — I25.10 CORONARY ARTERIOSCLEROSIS IN NATIVE ARTERY: ICD-10-CM

## 2024-09-04 DIAGNOSIS — J43.9 PULMONARY EMPHYSEMA, UNSPECIFIED EMPHYSEMA TYPE: ICD-10-CM

## 2024-09-04 DIAGNOSIS — I70.0 ATHEROSCLEROSIS OF AORTA: ICD-10-CM

## 2024-09-04 DIAGNOSIS — E78.5 DYSLIPIDEMIA: ICD-10-CM

## 2024-09-04 PROCEDURE — 99215 OFFICE O/P EST HI 40 MIN: CPT | Mod: S$GLB,,, | Performed by: SURGERY

## 2024-09-04 PROCEDURE — 1126F AMNT PAIN NOTED NONE PRSNT: CPT | Mod: CPTII,S$GLB,, | Performed by: SURGERY

## 2024-09-04 PROCEDURE — 1160F RVW MEDS BY RX/DR IN RCRD: CPT | Mod: CPTII,S$GLB,, | Performed by: SURGERY

## 2024-09-04 PROCEDURE — 3078F DIAST BP <80 MM HG: CPT | Mod: CPTII,S$GLB,, | Performed by: SURGERY

## 2024-09-04 PROCEDURE — 99999 PR PBB SHADOW E&M-EST. PATIENT-LVL V: CPT | Mod: PBBFAC,,, | Performed by: SURGERY

## 2024-09-04 PROCEDURE — 1159F MED LIST DOCD IN RCRD: CPT | Mod: CPTII,S$GLB,, | Performed by: SURGERY

## 2024-09-04 PROCEDURE — 3288F FALL RISK ASSESSMENT DOCD: CPT | Mod: CPTII,S$GLB,, | Performed by: SURGERY

## 2024-09-04 PROCEDURE — 1100F PTFALLS ASSESS-DOCD GE2>/YR: CPT | Mod: CPTII,S$GLB,, | Performed by: SURGERY

## 2024-09-04 PROCEDURE — 3077F SYST BP >= 140 MM HG: CPT | Mod: CPTII,S$GLB,, | Performed by: SURGERY

## 2024-09-04 NOTE — PROGRESS NOTES
"Minimally Invasive Surgery  Follow Up Clinic Note    Subjective:     Megha Mendoza is a 79 y.o. female with PMH CAD, DLD, COPD, DARA w/ nighttime home O2, OA, IBS w/ chronic constipation who presents to clinic for follow up regarding severe gastroparesis, esophageal spasms, GERD, and dysphagia. In brief, patient has a history of GERD and is s/p hiatal hernia repair with antireflux fundoplication on 3/19/22, but has had persistent and progressive dysphagia to solids, esophageal spasms, and regurgitation, that began immediately post-op. Workup revealed adequate passage of food but patient does have significant gastroparesis that seems to correlate with timing of past surgery. Upon discussing the patient's case with the Benign Foregut Conference, the conclusion was made to pursue pyloromyotomy. Patient is here today to discuss this procedure. Today, she reports she is able to tolerate a regular diet if she eats small meals and chews her food well. She reports occasional feelings of her stomach "rolling". Patient reports no changes to her medical history since her last clinic visit other than discontinuing her blood pressure medication. She states she takes her BP at home every morning and has been in the 110-120's systolic.    GERD Questionnaire  Meds: omeprazole  Yes Typical heartburn  Yes Regurgitation  Yes Dysphagia solids  Yes Dysphagia liquids - water makes   No Hoarseness  Yes Cough - has chronic sinus and allergy history  Yes Water brash  Yes Globus  Occasional mild Nausea  No Vomiting     Medications:  Current Outpatient Medications on File Prior to Visit   Medication Sig Dispense Refill    acetaminophen (TYLENOL) 500 MG tablet Take 1,000 mg by mouth every 6 (six) hours as needed for Pain.      apple cider vinegar 500 mg Tab Take 1 tablet by mouth once daily.      aspirin 81 MG Chew Take 81 mg by mouth once daily.      BACILLUS COAGULANS (DIGESTIVE ADVANTAGE ORAL) Take 1 tablet by mouth once daily.      co-enzyme " Q-10 30 mg capsule Take 100 mg by mouth once daily.      denosumab (PROLIA) 60 mg/mL Syrg Inject 1 mL (60 mg total) into the skin every 6 (six) months. 1 each 1    esomeprazole (NEXIUM) 40 MG capsule Take 1 capsule (40 mg total) by mouth 2 (two) times daily. 60 capsule 11    furosemide (LASIX) 20 MG tablet Take 0.5 tablets (10 mg total) by mouth daily as needed (swelling). (Patient taking differently: Take 20 mg by mouth daily as needed (swelling).) 15 tablet 5    gabapentin (NEURONTIN) 100 MG capsule Take 100 mg by mouth every evening.      krill-omega-3-dha-epa-lipids 391-74-35-50 mg Cap Take 1 capsule by mouth nightly.       metoclopramide HCl (REGLAN) 5 MG tablet Take 1 tablet (5 mg total) by mouth 4 (four) times daily as needed (nausea, bloating, slow stomach emptying symptoms). 120 tablet 3    multivitamin (THERAGRAN) tablet Take 1 tablet by mouth once daily.      nitroGLYCERIN (NITROSTAT) 0.4 MG SL tablet Place 0.4 mg under the tongue every 5 (five) minutes as needed for Chest pain.      oxybutynin (DITROPAN-XL) 10 MG 24 hr tablet Take 1 tablet (10 mg total) by mouth once daily. 30 tablet 11    rosuvastatin (CRESTOR) 40 MG Tab Take 40 mg by mouth every evening.      SYMBICORT 80-4.5 mcg/actuation HFAA INHALE 2 PUFFS BY MOUTH TWICE DAILY .  CONTROLLER 30.6 g 1     No current facility-administered medications on file prior to visit.         Objective:     PHYSICAL EXAM:  Vital Signs (Most Recent)  Pulse: (!) 54 (09/04/24 1428)  BP: (!) 140/63 (09/04/24 1428)    Physical Exam:  Gen: no apparent distress, awake and alert  CV: regular rate/rhythm  Pulm: non-labored breathing, equal and bilateral chest rise  Abd: soft, non-distended, non-tender  Ext: warm and well perfused, no edema  Skin: warm and dry, no lesions appreciated  Incision: c/d/I, no surrounding erythema or edema  Neuro: motor and sensation grossly intact and symmetric       Assessment:     Megha Mendoza is a 79 y.o. female with PMH CAD, DLD, COPD, DARA  w/ nighttime home O2, OA, IBS w/ chronic constipation who presents to clinic for follow up regarding severe gastroparesis, esophageal spasms, GERD, and dysphagia immediately following a hiatal hernia repair with antireflux fundoplication on 3/19/22. Following a review of the patient's workup, her case was discussed with the Benign Foregut Conference, and the conclusion was made to pursue pyloromyotomy.    Plan:     - Plan for laparoscopic pyloromyotomy      Karli CARDOZA

## 2024-09-17 RX ORDER — CLINDAMYCIN PHOSPHATE 900 MG/50ML
900 INJECTION, SOLUTION INTRAVENOUS
OUTPATIENT
Start: 2024-09-17

## 2024-09-17 RX ORDER — SODIUM CHLORIDE 9 MG/ML
INJECTION, SOLUTION INTRAVENOUS CONTINUOUS
OUTPATIENT
Start: 2024-09-17

## 2024-09-24 NOTE — PROGRESS NOTES
History & Physical    Subjective     Interval History 9/4/24:  Patient returns to discuss [facility] Multidisciplinary Swallowing Disorders Conference 8/14/24 and preoperative planning. Consensus recommendation is to pursue robotic pyloromyotomy; consider Levsin or other medications for symptom management in the interim. She denies any changes to her symptoms or health since her last visit other than discontinuing blood pressure medication. She takes her BP at home every morning and has had systolic range in the 110-120's.     GERD Questionnaire  Meds: esomeprazole  Yes Typical heartburn  Yes Regurgitation  Yes Dysphagia solids  Yes Dysphagia liquids  No Hoarseness  Yes Cough - has chronic sinus and allergy history  Yes Water brash  Yes Globus  Occasional mild Nausea  No Vomiting     8/14/24: Robotic pyloromyotomy. Consider Levsin or other medications for symptom management in interim.     History of Present Illness 7/10/24:  Patient is a 79 year-old woman with CAD, DLD, COPD on supplemental O2, DARA, OA, IBS with chronic constipation, and h/o GERD s/p hiatal hernia repair with antireflux fundoplication 3/19/22 (Michel), who presents for evaluation and discussion of management of persistent and progressive dysphagia to solids, esophageal spasms, and regurgitation, that began immediately post-op. She is a somewhat poor historian though redirectable. She reports an episode of food bolus impaction for which she required endoscopy and removal of food. She is able to tolerate foods now, but restricts to soft or wet foods. She reports acid reflux on liquid diet, which is not controlled with Omeprazole 40 mg BID. She has a history of gastroparesis, with intermittent symptoms bloating and hardness of the abdomen after eating. Her work-up which was personally reviewed includes:     UGI 6/6/24: Esophageal dysmotility. TBS with no e/o achalasia. Post-fundoplication hiatal hernia repair with prominent phrenic ampulla. Cricopharyngeal  bar. 13-mm barium tablet passed readily into the stomach. No EILEEN.      EGD 5/28/24: Moderately tortuous upper and middle 1/3 with severely dilated lower 1/3 esophagus. 2-cm hiatal hernia. Erythematous mucosa in the gastric body and antrum. Fundoplication with intact wrap. Normal duodenum. EndoFlip without e/o EGJOO. Esophageal dilation at EGJ to 20-mm. Path: mild chronic gastritis and reactive/regenerative changes, distal esophageal squamous mucosa with mild reactive change w/o e/o eosinophils.     EGD 4/5/24: Large amount of food in the middle and lower 2/3 of the esophagus, successfully removed. Esophagitis underlying food bolus. Tortuous esophagus. Mild gastritis in the stomach.     CT chest 4/5/24: Prominent focus of ingested material in the region of the distal esophagus above the GEJ. Favor a new small/moderate hiatal hernia containing portion of the stomach, however cannot entirely exclude food impaction. Proximal to this region the esophagus is mildly patulous. Pulmonary emphysema. Sequela of prior granulomatous disease.     GES 8/30/23: At 4 hour(s) the percentage of retention is 52 % (normal retention at 4 hours is 10% and lower). Abnormal gastric emptying time.     HREM 6/30/23: LES basal 24.2 -> 10.1. 90% intact swallows, 10% weak swallows. No hypercontractile swallows. 100% incomplete bolus clearance. Multiple rapid swallows indicate loss of inhibition with strong final contraction. No e/o residual fluid at the end of the study. Normal esophageal manometry. No e/o disorder of peristalsis.    No chief complaint on file.    Review of patient's allergies indicates:   Allergen Reactions    Cephalexin Rash and Other (See Comments)     Solid patches - rash like skin thickened    Hibiclens [chlorhexidine gluconate] Itching    Sulfa (sulfonamide antibiotics) Rash     Current Outpatient Medications   Medication Sig Dispense Refill    acetaminophen (TYLENOL) 500 MG tablet Take 1,000 mg by mouth every 6 (six)  hours as needed for Pain.      apple cider vinegar 500 mg Tab Take 1 tablet by mouth once daily.      aspirin 81 MG Chew Take 81 mg by mouth once daily.      BACILLUS COAGULANS (DIGESTIVE ADVANTAGE ORAL) Take 1 tablet by mouth once daily.      co-enzyme Q-10 30 mg capsule Take 100 mg by mouth once daily.      denosumab (PROLIA) 60 mg/mL Syrg Inject 1 mL (60 mg total) into the skin every 6 (six) months. 1 each 1    esomeprazole (NEXIUM) 40 MG capsule Take 1 capsule (40 mg total) by mouth 2 (two) times daily. 60 capsule 11    furosemide (LASIX) 20 MG tablet Take 0.5 tablets (10 mg total) by mouth daily as needed (swelling). (Patient taking differently: Take 20 mg by mouth daily as needed (swelling).) 15 tablet 5    gabapentin (NEURONTIN) 100 MG capsule Take 100 mg by mouth every evening.      krill-omega-3-dha-epa-lipids 890-92-98-50 mg Cap Take 1 capsule by mouth nightly.       metoclopramide HCl (REGLAN) 5 MG tablet Take 1 tablet (5 mg total) by mouth 4 (four) times daily as needed (nausea, bloating, slow stomach emptying symptoms). 120 tablet 3    multivitamin (THERAGRAN) tablet Take 1 tablet by mouth once daily.      nitroGLYCERIN (NITROSTAT) 0.4 MG SL tablet Place 0.4 mg under the tongue every 5 (five) minutes as needed for Chest pain.      oxybutynin (DITROPAN-XL) 10 MG 24 hr tablet Take 1 tablet (10 mg total) by mouth once daily. 30 tablet 11    rosuvastatin (CRESTOR) 40 MG Tab Take 40 mg by mouth every evening.      SYMBICORT 80-4.5 mcg/actuation HFAA INHALE 2 PUFFS BY MOUTH TWICE DAILY .  CONTROLLER 30.6 g 1     No current facility-administered medications for this visit.     Past Medical History:   Diagnosis Date    Arthritis     Back pain     Bronchitis, chronic     Carotid artery disease     50% blockage bilateral    Colitis, acute     COPD (chronic obstructive pulmonary disease)     Coronary artery disease     Diverticulitis     DJD (degenerative joint disease)     Elevated d-dimer 05/10/2023    GERD  (gastroesophageal reflux disease)     Hemorrhoids     Hiatal hernia     History of diverticulitis 11/22/2015    Hyperlipidemia     Irritable bowel syndrome with constipation     Mixed stress and urge urinary incontinence 03/30/2021    Obstructive sleep apnea syndrome 10/06/2015    Osteoporosis     Respiratory distress     Trouble in sleeping      Past Surgical History:   Procedure Laterality Date    CARDIAC CATHETERIZATION  05/01/2015    CARPAL TUNNEL RELEASE Bilateral 11/20/2019    COLONOSCOPY N/A 10/11/2022    Procedure: COLONOSCOPY;  Surgeon: Tomy Baron MD;  Location: [facility];  Service: Endoscopy;  Laterality: N/A;    ESOPHAGEAL MANOMETRY WITH MEASUREMENT OF IMPEDANCE N/A 06/30/2023    Procedure: MANOMETRY, ESOPHAGUS, WITH IMPEDANCE MEASUREMENT;  Surgeon: Joseluis Santana MD;  Location: [facility] (4TH FLR);  Service: Endoscopy;  Laterality: N/A; Referred by Dr. Dunlap    ESOPHAGOGASTRODUODENOSCOPY N/A 05/08/2023    Procedure: EGD (ESOPHAGOGASTRODUODENOSCOPY);  Surgeon: Khushboo Dunlap MD;  Location: [facility];  Service: Endoscopy;  Laterality: N/A;    ESOPHAGOGASTRODUODENOSCOPY N/A 04/05/2024    Procedure: EGD (ESOPHAGOGASTRODUODENOSCOPY);  Surgeon: Carmela Sequeira MD;  Location: [facility];  Service: Endoscopy;  Laterality: N/A;    ESOPHAGOGASTRODUODENOSCOPY N/A 05/28/2024    Procedure: EGD (ESOPHAGOGASTRODUODENOSCOPY);  Surgeon: Kasia Hale MD;  Location: [facility] (2ND FLR);  Service: Endoscopy;  Laterality: N/A; endoflip/4 liters of oxygen at night    EYE SURGERY      HAND TENODESIS Bilateral 10/04/2019    HEMORRHOID SURGERY  2009    LAPAROSCOPIC FUNDOPLICATION  03/19/2022    TOTAL ABDOMINAL HYSTERECTOMY  1995    TOTAL KNEE ARTHROPLASTY Left 08/02/2017    TUBAL LIGATION Bilateral      Family History   Problem Relation Name Age of Onset    Diabetes Mother      Hypertension Mother      Arthritis Mother      Stroke Mother      Stroke Father      Heart disease Father          congenital  "heart disease    Cancer Sister 1         lung    COPD Sister 1     Colon cancer Neg Hx      Esophageal cancer Neg Hx       Social History     Tobacco Use    Smoking status: Former     Current packs/day: 0.00     Average packs/day: 0.8 packs/day for 46.0 years (34.5 ttl pk-yrs)     Types: Cigarettes     Start date: 10/14/1959     Quit date: 10/14/2005     Years since quittin.9     Passive exposure: Past    Smokeless tobacco: Never    Tobacco comments:     Stopped and started several times   Substance Use Topics    Alcohol use: No    Drug use: Never         Objective     Vital Signs (Most Recent)  Pulse: (!) 54 (24 1428)  BP: (!) 140/63 (24 1428)  4' 10.5" (1.486 m)  64.9 kg (143 lb 3 oz)     Physical Exam:  Physical Exam  Vitals reviewed.   Constitutional:       General: She is not in acute distress.     Appearance: Normal appearance. She is well-developed. She is not ill-appearing.   HENT:      Head: Normocephalic and atraumatic.   Eyes:      Conjunctiva/sclera: Conjunctivae normal.   Cardiovascular:      Rate and Rhythm: Normal rate and regular rhythm.   Pulmonary:      Effort: Pulmonary effort is normal. No respiratory distress.   Abdominal:      General: There is no distension.      Palpations: Abdomen is soft.      Tenderness: There is no abdominal tenderness. There is no guarding.   Musculoskeletal:         General: Normal range of motion.      Cervical back: Normal range of motion and neck supple.   Skin:     General: Skin is warm and dry.   Neurological:      General: No focal deficit present.      Mental Status: She is alert and oriented to person, place, and time.   Psychiatric:         Mood and Affect: Mood normal.         Behavior: Behavior normal.       Laboratory  CBC: Reviewed  CMP: Reviewed  HbA1c: Reviewed    Diagnostic Results:  Labs: Reviewed  CT: Reviewed  Upper GI: Reviewed  EGD, HREM, GES: Reviewed       Assessment and Plan   Patient is a 79 year-old woman with gastroparesis who " has suffered from dysphagia (including impaction x1), esophageal spasm, regurgitation, and heartburn since hiatal hernia repair with antireflux fundoplication 3/19/22. Her work-up is negative for any obvious anatomic structural anomaly responsible for the severity of her symptoms. Her case was presented at the [facility] multidisciplinary swallowing disorders conference and pyloromyotomy was recommended. Should she continue to have symptoms, revision of her fundoplication can be considered, however should treating her gastroparesis relieve her symptoms, this high-risk intervention with unknown benefit will be avoided. We discussed the risks, benefits and alternatives to robotic pyloromyotomy, possible pyloroplasty, the risks including but not limited to bleeding, infection, leak which can result in intraabdominal abscess, sepsis, and need for reintervention, injury to bowel, and failure of procedure to improve symptoms. All questions were answered to her and her daughter's satisfaction and she has elected to proceed. Informed consent was obtained. She will need Cardiology and PCP clearance pre-operatively given her comorbidities. She is tentatively scheduled for 10/24/24.    Karen Bolden  9/4/24

## 2024-10-17 ENCOUNTER — TELEPHONE (OUTPATIENT)
Dept: SURGERY | Facility: CLINIC | Age: 79
End: 2024-10-17
Payer: MEDICARE

## 2024-10-17 ENCOUNTER — PATIENT MESSAGE (OUTPATIENT)
Dept: SURGERY | Facility: CLINIC | Age: 79
End: 2024-10-17
Payer: MEDICARE

## 2024-10-17 NOTE — TELEPHONE ENCOUNTER
----- Message from Case sent at 10/17/2024  1:44 PM CDT -----  Regarding: Missed call  Contact: [phone]  Caller is returning a missed called from Katlin Lai RN in the office. Please call back as soon as possible.

## 2024-10-17 NOTE — TELEPHONE ENCOUNTER
Returned pt phone call and informed her that [facility] is trying to contact her to discuss her cards clearance for her upcoming surgery on Thursday, 10/24.  She understands and will call their office.

## 2024-10-22 ENCOUNTER — TELEPHONE (OUTPATIENT)
Dept: SURGERY | Facility: CLINIC | Age: 79
End: 2024-10-22
Payer: MEDICARE

## 2024-10-22 NOTE — TELEPHONE ENCOUNTER
----- Message from Maribell Woods sent at 10/22/2024  1:12 PM CDT -----  Regarding: Returning Missed Call  Contact: [phone]  Returning a Missed Call    Caller: Patient     Returning call to: Someone from office per pt    Caller can be reached @: [phone]

## 2024-10-23 ENCOUNTER — ANESTHESIA EVENT (OUTPATIENT)
Dept: SURGERY | Facility: HOSPITAL | Age: 79
End: 2024-10-23
Payer: MEDICARE

## 2024-10-23 ENCOUNTER — HOSPITAL ENCOUNTER (INPATIENT)
Facility: HOSPITAL | Age: 79
LOS: 1 days | Discharge: HOME | DRG: 395 | End: 2024-10-25
Attending: EMERGENCY MEDICINE | Admitting: SURGERY
Payer: MEDICARE

## 2024-10-23 DIAGNOSIS — K31.84 GASTROPARESIS: ICD-10-CM
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
## 2024-10-23 DIAGNOSIS — K56.699 FOOD BOLUS OBSTRUCTION OF INTESTINE: ICD-10-CM
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
## 2024-10-23 DIAGNOSIS — T18.128A ESOPHAGEAL OBSTRUCTION DUE TO FOOD IMPACTION: Primary | ICD-10-CM

## 2024-10-23 PROBLEM — Z87.19 HISTORY OF REPAIR OF HIATAL HERNIA: Status: ACTIVE | Noted: 2024-10-23

## 2024-10-23 PROBLEM — Z98.890 HISTORY OF REPAIR OF HIATAL HERNIA: Status: ACTIVE | Noted: 2024-10-23

## 2024-10-23 LAB
ADENOVIRUS: NOT DETECTED
ALBUMIN SERPL BCP-MCNC: 4 G/DL (ref 3.5–5.2)
ALP SERPL-CCNC: 36 U/L (ref 40–150)
ALT SERPL W/O P-5'-P-CCNC: 13 U/L (ref 10–44)
ANION GAP SERPL CALC-SCNC: 15 MMOL/L (ref 8–16)
AST SERPL-CCNC: 24 U/L (ref 10–40)
BASOPHILS # BLD AUTO: 0.03 K/UL (ref 0–0.2)
BASOPHILS NFR BLD: 0.5 % (ref 0–1.9)
BILIRUB SERPL-MCNC: 0.5 MG/DL (ref 0.1–1)
BORDETELLA PARAPERTUSSIS (IS1001): NOT DETECTED
BORDETELLA PERTUSSIS (PTXP): NOT DETECTED
BUN SERPL-MCNC: 26 MG/DL (ref 8–23)
BUN SERPL-MCNC: 27 MG/DL (ref 6–30)
CALCIUM SERPL-MCNC: 9.4 MG/DL (ref 8.7–10.5)
CHLAMYDIA PNEUMONIAE: NOT DETECTED
CHLORIDE SERPL-SCNC: 106 MMOL/L (ref 95–110)
CHLORIDE SERPL-SCNC: 108 MMOL/L (ref 95–110)
CO2 SERPL-SCNC: 20 MMOL/L (ref 23–29)
CORONAVIRUS 229E, COMMON COLD VIRUS: NOT DETECTED
CORONAVIRUS HKU1, COMMON COLD VIRUS: NOT DETECTED
CORONAVIRUS NL63, COMMON COLD VIRUS: NOT DETECTED
CORONAVIRUS OC43, COMMON COLD VIRUS: NOT DETECTED
CREAT SERPL-MCNC: 0.8 MG/DL (ref 0.5–1.4)
CREAT SERPL-MCNC: 0.9 MG/DL (ref 0.5–1.4)
DIFFERENTIAL METHOD BLD: NORMAL
EOSINOPHIL # BLD AUTO: 0.1 K/UL (ref 0–0.5)
EOSINOPHIL NFR BLD: 1 % (ref 0–8)
ERYTHROCYTE [DISTWIDTH] IN BLOOD BY AUTOMATED COUNT: 12.9 % (ref 11.5–14.5)
EST. GFR  (NO RACE VARIABLE): >60 ML/MIN/1.73 M^2
FLUBV RNA NPH QL NAA+NON-PROBE: NOT DETECTED
GLUCOSE SERPL-MCNC: 63 MG/DL (ref 70–110)
GLUCOSE SERPL-MCNC: 67 MG/DL (ref 70–110)
HCT VFR BLD AUTO: 43.5 % (ref 37–48.5)
HCT VFR BLD CALC: 42 %PCV (ref 36–54)
HCV AB SERPL QL IA: NORMAL
HGB BLD-MCNC: 13.9 G/DL (ref 12–16)
HIV 1+2 AB+HIV1 P24 AG SERPL QL IA: NORMAL
HPIV1 RNA NPH QL NAA+NON-PROBE: NOT DETECTED
HPIV2 RNA NPH QL NAA+NON-PROBE: NOT DETECTED
HPIV3 RNA NPH QL NAA+NON-PROBE: NOT DETECTED
HPIV4 RNA NPH QL NAA+NON-PROBE: NOT DETECTED
HUMAN METAPNEUMOVIRUS: NOT DETECTED
IMM GRANULOCYTES # BLD AUTO: 0.02 K/UL (ref 0–0.04)
IMM GRANULOCYTES NFR BLD AUTO: 0.3 % (ref 0–0.5)
INFLUENZA A (SUBTYPES H1,H1-2009,H3): NOT DETECTED
INFLUENZA A, MOLECULAR: NOT DETECTED
INFLUENZA B, MOLECULAR: NOT DETECTED
LYMPHOCYTES # BLD AUTO: 1.7 K/UL (ref 1–4.8)
LYMPHOCYTES NFR BLD: 27.6 % (ref 18–48)
MAGNESIUM SERPL-MCNC: 2.2 MG/DL (ref 1.6–2.6)
MCH RBC QN AUTO: 30.5 PG (ref 27–31)
MCHC RBC AUTO-ENTMCNC: 32 G/DL (ref 32–36)
MCV RBC AUTO: 96 FL (ref 82–98)
MONOCYTES # BLD AUTO: 0.5 K/UL (ref 0.3–1)
MONOCYTES NFR BLD: 8.7 % (ref 4–15)
MYCOPLASMA PNEUMONIAE: NOT DETECTED
NEUTROPHILS # BLD AUTO: 3.8 K/UL (ref 1.8–7.7)
NEUTROPHILS NFR BLD: 61.9 % (ref 38–73)
NRBC BLD-RTO: 0 /100 WBC
PHOSPHATE SERPL-MCNC: 2.5 MG/DL (ref 2.7–4.5)
PLATELET # BLD AUTO: 232 K/UL (ref 150–450)
PMV BLD AUTO: 9.8 FL (ref 9.2–12.9)
POC IONIZED CALCIUM: 1.03 MMOL/L (ref 1.06–1.42)
POC TCO2 (MEASURED): 19 MMOL/L (ref 23–29)
POTASSIUM BLD-SCNC: 4.3 MMOL/L (ref 3.5–5.1)
POTASSIUM SERPL-SCNC: 4.4 MMOL/L (ref 3.5–5.1)
PROT SERPL-MCNC: 7.5 G/DL (ref 6–8.4)
RBC # BLD AUTO: 4.55 M/UL (ref 4–5.4)
RESPIRATORY INFECTION PANEL SOURCE: NORMAL
RSV AG BY MOLECULAR METHOD: NOT DETECTED
RSV RNA NPH QL NAA+NON-PROBE: NOT DETECTED
RV+EV RNA NPH QL NAA+NON-PROBE: NOT DETECTED
SAMPLE: ABNORMAL
SARS-COV-2 RNA RESP QL NAA+PROBE: NOT DETECTED
SARS-COV-2 RNA RESP QL NAA+PROBE: NOT DETECTED
SODIUM BLD-SCNC: 138 MMOL/L (ref 136–145)
SODIUM SERPL-SCNC: 141 MMOL/L (ref 136–145)
TROPONIN I SERPL DL<=0.01 NG/ML-MCNC: 0.01 NG/ML (ref 0–0.03)
WBC # BLD AUTO: 6.06 K/UL (ref 3.9–12.7)

## 2024-10-23 PROCEDURE — 25000003 PHARM REV CODE 250

## 2024-10-23 PROCEDURE — 87486 CHLMYD PNEUM DNA AMP PROBE: CPT | Performed by: STUDENT IN AN ORGANIZED HEALTH CARE EDUCATION/TRAINING PROGRAM

## 2024-10-23 PROCEDURE — 93005 ELECTROCARDIOGRAM TRACING: CPT

## 2024-10-23 PROCEDURE — 87581 M.PNEUMON DNA AMP PROBE: CPT | Performed by: STUDENT IN AN ORGANIZED HEALTH CARE EDUCATION/TRAINING PROGRAM

## 2024-10-23 PROCEDURE — 96374 THER/PROPH/DIAG INJ IV PUSH: CPT

## 2024-10-23 PROCEDURE — G0378 HOSPITAL OBSERVATION PER HR: HCPCS

## 2024-10-23 PROCEDURE — 96372 THER/PROPH/DIAG INJ SC/IM: CPT | Performed by: STUDENT IN AN ORGANIZED HEALTH CARE EDUCATION/TRAINING PROGRAM

## 2024-10-23 PROCEDURE — 86803 HEPATITIS C AB TEST: CPT | Performed by: PHYSICIAN ASSISTANT

## 2024-10-23 PROCEDURE — 84100 ASSAY OF PHOSPHORUS: CPT | Performed by: STUDENT IN AN ORGANIZED HEALTH CARE EDUCATION/TRAINING PROGRAM

## 2024-10-23 PROCEDURE — 63600175 PHARM REV CODE 636 W HCPCS

## 2024-10-23 PROCEDURE — 80053 COMPREHEN METABOLIC PANEL: CPT | Performed by: EMERGENCY MEDICINE

## 2024-10-23 PROCEDURE — 25000242 PHARM REV CODE 250 ALT 637 W/ HCPCS: Performed by: EMERGENCY MEDICINE

## 2024-10-23 PROCEDURE — 87389 HIV-1 AG W/HIV-1&-2 AB AG IA: CPT | Performed by: PHYSICIAN ASSISTANT

## 2024-10-23 PROCEDURE — 99223 1ST HOSP IP/OBS HIGH 75: CPT | Mod: ,,, | Performed by: SURGERY

## 2024-10-23 PROCEDURE — 99285 EMERGENCY DEPT VISIT HI MDM: CPT | Mod: 25

## 2024-10-23 PROCEDURE — 84484 ASSAY OF TROPONIN QUANT: CPT | Performed by: EMERGENCY MEDICINE

## 2024-10-23 PROCEDURE — 93010 ELECTROCARDIOGRAM REPORT: CPT | Mod: ,,, | Performed by: INTERNAL MEDICINE

## 2024-10-23 PROCEDURE — 96361 HYDRATE IV INFUSION ADD-ON: CPT

## 2024-10-23 PROCEDURE — 85025 COMPLETE CBC W/AUTO DIFF WBC: CPT | Performed by: EMERGENCY MEDICINE

## 2024-10-23 PROCEDURE — 63600175 PHARM REV CODE 636 W HCPCS: Performed by: EMERGENCY MEDICINE

## 2024-10-23 PROCEDURE — 83735 ASSAY OF MAGNESIUM: CPT | Performed by: STUDENT IN AN ORGANIZED HEALTH CARE EDUCATION/TRAINING PROGRAM

## 2024-10-23 PROCEDURE — 80047 BASIC METABLC PNL IONIZED CA: CPT

## 2024-10-23 PROCEDURE — 63600175 PHARM REV CODE 636 W HCPCS: Performed by: STUDENT IN AN ORGANIZED HEALTH CARE EDUCATION/TRAINING PROGRAM

## 2024-10-23 PROCEDURE — 0241U SARS-COV2 (COVID) WITH FLU/RSV BY PCR: CPT | Performed by: STUDENT IN AN ORGANIZED HEALTH CARE EDUCATION/TRAINING PROGRAM

## 2024-10-23 PROCEDURE — 96375 TX/PRO/DX INJ NEW DRUG ADDON: CPT

## 2024-10-23 RX ORDER — ENOXAPARIN SODIUM 100 MG/ML
40 INJECTION SUBCUTANEOUS EVERY 24 HOURS
Status: DISCONTINUED | OUTPATIENT
Start: 2024-10-23 | End: 2024-10-25 | Stop reason: HOSPADM

## 2024-10-23 RX ORDER — PROCHLORPERAZINE EDISYLATE 5 MG/ML
5 INJECTION INTRAMUSCULAR; INTRAVENOUS EVERY 6 HOURS PRN
Status: DISCONTINUED | OUTPATIENT
Start: 2024-10-23 | End: 2024-10-25 | Stop reason: HOSPADM

## 2024-10-23 RX ORDER — PANTOPRAZOLE SODIUM 40 MG/10ML
40 INJECTION, POWDER, LYOPHILIZED, FOR SOLUTION INTRAVENOUS DAILY
Status: DISCONTINUED | OUTPATIENT
Start: 2024-10-23 | End: 2024-10-25 | Stop reason: HOSPADM

## 2024-10-23 RX ORDER — ACETAMINOPHEN 325 MG/1
650 TABLET ORAL EVERY 8 HOURS PRN
Status: DISCONTINUED | OUTPATIENT
Start: 2024-10-23 | End: 2024-10-25 | Stop reason: HOSPADM

## 2024-10-23 RX ORDER — ONDANSETRON 4 MG/1
4 TABLET, ORALLY DISINTEGRATING ORAL EVERY 6 HOURS PRN
Status: DISCONTINUED | OUTPATIENT
Start: 2024-10-23 | End: 2024-10-25 | Stop reason: HOSPADM

## 2024-10-23 RX ORDER — SODIUM CHLORIDE 0.9 % (FLUSH) 0.9 %
10 SYRINGE (ML) INJECTION
Status: DISCONTINUED | OUTPATIENT
Start: 2024-10-23 | End: 2024-10-25 | Stop reason: HOSPADM

## 2024-10-23 RX ORDER — METOCLOPRAMIDE HYDROCHLORIDE 5 MG/ML
10 INJECTION INTRAMUSCULAR; INTRAVENOUS
Status: COMPLETED | OUTPATIENT
Start: 2024-10-23 | End: 2024-10-23

## 2024-10-23 RX ORDER — ONDANSETRON 8 MG/1
8 TABLET, ORALLY DISINTEGRATING ORAL EVERY 8 HOURS PRN
Status: DISCONTINUED | OUTPATIENT
Start: 2024-10-23 | End: 2024-10-23

## 2024-10-23 RX ORDER — NITROGLYCERIN 0.4 MG/1
0.4 TABLET SUBLINGUAL
Status: COMPLETED | OUTPATIENT
Start: 2024-10-23 | End: 2024-10-23

## 2024-10-23 RX ORDER — NAPROXEN SODIUM 220 MG/1
81 TABLET, FILM COATED ORAL DAILY
Status: DISCONTINUED | OUTPATIENT
Start: 2024-10-24 | End: 2024-10-25 | Stop reason: HOSPADM

## 2024-10-23 RX ORDER — GABAPENTIN 100 MG/1
100 CAPSULE ORAL NIGHTLY
Status: DISCONTINUED | OUTPATIENT
Start: 2024-10-23 | End: 2024-10-25 | Stop reason: HOSPADM

## 2024-10-23 RX ORDER — TALC
6 POWDER (GRAM) TOPICAL NIGHTLY PRN
Status: DISCONTINUED | OUTPATIENT
Start: 2024-10-23 | End: 2024-10-25 | Stop reason: HOSPADM

## 2024-10-23 RX ORDER — SODIUM CHLORIDE, SODIUM LACTATE, POTASSIUM CHLORIDE, CALCIUM CHLORIDE 600; 310; 30; 20 MG/100ML; MG/100ML; MG/100ML; MG/100ML
INJECTION, SOLUTION INTRAVENOUS CONTINUOUS
Status: DISCONTINUED | OUTPATIENT
Start: 2024-10-23 | End: 2024-10-25

## 2024-10-23 RX ORDER — SCOLOPAMINE TRANSDERMAL SYSTEM 1 MG/1
1 PATCH, EXTENDED RELEASE TRANSDERMAL
Status: DISCONTINUED | OUTPATIENT
Start: 2024-10-23 | End: 2024-10-25 | Stop reason: HOSPADM

## 2024-10-23 RX ORDER — LIDOCAINE HYDROCHLORIDE 10 MG/ML
1 INJECTION, SOLUTION EPIDURAL; INFILTRATION; INTRACAUDAL; PERINEURAL ONCE AS NEEDED
Status: DISCONTINUED | OUTPATIENT
Start: 2024-10-23 | End: 2024-10-25 | Stop reason: HOSPADM

## 2024-10-23 RX ADMIN — SODIUM CHLORIDE, POTASSIUM CHLORIDE, SODIUM LACTATE AND CALCIUM CHLORIDE: 600; 310; 30; 20 INJECTION, SOLUTION INTRAVENOUS at 10:10

## 2024-10-23 RX ADMIN — PANTOPRAZOLE SODIUM 40 MG: 40 INJECTION, POWDER, LYOPHILIZED, FOR SOLUTION INTRAVENOUS at 11:10

## 2024-10-23 RX ADMIN — GABAPENTIN 100 MG: 100 CAPSULE ORAL at 11:10

## 2024-10-23 RX ADMIN — NITROGLYCERIN 0.4 MG: 0.4 TABLET, ORALLY DISINTEGRATING SUBLINGUAL at 05:10

## 2024-10-23 RX ADMIN — SCOPALAMINE 1 PATCH: 1 PATCH, EXTENDED RELEASE TRANSDERMAL at 07:10

## 2024-10-23 RX ADMIN — SODIUM CHLORIDE, POTASSIUM CHLORIDE, SODIUM LACTATE AND CALCIUM CHLORIDE: 600; 310; 30; 20 INJECTION, SOLUTION INTRAVENOUS at 07:10

## 2024-10-23 RX ADMIN — METOCLOPRAMIDE 10 MG: 5 INJECTION, SOLUTION INTRAMUSCULAR; INTRAVENOUS at 05:10

## 2024-10-23 RX ADMIN — ENOXAPARIN SODIUM 40 MG: 100 INJECTION SUBCUTANEOUS at 07:10

## 2024-10-23 NOTE — ANESTHESIA PREPROCEDURE EVALUATION
[facility]  Anesthesia Pre-Operative Evaluation         Patient Name: eMgha Mendoza  YOB: 1945  MRN: [id number]    SUBJECTIVE:     Pre-operative evaluation for Procedure(s) (LRB):  XI ROBOTIC PYLOROPLASTY vs pyloromyotomy (N/A)     10/23/2024    Megha Mendoza is a 79 y.o. female w/ a significant PMHx of Arthritis, Back pain, Carotid artery disease, COPD , Coronary artery disease, Diverticulitis, DJD , GERD, Hiatal hernia, Hyperlipidemia, Irritable bowel syndrome with constipation, Mixed stress and urge urinary incontinence (3/30/2021), DARA (obstructive sleep apnea), and Osteoporosis and hx of Hiatal Hernia repair with toupet in 3/2019. who presented with concern for viral illness vs sequelae of gastroparesis.    Patient now presents for the above procedure(s).      LDA:        Peripheral IV - Single Lumen 10/23/24 1726 20 G Left Antecubital (Active)   Site Assessment Clean;Dry;Intact 10/23/24 1726   Extremity Assessment Distal to IV No abnormal discoloration;No redness;No swelling 10/23/24 1726   Dressing Status Clean;Dry;Intact 10/23/24 1726   Dressing Intervention Integrity maintained 10/23/24 1726   Number of days: 0       Prev airway: None documented.    Drips:    lactated ringers   Intravenous Continuous           Patient Active Problem List   Diagnosis    Chronic pain of left knee    Osteopenia    Former cigarette smoker    Dyslipidemia    Gastroesophageal reflux disease    Primary osteoarthritis of left knee    Coronary arteriosclerosis in native artery    Stenosis of carotid artery    Chronic insomnia    Atherosclerosis of aorta    Mixed stress and urge incontinence    Chronic constipation    Vaginal atrophy    Emphysema/COPD    Obstructive chronic bronchitis without exacerbation    Pharyngoesophageal dysphagia    Lung nodule    Acute pain of right shoulder    Pain in right acromioclavicular joint    Chronic pain of both feet    Senile purpura    Abnormality of gait and  mobility    Aortic valve regurgitation    Generalized osteoarthritis    Obstructive sleep apnea syndrome    Shortness of breath    Irritable bowel syndrome without diarrhea    Hernia, hiatal    History of repair of hiatal hernia       Review of patient's allergies indicates:   Allergen Reactions    Cephalexin Rash and Other (See Comments)     Solid patches - rash like skin thickened    Hibiclens [chlorhexidine gluconate] Itching    Sulfa (sulfonamide antibiotics) Rash       Current Inpatient Medications:   [START ON 10/24/2024] aspirin  81 mg Oral Daily    enoxparin  40 mg Subcutaneous Daily    scopolamine  1 patch Transdermal Q3 Days       No current facility-administered medications on file prior to encounter.     Current Outpatient Medications on File Prior to Encounter   Medication Sig Dispense Refill    acetaminophen (TYLENOL) 500 MG tablet Take 1,000 mg by mouth every 6 (six) hours as needed for Pain.      apple cider vinegar 500 mg Tab Take 1 tablet by mouth once daily.      aspirin 81 MG Chew Take 81 mg by mouth once daily.      BACILLUS COAGULANS (DIGESTIVE ADVANTAGE ORAL) Take 1 tablet by mouth once daily.      co-enzyme Q-10 30 mg capsule Take 100 mg by mouth once daily.      denosumab (PROLIA) 60 mg/mL Syrg Inject 1 mL (60 mg total) into the skin every 6 (six) months. 1 each 1    esomeprazole (NEXIUM) 40 MG capsule Take 1 capsule (40 mg total) by mouth 2 (two) times daily. (Patient not taking: Reported on 10/22/2024) 60 capsule 11    furosemide (LASIX) 20 MG tablet Take 0.5 tablets (10 mg total) by mouth daily as needed (swelling). (Patient taking differently: Take 20 mg by mouth daily as needed (swelling).) 15 tablet 5    gabapentin (NEURONTIN) 100 MG capsule Take 100 mg by mouth every evening.      krill-omega-3-dha-epa-lipids 587-01-01-50 mg Cap Take 1 capsule by mouth nightly.       metoclopramide HCl (REGLAN) 5 MG tablet Take 1 tablet (5 mg total) by mouth 4 (four) times daily as needed (nausea,  bloating, slow stomach emptying symptoms). 120 tablet 3    multivitamin (THERAGRAN) tablet Take 1 tablet by mouth once daily.      nitroGLYCERIN (NITROSTAT) 0.4 MG SL tablet Place 0.4 mg under the tongue every 5 (five) minutes as needed for Chest pain.      oxybutynin (DITROPAN-XL) 10 MG 24 hr tablet Take 1 tablet (10 mg total) by mouth once daily. (Patient taking differently: Take 10 mg by mouth nightly.) 30 tablet 11    rosuvastatin (CRESTOR) 40 MG Tab Take 40 mg by mouth every evening.      SYMBICORT 80-4.5 mcg/actuation HFAA INHALE 2 PUFFS BY MOUTH TWICE DAILY .  CONTROLLER 30.6 g 1       Past Surgical History:   Procedure Laterality Date    CARDIAC CATHETERIZATION  05/01/2015    CARPAL TUNNEL RELEASE Bilateral 11/20/2019    COLONOSCOPY N/A 10/11/2022    Procedure: COLONOSCOPY;  Surgeon: Tomy Baron MD;  Location: [facility];  Service: Endoscopy;  Laterality: N/A;    ESOPHAGEAL MANOMETRY WITH MEASUREMENT OF IMPEDANCE N/A 06/30/2023    Procedure: MANOMETRY, ESOPHAGUS, WITH IMPEDANCE MEASUREMENT;  Surgeon: Joseluis Santana MD;  Location: [facility] (4TH FLR);  Service: Endoscopy;  Laterality: N/A; Referred by Dr. Dunalp    ESOPHAGOGASTRODUODENOSCOPY N/A 05/08/2023    Procedure: EGD (ESOPHAGOGASTRODUODENOSCOPY);  Surgeon: Khushboo Dunlap MD;  Location: [facility];  Service: Endoscopy;  Laterality: N/A;    ESOPHAGOGASTRODUODENOSCOPY N/A 04/05/2024    Procedure: EGD (ESOPHAGOGASTRODUODENOSCOPY);  Surgeon: Carmela Sequeira MD;  Location: [facility];  Service: Endoscopy;  Laterality: N/A;    ESOPHAGOGASTRODUODENOSCOPY N/A 05/28/2024    Procedure: EGD (ESOPHAGOGASTRODUODENOSCOPY);  Surgeon: Kasia Hale MD;  Location: [facility] (2ND FLR);  Service: Endoscopy;  Laterality: N/A; endoflip/4 liters of oxygen at night    EYE SURGERY      HAND TENODESIS Bilateral 10/04/2019    HEMORRHOID SURGERY  2009    LAPAROSCOPIC FUNDOPLICATION  03/19/2022    TOTAL ABDOMINAL HYSTERECTOMY  1995    TOTAL KNEE ARTHROPLASTY  Left 2017    TUBAL LIGATION Bilateral        Social History     Socioeconomic History    Marital status:     Number of children: 4   Tobacco Use    Smoking status: Former     Current packs/day: 0.00     Average packs/day: 0.8 packs/day for 46.0 years (34.5 ttl pk-yrs)     Types: Cigarettes     Start date: 10/14/1959     Quit date: 10/14/2005     Years since quittin.0     Passive exposure: Past    Smokeless tobacco: Never    Tobacco comments:     Stopped and started several times   Substance and Sexual Activity    Alcohol use: No    Drug use: Never    Sexual activity: Not Currently     Birth control/protection: Surgical     Comment:      Social Drivers of Health     Financial Resource Strain: Medium Risk (2024)    Overall Financial Resource Strain (CARDIA)     Difficulty of Paying Living Expenses: Somewhat hard   Food Insecurity: Food Insecurity Present (2024)    Hunger Vital Sign     Worried About Running Out of Food in the Last Year: Sometimes true     Ran Out of Food in the Last Year: Sometimes true   Transportation Needs: No Transportation Needs (1/3/2024)    PRAPARE - Transportation     Lack of Transportation (Medical): No     Lack of Transportation (Non-Medical): No   Physical Activity: Unknown (2024)    Exercise Vital Sign     Days of Exercise per Week: Patient declined     Minutes of Exercise per Session: 30 min   Stress: Stress Concern Present (2024)    [employer] - Occupational Stress Questionnaire     Feeling of Stress : To some extent   Housing Stability: Low Risk  (1/3/2024)    Housing Stability Vital Sign     Unable to Pay for Housing in the Last Year: No     Number of Places Lived in the Last Year: 1     Unstable Housing in the Last Year: No       OBJECTIVE:     Vital Signs Range (Last 24H):  Temp:  [37 °C (98.6 °F)]   Pulse:  [61-81]   Resp:  [20]   BP: (133-165)/(59-70)   SpO2:  [95 %-99 %]       Significant Labs:  Lab Results    Component Value Date    WBC 6.06 10/23/2024    HGB 13.9 10/23/2024    HCT 43.5 10/23/2024     10/23/2024    CHOL 146 03/12/2024    TRIG 106 03/12/2024    HDL 66 03/12/2024    LDLDIRECT 60 03/12/2024    ALT 13 10/23/2024    AST 24 10/23/2024     10/23/2024    K 4.4 10/23/2024     10/23/2024    CREATININE 0.8 10/23/2024    BUN 26 (H) 10/23/2024    CO2 20 (L) 10/23/2024    TSH 0.934 08/22/2024    HGBA1C 5.5 08/22/2024       Diagnostic Studies: No relevant studies.    EKG:   Results for orders placed or performed during the hospital encounter of 04/05/24   EKG 12-lead    Collection Time: 04/05/24 11:52 AM   Result Value Ref Range    QRS Duration 76 ms    OHS QTC Calculation 428 ms    Narrative    Test Reason : R07.9    Vent. Rate : 061 BPM     Atrial Rate : 061 BPM     P-R Int : 154 ms          QRS Dur : 076 ms      QT Int : 426 ms       P-R-T Axes : 059 -70 045 degrees     QTc Int : 428 ms    Normal sinus rhythm with sinus arrhythmia  Left axis deviation  Abnormal ECG  When compared with ECG of 19-APR-2023 09:18,  No significant change was found  Confirmed by Federico POP, Santos MOREAU ([id number]) on 4/5/2024 1:27:10 PM    Referred By: [email or web address]   SELF           Confirmed By:Santos Caballero MD       2D ECHO:  TTE:  No results found for this or any previous visit.    JC:  No results found for this or any previous visit.    ASSESSMENT/PLAN:                                                                                                          10/23/2024  Megha Mendoza is a 79 y.o., female.      Pre-op Assessment    I have reviewed the Patient Summary Reports.     I have reviewed the Nursing Notes. I have reviewed the NPO Status.   I have reviewed the Medications.     Review of Systems  Anesthesia Hx:   History of prior surgery of interest to airway management or planning:          Denies Family Hx of Anesthesia complications.    Denies Personal Hx of Anesthesia complications.                    Cardiovascular:       Valvular problems/Murmurs, AI  CAD                                          Pulmonary:   COPD     Sleep Apnea                Hepatic/GI:    Hiatal Hernia, GERD                Musculoskeletal:  Arthritis                   Physical Exam  General: Well nourished, Cooperative, Alert and Oriented    Airway:  Mallampati: III / II  Mouth Opening: Normal  TM Distance: Normal  Tongue: Normal  Neck ROM: Normal ROM    Dental:  Intact, Dentures        Anesthesia Plan  Type of Anesthesia, risks & benefits discussed:    Anesthesia Type: Gen ETT  Intra-op Monitoring Plan: Standard ASA Monitors  Post Op Pain Control Plan: multimodal analgesia  Induction:  IV  Airway Plan: Direct, Post-Induction  Informed Consent: Informed consent signed with the Patient and all parties understand the risks and agree with anesthesia plan.  All questions answered.   ASA Score: 3  Day of Surgery Review of History & Physical: H&P Update referred to the surgeon/provider.    Ready For Surgery From Anesthesia Perspective.     .

## 2024-10-24 ENCOUNTER — ANESTHESIA (OUTPATIENT)
Dept: SURGERY | Facility: HOSPITAL | Age: 79
End: 2024-10-24
Payer: MEDICARE

## 2024-10-24 LAB
OHS QRS DURATION: 76 MS
OHS QTC CALCULATION: 451 MS

## 2024-10-24 PROCEDURE — 43235 EGD DIAGNOSTIC BRUSH WASH: CPT | Mod: ,,, | Performed by: SURGERY

## 2024-10-24 PROCEDURE — 36000707: Performed by: SURGERY

## 2024-10-24 PROCEDURE — 36000706: Performed by: SURGERY

## 2024-10-24 PROCEDURE — 37000009 HC ANESTHESIA EA ADD 15 MINS: Performed by: SURGERY

## 2024-10-24 PROCEDURE — 71000015 HC POSTOP RECOV 1ST HR: Performed by: SURGERY

## 2024-10-24 PROCEDURE — 63600175 PHARM REV CODE 636 W HCPCS: Performed by: STUDENT IN AN ORGANIZED HEALTH CARE EDUCATION/TRAINING PROGRAM

## 2024-10-24 PROCEDURE — 11000001 HC ACUTE MED/SURG PRIVATE ROOM

## 2024-10-24 PROCEDURE — 96361 HYDRATE IV INFUSION ADD-ON: CPT

## 2024-10-24 PROCEDURE — 25000003 PHARM REV CODE 250

## 2024-10-24 PROCEDURE — 63600175 PHARM REV CODE 636 W HCPCS

## 2024-10-24 PROCEDURE — 0DC38ZZ EXTIRPATION OF MATTER FROM LOWER ESOPHAGUS, VIA NATURAL OR ARTIFICIAL OPENING ENDOSCOPIC: ICD-10-PCS | Performed by: SURGERY

## 2024-10-24 PROCEDURE — 37000008 HC ANESTHESIA 1ST 15 MINUTES: Performed by: SURGERY

## 2024-10-24 PROCEDURE — 71000016 HC POSTOP RECOV ADDL HR: Performed by: SURGERY

## 2024-10-24 PROCEDURE — 71000033 HC RECOVERY, INTIAL HOUR: Performed by: SURGERY

## 2024-10-24 RX ORDER — FENTANYL CITRATE 50 UG/ML
INJECTION, SOLUTION INTRAMUSCULAR; INTRAVENOUS
Status: DISCONTINUED | OUTPATIENT
Start: 2024-10-24 | End: 2024-10-24

## 2024-10-24 RX ORDER — CLINDAMYCIN PHOSPHATE 900 MG/50ML
900 INJECTION, SOLUTION INTRAVENOUS
Status: DISCONTINUED | OUTPATIENT
Start: 2024-10-24 | End: 2024-10-24 | Stop reason: HOSPADM

## 2024-10-24 RX ORDER — LIDOCAINE HYDROCHLORIDE 20 MG/ML
INJECTION, SOLUTION EPIDURAL; INFILTRATION; INTRACAUDAL; PERINEURAL
Status: DISCONTINUED | OUTPATIENT
Start: 2024-10-24 | End: 2024-10-24

## 2024-10-24 RX ORDER — PHENYLEPHRINE HYDROCHLORIDE 10 MG/ML
INJECTION INTRAVENOUS
Status: DISCONTINUED | OUTPATIENT
Start: 2024-10-24 | End: 2024-10-24

## 2024-10-24 RX ORDER — SODIUM CHLORIDE 9 MG/ML
INJECTION, SOLUTION INTRAVENOUS CONTINUOUS
Status: DISCONTINUED | OUTPATIENT
Start: 2024-10-24 | End: 2024-10-25

## 2024-10-24 RX ORDER — ROCURONIUM BROMIDE 10 MG/ML
INJECTION, SOLUTION INTRAVENOUS
Status: DISCONTINUED | OUTPATIENT
Start: 2024-10-24 | End: 2024-10-24

## 2024-10-24 RX ORDER — SUCCINYLCHOLINE CHLORIDE 20 MG/ML
INJECTION INTRAMUSCULAR; INTRAVENOUS
Status: DISCONTINUED | OUTPATIENT
Start: 2024-10-24 | End: 2024-10-24

## 2024-10-24 RX ORDER — DEXAMETHASONE SODIUM PHOSPHATE 4 MG/ML
INJECTION, SOLUTION INTRA-ARTICULAR; INTRALESIONAL; INTRAMUSCULAR; INTRAVENOUS; SOFT TISSUE
Status: DISCONTINUED | OUTPATIENT
Start: 2024-10-24 | End: 2024-10-24

## 2024-10-24 RX ORDER — PROPOFOL 10 MG/ML
VIAL (ML) INTRAVENOUS
Status: DISCONTINUED | OUTPATIENT
Start: 2024-10-24 | End: 2024-10-24

## 2024-10-24 RX ORDER — ONDANSETRON HYDROCHLORIDE 2 MG/ML
INJECTION, SOLUTION INTRAVENOUS
Status: DISCONTINUED | OUTPATIENT
Start: 2024-10-24 | End: 2024-10-24

## 2024-10-24 RX ADMIN — DEXAMETHASONE SODIUM PHOSPHATE 4 MG: 4 INJECTION, SOLUTION INTRAMUSCULAR; INTRAVENOUS at 06:10

## 2024-10-24 RX ADMIN — ONDANSETRON 4 MG: 2 INJECTION INTRAMUSCULAR; INTRAVENOUS at 06:10

## 2024-10-24 RX ADMIN — ROCURONIUM BROMIDE 50 MG: 10 INJECTION, SOLUTION INTRAVENOUS at 05:10

## 2024-10-24 RX ADMIN — GABAPENTIN 100 MG: 100 CAPSULE ORAL at 09:10

## 2024-10-24 RX ADMIN — LIDOCAINE HYDROCHLORIDE 100 MG: 20 INJECTION, SOLUTION EPIDURAL; INFILTRATION; INTRACAUDAL; PERINEURAL at 05:10

## 2024-10-24 RX ADMIN — SUCCINYLCHOLINE 120 MG: 20 INJECTION, SOLUTION INTRAMUSCULAR; INTRAVENOUS at 05:10

## 2024-10-24 RX ADMIN — SODIUM CHLORIDE, POTASSIUM CHLORIDE, SODIUM LACTATE AND CALCIUM CHLORIDE: 600; 310; 30; 20 INJECTION, SOLUTION INTRAVENOUS at 09:10

## 2024-10-24 RX ADMIN — SODIUM CHLORIDE, POTASSIUM CHLORIDE, SODIUM LACTATE AND CALCIUM CHLORIDE: 600; 310; 30; 20 INJECTION, SOLUTION INTRAVENOUS at 11:10

## 2024-10-24 RX ADMIN — SODIUM CHLORIDE, POTASSIUM CHLORIDE, SODIUM LACTATE AND CALCIUM CHLORIDE: 600; 310; 30; 20 INJECTION, SOLUTION INTRAVENOUS at 03:10

## 2024-10-24 RX ADMIN — SODIUM CHLORIDE, POTASSIUM CHLORIDE, SODIUM LACTATE AND CALCIUM CHLORIDE: 600; 310; 30; 20 INJECTION, SOLUTION INTRAVENOUS at 06:10

## 2024-10-24 RX ADMIN — PHENYLEPHRINE HYDROCHLORIDE 100 MCG: 10 INJECTION INTRAVENOUS at 05:10

## 2024-10-24 RX ADMIN — PANTOPRAZOLE SODIUM 40 MG: 40 INJECTION, POWDER, LYOPHILIZED, FOR SOLUTION INTRAVENOUS at 10:10

## 2024-10-24 RX ADMIN — PROPOFOL 50 MG: 10 INJECTION, EMULSION INTRAVENOUS at 06:10

## 2024-10-24 RX ADMIN — SUGAMMADEX 200 MG: 100 INJECTION, SOLUTION INTRAVENOUS at 06:10

## 2024-10-24 RX ADMIN — PROPOFOL 150 MG: 10 INJECTION, EMULSION INTRAVENOUS at 05:10

## 2024-10-24 RX ADMIN — FENTANYL CITRATE 100 MCG: 50 INJECTION, SOLUTION INTRAMUSCULAR; INTRAVENOUS at 05:10

## 2024-10-24 RX ADMIN — SODIUM CHLORIDE, POTASSIUM CHLORIDE, SODIUM LACTATE AND CALCIUM CHLORIDE: 600; 310; 30; 20 INJECTION, SOLUTION INTRAVENOUS at 10:10

## 2024-10-24 RX ADMIN — SODIUM CHLORIDE: 0.9 INJECTION, SOLUTION INTRAVENOUS at 05:10

## 2024-10-24 NOTE — TRANSFER OF CARE
"Anesthesia Transfer of Care Note    Patient: Megha Mendoza    Procedure(s) Performed: Procedure(s) (LRB):  EGD (ESOPHAGOGASTRODUODENOSCOPY) (N/A)    Patient location: PACU    Anesthesia Type: general    Transport from OR: Transported from OR on 6-10 L/min O2 by face mask with adequate spontaneous ventilation    Post pain: adequate analgesia    Post assessment: no apparent anesthetic complications and tolerated procedure well    Post vital signs: stable    Level of consciousness: responds to stimulation and sedated    Nausea/Vomiting: no nausea/vomiting    Complications: none    Transfer of care protocol was followed      Last vitals: Visit Vitals  BP (!) 151/85 (BP Location: Right arm, Patient Position: Lying)   Pulse 64   Temp 36.5 °C (97.7 °F) (Skin)   Resp 18   Ht 4' 10" (1.473 m)   Wt 61.7 kg (136 lb 0.4 oz)   SpO2 97%   Breastfeeding No   BMI 28.43 kg/m²     "

## 2024-10-24 NOTE — ANESTHESIA PROCEDURE NOTES
Intubation    Date/Time: 10/24/2024 5:57 PM    Performed by: Katelin Torres CRNA  Authorized by: Edgar Baeza MD    Intubation:     Induction:  Intravenous    Intubated:  Postinduction    Mask Ventilation:  Not attempted    Attempts:  1    Attempted By:  CRNA    Method of Intubation:  Video laryngoscopy    Blade:  Bhardwaj 3    Laryngeal View Grade: Grade I - full view of cords      Difficult Airway Encountered?: No      Airway Device:  Oral endotracheal tube    Airway Device Size:  7.0    Style/Cuff Inflation:  Cuffed    Tube secured:  21    Secured at:  The lips    Placement Verified By:  Capnometry and Revisualization with laryngoscopy    Complicating Factors:  None    Findings Post-Intubation:  BS equal bilateral and atraumatic/condition of teeth unchanged

## 2024-10-25 ENCOUNTER — ANESTHESIA EVENT (OUTPATIENT)
Dept: ENDOSCOPY | Facility: HOSPITAL | Age: 79
End: 2024-10-25
Payer: MEDICARE

## 2024-10-25 ENCOUNTER — ANESTHESIA (OUTPATIENT)
Dept: ENDOSCOPY | Facility: HOSPITAL | Age: 79
End: 2024-10-25
Payer: MEDICARE

## 2024-10-25 VITALS
BODY MASS INDEX: 28.55 KG/M2 | RESPIRATION RATE: 18 BRPM | DIASTOLIC BLOOD PRESSURE: 61 MMHG | TEMPERATURE: 98 F | WEIGHT: 136 LBS | OXYGEN SATURATION: 96 % | HEIGHT: 58 IN | HEART RATE: 68 BPM | SYSTOLIC BLOOD PRESSURE: 134 MMHG

## 2024-10-25 DIAGNOSIS — K31.84 GASTROPARESIS: Primary | ICD-10-CM

## 2024-10-25 PROBLEM — T18.128A ESOPHAGEAL OBSTRUCTION DUE TO FOOD IMPACTION: Status: ACTIVE | Noted: 2024-10-25
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
## 2024-10-25 LAB
ANION GAP SERPL CALC-SCNC: 13 MMOL/L (ref 8–16)
BASOPHILS # BLD AUTO: 0.01 K/UL (ref 0–0.2)
BASOPHILS NFR BLD: 0.2 % (ref 0–1.9)
BILIRUB UR QL STRIP: NEGATIVE
BUN SERPL-MCNC: 18 MG/DL (ref 8–23)
CALCIUM SERPL-MCNC: 8.4 MG/DL (ref 8.7–10.5)
CHLORIDE SERPL-SCNC: 107 MMOL/L (ref 95–110)
CLARITY UR REFRACT.AUTO: CLEAR
CO2 SERPL-SCNC: 16 MMOL/L (ref 23–29)
COLOR UR AUTO: YELLOW
CREAT SERPL-MCNC: 0.7 MG/DL (ref 0.5–1.4)
DIFFERENTIAL METHOD BLD: ABNORMAL
EOSINOPHIL # BLD AUTO: 0 K/UL (ref 0–0.5)
EOSINOPHIL NFR BLD: 0 % (ref 0–8)
ERYTHROCYTE [DISTWIDTH] IN BLOOD BY AUTOMATED COUNT: 12.8 % (ref 11.5–14.5)
EST. GFR  (NO RACE VARIABLE): >60 ML/MIN/1.73 M^2
GLUCOSE SERPL-MCNC: 88 MG/DL (ref 70–110)
GLUCOSE UR QL STRIP: NEGATIVE
HCT VFR BLD AUTO: 39 % (ref 37–48.5)
HGB BLD-MCNC: 12.9 G/DL (ref 12–16)
HGB UR QL STRIP: NEGATIVE
IMM GRANULOCYTES # BLD AUTO: 0.01 K/UL (ref 0–0.04)
IMM GRANULOCYTES NFR BLD AUTO: 0.2 % (ref 0–0.5)
KETONES UR QL STRIP: ABNORMAL
LEUKOCYTE ESTERASE UR QL STRIP: NEGATIVE
LYMPHOCYTES # BLD AUTO: 0.7 K/UL (ref 1–4.8)
LYMPHOCYTES NFR BLD: 14.6 % (ref 18–48)
MAGNESIUM SERPL-MCNC: 2 MG/DL (ref 1.6–2.6)
MCH RBC QN AUTO: 30.6 PG (ref 27–31)
MCHC RBC AUTO-ENTMCNC: 33.1 G/DL (ref 32–36)
MCV RBC AUTO: 93 FL (ref 82–98)
MONOCYTES # BLD AUTO: 0.1 K/UL (ref 0.3–1)
MONOCYTES NFR BLD: 1.6 % (ref 4–15)
NEUTROPHILS # BLD AUTO: 4.1 K/UL (ref 1.8–7.7)
NEUTROPHILS NFR BLD: 83.4 % (ref 38–73)
NITRITE UR QL STRIP: NEGATIVE
NRBC BLD-RTO: 0 /100 WBC
PH UR STRIP: 6 [PH] (ref 5–8)
PHOSPHATE SERPL-MCNC: 2.7 MG/DL (ref 2.7–4.5)
PLATELET # BLD AUTO: 197 K/UL (ref 150–450)
PMV BLD AUTO: 10.2 FL (ref 9.2–12.9)
POTASSIUM SERPL-SCNC: 4.5 MMOL/L (ref 3.5–5.1)
PROT UR QL STRIP: ABNORMAL
RBC # BLD AUTO: 4.21 M/UL (ref 4–5.4)
SODIUM SERPL-SCNC: 136 MMOL/L (ref 136–145)
SP GR UR STRIP: 1.02 (ref 1–1.03)
URN SPEC COLLECT METH UR: ABNORMAL
WBC # BLD AUTO: 4.92 K/UL (ref 3.9–12.7)

## 2024-10-25 PROCEDURE — 63600175 PHARM REV CODE 636 W HCPCS: Performed by: STUDENT IN AN ORGANIZED HEALTH CARE EDUCATION/TRAINING PROGRAM

## 2024-10-25 PROCEDURE — 84100 ASSAY OF PHOSPHORUS: CPT | Performed by: STUDENT IN AN ORGANIZED HEALTH CARE EDUCATION/TRAINING PROGRAM

## 2024-10-25 PROCEDURE — 0DC38ZZ EXTIRPATION OF MATTER FROM LOWER ESOPHAGUS, VIA NATURAL OR ARTIFICIAL OPENING ENDOSCOPIC: ICD-10-PCS | Performed by: INTERNAL MEDICINE

## 2024-10-25 PROCEDURE — 63600175 PHARM REV CODE 636 W HCPCS

## 2024-10-25 PROCEDURE — 85025 COMPLETE CBC W/AUTO DIFF WBC: CPT | Performed by: STUDENT IN AN ORGANIZED HEALTH CARE EDUCATION/TRAINING PROGRAM

## 2024-10-25 PROCEDURE — 43247 EGD REMOVE FOREIGN BODY: CPT | Performed by: INTERNAL MEDICINE

## 2024-10-25 PROCEDURE — 25000003 PHARM REV CODE 250: Performed by: STUDENT IN AN ORGANIZED HEALTH CARE EDUCATION/TRAINING PROGRAM

## 2024-10-25 PROCEDURE — 27201042 HC RETRIEVAL NET: Performed by: INTERNAL MEDICINE

## 2024-10-25 PROCEDURE — 83735 ASSAY OF MAGNESIUM: CPT | Performed by: STUDENT IN AN ORGANIZED HEALTH CARE EDUCATION/TRAINING PROGRAM

## 2024-10-25 PROCEDURE — 37000009 HC ANESTHESIA EA ADD 15 MINS: Performed by: INTERNAL MEDICINE

## 2024-10-25 PROCEDURE — 80048 BASIC METABOLIC PNL TOTAL CA: CPT | Performed by: STUDENT IN AN ORGANIZED HEALTH CARE EDUCATION/TRAINING PROGRAM

## 2024-10-25 PROCEDURE — 37000008 HC ANESTHESIA 1ST 15 MINUTES: Performed by: INTERNAL MEDICINE

## 2024-10-25 PROCEDURE — 36415 COLL VENOUS BLD VENIPUNCTURE: CPT | Performed by: STUDENT IN AN ORGANIZED HEALTH CARE EDUCATION/TRAINING PROGRAM

## 2024-10-25 PROCEDURE — 81003 URINALYSIS AUTO W/O SCOPE: CPT | Performed by: STUDENT IN AN ORGANIZED HEALTH CARE EDUCATION/TRAINING PROGRAM

## 2024-10-25 PROCEDURE — 63600175 PHARM REV CODE 636 W HCPCS: Performed by: NURSE ANESTHETIST, CERTIFIED REGISTERED

## 2024-10-25 RX ORDER — SUCCINYLCHOLINE CHLORIDE 20 MG/ML
INJECTION INTRAMUSCULAR; INTRAVENOUS
Status: DISCONTINUED | OUTPATIENT
Start: 2024-10-25 | End: 2024-10-25

## 2024-10-25 RX ORDER — OXYBUTYNIN CHLORIDE 10 MG/1
10 TABLET, EXTENDED RELEASE ORAL NIGHTLY
Status: DISCONTINUED | OUTPATIENT
Start: 2024-10-25 | End: 2024-10-25 | Stop reason: HOSPADM

## 2024-10-25 RX ORDER — GLUCAGON 1 MG
1 KIT INJECTION
Status: DISCONTINUED | OUTPATIENT
Start: 2024-10-25 | End: 2024-10-25 | Stop reason: HOSPADM

## 2024-10-25 RX ORDER — FENTANYL CITRATE 50 UG/ML
25 INJECTION, SOLUTION INTRAMUSCULAR; INTRAVENOUS EVERY 5 MIN PRN
Status: DISCONTINUED | OUTPATIENT
Start: 2024-10-25 | End: 2024-10-25 | Stop reason: HOSPADM

## 2024-10-25 RX ORDER — ATORVASTATIN CALCIUM 40 MG/1
40 TABLET, FILM COATED ORAL DAILY
Status: DISCONTINUED | OUTPATIENT
Start: 2024-10-25 | End: 2024-10-25 | Stop reason: HOSPADM

## 2024-10-25 RX ORDER — ONDANSETRON 4 MG/1
4 TABLET, ORALLY DISINTEGRATING ORAL EVERY 6 HOURS PRN
Qty: 28 TABLET | Refills: 0 | Status: ON HOLD | OUTPATIENT
Start: 2024-10-25 | End: 2024-11-02

## 2024-10-25 RX ORDER — HALOPERIDOL 5 MG/ML
0.5 INJECTION INTRAMUSCULAR EVERY 10 MIN PRN
Status: DISCONTINUED | OUTPATIENT
Start: 2024-10-25 | End: 2024-10-25 | Stop reason: HOSPADM

## 2024-10-25 RX ORDER — ONDANSETRON HYDROCHLORIDE 2 MG/ML
4 INJECTION, SOLUTION INTRAVENOUS DAILY PRN
Status: DISCONTINUED | OUTPATIENT
Start: 2024-10-25 | End: 2024-10-25 | Stop reason: HOSPADM

## 2024-10-25 RX ORDER — ONDANSETRON HYDROCHLORIDE 2 MG/ML
INJECTION, SOLUTION INTRAVENOUS
Status: DISCONTINUED | OUTPATIENT
Start: 2024-10-25 | End: 2024-10-25

## 2024-10-25 RX ORDER — LIDOCAINE HYDROCHLORIDE 20 MG/ML
INJECTION INTRAVENOUS
Status: DISCONTINUED | OUTPATIENT
Start: 2024-10-25 | End: 2024-10-25

## 2024-10-25 RX ORDER — PROPOFOL 10 MG/ML
VIAL (ML) INTRAVENOUS
Status: DISCONTINUED | OUTPATIENT
Start: 2024-10-25 | End: 2024-10-25

## 2024-10-25 RX ORDER — SODIUM CHLORIDE 0.9 % (FLUSH) 0.9 %
10 SYRINGE (ML) INJECTION
Status: DISCONTINUED | OUTPATIENT
Start: 2024-10-25 | End: 2024-10-25 | Stop reason: HOSPADM

## 2024-10-25 RX ADMIN — SUCCINYLCHOLINE CHLORIDE 100 MG: 20 INJECTION, SOLUTION INTRAMUSCULAR; INTRAVENOUS at 08:10

## 2024-10-25 RX ADMIN — PROPOFOL 100 MG: 10 INJECTION, EMULSION INTRAVENOUS at 08:10

## 2024-10-25 RX ADMIN — ATORVASTATIN CALCIUM 40 MG: 40 TABLET, FILM COATED ORAL at 01:10

## 2024-10-25 RX ADMIN — LIDOCAINE HYDROCHLORIDE 50 MG: 20 INJECTION INTRAVENOUS at 09:10

## 2024-10-25 RX ADMIN — PROPOFOL 150 MCG/KG/MIN: 10 INJECTION, EMULSION INTRAVENOUS at 08:10

## 2024-10-25 RX ADMIN — PANTOPRAZOLE SODIUM 40 MG: 40 INJECTION, POWDER, LYOPHILIZED, FOR SOLUTION INTRAVENOUS at 10:10

## 2024-10-25 RX ADMIN — ONDANSETRON 4 MG: 2 INJECTION INTRAMUSCULAR; INTRAVENOUS at 09:10

## 2024-10-25 RX ADMIN — PROPOFOL 20 MG: 10 INJECTION, EMULSION INTRAVENOUS at 09:10

## 2024-10-25 RX ADMIN — SODIUM CHLORIDE, POTASSIUM CHLORIDE, SODIUM LACTATE AND CALCIUM CHLORIDE: 600; 310; 30; 20 INJECTION, SOLUTION INTRAVENOUS at 06:10

## 2024-10-25 RX ADMIN — LIDOCAINE HYDROCHLORIDE 50 MG: 20 INJECTION INTRAVENOUS at 08:10

## 2024-10-25 NOTE — ANESTHESIA POSTPROCEDURE EVALUATION
Anesthesia Post Evaluation    Patient: Megha Mendoza    Procedure(s) Performed: Procedure(s) (LRB):  EGD (ESOPHAGOGASTRODUODENOSCOPY) (N/A)    Final Anesthesia Type: general      Patient location during evaluation: PACU  Patient participation: Yes- Able to Participate  Level of consciousness: awake and alert and oriented  Post-procedure vital signs: reviewed and stable  Pain management: adequate  Airway patency: patent  DARA mitigation strategies: Intraoperative administration of CPAP, nasopharyngeal airway, or oral appliance during sedation, Multimodal analgesia, Extubation while patient is awake, Verification of full reversal of neuromuscular block and Extubation and recovery carried out in lateral, semiupright, or other nonsupine position  PONV status at discharge: No PONV  Anesthetic complications: no      Cardiovascular status: hemodynamically stable  Respiratory status: unassisted  Hydration status: euvolemic  Follow-up not needed.              Vitals Value Taken Time   /61 10/25/24 1515   Temp 36.6 °C (97.9 °F) 10/25/24 1515   Pulse 68 10/25/24 1515   Resp 18 10/25/24 1515   SpO2 96 % 10/25/24 1515         Event Time   Out of Recovery 10/24/2024 18:45:00         Pain/Lou Score: Lou Score: 9 (10/25/2024 10:00 AM)

## 2024-10-25 NOTE — TRANSFER OF CARE
"Anesthesia Transfer of Care Note    Patient: Megha Mendoza    Procedure(s) Performed: Procedure(s) (LRB):  EGD (ESOPHAGOGASTRODUODENOSCOPY) (N/A)    Patient location: PACU    Anesthesia Type: general    Transport from OR: Transported from OR on room air with adequate spontaneous ventilation    Post pain: adequate analgesia    Post assessment: no apparent anesthetic complications and tolerated procedure well    Post vital signs: stable    Level of consciousness: awake, alert and oriented    Nausea/Vomiting: no nausea/vomiting    Complications: none    Transfer of care protocol was followed      Last vitals: Visit Vitals  /60   Pulse 68   Temp 36.3 °C (97.4 °F) (Temporal)   Resp 19   Ht 4' 10" (1.473 m)   Wt 61.7 kg (136 lb)   SpO2 98%   Breastfeeding No   BMI 28.42 kg/m²     "

## 2024-10-25 NOTE — ANESTHESIA PREPROCEDURE EVALUATION
[facility]  Anesthesia Pre-Operative Evaluation         Patient Name: Megha Mendoza  YOB: 1945  MRN: [id number]    SUBJECTIVE:     Pre-operative evaluation for Procedure(s) (LRB):  EGD (ESOPHAGOGASTRODUODENOSCOPY) (N/A)       10/25/2024    Megha Mendoza is a 79 y.o. female w/ a significant PMHx of Arthritis, Back pain, Carotid artery disease, COPD , Coronary artery disease, Diverticulitis, DJD , GERD, Hiatal hernia, Hyperlipidemia, Irritable bowel syndrome with constipation, Mixed stress and urge urinary incontinence (3/30/2021), DARA (obstructive sleep apnea), and Osteoporosis and hx of Hiatal Hernia repair with toupet in 3/2019.     Pt presented for Pylorotomy but found to have food bolus intra-op, case cancelled and pt now presents for above procedure    Patient now presents for the above procedure(s).      LDA:        Peripheral IV - Single Lumen 10/23/24 1726 20 G Left Antecubital (Active)   Site Assessment Clean;Dry;Intact 10/23/24 1726   Extremity Assessment Distal to IV No abnormal discoloration;No redness;No swelling 10/23/24 1726   Dressing Status Clean;Dry;Intact 10/23/24 1726   Dressing Intervention Integrity maintained 10/23/24 1726   Number of days: 0       Prev airway: None documented.    Drips:    0.9% NaCl   Intravenous Continuous   Held at 10/24/24 1345    lactated ringers   Intravenous Continuous 100 mL/hr at 10/25/24 0610 New Bag at 10/25/24 0610       Patient Active Problem List   Diagnosis    Chronic pain of left knee    Osteopenia    Former cigarette smoker    Dyslipidemia    Gastroesophageal reflux disease    Primary osteoarthritis of left knee    Coronary arteriosclerosis in native artery    Stenosis of carotid artery    Chronic insomnia    Atherosclerosis of aorta    Mixed stress and urge incontinence    Chronic constipation    Vaginal atrophy    Emphysema/COPD    Obstructive chronic bronchitis without exacerbation    Pharyngoesophageal dysphagia    Lung  nodule    Acute pain of right shoulder    Pain in right acromioclavicular joint    Chronic pain of both feet    Senile purpura    Abnormality of gait and mobility    Aortic valve regurgitation    Generalized osteoarthritis    Obstructive sleep apnea syndrome    Shortness of breath    Irritable bowel syndrome without diarrhea    Hernia, hiatal    History of repair of hiatal hernia       Review of patient's allergies indicates:   Allergen Reactions    Cephalexin Rash and Other (See Comments)     Solid patches - rash like skin thickened    Hibiclens [chlorhexidine gluconate] Itching    Sulfa (sulfonamide antibiotics) Rash       Current Inpatient Medications:   aspirin  81 mg Oral Daily    enoxparin  40 mg Subcutaneous Daily    gabapentin  100 mg Oral QHS    pantoprazole  40 mg Intravenous Daily    scopolamine  1 patch Transdermal Q3 Days       No current facility-administered medications on file prior to encounter.     Current Outpatient Medications on File Prior to Encounter   Medication Sig Dispense Refill    acetaminophen (TYLENOL) 500 MG tablet Take 1,000 mg by mouth every 6 (six) hours as needed for Pain.      apple cider vinegar 500 mg Tab Take 1 tablet by mouth once daily.      aspirin 81 MG Chew Take 81 mg by mouth once daily.      BACILLUS COAGULANS (DIGESTIVE ADVANTAGE ORAL) Take 1 tablet by mouth once daily.      co-enzyme Q-10 30 mg capsule Take 100 mg by mouth once daily.      denosumab (PROLIA) 60 mg/mL Syrg Inject 1 mL (60 mg total) into the skin every 6 (six) months. 1 each 1    esomeprazole (NEXIUM) 40 MG capsule Take 1 capsule (40 mg total) by mouth 2 (two) times daily. (Patient not taking: Reported on 10/22/2024) 60 capsule 11    furosemide (LASIX) 20 MG tablet Take 0.5 tablets (10 mg total) by mouth daily as needed (swelling). (Patient taking differently: Take 20 mg by mouth daily as needed (swelling).) 15 tablet 5    gabapentin (NEURONTIN) 100 MG capsule Take 100 mg by mouth every evening.       krill-omega-3-dha-epa-lipids 409-17-26-50 mg Cap Take 1 capsule by mouth nightly.       metoclopramide HCl (REGLAN) 5 MG tablet Take 1 tablet (5 mg total) by mouth 4 (four) times daily as needed (nausea, bloating, slow stomach emptying symptoms). 120 tablet 3    multivitamin (THERAGRAN) tablet Take 1 tablet by mouth once daily.      nitroGLYCERIN (NITROSTAT) 0.4 MG SL tablet Place 0.4 mg under the tongue every 5 (five) minutes as needed for Chest pain.      oxybutynin (DITROPAN-XL) 10 MG 24 hr tablet Take 1 tablet (10 mg total) by mouth once daily. (Patient taking differently: Take 10 mg by mouth nightly.) 30 tablet 11    rosuvastatin (CRESTOR) 40 MG Tab Take 40 mg by mouth every evening.      SYMBICORT 80-4.5 mcg/actuation HFAA INHALE 2 PUFFS BY MOUTH TWICE DAILY .  CONTROLLER 30.6 g 1       Past Surgical History:   Procedure Laterality Date    CARDIAC CATHETERIZATION  05/01/2015    CARPAL TUNNEL RELEASE Bilateral 11/20/2019    COLONOSCOPY N/A 10/11/2022    Procedure: COLONOSCOPY;  Surgeon: Tomy Baron MD;  Location: [facility];  Service: Endoscopy;  Laterality: N/A;    ESOPHAGEAL MANOMETRY WITH MEASUREMENT OF IMPEDANCE N/A 06/30/2023    Procedure: MANOMETRY, ESOPHAGUS, WITH IMPEDANCE MEASUREMENT;  Surgeon: Joseluis Santana MD;  Location: [address]);  Service: Endoscopy;  Laterality: N/A; Referred by Dr. Dunlap    ESOPHAGOGASTRODUODENOSCOPY N/A 05/08/2023    Procedure: EGD (ESOPHAGOGASTRODUODENOSCOPY);  Surgeon: Khushboo Dunlap MD;  Location: [facility];  Service: Endoscopy;  Laterality: N/A;    ESOPHAGOGASTRODUODENOSCOPY N/A 04/05/2024    Procedure: EGD (ESOPHAGOGASTRODUODENOSCOPY);  Surgeon: Carmela Sequeira MD;  [facility];  Service: Endoscopy;  Laterality: N/A;    ESOPHAGOGASTRODUODENOSCOPY N/A 05/28/2024    Procedure: EGD (ESOPHAGOGASTRODUODENOSCOPY);  Surgeon: Kasia Hale MD;  Location: [facility] (2ND FLR);  Service: Endoscopy;  Laterality: N/A; endoflip/4 liters of  oxygen at night    EYE SURGERY      HAND TENODESIS Bilateral 10/04/2019    HEMORRHOID SURGERY  2009    LAPAROSCOPIC FUNDOPLICATION  2022    TOTAL ABDOMINAL HYSTERECTOMY  1995    TOTAL KNEE ARTHROPLASTY Left 2017    TUBAL LIGATION Bilateral        Social History     Socioeconomic History    Marital status:     Number of children: 4   Tobacco Use    Smoking status: Former     Current packs/day: 0.00     Average packs/day: 0.8 packs/day for 46.0 years (34.5 ttl pk-yrs)     Types: Cigarettes     Start date: 10/14/1959     Quit date: 10/14/2005     Years since quittin.0     Passive exposure: Past    Smokeless tobacco: Never    Tobacco comments:     Stopped and started several times   Substance and Sexual Activity    Alcohol use: No    Drug use: Never    Sexual activity: Not Currently     Birth control/protection: Surgical     Comment:      Social Drivers of Health     Financial Resource Strain: Low Risk  (10/24/2024)    Overall Financial Resource Strain (CARDIA)     Difficulty of Paying Living Expenses: Not hard at all   Food Insecurity: No Food Insecurity (10/24/2024)    Hunger Vital Sign     Worried About Running Out of Food in the Last Year: Never true     Ran Out of Food in the Last Year: Never true   Transportation Needs: No Transportation Needs (10/24/2024)    TRANSPORTATION NEEDS     Transportation : No   Physical Activity: Inactive (10/24/2024)    Exercise Vital Sign     Days of Exercise per Week: 0 days     Minutes of Exercise per Session: 0 min   Stress: No Stress Concern Present (10/24/2024)    [employer] - Occupational Stress Questionnaire     Feeling of Stress : Not at all   Housing Stability: Low Risk  (10/24/2024)    Housing Stability Vital Sign     Unable to Pay for Housing in the Last Year: No     Homeless in the Last Year: No       OBJECTIVE:     Vital Signs Range (Last 24H):  Temp:  [36.3 °C (97.3 °F)-37.2 °C (99 °F)]   Pulse:  [54-82]    Resp:  [16-27]   BP: (120-155)/(55-85)   SpO2:  [93 %-99 %]       Significant Labs:  Lab Results   Component Value Date    WBC 4.92 10/25/2024    HGB 12.9 10/25/2024    HCT 39.0 10/25/2024     10/25/2024    CHOL 146 03/12/2024    TRIG 106 03/12/2024    HDL 66 03/12/2024    LDLDIRECT 60 03/12/2024    ALT 13 10/23/2024    AST 24 10/23/2024     10/25/2024    K 4.5 10/25/2024     10/25/2024    CREATININE 0.7 10/25/2024    BUN 18 10/25/2024    CO2 16 (L) 10/25/2024    TSH 0.934 08/22/2024    HGBA1C 5.5 08/22/2024       Diagnostic Studies: No relevant studies.    EKG:   Results for orders placed or performed during the hospital encounter of 10/23/24   EKG 12-lead    Collection Time: 10/23/24  5:01 PM   Result Value Ref Range    QRS Duration 76 ms    OHS QTC Calculation 451 ms    Narrative    Test Reason : K56.699,W44.F3XA,    Vent. Rate : 072 BPM     Atrial Rate : 072 BPM     P-R Int : 154 ms          QRS Dur : 076 ms      QT Int : 412 ms       P-R-T Axes : 075 -66 047 degrees     QTc Int : 451 ms    Normal sinus rhythm  Left axis deviation  Pulmonary disease pattern  Septal infarct ,age undetermined  Abnormal ECG  When compared with ECG of 05-APR-2024 11:52,  No significant change was found  Confirmed by VANIA DODSON MD ([id number]) on 10/24/2024 8:28:41 AM    Referred By: [email or web address]   SELF           Confirmed By:VANIA DODSON MD       2D ECHO:  TTE:  No results found for this or any previous visit.    JC:  No results found for this or any previous visit.    ASSESSMENT/PLAN:                                                                                                          10/25/2024  Megha Mendoza is a 79 y.o., female.      Pre-op Assessment    I have reviewed the Patient Summary Reports.     I have reviewed the Nursing Notes. I have reviewed the NPO Status.   I have reviewed the Medications.     Review of Systems  Anesthesia Hx:   History of prior surgery of interest to airway management or  planning:          Denies Family Hx of Anesthesia complications.    Denies Personal Hx of Anesthesia complications.                    Cardiovascular:      Valvular problems/Murmurs, AI  CAD                                          Pulmonary:   COPD     Sleep Apnea                Hepatic/GI:    Hiatal Hernia, GERD                Musculoskeletal:  Arthritis                   Physical Exam  General: Well nourished, Cooperative, Alert and Oriented    Airway:  Mallampati: III / II  Mouth Opening: Normal  TM Distance: Normal  Tongue: Normal  Neck ROM: Normal ROM    Dental:  Dentures, Edentulous    Chest/Lungs:  Clear to auscultation        Anesthesia Plan  Type of Anesthesia, risks & benefits discussed:    Anesthesia Type: Gen ETT  Intra-op Monitoring Plan: Standard ASA Monitors  Post Op Pain Control Plan: multimodal analgesia  Induction:  IV and rapid sequence  Airway Plan: Video, Post-Induction  Informed Consent: Informed consent signed with the Patient and all parties understand the risks and agree with anesthesia plan.  All questions answered.   ASA Score: 3  Day of Surgery Review of History & Physical: H&P Update referred to the surgeon/provider.    Ready For Surgery From Anesthesia Perspective.     .

## 2024-10-25 NOTE — ANESTHESIA PROCEDURE NOTES
Intubation    Date/Time: 10/25/2024 8:57 AM    Performed by: Tracy Luque CRNA  Authorized by: Adan Yee MD    Intubation:     Induction:  Rapid sequence induction    Intubated:  Postinduction    Mask Ventilation:  Not attempted    Attempts:  1    Attempted By:  CRNA    Method of Intubation:  Video laryngoscopy    Blade:  Bhardwaj 3    Laryngeal View Grade: Grade I - full view of cords      Difficult Airway Encountered?: No      Airway Device Size:  7.0    Style/Cuff Inflation:  Cuffed (inflated to minimal occlusive pressure)    Complicating Factors:  None    Findings Post-Intubation:  BS equal bilateral and atraumatic/condition of teeth unchanged

## 2024-10-25 NOTE — H&P (VIEW-ONLY)
[facility]  General Surgery  Progress Note    Subjective:     History of Present Illness:  Ms. Mendoza is a 79 y.o F with pmhx of Arthritis, Back pain, Bronchitis, chronic, Carotid artery disease, Colitis, acute, COPD , Coronary artery disease, Diverticulitis, DJD , GERD, Hemorrhoids, Hiatal hernia, Hyperlipidemia, Irritable bowel syndrome with constipation, Mixed stress and urge urinary incontinence (3/30/2021), DARA (obstructive sleep apnea), Osteoporosis, Respiratory distress, and Trouble in sleeping. She is s/p Hiatal Hernia repair with toupet in 3/2019. Post operatively she has had symptoms of early satiety, post prandial pain, food getting stuck in her esophagus. She was worked up and after being presented at a esophageal dysmotility conference was thought to have gastroparesis as the etiology behind her symptoms. She was booked for pyloromyotomy that was scheduled to take place tomorrow (10/24/2024). During a pre-operative telephone call patient reported approximately 5 days of anorexia, nausea and occasional self -induced emesis, abdominal pain/cramping, and overall feelings of weakness and malaise. She was told to report to [facility] ED for evaluation for possible acute viral or bacterial infection vs. Dehydration / weakness as a sequelae of her gastroparesis. In ED patient appears uncomfortable but non-toxic, endorses passing bowel movement earlier today, and states she is still producing urine.     Post-Op Info:  Procedure(s) (LRB):  EGD (ESOPHAGOGASTRODUODENOSCOPY) (N/A)   1 Day Post-Op     Interval History: Aborted pyloromyotomy on 10/24 due to large food bolus stuck in esophagus. BUDDY TRAN/ [facility]. Pain is currently well controlled with some abdominal pain. Will go with GI today for EGD to remove any remaining bolus prior to repeat attempt at surgery.    Medications:  Continuous Infusions:   0.9% NaCl   Intravenous Continuous   Held at 10/24/24 1345    lactated ringers   Intravenous Continuous 100 mL/hr  at 10/25/24 0610 New Bag at 10/25/24 0610     Scheduled Meds:   aspirin  81 mg Oral Daily    enoxparin  40 mg Subcutaneous Daily    gabapentin  100 mg Oral QHS    pantoprazole  40 mg Intravenous Daily    scopolamine  1 patch Transdermal Q3 Days     PRN Meds:  Current Facility-Administered Medications:     acetaminophen, 650 mg, Oral, Q8H PRN    dextrose 10%, 12.5 g, Intravenous, PRN    dextrose 10%, 25 g, Intravenous, PRN    LIDOcaine (PF) 10 mg/ml (1%), 1 mL, Intradermal, Once PRN    melatonin, 6 mg, Oral, Nightly PRN    ondansetron, 4 mg, Oral, Q6H PRN    prochlorperazine, 5 mg, Intravenous, Q6H PRN    sodium chloride 0.9%, 10 mL, Intravenous, PRN     Review of patient's allergies indicates:   Allergen Reactions    Cephalexin Rash and Other (See Comments)     Solid patches - rash like skin thickened    Hibiclens [chlorhexidine gluconate] Itching    Sulfa (sulfonamide antibiotics) Rash     Objective:     Vital Signs (Most Recent):  Temp: 97.7 °F (36.5 °C) (10/25/24 0450)  Pulse: (!) 54 (10/25/24 0450)  Resp: 18 (10/25/24 0450)  BP: (!) 125/56 (10/25/24 0450)  SpO2: 96 % (10/25/24 0450) Vital Signs (24h Range):  Temp:  [97.5 °F (36.4 °C)-99 °F (37.2 °C)] 97.7 °F (36.5 °C)  Pulse:  [54-82] 54  Resp:  [17-27] 18  SpO2:  [93 %-99 %] 96 %  BP: (120-155)/(55-85) 125/56     Weight: 61.7 kg (136 lb 0.4 oz)  Body mass index is 28.43 kg/m².    Intake/Output - Last 3 Shifts         10/23 0700  10/24 0659 10/24 0700  10/25 0659 10/25 0700  10/26 0659    P.O. 0 0     IV Piggyback  250     Total Intake(mL/kg) 0 (0) 250 (4.1)     Net 0 +250            Urine Occurrence 2 x 2 x     Stool Occurrence 0 x 0 x     Emesis Occurrence 0 x 0 x              Physical Exam  HENT:      Head: Normocephalic and atraumatic.   Eyes:      Extraocular Movements: Extraocular movements intact.   Cardiovascular:      Rate and Rhythm: Normal rate.   Pulmonary:      Effort: Pulmonary effort is normal. No respiratory distress.   Abdominal:       Tenderness: There is no guarding or rebound.      Comments: Mild tenderness to palpation in upper abdomen. Not peritonitic    Skin:     General: Skin is warm and dry.   Neurological:      General: No focal deficit present.      Mental Status: She is alert.          Significant Labs:  I have reviewed all pertinent lab results within the past 24 hours.  CBC:   Recent Labs   Lab 10/25/24  0357   WBC 4.92   RBC 4.21   HGB 12.9   HCT 39.0      MCV 93   MCH 30.6   MCHC 33.1     CMP:   Recent Labs   Lab 10/23/24  1713 10/25/24  0357   GLU 63* 88   CALCIUM 9.4 8.4*   ALBUMIN 4.0  --    PROT 7.5  --     136   K 4.4 4.5   CO2 20* 16*    107   BUN 26* 18   CREATININE 0.8 0.7   ALKPHOS 36*  --    ALT 13  --    AST 24  --    BILITOT 0.5  --        Significant Diagnostics:  I have reviewed all pertinent imaging results/findings within the past 24 hours.  Assessment/Plan:     History of repair of hiatal hernia  Ms. Mendoza is a 79 y.o F with history of hiatal hernia repair 3/2019 that has since developed early satiety, post prandial pain, and likely gastroparesis. She was planned to undergo pyloromyotomy on 10/24/24 but has developed malaise, nausea, abdominal pain, and anorexia over the last week and presented to the ED for evaluation. C/f viral or bacterial etiology vs dehydration and weakness from sequelae of gastroparesis.    - Aborted pyloromyotomy 10/24. Will go with GI today for EGD to remove remaining food bolus and then will discuss reattempting surgery.  - NPO  - mIVF  - PRN anti-emetics   - PRN pain medications.   -DVT ppx         Renee Rose MD  General Surgery  [facility]

## 2024-10-25 NOTE — ANESTHESIA POSTPROCEDURE EVALUATION
Anesthesia Post Evaluation    Patient: Megha Mendoza    Procedure(s) Performed: Procedure(s) (LRB):  EGD (ESOPHAGOGASTRODUODENOSCOPY) (N/A)    Final Anesthesia Type: general      Patient location during evaluation: PACU  Patient participation: Yes- Able to Participate  Level of consciousness: awake and alert  Post-procedure vital signs: reviewed and stable  Pain management: adequate  Airway patency: patent  DARA mitigation strategies: Multimodal analgesia, Preoperative use of mandibular advancement devices or oral appliances and Intraoperative administration of CPAP, nasopharyngeal airway, or oral appliance during sedation  PONV status at discharge: No PONV  Anesthetic complications: no      Cardiovascular status: blood pressure returned to baseline, hemodynamically stable and stable  Respiratory status: unassisted and spontaneous ventilation  Hydration status: euvolemic  Follow-up not needed.              Vitals Value Taken Time   /68 10/25/24 1018   Temp 36.9 °C (98.5 °F) 10/25/24 1018   Pulse 53 10/25/24 1018   Resp 18 10/25/24 1018   SpO2 96 % 10/25/24 1018         Event Time   Out of Recovery 10/25/2024 10:08:00         Pain/Lou Score: Lou Score: 9 (10/25/2024 10:00 AM)

## 2024-10-28 ENCOUNTER — PATIENT OUTREACH (OUTPATIENT)
Dept: ADMINISTRATIVE | Facility: CLINIC | Age: 79
End: 2024-10-28
Payer: MEDICARE

## 2024-10-28 ENCOUNTER — PATIENT MESSAGE (OUTPATIENT)
Dept: ADMINISTRATIVE | Facility: CLINIC | Age: 79
End: 2024-10-28
Payer: MEDICARE

## 2024-10-30 RX ORDER — OMEPRAZOLE 40 MG/1
40 CAPSULE, DELAYED RELEASE ORAL
COMMUNITY

## 2024-10-30 NOTE — PRE-PROCEDURE INSTRUCTIONS
PreOp Instructions given:   - Verbal medication information (what to hold and what to take)   - NPO guidelines CLEAR LIQUIDS UP TO 1 hour prior to arrival time to DOSC  - Arrival place directions given; time to be given the day before procedure by the   Surgeon's Office DOSC  - Bathing with antibacterial soap   - Don't wear any jewelry or bring any valuables AM of surgery   - No makeup or moisturizer to face   - No perfume/cologne, powder, lotions or aftershave   Pt. verbalized understanding.   Pt denies any h/o Anesthesia/Sedation complications or side effects.  Patient does not know arrival time.  Explained that this information comes from the surgeon's office and if they haven't heard from them by 2 or 3 pm to call the office.  Patient stated an understanding.

## 2024-10-31 ENCOUNTER — ANESTHESIA EVENT (OUTPATIENT)
Dept: SURGERY | Facility: HOSPITAL | Age: 79
End: 2024-10-31
Payer: MEDICARE

## 2024-10-31 ENCOUNTER — TELEPHONE (OUTPATIENT)
Dept: SURGERY | Facility: CLINIC | Age: 79
End: 2024-10-31
Payer: MEDICARE

## 2024-11-01 ENCOUNTER — HOSPITAL ENCOUNTER (OUTPATIENT)
Facility: HOSPITAL | Age: 79
Discharge: HOME | End: 2024-11-02
Attending: SURGERY | Admitting: SURGERY
Payer: MEDICARE

## 2024-11-01 ENCOUNTER — ANESTHESIA (OUTPATIENT)
Dept: SURGERY | Facility: HOSPITAL | Age: 79
End: 2024-11-01
Payer: MEDICARE

## 2024-11-01 DIAGNOSIS — K31.1 PYLORIC STENOSIS: ICD-10-CM

## 2024-11-01 DIAGNOSIS — K21.9 GASTROESOPHAGEAL REFLUX DISEASE, UNSPECIFIED WHETHER ESOPHAGITIS PRESENT: ICD-10-CM

## 2024-11-01 DIAGNOSIS — K31.84 GASTROPARESIS: Primary | ICD-10-CM

## 2024-11-01 PROCEDURE — 25000003 PHARM REV CODE 250

## 2024-11-01 PROCEDURE — 94761 N-INVAS EAR/PLS OXIMETRY MLT: CPT

## 2024-11-01 PROCEDURE — 25000003 PHARM REV CODE 250: Performed by: STUDENT IN AN ORGANIZED HEALTH CARE EDUCATION/TRAINING PROGRAM

## 2024-11-01 PROCEDURE — 71000016 HC POSTOP RECOV ADDL HR: Performed by: SURGERY

## 2024-11-01 PROCEDURE — 63600175 PHARM REV CODE 636 W HCPCS: Performed by: STUDENT IN AN ORGANIZED HEALTH CARE EDUCATION/TRAINING PROGRAM

## 2024-11-01 PROCEDURE — 63600175 PHARM REV CODE 636 W HCPCS: Performed by: SURGERY

## 2024-11-01 PROCEDURE — 43659 UNLISTED LAPS PX STOMACH: CPT | Mod: ,,, | Performed by: SURGERY

## 2024-11-01 PROCEDURE — 25000003 PHARM REV CODE 250: Performed by: SURGERY

## 2024-11-01 PROCEDURE — 37000008 HC ANESTHESIA 1ST 15 MINUTES: Performed by: SURGERY

## 2024-11-01 PROCEDURE — 71000033 HC RECOVERY, INTIAL HOUR: Performed by: SURGERY

## 2024-11-01 PROCEDURE — 71000015 HC POSTOP RECOV 1ST HR: Performed by: SURGERY

## 2024-11-01 PROCEDURE — 63600175 PHARM REV CODE 636 W HCPCS

## 2024-11-01 PROCEDURE — 37000009 HC ANESTHESIA EA ADD 15 MINS: Performed by: SURGERY

## 2024-11-01 PROCEDURE — 36000711: Performed by: SURGERY

## 2024-11-01 PROCEDURE — 99900035 HC TECH TIME PER 15 MIN (STAT)

## 2024-11-01 PROCEDURE — 36000710: Performed by: SURGERY

## 2024-11-01 PROCEDURE — 27000221 HC OXYGEN, UP TO 24 HOURS

## 2024-11-01 RX ORDER — BUPIVACAINE HYDROCHLORIDE 2.5 MG/ML
INJECTION, SOLUTION EPIDURAL; INFILTRATION; INTRACAUDAL
Status: DISCONTINUED | OUTPATIENT
Start: 2024-11-01 | End: 2024-11-01 | Stop reason: HOSPADM

## 2024-11-01 RX ORDER — HALOPERIDOL 5 MG/ML
0.5 INJECTION INTRAMUSCULAR EVERY 10 MIN PRN
Status: DISCONTINUED | OUTPATIENT
Start: 2024-11-01 | End: 2024-11-01 | Stop reason: HOSPADM

## 2024-11-01 RX ORDER — HYDROMORPHONE HYDROCHLORIDE 1 MG/ML
0.2 INJECTION, SOLUTION INTRAMUSCULAR; INTRAVENOUS; SUBCUTANEOUS EVERY 5 MIN PRN
Status: DISCONTINUED | OUTPATIENT
Start: 2024-11-01 | End: 2024-11-01

## 2024-11-01 RX ORDER — MUPIROCIN 20 MG/G
OINTMENT TOPICAL 2 TIMES DAILY
Status: DISCONTINUED | OUTPATIENT
Start: 2024-11-01 | End: 2024-11-02 | Stop reason: HOSPADM

## 2024-11-01 RX ORDER — SODIUM CHLORIDE 0.9 % (FLUSH) 0.9 %
10 SYRINGE (ML) INJECTION
Status: DISCONTINUED | OUTPATIENT
Start: 2024-11-01 | End: 2024-11-01

## 2024-11-01 RX ORDER — ONDANSETRON HYDROCHLORIDE 2 MG/ML
INJECTION, SOLUTION INTRAVENOUS
Status: DISCONTINUED | OUTPATIENT
Start: 2024-11-01 | End: 2024-11-01

## 2024-11-01 RX ORDER — SODIUM CHLORIDE 0.9 % (FLUSH) 0.9 %
10 SYRINGE (ML) INJECTION
Status: DISCONTINUED | OUTPATIENT
Start: 2024-11-01 | End: 2024-11-02 | Stop reason: HOSPADM

## 2024-11-01 RX ORDER — ROCURONIUM BROMIDE 10 MG/ML
INJECTION, SOLUTION INTRAVENOUS
Status: DISCONTINUED | OUTPATIENT
Start: 2024-11-01 | End: 2024-11-01

## 2024-11-01 RX ORDER — ACETAMINOPHEN 10 MG/ML
1000 INJECTION, SOLUTION INTRAVENOUS EVERY 8 HOURS
Status: COMPLETED | OUTPATIENT
Start: 2024-11-01 | End: 2024-11-02

## 2024-11-01 RX ORDER — PROCHLORPERAZINE EDISYLATE 5 MG/ML
5 INJECTION INTRAMUSCULAR; INTRAVENOUS EVERY 30 MIN PRN
Status: DISCONTINUED | OUTPATIENT
Start: 2024-11-01 | End: 2024-11-01

## 2024-11-01 RX ORDER — ACETAMINOPHEN 500 MG
1000 TABLET ORAL
Status: COMPLETED | OUTPATIENT
Start: 2024-11-01 | End: 2024-11-01

## 2024-11-01 RX ORDER — EPHEDRINE SULFATE 50 MG/ML
INJECTION, SOLUTION INTRAVENOUS
Status: DISCONTINUED | OUTPATIENT
Start: 2024-11-01 | End: 2024-11-01

## 2024-11-01 RX ORDER — ACETAMINOPHEN 500 MG
1000 TABLET ORAL EVERY 8 HOURS
Status: DISCONTINUED | OUTPATIENT
Start: 2024-11-02 | End: 2024-11-01

## 2024-11-01 RX ORDER — GABAPENTIN 250 MG/5ML
250 SOLUTION ORAL EVERY 8 HOURS
Status: DISCONTINUED | OUTPATIENT
Start: 2024-11-02 | End: 2024-11-02

## 2024-11-01 RX ORDER — FENTANYL CITRATE 50 UG/ML
INJECTION, SOLUTION INTRAMUSCULAR; INTRAVENOUS
Status: DISCONTINUED | OUTPATIENT
Start: 2024-11-01 | End: 2024-11-01

## 2024-11-01 RX ORDER — NAPROXEN SODIUM 220 MG/1
81 TABLET, FILM COATED ORAL DAILY
Status: DISCONTINUED | OUTPATIENT
Start: 2024-11-01 | End: 2024-11-02 | Stop reason: HOSPADM

## 2024-11-01 RX ORDER — ATORVASTATIN CALCIUM 40 MG/1
80 TABLET, FILM COATED ORAL DAILY
Status: DISCONTINUED | OUTPATIENT
Start: 2024-11-02 | End: 2024-11-02 | Stop reason: HOSPADM

## 2024-11-01 RX ORDER — LIDOCAINE HYDROCHLORIDE 20 MG/ML
INJECTION, SOLUTION EPIDURAL; INFILTRATION; INTRACAUDAL; PERINEURAL
Status: DISCONTINUED | OUTPATIENT
Start: 2024-11-01 | End: 2024-11-01

## 2024-11-01 RX ORDER — IBUPROFEN 200 MG
800 TABLET ORAL EVERY 8 HOURS
Status: DISCONTINUED | OUTPATIENT
Start: 2024-11-02 | End: 2024-11-01

## 2024-11-01 RX ORDER — LABETALOL HYDROCHLORIDE 5 MG/ML
INJECTION, SOLUTION INTRAVENOUS
Status: DISCONTINUED | OUTPATIENT
Start: 2024-11-01 | End: 2024-11-01

## 2024-11-01 RX ORDER — SUCCINYLCHOLINE CHLORIDE 20 MG/ML
INJECTION INTRAMUSCULAR; INTRAVENOUS
Status: DISCONTINUED | OUTPATIENT
Start: 2024-11-01 | End: 2024-11-01

## 2024-11-01 RX ORDER — GABAPENTIN 250 MG/5ML
250 SOLUTION ORAL EVERY 8 HOURS
Status: DISCONTINUED | OUTPATIENT
Start: 2024-11-01 | End: 2024-11-01

## 2024-11-01 RX ORDER — GABAPENTIN 300 MG/1
300 CAPSULE ORAL 3 TIMES DAILY
Status: DISCONTINUED | OUTPATIENT
Start: 2024-11-01 | End: 2024-11-01

## 2024-11-01 RX ORDER — PHENYLEPHRINE HYDROCHLORIDE 10 MG/ML
INJECTION INTRAVENOUS
Status: DISCONTINUED | OUTPATIENT
Start: 2024-11-01 | End: 2024-11-01

## 2024-11-01 RX ORDER — PROCHLORPERAZINE EDISYLATE 5 MG/ML
5 INJECTION INTRAMUSCULAR; INTRAVENOUS EVERY 6 HOURS PRN
Status: DISCONTINUED | OUTPATIENT
Start: 2024-11-01 | End: 2024-11-02 | Stop reason: HOSPADM

## 2024-11-01 RX ORDER — GLUCAGON 1 MG
1 KIT INJECTION
Status: DISCONTINUED | OUTPATIENT
Start: 2024-11-01 | End: 2024-11-01 | Stop reason: HOSPADM

## 2024-11-01 RX ORDER — PROPOFOL 10 MG/ML
VIAL (ML) INTRAVENOUS
Status: DISCONTINUED | OUTPATIENT
Start: 2024-11-01 | End: 2024-11-01

## 2024-11-01 RX ORDER — ENOXAPARIN SODIUM 100 MG/ML
40 INJECTION SUBCUTANEOUS EVERY 24 HOURS
Status: DISCONTINUED | OUTPATIENT
Start: 2024-11-01 | End: 2024-11-02 | Stop reason: HOSPADM

## 2024-11-01 RX ORDER — SODIUM CHLORIDE 9 MG/ML
INJECTION, SOLUTION INTRAVENOUS CONTINUOUS
Status: DISCONTINUED | OUTPATIENT
Start: 2024-11-01 | End: 2024-11-01

## 2024-11-01 RX ORDER — DEXAMETHASONE SODIUM PHOSPHATE 4 MG/ML
INJECTION, SOLUTION INTRA-ARTICULAR; INTRALESIONAL; INTRAMUSCULAR; INTRAVENOUS; SOFT TISSUE
Status: DISCONTINUED | OUTPATIENT
Start: 2024-11-01 | End: 2024-11-01

## 2024-11-01 RX ORDER — CEFAZOLIN SODIUM 1 G/3ML
INJECTION, POWDER, FOR SOLUTION INTRAMUSCULAR; INTRAVENOUS
Status: DISCONTINUED | OUTPATIENT
Start: 2024-11-01 | End: 2024-11-01

## 2024-11-01 RX ORDER — ONDANSETRON HYDROCHLORIDE 2 MG/ML
4 INJECTION, SOLUTION INTRAVENOUS EVERY 6 HOURS
Status: DISCONTINUED | OUTPATIENT
Start: 2024-11-01 | End: 2024-11-02 | Stop reason: HOSPADM

## 2024-11-01 RX ORDER — ONDANSETRON HYDROCHLORIDE 2 MG/ML
8 INJECTION, SOLUTION INTRAVENOUS EVERY 6 HOURS PRN
Status: DISCONTINUED | OUTPATIENT
Start: 2024-11-01 | End: 2024-11-01

## 2024-11-01 RX ORDER — SODIUM CHLORIDE 9 MG/ML
INJECTION, SOLUTION INTRAVENOUS CONTINUOUS
Status: DISCONTINUED | OUTPATIENT
Start: 2024-11-01 | End: 2024-11-02

## 2024-11-01 RX ADMIN — SODIUM CHLORIDE: 9 INJECTION, SOLUTION INTRAVENOUS at 12:11

## 2024-11-01 RX ADMIN — ACETAMINOPHEN 1000 MG: 10 INJECTION, SOLUTION INTRAVENOUS at 04:11

## 2024-11-01 RX ADMIN — MUPIROCIN: 20 OINTMENT TOPICAL at 10:11

## 2024-11-01 RX ADMIN — SUCCINYLCHOLINE 100 MG: 20 INJECTION, SOLUTION INTRAMUSCULAR; INTRAVENOUS at 09:11

## 2024-11-01 RX ADMIN — EPHEDRINE SULFATE 10 MG: 50 INJECTION INTRAVENOUS at 09:11

## 2024-11-01 RX ADMIN — SUGAMMADEX 400 MG: 100 INJECTION, SOLUTION INTRAVENOUS at 10:11

## 2024-11-01 RX ADMIN — ONDANSETRON 4 MG: 2 INJECTION INTRAMUSCULAR; INTRAVENOUS at 05:11

## 2024-11-01 RX ADMIN — PROPOFOL 150 MG: 10 INJECTION, EMULSION INTRAVENOUS at 09:11

## 2024-11-01 RX ADMIN — PHENYLEPHRINE HYDROCHLORIDE 200 MCG: 10 INJECTION INTRAVENOUS at 09:11

## 2024-11-01 RX ADMIN — IBUPROFEN 800 MG: 800 INJECTION INTRAVENOUS at 02:11

## 2024-11-01 RX ADMIN — SODIUM CHLORIDE, SODIUM GLUCONATE, SODIUM ACETATE, POTASSIUM CHLORIDE, MAGNESIUM CHLORIDE, SODIUM PHOSPHATE, DIBASIC, AND POTASSIUM PHOSPHATE: .53; .5; .37; .037; .03; .012; .00082 INJECTION, SOLUTION INTRAVENOUS at 09:11

## 2024-11-01 RX ADMIN — PHENYLEPHRINE HYDROCHLORIDE 100 MCG: 10 INJECTION INTRAVENOUS at 09:11

## 2024-11-01 RX ADMIN — FENTANYL CITRATE 50 MCG: 50 INJECTION, SOLUTION INTRAMUSCULAR; INTRAVENOUS at 10:11

## 2024-11-01 RX ADMIN — ROCURONIUM BROMIDE 40 MG: 10 INJECTION, SOLUTION INTRAVENOUS at 09:11

## 2024-11-01 RX ADMIN — ROCURONIUM BROMIDE 10 MG: 10 INJECTION, SOLUTION INTRAVENOUS at 09:11

## 2024-11-01 RX ADMIN — ACETAMINOPHEN 1000 MG: 10 INJECTION, SOLUTION INTRAVENOUS at 10:11

## 2024-11-01 RX ADMIN — MUPIROCIN: 20 OINTMENT TOPICAL at 11:11

## 2024-11-01 RX ADMIN — ONDANSETRON 4 MG: 2 INJECTION INTRAMUSCULAR; INTRAVENOUS at 10:11

## 2024-11-01 RX ADMIN — PHENYLEPHRINE HYDROCHLORIDE 50 MCG: 10 INJECTION INTRAVENOUS at 10:11

## 2024-11-01 RX ADMIN — METHOCARBAMOL 250 MG: 100 INJECTION INTRAMUSCULAR; INTRAVENOUS at 10:11

## 2024-11-01 RX ADMIN — LIDOCAINE HYDROCHLORIDE 40 MG: 20 INJECTION, SOLUTION EPIDURAL; INFILTRATION; INTRACAUDAL; PERINEURAL at 09:11

## 2024-11-01 RX ADMIN — ACETAMINOPHEN 1000 MG: 500 TABLET ORAL at 08:11

## 2024-11-01 RX ADMIN — GABAPENTIN 250 MG: 250 SOLUTION ORAL at 02:11

## 2024-11-01 RX ADMIN — FENTANYL CITRATE 25 MCG: 50 INJECTION, SOLUTION INTRAMUSCULAR; INTRAVENOUS at 09:11

## 2024-11-01 RX ADMIN — ASPIRIN 81 MG CHEWABLE TABLET 81 MG: 81 TABLET CHEWABLE at 11:11

## 2024-11-01 RX ADMIN — LABETALOL HYDROCHLORIDE 5 MG: 5 INJECTION, SOLUTION INTRAVENOUS at 09:11

## 2024-11-01 RX ADMIN — CEFAZOLIN 2 G: 330 INJECTION, POWDER, FOR SOLUTION INTRAMUSCULAR; INTRAVENOUS at 09:11

## 2024-11-01 RX ADMIN — METHOCARBAMOL 500 MG: 100 INJECTION INTRAMUSCULAR; INTRAVENOUS at 12:11

## 2024-11-01 RX ADMIN — DEXAMETHASONE SODIUM PHOSPHATE 4 MG: 4 INJECTION, SOLUTION INTRAMUSCULAR; INTRAVENOUS at 09:11

## 2024-11-01 RX ADMIN — GLYCOPYRROLATE 0.2 MG: 0.2 INJECTION, SOLUTION INTRAMUSCULAR; INTRAVENOUS at 09:11

## 2024-11-01 RX ADMIN — ENOXAPARIN SODIUM 40 MG: 40 INJECTION SUBCUTANEOUS at 05:11

## 2024-11-01 NOTE — OP NOTE
DATE OF PROCEDURE: 11/1/2024    PRE OP DIAGNOSIS: Gastroparesis [K31.84]    POST OP DIAGNOSIS: Gastroparesis [K31.84]    PROCEDURE: Procedure(s) (LRB):  XI ROBOTIC  PYLOROMYOTOMY (N/A)  EGD (ESOPHAGOGASTRODUODENOSCOPY) (N/A)    Surgeons and Role:     * Karen Collado MD - Primary     * Barb Magaña MD - Resident - Assisting    ANESTHESIA: General    INDICATION: Patient is a 79 year-old woman with history of GERD s/p hiatal hernia repair with antireflux fundoplication 3/19/22 who presented with progressive dysphagia to solids, esophageal spasms, regurgitation, and one episode of food bolus impaction requiring endoscopic removal. Work-up was without evidence of obstruction but did note severe gastroparesis. Her case was presented at the [facility] [place] [facility] Swallowing [facility] conference and pyloromyotomy was recommended. After discussing the risks, benefits and alternatives to robotic pyloromyotomy with possible pyloroplasty, she elected to proceed. She presented last week with esophageal food impaction and underwent endoscopic removal on 10/25/24. She has been on liquids for the last week.     FINDINGS:  1. EGD with mildly tortuous esophagus without retained contents  2. Pyloromyotomy     PROCEDURE IN DETAIL: The patient was met in the pre-op area and her identity and consent confirmed. She was then brought to the Operating Room and placed supine. General anesthesia was induced and she was intubated without incident. SCDs and a warming blanket were placed. Pre-op antibiotics were infused within 30 minutes of the incision. The abdomen was prepped and draped in sterile fashion and a pre-procedural pause performed by all members of the surgical and anesthesia teams. EGD noted a mildly dilated and tortuous esophagus with patent LES and prior fundoplication; the stomach appeared normal. There was no impacted or retained food in the esophagus or stomach. The air was aspirated and the endoscope  removed. Access to the abdomen was obtained with an 8-mm Optiview trocar approximately 15-cm below the xiphoid to the left of midline. Pneumoperitoneum to 15 mmHg with CO2 gas was obtained and inspection yielded no injury. Three additional 8-mm trocars were placed under visualization in line with the camera port, two on the right and one on the left. The patient was positioned in steep reverse Trendelenburg and the Xi fivesquids.co.uki robot was docked. At this point I scrubbed out and moved to the surgeon's console. The serosa was scored anteriorly over the pylorus muscle using the monopolar hook cautery. The circular muscles were then divided using gentle traction with long needle  forceps until the mucosa was the only layer remaining. The abdomen was inspected and hemostasis ensured. The robot was undocked. The trocars were removed and the abdomen desufflated. All skin incisions were closed with 4-0 Monocryl and reinforced with Dermabond. The patient tolerated the operation well. She was extubated without incident and brought to the PACU in good condition. Sponge and needle counts were correct at the end of the case.      EBL: 3 mL  Specimens: None  Drains: None  Complications: None apparent  Dispo: Surgical floor     I was present and scrubbed (or at the surgeon's console) for the entirety of this operation.     Karen Bolden  11/1/2024

## 2024-11-01 NOTE — INTERVAL H&P NOTE
The patient has been examined and the H&P has been reviewed:    I concur with the findings and no changes have occurred since H&P was written.  Patient seen in pre-op. No new complaints, symptoms, or changes to history since last seen. Written and signed consent on file. Questions answered.      Surgery risks, benefits and alternative options discussed and understood by patient/family.          There are no hospital problems to display for this patient.

## 2024-11-01 NOTE — PROGRESS NOTES
"Per patient the photograph filed at 1838 is a midline lower abd wound that occurred as a result of "leaving the heating pad on high for too long".  "

## 2024-11-01 NOTE — BRIEF OP NOTE
[facility] ([facility])  Brief Operative Note    SUMMARY     Surgery Date: 11/1/2024     Surgeons and Role:     * Karen Collado MD - Primary     * Barb Magaña MD - Resident - Assisting        Pre-op Diagnosis:  Gastroparesis [K31.84]    Post-op Diagnosis:  Post-Op Diagnosis Codes:     * Gastroparesis [K31.84]    Procedure(s) (LRB):  XI ROBOTIC  PYLOROMYOTOMY (N/A)  EGD (ESOPHAGOGASTRODUODENOSCOPY) (N/A)    Anesthesia: General    Implants:  * No implants in log *    Operative Findings: Robotic pyloromyotomy with EGD    Estimated Blood Loss: minimal    Estimated Blood Loss has been documented.         Specimens:   Specimen (24h ago, onward)      None            [id number]

## 2024-11-01 NOTE — NURSING TRANSFER
Nursing Transfer Note      11/1/2024   5:32 PM    Transfer To: 524    Transfer via stretcher    Transfer with 2L O2    Transported by PCT    Transfer Vital Signs: see flowsheet     Medicines sent: IVF    Patient belongings transferred with patient: Yes (cell phone)    Chart send with patient: Yes    Notified: daughter    Patient reassessed at: 1700

## 2024-11-02 VITALS
SYSTOLIC BLOOD PRESSURE: 136 MMHG | RESPIRATION RATE: 18 BRPM | HEIGHT: 58 IN | WEIGHT: 143.19 LBS | OXYGEN SATURATION: 97 % | DIASTOLIC BLOOD PRESSURE: 60 MMHG | HEART RATE: 49 BPM | BODY MASS INDEX: 30.06 KG/M2 | TEMPERATURE: 97 F

## 2024-11-02 LAB
ANION GAP SERPL CALC-SCNC: 10 MMOL/L (ref 8–16)
BASOPHILS # BLD AUTO: 0.01 K/UL (ref 0–0.2)
BASOPHILS NFR BLD: 0.2 % (ref 0–1.9)
BUN SERPL-MCNC: 11 MG/DL (ref 8–23)
CALCIUM SERPL-MCNC: 8.9 MG/DL (ref 8.7–10.5)
CHLORIDE SERPL-SCNC: 107 MMOL/L (ref 95–110)
CO2 SERPL-SCNC: 21 MMOL/L (ref 23–29)
CREAT SERPL-MCNC: 0.9 MG/DL (ref 0.5–1.4)
DIFFERENTIAL METHOD BLD: ABNORMAL
EOSINOPHIL # BLD AUTO: 0 K/UL (ref 0–0.5)
EOSINOPHIL NFR BLD: 0.2 % (ref 0–8)
ERYTHROCYTE [DISTWIDTH] IN BLOOD BY AUTOMATED COUNT: 13.2 % (ref 11.5–14.5)
EST. GFR  (NO RACE VARIABLE): >60 ML/MIN/1.73 M^2
GLUCOSE SERPL-MCNC: 94 MG/DL (ref 70–110)
HCT VFR BLD AUTO: 37.4 % (ref 37–48.5)
HGB BLD-MCNC: 11.9 G/DL (ref 12–16)
IMM GRANULOCYTES # BLD AUTO: 0.01 K/UL (ref 0–0.04)
IMM GRANULOCYTES NFR BLD AUTO: 0.2 % (ref 0–0.5)
LYMPHOCYTES # BLD AUTO: 0.8 K/UL (ref 1–4.8)
LYMPHOCYTES NFR BLD: 17.5 % (ref 18–48)
MAGNESIUM SERPL-MCNC: 1.9 MG/DL (ref 1.6–2.6)
MCH RBC QN AUTO: 30.3 PG (ref 27–31)
MCHC RBC AUTO-ENTMCNC: 31.8 G/DL (ref 32–36)
MCV RBC AUTO: 95 FL (ref 82–98)
MONOCYTES # BLD AUTO: 0.4 K/UL (ref 0.3–1)
MONOCYTES NFR BLD: 8.8 % (ref 4–15)
NEUTROPHILS # BLD AUTO: 3.3 K/UL (ref 1.8–7.7)
NEUTROPHILS NFR BLD: 73.1 % (ref 38–73)
NRBC BLD-RTO: 0 /100 WBC
PHOSPHATE SERPL-MCNC: 3.3 MG/DL (ref 2.7–4.5)
PLATELET # BLD AUTO: 177 K/UL (ref 150–450)
PMV BLD AUTO: 10.9 FL (ref 9.2–12.9)
POTASSIUM SERPL-SCNC: 4.5 MMOL/L (ref 3.5–5.1)
RBC # BLD AUTO: 3.93 M/UL (ref 4–5.4)
SODIUM SERPL-SCNC: 138 MMOL/L (ref 136–145)
WBC # BLD AUTO: 4.57 K/UL (ref 3.9–12.7)

## 2024-11-02 PROCEDURE — 85025 COMPLETE CBC W/AUTO DIFF WBC: CPT | Performed by: SURGERY

## 2024-11-02 PROCEDURE — 83735 ASSAY OF MAGNESIUM: CPT | Performed by: SURGERY

## 2024-11-02 PROCEDURE — 25000003 PHARM REV CODE 250: Performed by: SURGERY

## 2024-11-02 PROCEDURE — 80048 BASIC METABOLIC PNL TOTAL CA: CPT | Performed by: SURGERY

## 2024-11-02 PROCEDURE — 25000003 PHARM REV CODE 250: Performed by: STUDENT IN AN ORGANIZED HEALTH CARE EDUCATION/TRAINING PROGRAM

## 2024-11-02 PROCEDURE — 63600175 PHARM REV CODE 636 W HCPCS: Performed by: SURGERY

## 2024-11-02 PROCEDURE — 36415 COLL VENOUS BLD VENIPUNCTURE: CPT | Performed by: SURGERY

## 2024-11-02 PROCEDURE — 25000003 PHARM REV CODE 250

## 2024-11-02 PROCEDURE — 84100 ASSAY OF PHOSPHORUS: CPT | Performed by: SURGERY

## 2024-11-02 PROCEDURE — 63600175 PHARM REV CODE 636 W HCPCS: Performed by: STUDENT IN AN ORGANIZED HEALTH CARE EDUCATION/TRAINING PROGRAM

## 2024-11-02 RX ORDER — GABAPENTIN 300 MG/1
300 CAPSULE ORAL 3 TIMES DAILY
Status: DISCONTINUED | OUTPATIENT
Start: 2024-11-02 | End: 2024-11-02 | Stop reason: HOSPADM

## 2024-11-02 RX ORDER — GABAPENTIN 250 MG/5ML
250 SOLUTION ORAL EVERY 8 HOURS
Qty: 25 ML | Refills: 0 | Status: SHIPPED | OUTPATIENT
Start: 2024-11-02

## 2024-11-02 RX ORDER — ONDANSETRON 4 MG/1
4 TABLET, ORALLY DISINTEGRATING ORAL EVERY 6 HOURS PRN
Qty: 28 TABLET | Refills: 0 | Status: SHIPPED | OUTPATIENT
Start: 2024-11-02 | End: 2024-11-09

## 2024-11-02 RX ORDER — ACETAMINOPHEN 500 MG/.95G
1000 POWDER ORAL EVERY 8 HOURS
Qty: 15 PACKET | Refills: 0 | Status: SHIPPED | OUTPATIENT
Start: 2024-11-02

## 2024-11-02 RX ORDER — PANTOPRAZOLE SODIUM 40 MG/10ML
40 INJECTION, POWDER, LYOPHILIZED, FOR SOLUTION INTRAVENOUS 2 TIMES DAILY
Status: DISCONTINUED | OUTPATIENT
Start: 2024-11-02 | End: 2024-11-02 | Stop reason: HOSPADM

## 2024-11-02 RX ORDER — POLYETHYLENE GLYCOL 3350 17 G/17G
17 POWDER, FOR SOLUTION ORAL DAILY
Status: DISCONTINUED | OUTPATIENT
Start: 2024-11-02 | End: 2024-11-02 | Stop reason: HOSPADM

## 2024-11-02 RX ORDER — PANTOPRAZOLE SODIUM 40 MG/10ML
40 INJECTION, POWDER, LYOPHILIZED, FOR SOLUTION INTRAVENOUS DAILY
Status: DISCONTINUED | OUTPATIENT
Start: 2024-11-02 | End: 2024-11-02

## 2024-11-02 RX ADMIN — ACETAMINOPHEN 1000 MG: 10 INJECTION, SOLUTION INTRAVENOUS at 05:11

## 2024-11-02 RX ADMIN — ONDANSETRON 4 MG: 2 INJECTION INTRAMUSCULAR; INTRAVENOUS at 05:11

## 2024-11-02 RX ADMIN — MUPIROCIN: 20 OINTMENT TOPICAL at 08:11

## 2024-11-02 RX ADMIN — ONDANSETRON 4 MG: 2 INJECTION INTRAMUSCULAR; INTRAVENOUS at 12:11

## 2024-11-02 RX ADMIN — PANTOPRAZOLE SODIUM 40 MG: 40 INJECTION, POWDER, LYOPHILIZED, FOR SOLUTION INTRAVENOUS at 12:11

## 2024-11-02 RX ADMIN — ASPIRIN 81 MG CHEWABLE TABLET 81 MG: 81 TABLET CHEWABLE at 08:11

## 2024-11-02 RX ADMIN — POLYETHYLENE GLYCOL 3350 17 G: 17 POWDER, FOR SOLUTION ORAL at 12:11

## 2024-11-02 RX ADMIN — GABAPENTIN 250 MG: 250 SOLUTION ORAL at 05:11

## 2024-11-02 RX ADMIN — IBUPROFEN 800 MG: 800 INJECTION INTRAVENOUS at 12:11

## 2024-11-02 RX ADMIN — IBUPROFEN 800 MG: 800 INJECTION INTRAVENOUS at 07:11

## 2024-11-02 RX ADMIN — ATORVASTATIN CALCIUM 80 MG: 40 TABLET, FILM COATED ORAL at 08:11

## 2024-11-02 NOTE — HOSPITAL COURSE
Please see the preoperative H&P and other available documentation for full details related to history prior to this admission.  Briefly, Megha Mendoza is a 79 y.o. female who was admitted following scheduled elective surgery for History of repair of hiatal hernia. They underwent the following procedures: robotic pyloromyotomy    Following a complete preoperative discussion of the risks and benefits of surgery with signed informed consent, the patient was taken to the operating room on 11/1/2024 and underwent the above stated procedures. The patient tolerated surgery well and there were no complications. Please see the operative report for full intraoperative findings and details. Postoperatively, the patient did well and was transferred from the PACU to the floor in stable condition where they had a stable and uncomplicated hospital course. Labs and vital signs remained stable and appropriate throughout course. Diet was advanced as tolerated and the patient's pain was controlled on oral pain medications without problem. Ambulating without issue. Voiding without issue with adequate urine output. Passing gas and stool. Incision site is clean, dry, and intact.    Currently, the patient is doing well at 1 Day Post-Op and is stable and appropriate for discharge home at this time. Patient will follow up in clinic with Dr. Bolden in 2 weeks.

## 2024-11-02 NOTE — ASSESSMENT & PLAN NOTE
Megha Kelly 79 y.o F with history of hiatal hernia repair 3/2019 that has since developed early satiety, post prandial pain, and likely gastroparesis. S/p robotic pyloromyotomy on 11/1/24    - advance to clear liquids. If tolerates clears, will discuss full liquid for lunch  - will discharge home on 1 week of full liquid diet  - d/c mIVF  - Crush or open all meds. Ok for liquid meds.  No pills  - home meds restarted as appropriate  - OOB, ambulate   - DVT ppx

## 2024-11-02 NOTE — DISCHARGE INSTRUCTIONS
You had a robotic pyloromyotomy performed.     Please alternate tylenol and ibuprofen for pain as directed by the bottle.   Please make sure to take 1 capful of Miralax per day to help with post-op constipation.     You have skin glue (dermabond) over your incision. Skin glue will fall off on its own  You may shower and let soapy water run over your incision, do not scrub. Please no baths or soaking for 2 weeks.    Ulises no heavy lifting/pushing/pulling. Do not lift anything heavier than a gallon of milk (about 10-15 lbs) for the next 4-6 weeks.    You must stay on a clear liquid diet for 3 day until Monday 11/4. Then you can progress to a full liquid diet for 4 days (Tuesday - Friday)  On Saturday 11/9, you may progress to a soft diet until you are seen in clinic.   Please crush or open all pills prior to taking them. You may take liquid meds. No pills.   Do no drink carbonated beverages or drink through a straw.     Please follow up in clinic with Dr. Bolden in 2 weeks.

## 2024-11-02 NOTE — PROGRESS NOTES
[facility] - [facility]  General Surgery  Progress Note    Subjective:     History of Present Illness:  No notes on file    Post-Op Info:  Procedure(s) (LRB):  XI ROBOTIC  PYLOROMYOTOMY (N/A)  EGD (ESOPHAGOGASTRODUODENOSCOPY) (N/A)   1 Day Post-Op     Interval History: AF, HDS. POD 1 robo pyloromyotomy. Pain controlled. No nausea or vomiting. Has been ambulating. Pain at IV site overnight.     Medications:  Continuous Infusions:  Scheduled Meds:   aspirin  81 mg Oral Daily    atorvastatin  80 mg Oral Daily    enoxparin  40 mg Subcutaneous Daily    gabapentin  250 mg Oral Q8H    methocarbamol (ROBAXIN) IVPB  250 mg Intravenous Q8H    mupirocin   Nasal BID    ondansetron  4 mg Intravenous Q6H     PRN Meds:  Current Facility-Administered Medications:     dextrose 10%, 12.5 g, Intravenous, PRN    dextrose 10%, 25 g, Intravenous, PRN    prochlorperazine, 5 mg, Intravenous, Q6H PRN    sodium chloride 0.9%, 10 mL, Intra-Catheter, PRN     Review of patient's allergies indicates:   Allergen Reactions    Cephalexin Rash and Other (See Comments)     Solid patches - rash like skin thickened    Hibiclens [chlorhexidine gluconate] Itching    Sulfa (sulfonamide antibiotics) Rash     Objective:     Vital Signs (Most Recent):  Temp: 97.5 °F (36.4 °C) (11/02/24 0701)  Pulse: (!) 50 (11/02/24 0701)  Resp: 18 (11/02/24 0701)  BP: 130/60 (11/02/24 0701)  SpO2: 96 % (11/02/24 0701) Vital Signs (24h Range):  Temp:  [96.7 °F (35.9 °C)-97.8 °F (36.6 °C)] 97.5 °F (36.4 °C)  Pulse:  [50-68] 50  Resp:  [14-30] 18  SpO2:  [94 %-99 %] 96 %  BP: (116-136)/(54-63) 130/60     Weight: 65 kg (143 lb 3.2 oz)  Body mass index is 29.93 kg/m².    Intake/Output - Last 3 Shifts         10/31 0700  11/01 0659 11/01 0700  11/02 0659 11/02 0700  11/03 0659    I.V. (mL/kg)  186.1 (2.9)     IV Piggyback  450     Total Intake(mL/kg)  636.1 (9.8)     Urine (mL/kg/hr)  850     Total Output  850     Net  -213.9                     Physical Exam  Vitals reviewed.    Constitutional:       General: She is not in acute distress.     Appearance: She is not ill-appearing.   Cardiovascular:      Rate and Rhythm: Normal rate.   Pulmonary:      Effort: Pulmonary effort is normal. No respiratory distress.   Abdominal:      General: There is no distension.      Palpations: Abdomen is soft.      Comments: Soft, appropriately tender. Incisions c/d/I with slight bruising surrounding    Skin:     General: Skin is warm and dry.      Findings: Bruising present.   Neurological:      General: No focal deficit present.      Mental Status: She is alert.          Significant Labs:  I have reviewed all pertinent lab results within the past 24 hours.    Significant Diagnostics:  I have reviewed all pertinent imaging results/findings within the past 24 hours.  Assessment/Plan:     * History of repair of hiatal hernia  Megha Mendoza 79 y.o F with history of hiatal hernia repair 3/2019 that has since developed early satiety, post prandial pain, and likely gastroparesis. S/p robotic pyloromyotomy on 11/1/24    - advance to clear liquids. If tolerates clears, will discuss full liquid for lunch  - will discharge home on 1 week of full liquid diet  - d/c mIVF  - Crush or open all meds. Ok for liquid meds.  No pills  - home meds restarted as appropriate  - OOB, ambulate   - DVT ppx           Tracy Christiansen MD  General Surgery  [facility]

## 2024-11-02 NOTE — DISCHARGE SUMMARY
[facility]  General Surgery  Discharge Summary      Patient Name: Megha Mendoza  MRN: [id number]  Admission Date: 11/1/2024  Hospital Length of Stay: 0 days  Discharge Date and Time:  11/02/2024 1:01 PM  Attending Physician: Karen Collado   Discharging Provider: Tracy Christiansen MD  Primary Care Provider: Tomy Baron MD    HPI:   No notes on file    Procedure(s) (LRB):  XI ROBOTIC  PYLOROMYOTOMY (N/A)  EGD (ESOPHAGOGASTRODUODENOSCOPY) (N/A)      Indwelling Lines/Drains at time of discharge:   Lines/Drains/Airways       None                 Hospital Course: Please see the preoperative H&P and other available documentation for full details related to history prior to this admission.  Briefly, Megha Mendoza is a 79 y.o. female who was admitted following scheduled elective surgery for History of repair of hiatal hernia. They underwent the following procedures: robotic pyloromyotomy    Following a complete preoperative discussion of the risks and benefits of surgery with signed informed consent, the patient was taken to the operating room on 11/1/2024 and underwent the above stated procedures. The patient tolerated surgery well and there were no complications. Please see the operative report for full intraoperative findings and details. Postoperatively, the patient did well and was transferred from the PACU to the floor in stable condition where they had a stable and uncomplicated hospital course. Labs and vital signs remained stable and appropriate throughout course. Diet was advanced as tolerated and the patient's pain was controlled on oral pain medications without problem. Ambulating without issue. Voiding without issue with adequate urine output. Passing gas and stool. Incision site is clean, dry, and intact.    Currently, the patient is doing well at 1 Day Post-Op and is stable and appropriate for discharge home at this time. Patient will follow up in clinic with Dr. Bolden in 2  weeks.      Goals of Care Treatment Preferences:  Code Status: Full Code      Consults:     Significant Diagnostic Studies: N/A    Pending Diagnostic Studies:       None          Final Active Diagnoses:    Diagnosis Date Noted POA    PRINCIPAL PROBLEM:  History of repair of hiatal hernia [Z98.890, Z87.19] 10/23/2024 Not Applicable      Problems Resolved During this Admission:      Discharged Condition: good    Disposition: Home or Self Care    Follow Up:   Follow-up Information       Karen Collado MD Follow up in 2 week(s).    Specialties: General Surgery, Bariatrics  Contact information:  [address] [facility]  [facility] [address]  [phone]                           Patient Instructions:      Diet clear liquid     Lifting restrictions   Order Comments: No lifting greater than 10 pounds for 6 weeks from day of surgery.  No pushing/pulling such as vacuuming or raking.  No straining, avoid constipation and take stool softeners as described and laxatives as needed.  No driving while on narcotics and until you can react quickly without pain.     No dressing needed   Order Comments: WOUND CARE  You have skin glue over your incision(s)  This will slowly flake away in about 10 days  It is okay to shower starting the day after surgery  Can pat your incision dry  Please do not scrub hard over your incisions  Please do not pick the glue off or it will reopen the wound     Notify your health care provider if you experience any of the following:  temperature >100.4     Notify your health care provider if you experience any of the following:  persistent nausea and vomiting or diarrhea     Notify your health care provider if you experience any of the following:  severe uncontrolled pain     Notify your health care provider if you experience any of the following:  redness, tenderness, or signs of infection (pain, swelling, redness, odor or green/yellow discharge around incision site)     Notify your health care  provider if you experience any of the following:  difficulty breathing or increased cough     Notify your health care provider if you experience any of the following:  severe persistent headache     Notify your health care provider if you experience any of the following:  worsening rash     Notify your health care provider if you experience any of the following:  persistent dizziness, light-headedness, or visual disturbances     Notify your health care provider if you experience any of the following:  increased confusion or weakness     Activity as tolerated   Order Comments: You had a robotic pyloromyotomy performed.     You have skin glue (dermabond) over your incision. Skin glue will fall off on its own  You may shower and let soapy water run over your incision, do not scrub. Please no baths or soaking for 2 weeks/    Ulises no heavy lifting/pushing/pulling. Do not lift anything heavier than a gallon of milk (about 10-15 lbs) for the next 4-6 weeks.    You must stay on a clear liquid diet for 3 day until Monday 11/4. Then you can progress to a full liquid diet for 4 days (Tuesday - Friday)  On Saturday 11/9, you may progress to a soft diet until you are seen in clinic.   Please crush or open all pills prior to taking them. You may take liquid meds. No pills.   Do no drink carbonated beverages or drink through a straw.     Please follow up in clinic with Dr. Bolden in 2 weeks.     Medications:  Reconciled Home Medications:      Medication List        START taking these medications      gabapentin 250 mg/5 mL solution  Commonly known as: NEURONTIN  Take 5 mLs (250 mg total) by mouth every 8 (eight) hours.  Replaces: NEURONTIN 100 MG capsule            CHANGE how you take these medications      * acetaminophen 500 MG tablet  Commonly known as: TYLENOL  Take 1,000 mg by mouth every 6 (six) hours as needed for Pain.  What changed: Another medication with the same name was added. Make sure you understand how and when to  take each.     * TYLENOL EXTRA STRENGTH 500 mg Pwpk  Generic drug: acetaminophen  Take 2 packets (1,000 mg) by mouth every 8 (eight) hours.  What changed: You were already taking a medication with the same name, and this prescription was added. Make sure you understand how and when to take each.           * This list has 2 medication(s) that are the same as other medications prescribed for you. Read the directions carefully, and ask your doctor or other care provider to review them with you.                CONTINUE taking these medications      apple cider vinegar 500 mg Tab  Take 1 tablet by mouth once daily.     aspirin 81 MG Chew  Take 81 mg by mouth once daily.     co-enzyme Q-10 30 mg capsule  Take 100 mg by mouth once daily.     DIGESTIVE ADVANTAGE ORAL  Take 1 tablet by mouth once daily.     furosemide 20 MG tablet  Commonly known as: LASIX  Take 0.5 tablets (10 mg total) by mouth daily as needed (swelling).     krill-omega-3-dha-epa-lipids 313-91-83-50 mg Cap  Take 1 capsule by mouth nightly.     metoclopramide HCl 5 MG tablet  Commonly known as: REGLAN  Take 1 tablet (5 mg total) by mouth 4 (four) times daily as needed (nausea, bloating, slow stomach emptying symptoms).     multivitamin tablet  Commonly known as: THERAGRAN  Take 1 tablet by mouth once daily.     nitroGLYCERIN 0.4 MG SL tablet  Commonly known as: NITROSTAT  Place 0.4 mg under the tongue every 5 (five) minutes as needed for Chest pain.     omeprazole 40 MG capsule  Commonly known as: PRILOSEC  Take 40 mg by mouth 2 (two) times daily before meals.     ondansetron 4 MG Tbdl  Commonly known as: ZOFRAN-ODT  DISSOLVE 1 tablet (4 mg total) by mouth every 6 (six) hours as needed.     oxybutynin 10 MG 24 hr tablet  Commonly known as: DITROPAN-XL  Take 1 tablet (10 mg total) by mouth once daily.     PROLIA 60 mg/mL Syrg  Generic drug: denosumab  Inject 1 mL (60 mg total) into the skin every 6 (six) months.     rosuvastatin 40 MG Tab  Commonly known  as: CRESTOR  Take 40 mg by mouth every evening.            STOP taking these medications      NEURONTIN 100 MG capsule  Generic drug: gabapentin  Replaced by: gabapentin 250 mg/5 mL solution            ASK your doctor about these medications      esomeprazole 40 MG capsule  Commonly known as: NEXIUM  Take 1 capsule (40 mg total) by mouth 2 (two) times daily.     SYMBICORT 80-4.5 mcg/actuation Hfaa  Generic drug: budesonide-formoterol 80-4.5 mcg  INHALE 2 PUFFS BY MOUTH TWICE DAILY .  CONTROLLER            Time spent on the discharge of patient: 15 minutes    Tracy Christiansen MD  General Surgery  [facility] - Surgery

## 2024-11-02 NOTE — SUBJECTIVE & OBJECTIVE
Interval History: AF, HDS. POD 1 robo pyloromyotomy. Pain controlled. No nausea or vomiting. Has been ambulating. Pain at IV site overnight.     Medications:  Continuous Infusions:  Scheduled Meds:   aspirin  81 mg Oral Daily    atorvastatin  80 mg Oral Daily    enoxparin  40 mg Subcutaneous Daily    gabapentin  250 mg Oral Q8H    methocarbamol (ROBAXIN) IVPB  250 mg Intravenous Q8H    mupirocin   Nasal BID    ondansetron  4 mg Intravenous Q6H     PRN Meds:  Current Facility-Administered Medications:     dextrose 10%, 12.5 g, Intravenous, PRN    dextrose 10%, 25 g, Intravenous, PRN    prochlorperazine, 5 mg, Intravenous, Q6H PRN    sodium chloride 0.9%, 10 mL, Intra-Catheter, PRN     Review of patient's allergies indicates:   Allergen Reactions    Cephalexin Rash and Other (See Comments)     Solid patches - rash like skin thickened    Hibiclens [chlorhexidine gluconate] Itching    Sulfa (sulfonamide antibiotics) Rash     Objective:     Vital Signs (Most Recent):  Temp: 97.5 °F (36.4 °C) (11/02/24 0701)  Pulse: (!) 50 (11/02/24 0701)  Resp: 18 (11/02/24 0701)  BP: 130/60 (11/02/24 0701)  SpO2: 96 % (11/02/24 0701) Vital Signs (24h Range):  Temp:  [96.7 °F (35.9 °C)-97.8 °F (36.6 °C)] 97.5 °F (36.4 °C)  Pulse:  [50-68] 50  Resp:  [14-30] 18  SpO2:  [94 %-99 %] 96 %  BP: (116-136)/(54-63) 130/60     Weight: 65 kg (143 lb 3.2 oz)  Body mass index is 29.93 kg/m².    Intake/Output - Last 3 Shifts         10/31 0700  11/01 0659 11/01 0700  11/02 0659 11/02 0700  11/03 0659    I.V. (mL/kg)  186.1 (2.9)     IV Piggyback  450     Total Intake(mL/kg)  636.1 (9.8)     Urine (mL/kg/hr)  850     Total Output  850     Net  -213.9                     Physical Exam  Vitals reviewed.   Constitutional:       General: She is not in acute distress.     Appearance: She is not ill-appearing.   Cardiovascular:      Rate and Rhythm: Normal rate.   Pulmonary:      Effort: Pulmonary effort is normal. No respiratory distress.   Abdominal:       General: There is no distension.      Palpations: Abdomen is soft.      Comments: Soft, appropriately tender. Incisions c/d/I with slight bruising surrounding    Skin:     General: Skin is warm and dry.      Findings: Bruising present.   Neurological:      General: No focal deficit present.      Mental Status: She is alert.          Significant Labs:  I have reviewed all pertinent lab results within the past 24 hours.    Significant Diagnostics:  I have reviewed all pertinent imaging results/findings within the past 24 hours.

## 2024-11-02 NOTE — NURSING
Eager & in agreement w/ DC. VU of DC instructions paperwork & prescriptions passed & explained  IV removed w/ cath tip intact, WNL. No complaints of N/V , and pain.. To be DCd home w/ daughter will be escorted downstairs via  transport team once dressed, ready & ride arrives. Free from falls, injury, or skin breakdown this hospital admission.

## 2024-11-02 NOTE — PLAN OF CARE
Pt x4, VSS. PT progressing apace and currently resting. PT care ongoing.  Problem: Adult Inpatient Plan of Care  Goal: Plan of Care Review  Outcome: Progressing  Goal: Patient-Specific Goal (Individualized)  Outcome: Progressing  Goal: Absence of Hospital-Acquired Illness or Injury  Outcome: Progressing  Goal: Optimal Comfort and Wellbeing  Outcome: Progressing

## 2024-11-02 NOTE — PROGRESS NOTES
The pt's dtr Priscila is transporting her home at d/c. The sw stressed the importance of the pt going to her hsp f/u's and taking her medications. She acknowledged understanding and states she will comply. The pt has no further questions or Case Management needs and is clear to d/c.    Future Appointments   Date Time Provider Department Center   11/12/2024 12:30 PM Tomy Baron MD [facility] INDY [facility]   11/13/2024  2:45 PM Karen Collado MD [facility]UR [facility]   1/29/2025  9:30 AM CHAIR 01 STAJULIA SIMON [facility]   2/3/2025  8:45 AM Kasi Saavedra MD [facility]   3/6/2025  8:30 AM Tomy Baron MD [facility] MED [facility]    [address] - Surgery  Discharge Final Note    Primary Care Provider: Tomy Baron MD    Expected Discharge Date: 11/2/2024    Final Discharge Note (most recent)       Final Note - 11/02/24 1449          Final Note    Assessment Type Discharge Planning Assessment                     Important Message from Medicare             Contact Info       Karen Collado MD   Specialty: General Surgery, Bariatrics    [address] [facility] [address]   Phone: [phone]       Next Steps: Follow up on 11/13/2024    Instructions: 2:45pm    Tomy Baron MD   Specialty: Family Medicine   Relationship: PCP - General    [address]helle [address]O DR  [facility] [address]   Phone: [phone]       Next Steps: Follow up on 11/12/2024    Instructions: 12:30pm

## 2024-11-02 NOTE — PLAN OF CARE
The sw met with the pt and her dtr Priscila Lau [phone] who was at bedside during the assessment. The pt lives alone in [place] but has a very supportive family. The pt still drives but Priscila will transport her home at d/c. The pt's independent with her ADL's and uses the dme listed below. The pt leads a very active lifestyle and she still mow her own lawn. The sw encouraged them to call if they have any further questions or concerns. The sw will continue to follow the pt throughout her transitions of care and will assist with any d/c needs.     [facility] - Surgery  Discharge Assessment    Primary Care Provider: Tomy Baron MD     Discharge Assessment (most recent)       BRIEF DISCHARGE ASSESSMENT - 11/02/24 8670          Discharge Planning    Assessment Type Discharge Planning Assessment (P)      Resource/Environmental Concerns none (P)      Support Systems Spouse/significant other;Children;Family members;Friends/neighbors (P)      Equipment Currently Used at Home oxygen (P)    at night    Current Living Arrangements home (P)      Patient/Family Anticipates Transition to home with family (P)      Patient/Family Anticipated Services at Transition none (P)      DME Needed Upon Discharge  none (P)      Discharge Plan A Home with family (P)      Discharge Plan B Home Health (P)

## 2024-11-12 ENCOUNTER — OFFICE VISIT (OUTPATIENT)
Dept: FAMILY MEDICINE | Facility: CLINIC | Age: 79
End: 2024-11-12
Payer: MEDICARE

## 2024-11-12 VITALS
OXYGEN SATURATION: 97 % | BODY MASS INDEX: 27.85 KG/M2 | SYSTOLIC BLOOD PRESSURE: 118 MMHG | HEIGHT: 58 IN | WEIGHT: 132.69 LBS | DIASTOLIC BLOOD PRESSURE: 62 MMHG | RESPIRATION RATE: 17 BRPM | HEART RATE: 75 BPM

## 2024-11-12 DIAGNOSIS — T18.128A ESOPHAGEAL OBSTRUCTION DUE TO FOOD IMPACTION: ICD-10-CM

## 2024-11-12 DIAGNOSIS — Z09 HOSPITAL DISCHARGE FOLLOW-UP: Primary | ICD-10-CM
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
## 2024-11-12 PROCEDURE — 99999 PR PBB SHADOW E&M-EST. PATIENT-LVL III: CPT | Mod: PBBFAC,,, | Performed by: FAMILY MEDICINE

## 2024-11-12 RX ORDER — GABAPENTIN 100 MG/1
CAPSULE ORAL
COMMUNITY
Start: 2024-11-04

## 2024-11-12 RX ORDER — CYCLOSPORINE 0.5 MG/ML
1 EMULSION OPHTHALMIC 2 TIMES DAILY
COMMUNITY
Start: 2024-10-14

## 2024-11-12 NOTE — PROGRESS NOTES
Subjective:       Patient ID: Megha Mendoza is a 79 y.o. female.    Chief Complaint: Follow-up (Pt here for hospital f/u. )    Pt is a 79 y.o. female who presents for evaluation and management of   Encounter Diagnoses   Name Primary?    Hospital discharge follow-up Yes    Esophageal obstruction due to food impaction    .  History of Present Illness    CHIEF COMPLAINT:  Megha presents for follow-up after recent pylorus surgery and to discuss ongoing esophageal and gastric issues.    HPI:  Megha reports a history of esophageal and gastric issues dating back to 2022 following a hiatal hernia repair. Her scheduled heart surgery on November 24th was canceled due to esophageal blockage, the second occurrence in 6 months. In April, she had a similar blockage requiring a 4-hour procedure at [facility] to clean her esophagus.    Megha describes ongoing symptoms including chest and throat spasms, reflux, and a tickling sensation. She continues to have reflux while taking 40 mg of Nexium (omeprazole) twice daily. Medical experts believe symptoms may be related to her plate valve functioning at 50% or slower than normal, causing reflux and pressure buildup leading to spasms in her chest and throat.    On November 1st, the patient underwent pylorus surgery. She has been on a liquid diet for an extended period and only started soft foods on the Saturday prior to this visit. She reports being cautious with her diet and has lost 12-13 lbs.    Since the surgery, the patient has had jaw spasms 3-4 times, describing a burning sensation that extends behind her ears and to the top of her head. When the spasms occur in her chest, it feels similar to a heart attack, though she has not had this since the surgery.    Megha notes that she previously had swelling in her feet and ankles, which did not respond to Lasix. This swelling has resolved, which she attributes to ozone treatments she received at an alternative medicine clinic. She reports  that these treatments also helped clear up dark skin discoloration on her feet related to circulation problems.    Megha mentions having dizziness and balance issues following her hospital stay, but these symptoms have improved in the last few days. She reports feeling more stable now but notes that she is still sore from the surgery and is limited in her activities, unable to lift anything heavier than a gallon of milk for 6 weeks.    Megha denies any episodes of food blockage or severe spasms since the pylorus surgery, and denies current swelling in her feet and ankles.    MEDICATIONS:  Megha is on Nexium (omeprazole) 40 mg twice daily for acid reflux. She is also taking some blood pressure medication, though the specific name is not mentioned.    MEDICAL HISTORY:  Megha has a history of hiatal hernia diagnosed in 2022, esophageal dysmotility, GERD, and venous stasis.    SURGICAL HISTORY:  She underwent hiatal hernia repair in 2022, which resulted in complications including esophageal twisting and ongoing reflux. Megha had a pyloroplasty on November 1st to address stomach emptying issues. She also underwent an upper GI endoscopy in April, six months ago, which revealed food stoppage in the esophagus and took 4 hours to clean. Recently, during her hospital stay from October 24th to November 1st, another upper GI endoscopy was performed to check her esophagus.    TEST RESULTS:  Pr  ior to the current visit, the patient underwent an esophageal manometry. The results showed that her pyloric valve was functioning 50% or more slower than normal.         Review of Systems   Constitutional:  Negative for fever.   Cardiovascular:  Negative for chest pain.   Gastrointestinal:  Negative for abdominal pain, blood in stool, nausea and vomiting.       Objective:      Physical Exam  Constitutional:       Appearance: She is well-developed.   HENT:      Head: Normocephalic and atraumatic.      Right Ear: External ear normal.       Left Ear: External ear normal.      Nose: Nose normal.   Eyes:      Pupils: Pupils are equal, round, and reactive to light.   Neck:      Thyroid: No thyromegaly.      Vascular: No JVD.      Trachea: No tracheal deviation.   Cardiovascular:      Rate and Rhythm: Normal rate.      Heart sounds: Normal heart sounds. No murmur heard.  Pulmonary:      Effort: Pulmonary effort is normal. No respiratory distress.      Breath sounds: Normal breath sounds. No wheezing or rales.   Chest:      Chest wall: No tenderness.   Abdominal:      General: Bowel sounds are normal. There is no distension.      Palpations: Abdomen is soft. There is no mass.      Tenderness: There is no abdominal tenderness. There is no guarding or rebound.   Musculoskeletal:         General: No tenderness.      Cervical back: Normal range of motion and neck supple.   Lymphadenopathy:      Cervical: No cervical adenopathy.   Skin:     General: Skin is warm and dry.      Coloration: Skin is not pale.      Findings: No erythema or rash.   Neurological:      Mental Status: She is alert and oriented to person, place, and time.      Cranial Nerves: No cranial nerve deficit.      Motor: No abnormal muscle tone.      Coordination: Coordination normal.      Deep Tendon Reflexes: Reflexes are normal and symmetric. Reflexes normal.   Psychiatric:         Behavior: Behavior normal.         Thought Content: Thought content normal.         Judgment: Judgment normal.         Assessment:       1. Hospital discharge follow-up    2. Esophageal obstruction due to food impaction        Plan:   1. Hospital discharge follow-up    2. Esophageal obstruction due to food impaction  Overview:  78 yo W here with Food bolus impaction pre-pyloromyotomy. She has a significantly extensive medical history ofHLD, COPD, bronchitis, CAD, GERD, IBS with chronic constipation, hemorrhoids, diverticulosis, colitis.  She has also has had persistent gastroparesis, esophageal spasms and GERD  following a Nissen procedure for a large hiatal hernia.  . 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  Assessment & Plan    Assessed patient's recent pylorus surgery and ongoing esophageal issues  Noted improvement in leg swelling, potentially due to weight loss or alternative treatments  Reviewed current medication regimen, including discontinuation of Lasix and low-dose aspirin  Observed patient's good overall condition post-surgery, including normalized blood pressure and reduced leg swelling    POST-OPERATIVE CARE:  Megha to adhere to post-surgical restrictions, including not lifting anything heavier than a gallon of milk for 6 weeks.    MEDICATIONS/SUPPLEMENTS:  Lasix discontinued.  Low-dose aspirin discontinued (as per Dr. Day's recommendation).    FOLLOW UP:  Follow up in March, unless problems arise beforehand.  Contact the office if any issues arise that require attention.         This note was generated with the assistance of ambient listening technology. Verbal consent was obtained by the patient and accompanying visitor(s) for the recording of patient appointment to facilitate this note. I attest to having reviewed and edited the generated note for accuracy, though some syntax or spelling errors may persist. Please contact the author of this note for any clarification.

## 2024-11-13 ENCOUNTER — OFFICE VISIT (OUTPATIENT)
Dept: SURGERY | Facility: CLINIC | Age: 79
End: 2024-11-13
Payer: MEDICARE

## 2024-11-13 VITALS
WEIGHT: 133.81 LBS | SYSTOLIC BLOOD PRESSURE: 132 MMHG | HEIGHT: 58 IN | DIASTOLIC BLOOD PRESSURE: 71 MMHG | BODY MASS INDEX: 28.09 KG/M2 | HEART RATE: 69 BPM

## 2024-11-13 DIAGNOSIS — K31.84 GASTROPARESIS: Primary | ICD-10-CM

## 2024-11-13 DIAGNOSIS — Z98.890 S/P LAPAROSCOPIC FUNDOPLICATION: ICD-10-CM
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
## 2024-11-13 DIAGNOSIS — T18.128A ESOPHAGEAL OBSTRUCTION DUE TO FOOD IMPACTION: ICD-10-CM

## 2024-11-13 PROCEDURE — 1159F MED LIST DOCD IN RCRD: CPT | Mod: CPTII,S$GLB,, | Performed by: SURGERY

## 2024-11-13 PROCEDURE — 3288F FALL RISK ASSESSMENT DOCD: CPT | Mod: CPTII,S$GLB,, | Performed by: SURGERY

## 2024-11-13 PROCEDURE — 1126F AMNT PAIN NOTED NONE PRSNT: CPT | Mod: CPTII,S$GLB,, | Performed by: SURGERY

## 2024-11-13 PROCEDURE — 3078F DIAST BP <80 MM HG: CPT | Mod: CPTII,S$GLB,, | Performed by: SURGERY

## 2024-11-13 PROCEDURE — 1160F RVW MEDS BY RX/DR IN RCRD: CPT | Mod: CPTII,S$GLB,, | Performed by: SURGERY

## 2024-11-13 PROCEDURE — 3075F SYST BP GE 130 - 139MM HG: CPT | Mod: CPTII,S$GLB,, | Performed by: SURGERY

## 2024-11-13 PROCEDURE — 99024 POSTOP FOLLOW-UP VISIT: CPT | Mod: S$GLB,,, | Performed by: SURGERY

## 2024-11-13 PROCEDURE — 1101F PT FALLS ASSESS-DOCD LE1/YR: CPT | Mod: CPTII,S$GLB,, | Performed by: SURGERY

## 2024-11-13 PROCEDURE — 99999 PR PBB SHADOW E&M-EST. PATIENT-LVL III: CPT | Mod: PBBFAC,,, | Performed by: SURGERY

## 2024-11-13 NOTE — PROGRESS NOTES
HPI:  Patient is a 79 year-old woman with history of GERD s/p hiatal hernia repair with antireflux fundoplication 3/19/22 who presents for post-op follow-up s/p robotic pyloromyotomy 11/1/24 for gastroparesis noted on work-up for progressive dysphagia to solids, esophageal spasms, regurgitation, and food bolus impaction. She states she is overall doing quite well. She had 3 brief episodes of esophageal spasm within the 1 week post-op. She also had some bloating the 1st week managed with OTC medications. She advanced to a soft diet on Saturday. She has been tolerating this without any episodes of dysphagia, regurgitation, or retrosternal pain. She has had a few instances of heartburn for which she has resumes Nexium daily with resolution. She endorses normal bowel and bladder function. She denies abdominal pain and is not requiring analgesics. She is overall happy with her course.    PHYSICAL EXAM:  Physical Exam  Vitals reviewed.   Constitutional:       General: She is not in acute distress.     Appearance: Normal appearance.   Cardiovascular:      Rate and Rhythm: Normal rate.   Pulmonary:      Effort: Pulmonary effort is normal. No respiratory distress.   Abdominal:      General: There is no distension.      Palpations: Abdomen is soft.      Tenderness: There is no abdominal tenderness. There is no guarding.   Skin:     General: Skin is warm and dry.      Findings: No bruising or erythema.      Comments: Well-healing lap incisions   Neurological:      General: No focal deficit present.      Mental Status: She is alert and oriented to person, place, and time.   Psychiatric:         Mood and Affect: Mood normal.         Behavior: Behavior normal.       ASSESSMENT:    The patient is doing well s/p robotic pyloromyotomy. Mild heartburn controlled with daily Nexium.     PLAN:    Advance diet as tolerates. Encouraged patient to take small bites, chew well, and take small meals.   Return to clinic as needed. Routine  follow-up per GI.    Karen Bolden  11/13/2024

## 2024-11-29 NOTE — TELEPHONE ENCOUNTER
----- Message from Summer Terrell RN sent at 1/18/2023  7:23 AM CST -----  Dr Baron;  Mrs Cleveland's due for her Prolia injection.  Her calcium level is good.  Her last DEXA was 2 years ago.  If you want her to continue her Prolia, can you please re-sign her therapy plan.   Thanks,  Summer     Yes

## 2025-01-29 ENCOUNTER — INFUSION (OUTPATIENT)
Dept: INFUSION THERAPY | Facility: HOSPITAL | Age: 80
End: 2025-01-29
Attending: FAMILY MEDICINE
Payer: MEDICARE

## 2025-01-29 VITALS
SYSTOLIC BLOOD PRESSURE: 142 MMHG | RESPIRATION RATE: 18 BRPM | DIASTOLIC BLOOD PRESSURE: 55 MMHG | TEMPERATURE: 98 F | HEART RATE: 62 BPM

## 2025-01-29 DIAGNOSIS — M85.80 OSTEOPENIA, UNSPECIFIED LOCATION: Primary | ICD-10-CM

## 2025-01-29 PROCEDURE — 63600175 PHARM REV CODE 636 W HCPCS: Mod: JZ,TB | Performed by: FAMILY MEDICINE

## 2025-01-29 PROCEDURE — 96372 THER/PROPH/DIAG INJ SC/IM: CPT

## 2025-01-29 RX ADMIN — DENOSUMAB 60 MG: 60 INJECTION SUBCUTANEOUS at 09:01

## 2025-02-03 ENCOUNTER — OFFICE VISIT (OUTPATIENT)
Dept: NEUROLOGY | Facility: CLINIC | Age: 80
End: 2025-02-03
Payer: MEDICARE

## 2025-02-03 VITALS
RESPIRATION RATE: 14 BRPM | HEIGHT: 58 IN | WEIGHT: 138.88 LBS | SYSTOLIC BLOOD PRESSURE: 144 MMHG | BODY MASS INDEX: 29.15 KG/M2 | HEART RATE: 72 BPM | DIASTOLIC BLOOD PRESSURE: 78 MMHG

## 2025-02-03 DIAGNOSIS — R20.0 NUMBNESS AND TINGLING OF BOTH FEET: Primary | ICD-10-CM

## 2025-02-03 DIAGNOSIS — R20.2 NUMBNESS AND TINGLING OF BOTH FEET: Primary | ICD-10-CM

## 2025-02-03 DIAGNOSIS — R76.8 ANA POSITIVE: ICD-10-CM

## 2025-02-03 DIAGNOSIS — M54.16 LUMBAR RADICULAR PAIN: ICD-10-CM

## 2025-02-03 PROCEDURE — 3077F SYST BP >= 140 MM HG: CPT | Mod: CPTII,S$GLB,, | Performed by: PSYCHIATRY & NEUROLOGY

## 2025-02-03 PROCEDURE — 1159F MED LIST DOCD IN RCRD: CPT | Mod: CPTII,S$GLB,, | Performed by: PSYCHIATRY & NEUROLOGY

## 2025-02-03 PROCEDURE — 3078F DIAST BP <80 MM HG: CPT | Mod: CPTII,S$GLB,, | Performed by: PSYCHIATRY & NEUROLOGY

## 2025-02-03 PROCEDURE — 99999 PR PBB SHADOW E&M-EST. PATIENT-LVL III: CPT | Mod: PBBFAC,,, | Performed by: PSYCHIATRY & NEUROLOGY

## 2025-02-03 PROCEDURE — 1125F AMNT PAIN NOTED PAIN PRSNT: CPT | Mod: CPTII,S$GLB,, | Performed by: PSYCHIATRY & NEUROLOGY

## 2025-02-03 PROCEDURE — 99214 OFFICE O/P EST MOD 30 MIN: CPT | Mod: S$GLB,,, | Performed by: PSYCHIATRY & NEUROLOGY

## 2025-02-03 PROCEDURE — 1160F RVW MEDS BY RX/DR IN RCRD: CPT | Mod: CPTII,S$GLB,, | Performed by: PSYCHIATRY & NEUROLOGY

## 2025-02-03 NOTE — PROGRESS NOTES
HPI: Megha Mendoza is a 79 y.o. female with history of pain in the feet for years      Here for 6 months follow up    Numbness and tingling in the feet      Pain is persistent and tingling/stinging    Some relief with OTC meds      Lower back pain with radiating pain to the legs is worse currently. She states she currently can't get injections with Dr Eubanks due to insurance limitations      She is taking Gabapentin 100mg nightly.     She has seen podiatry and stated she was told she has OA, Almaraz Neuroma, Plantar fasciitis, Bone spurs, and bunions     She is former smoker remotely     She does not drink alcohol      Review of Systems   Constitutional:  Negative for fever.   HENT:  Negative for nosebleeds.    Eyes:  Negative for double vision.   Respiratory:  Negative for hemoptysis.    Cardiovascular:  Positive for leg swelling.   Gastrointestinal:  Negative for blood in stool.   Genitourinary:  Negative for hematuria.   Musculoskeletal:  Positive for back pain.   Skin:  Negative for rash.   Neurological:  Positive for tingling.   Endo/Heme/Allergies:  Does not bruise/bleed easily.         I have reviewed all of this patient's past medical and surgical histories as well as family and social histories and active allergies and medications as documented in the electronic medical record.        Exam:  Gen Appearance, well developed/nourished in no apparent distress  CV: 2+ distal pulses with trace edema or swelling in the ankles  Neuro:  MS: Awake, alert,  Sustains attention. Recent/remote memory intact, Language is full to spontaneous speech/repetition/naming/comprehension. Fund of Knowledge is full  CN: Optic discs are flat with normal vasculature, PERRL, Extraoccular movements and visual fields are full. Normal facial sensation and strength, Hearing symmetric, Tongue and Palate are midline and strong. Shoulder Shrug symmetric and strong.  Motor: Normal bulk, tone, no abnormal movements. 5/5 strength bilateral  upper/lower extremities with 2+ reflexes and no clonus  Sensory: symmetric temp, and vibration but reduced in the feet to vibration. Romberg mildly positive  Cerebellar: Finger-nose,Heal-shin, Rapid alternating movements intact  Gait: Normal stance, no ataxia    Imaging:  Labs: 2023 CMP normal with A1c normal and TSH and CBC normal    2022 A1C elevated / prediabetic    2024 B12 normal. SPEP/CHELSEA unremarkable      Assessment/Plan: Megha Mendoza is a 79 y.o. female with at least two year of feet tingling  I recommend:     Exam suggests smaller fiber polyneuropathy   2.   Note her history of impaired fasting glucose currently improved. Keep a good diet to prevent this from worsening. Also has a history of + KB but not thought to have inflammatory arthritis per her  3.  Can raise dose Gabapentin unless LE swelling worsens. She will try 2 nightly of the 100mg gabapentin unless side effects. She also plans to discuss this with PCP  -Not interested in adding Cymbalta.   4.  Could consider EMG/NCS but this type of neuropathy may be EMG negative. Re-consider if worse.   5.  She has seen podiatry and states she was told she has OA, Almaraz Neuroma, Plantar fasciitis, Bone spurs, and bunions   -Note she has seen pain management (Dr Eubanks) regarding lumbar disc disease and lumbar radicular pain prior. She has cost limitations with Dr Eubanks. Gabapentin increase may help.     RTC 6 months

## 2025-02-26 ENCOUNTER — TELEPHONE (OUTPATIENT)
Dept: FAMILY MEDICINE | Facility: CLINIC | Age: 80
End: 2025-02-26
Payer: MEDICARE

## 2025-02-26 ENCOUNTER — ANESTHESIA EVENT (OUTPATIENT)
Dept: ENDOSCOPY | Facility: HOSPITAL | Age: 80
End: 2025-02-26
Payer: MEDICARE

## 2025-02-26 ENCOUNTER — HOSPITAL ENCOUNTER (EMERGENCY)
Facility: HOSPITAL | Age: 80
Discharge: HOME | End: 2025-02-26
Attending: EMERGENCY MEDICINE
Payer: MEDICARE

## 2025-02-26 ENCOUNTER — HOSPITAL ENCOUNTER (EMERGENCY)
Facility: HOSPITAL | Age: 80
Discharge: ANOTHER HOSPITAL | End: 2025-02-26
Payer: MEDICARE

## 2025-02-26 ENCOUNTER — ANESTHESIA (OUTPATIENT)
Dept: ENDOSCOPY | Facility: HOSPITAL | Age: 80
End: 2025-02-26
Payer: MEDICARE

## 2025-02-26 VITALS
HEART RATE: 70 BPM | SYSTOLIC BLOOD PRESSURE: 115 MMHG | HEIGHT: 58 IN | WEIGHT: 132 LBS | DIASTOLIC BLOOD PRESSURE: 58 MMHG | TEMPERATURE: 99 F | RESPIRATION RATE: 10 BRPM | BODY MASS INDEX: 27.71 KG/M2 | OXYGEN SATURATION: 98 %

## 2025-02-26 VITALS
BODY MASS INDEX: 27.85 KG/M2 | HEIGHT: 58 IN | RESPIRATION RATE: 20 BRPM | SYSTOLIC BLOOD PRESSURE: 144 MMHG | TEMPERATURE: 98 F | WEIGHT: 132.69 LBS | DIASTOLIC BLOOD PRESSURE: 71 MMHG | OXYGEN SATURATION: 99 % | HEART RATE: 66 BPM
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
## 2025-02-26 DIAGNOSIS — T18.128A ESOPHAGEAL OBSTRUCTION DUE TO FOOD IMPACTION: Primary | ICD-10-CM

## 2025-02-26 DIAGNOSIS — R13.19 ESOPHAGEAL DYSPHAGIA: Primary | ICD-10-CM

## 2025-02-26 LAB
ANION GAP SERPL CALC-SCNC: 16 MMOL/L (ref 8–16)
BASOPHILS # BLD AUTO: 0.03 K/UL (ref 0–0.2)
BASOPHILS NFR BLD: 0.4 % (ref 0–1.9)
BUN SERPL-MCNC: 24 MG/DL (ref 8–23)
CALCIUM SERPL-MCNC: 9.6 MG/DL (ref 8.7–10.5)
CHLORIDE SERPL-SCNC: 105 MMOL/L (ref 95–110)
CO2 SERPL-SCNC: 20 MMOL/L (ref 23–29)
CREAT SERPL-MCNC: 0.8 MG/DL (ref 0.5–1.4)
DIFFERENTIAL METHOD BLD: NORMAL
EOSINOPHIL # BLD AUTO: 0 K/UL (ref 0–0.5)
EOSINOPHIL NFR BLD: 0.5 % (ref 0–8)
ERYTHROCYTE [DISTWIDTH] IN BLOOD BY AUTOMATED COUNT: 13 % (ref 11.5–14.5)
EST. GFR  (NO RACE VARIABLE): >60 ML/MIN/1.73 M^2
GLUCOSE SERPL-MCNC: 76 MG/DL (ref 70–110)
HCT VFR BLD AUTO: 45.2 % (ref 37–48.5)
HGB BLD-MCNC: 15 G/DL (ref 12–16)
IMM GRANULOCYTES # BLD AUTO: 0.01 K/UL (ref 0–0.04)
IMM GRANULOCYTES NFR BLD AUTO: 0.1 % (ref 0–0.5)
LYMPHOCYTES # BLD AUTO: 1.8 K/UL (ref 1–4.8)
LYMPHOCYTES NFR BLD: 23.8 % (ref 18–48)
MCH RBC QN AUTO: 30.6 PG (ref 27–31)
MCHC RBC AUTO-ENTMCNC: 33.2 G/DL (ref 32–36)
MCV RBC AUTO: 92 FL (ref 82–98)
MONOCYTES # BLD AUTO: 0.6 K/UL (ref 0.3–1)
MONOCYTES NFR BLD: 7.8 % (ref 4–15)
NEUTROPHILS # BLD AUTO: 5 K/UL (ref 1.8–7.7)
NEUTROPHILS NFR BLD: 67.4 % (ref 38–73)
NRBC BLD-RTO: 0 /100 WBC
PLATELET # BLD AUTO: 241 K/UL (ref 150–450)
PMV BLD AUTO: 9.6 FL (ref 9.2–12.9)
POTASSIUM SERPL-SCNC: 4.4 MMOL/L (ref 3.5–5.1)
RBC # BLD AUTO: 4.9 M/UL (ref 4–5.4)
SODIUM SERPL-SCNC: 141 MMOL/L (ref 136–145)
WBC # BLD AUTO: 7.48 K/UL (ref 3.9–12.7)

## 2025-02-26 PROCEDURE — 99285 EMERGENCY DEPT VISIT HI MDM: CPT | Mod: 25

## 2025-02-26 PROCEDURE — 99284 EMERGENCY DEPT VISIT MOD MDM: CPT | Mod: 25,,, | Performed by: INTERNAL MEDICINE

## 2025-02-26 PROCEDURE — 37000008 HC ANESTHESIA 1ST 15 MINUTES: Performed by: INTERNAL MEDICINE

## 2025-02-26 PROCEDURE — 85025 COMPLETE CBC W/AUTO DIFF WBC: CPT | Performed by: NURSE PRACTITIONER

## 2025-02-26 PROCEDURE — 80048 BASIC METABOLIC PNL TOTAL CA: CPT | Performed by: NURSE PRACTITIONER

## 2025-02-26 PROCEDURE — 63600175 PHARM REV CODE 636 W HCPCS: Performed by: STUDENT IN AN ORGANIZED HEALTH CARE EDUCATION/TRAINING PROGRAM

## 2025-02-26 PROCEDURE — 37000009 HC ANESTHESIA EA ADD 15 MINS: Performed by: INTERNAL MEDICINE

## 2025-02-26 PROCEDURE — 25000003 PHARM REV CODE 250: Performed by: STUDENT IN AN ORGANIZED HEALTH CARE EDUCATION/TRAINING PROGRAM

## 2025-02-26 PROCEDURE — 99284 EMERGENCY DEPT VISIT MOD MDM: CPT | Mod: 25,27

## 2025-02-26 RX ORDER — HYDROMORPHONE HYDROCHLORIDE 2 MG/ML
0.2 INJECTION, SOLUTION INTRAMUSCULAR; INTRAVENOUS; SUBCUTANEOUS EVERY 5 MIN PRN
Refills: 0 | OUTPATIENT
Start: 2025-02-26

## 2025-02-26 RX ORDER — OXYCODONE HYDROCHLORIDE 5 MG/1
10 TABLET ORAL EVERY 4 HOURS PRN
Refills: 0 | OUTPATIENT
Start: 2025-02-26

## 2025-02-26 RX ORDER — DEXMEDETOMIDINE HYDROCHLORIDE 100 UG/ML
INJECTION, SOLUTION INTRAVENOUS
Status: DISCONTINUED | OUTPATIENT
Start: 2025-02-26 | End: 2025-02-26

## 2025-02-26 RX ORDER — TOPICAL ANESTHETIC 200 MG/ML
SPRAY DENTAL; PERIODONTAL
Status: DISCONTINUED | OUTPATIENT
Start: 2025-02-26 | End: 2025-02-26

## 2025-02-26 RX ORDER — ONDANSETRON HYDROCHLORIDE 2 MG/ML
INJECTION, SOLUTION INTRAVENOUS
Status: DISCONTINUED | OUTPATIENT
Start: 2025-02-26 | End: 2025-02-26

## 2025-02-26 RX ORDER — OXYCODONE HYDROCHLORIDE 5 MG/1
5 TABLET ORAL
Refills: 0 | OUTPATIENT
Start: 2025-02-26

## 2025-02-26 RX ORDER — PHENYLEPHRINE HYDROCHLORIDE 10 MG/ML
INJECTION INTRAVENOUS
Status: DISCONTINUED | OUTPATIENT
Start: 2025-02-26 | End: 2025-02-26

## 2025-02-26 RX ORDER — IPRATROPIUM BROMIDE AND ALBUTEROL SULFATE 2.5; .5 MG/3ML; MG/3ML
3 SOLUTION RESPIRATORY (INHALATION) EVERY 4 HOURS PRN
OUTPATIENT
Start: 2025-02-26

## 2025-02-26 RX ORDER — PROPOFOL 10 MG/ML
VIAL (ML) INTRAVENOUS
Status: DISCONTINUED | OUTPATIENT
Start: 2025-02-26 | End: 2025-02-26

## 2025-02-26 RX ORDER — HALOPERIDOL 5 MG/ML
0.5 INJECTION INTRAMUSCULAR EVERY 10 MIN PRN
OUTPATIENT
Start: 2025-02-26

## 2025-02-26 RX ORDER — PROPOFOL 10 MG/ML
VIAL (ML) INTRAVENOUS CONTINUOUS PRN
Status: DISCONTINUED | OUTPATIENT
Start: 2025-02-26 | End: 2025-02-26

## 2025-02-26 RX ORDER — LIDOCAINE HYDROCHLORIDE 20 MG/ML
INJECTION INTRAVENOUS
Status: DISCONTINUED | OUTPATIENT
Start: 2025-02-26 | End: 2025-02-26

## 2025-02-26 RX ADMIN — LIDOCAINE HYDROCHLORIDE 100 MG: 20 INJECTION, SOLUTION INTRAVENOUS at 03:02

## 2025-02-26 RX ADMIN — DEXMEDETOMIDINE HYDROCHLORIDE 8 MCG: 100 INJECTION, SOLUTION, CONCENTRATE INTRAVENOUS at 03:02

## 2025-02-26 RX ADMIN — ONDANSETRON 4 MG: 2 INJECTION, SOLUTION INTRAMUSCULAR; INTRAVENOUS at 03:02

## 2025-02-26 RX ADMIN — PHENYLEPHRINE HYDROCHLORIDE 100 MCG: 10 INJECTION INTRAVENOUS at 03:02

## 2025-02-26 RX ADMIN — PROPOFOL 150 MCG/KG/MIN: 10 INJECTION, EMULSION INTRAVENOUS at 03:02

## 2025-02-26 RX ADMIN — GLYCOPYRROLATE 0.2 MG: 0.2 INJECTION, SOLUTION INTRAMUSCULAR; INTRAVITREAL at 03:02

## 2025-02-26 RX ADMIN — SODIUM CHLORIDE: 0.9 INJECTION, SOLUTION INTRAVENOUS at 03:02

## 2025-02-26 RX ADMIN — PROPOFOL 50 MG: 10 INJECTION, EMULSION INTRAVENOUS at 03:02

## 2025-02-26 RX ADMIN — TOPICAL ANESTHETIC 1 EACH: 200 SPRAY DENTAL; PERIODONTAL at 03:02

## 2025-02-26 NOTE — ED NOTES
Pt going to Ed @ [facility]  in the Ed knows patient  is coming POV. Report number [id number].

## 2025-02-26 NOTE — CONSULTS
[facility] - Endoscopy ([facility])  Gastroenterology  Consult Note    Patient Name: Megha Mendoza  MRN: [id number]  Admission Date: 2/26/2025  Hospital Length of Stay: 0 days  Code Status: Prior   Attending Provider: No att. providers found   Consulting Provider: Edgar Walker MD  Primary Care Physician: Tomy Baron MD  Principal Problem:<principal problem not specified>    Inpatient consult to Gastroenterology  Consult performed by: Edgar Walker MD  Consult ordered by: Bouchra Fernandez MD        Subjective:     HPI:  79 y.o. female with recurrent esophageal impaction presents via private vehicle after she was found to have an esophageal food impaction at outside hospital this morning. She says that she has had ongoing symptoms since Sunday. She reports similar incident occurred last year.     Past Medical History:   Diagnosis Date    Arthritis     Back pain     Bronchitis, chronic     Carotid artery disease     50% blockage bilateral    Colitis, acute     COPD (chronic obstructive pulmonary disease)     Coronary artery disease     Diverticulitis     DJD (degenerative joint disease)     Elevated d-dimer 05/10/2023    GERD (gastroesophageal reflux disease)     Hemorrhoids     Hiatal hernia     History of diverticulitis 11/22/2015    Hyperlipidemia     Irritable bowel syndrome with constipation     Mixed stress and urge urinary incontinence 03/30/2021    Obstructive sleep apnea syndrome 10/06/2015    Osteoporosis     Respiratory distress     Trouble in sleeping        Past Surgical History:   Procedure Laterality Date    CARDIAC CATHETERIZATION  05/01/2015    CARPAL TUNNEL RELEASE Bilateral 11/20/2019    COLONOSCOPY N/A 10/11/2022    Procedure: COLONOSCOPY;  Surgeon: Tomy Baron MD;  Location: [facility];  Service: Endoscopy;  Laterality: N/A;    ESOPHAGEAL MANOMETRY WITH MEASUREMENT OF IMPEDANCE N/A 06/30/2023    Procedure: MANOMETRY, ESOPHAGUS, WITH IMPEDANCE MEASUREMENT;  Surgeon:  Joseluis Santana MD;  Location: [facility] (4TH FLR);  Service: Endoscopy;  Laterality: N/A; Referred by Dr. Dunlap    ESOPHAGOGASTRODUODENOSCOPY N/A 05/08/2023    Procedure: EGD (ESOPHAGOGASTRODUODENOSCOPY);  Surgeon: Khushboo Dunlap MD;  Location: [facility];  Service: Endoscopy;  Laterality: N/A;    ESOPHAGOGASTRODUODENOSCOPY N/A 04/05/2024    Procedure: EGD (ESOPHAGOGASTRODUODENOSCOPY);  Surgeon: Carmela Sequeira MD;  Location: [facility];  Service: Endoscopy;  Laterality: N/A;    ESOPHAGOGASTRODUODENOSCOPY N/A 05/28/2024    Procedure: EGD (ESOPHAGOGASTRODUODENOSCOPY);  Surgeon: Kasia Hale MD;  Location: [facility] (2ND FLR);  Service: Endoscopy;  Laterality: N/A; endoflip/4 liters of oxygen at night    ESOPHAGOGASTRODUODENOSCOPY N/A 10/25/2024    Procedure: EGD (ESOPHAGOGASTRODUODENOSCOPY);  Surgeon: Bruce Malik MD;  Location: [facility] (2ND FLR);  Service: Endoscopy;  Laterality: N/A;    ESOPHAGOGASTRODUODENOSCOPY N/A 10/24/2024    Procedure: EGD (ESOPHAGOGASTRODUODENOSCOPY);  Surgeon: Karen Collado MD;  Location: [facility] OR 2ND FLR;  Service: General;  Laterality: N/A;    ESOPHAGOGASTRODUODENOSCOPY N/A 11/1/2024    Procedure: EGD (ESOPHAGOGASTRODUODENOSCOPY);  Surgeon: Karen Collado MD;  Location: [facility] OR 2ND FLR;  Service: General;  Laterality: N/A;    EYE SURGERY      HAND TENODESIS Bilateral 10/04/2019    HEMORRHOID SURGERY  2009    LAPAROSCOPIC FUNDOPLICATION  03/19/2022    ROBOT-ASSISTED LAPAROSCOPIC PYLOROPLASTY USING DA PERRY XI N/A 11/1/2024    Procedure: XI ROBOTIC  PYLOROMYOTOMY;  Surgeon: Karen Collado MD;  Location: [facility] OR 2ND FLR;  Service: General;  Laterality: N/A;    TOTAL ABDOMINAL HYSTERECTOMY  1995    TOTAL KNEE ARTHROPLASTY Left 08/02/2017    TUBAL LIGATION Bilateral        Review of patient's allergies indicates:   Allergen Reactions    Cephalexin Rash and Other (See Comments)     Solid patches - rash like skin thickened    Hibiclens  [chlorhexidine gluconate] Itching    Sulfa (sulfonamide antibiotics) Rash     Family History       Problem Relation (Age of Onset)    Arthritis Mother    COPD Sister    Cancer Sister    Diabetes Mother    Heart disease Father    Hypertension Mother    Stroke Mother, Father          Tobacco Use    Smoking status: Former     Current packs/day: 0.00     Average packs/day: 0.8 packs/day for 46.0 years (34.5 ttl pk-yrs)     Types: Cigarettes     Start date: 10/14/1959     Quit date: 10/14/2005     Years since quittin.3     Passive exposure: Past    Smokeless tobacco: Never    Tobacco comments:     Stopped and started several times   Substance and Sexual Activity    Alcohol use: No    Drug use: Never    Sexual activity: Not Currently     Birth control/protection: Surgical     Comment:      Review of Systems   HENT:  Positive for trouble swallowing.    Cardiovascular:  Positive for chest pain.     Objective:     Vital Signs (Most Recent):  Temp: 98.8 °F (37.1 °C) (25 1454)  Pulse: 76 (25 1454)  Resp: 18 (25 1454)  BP: (!) 142/65 (25 1454)  SpO2: 98 % (25 1454) Vital Signs (24h Range):  Temp:  [97.7 °F (36.5 °C)-98.8 °F (37.1 °C)] 98.8 °F (37.1 °C)  Pulse:  [63-84] 76  Resp:  [18-20] 18  SpO2:  [94 %-99 %] 98 %  BP: (133-184)/(60-72) 142/65     Weight: 59.9 kg (132 lb) (25 1349)  Body mass index is 27.59 kg/m².    No intake or output data in the 24 hours ending 25 1509    Lines/Drains/Airways       Peripheral Intravenous Line  Duration                  Peripheral IV - Single Lumen 25 1407 22 G 1 in Right Forearm <1 day                     Physical Exam  Constitutional:       Appearance: She is well-developed.   HENT:      Head: Normocephalic and atraumatic.   Eyes:      Conjunctiva/sclera: Conjunctivae normal.   Pulmonary:      Effort: Pulmonary effort is normal. No respiratory distress.   Musculoskeletal:      Cervical back: Normal range of motion.   Neurological:       Mental Status: She is alert and oriented to person, place, and time.   Psychiatric:         Behavior: Behavior normal.         Thought Content: Thought content normal.         Judgment: Judgment normal.          Significant Labs:  Recent Lab Results         02/26/25  1415        Anion Gap 16       Baso # 0.03       Basophil % 0.4       BUN 24       Calcium 9.6       Chloride 105       CO2 20       Creatinine 0.8       Differential Method Automated       eGFR >60       Eos # 0.0       Eos % 0.5       Glucose 76       Gran # (ANC) 5.0       Gran % 67.4       Hematocrit 45.2       Hemoglobin 15.0       Immature Grans (Abs) 0.01  Comment: Mild elevation in immature granulocytes is non specific and   can be seen in a variety of conditions including stress response,   acute inflammation, trauma and pregnancy. Correlation with other   laboratory and clinical findings is essential.         Immature Granulocytes 0.1       Lymph # 1.8       Lymph % 23.8       MCH 30.6       MCHC 33.2       MCV 92       Mono # 0.6       Mono % 7.8       MPV 9.6       nRBC 0       Platelet Count 241       Potassium 4.4       RBC 4.90       RDW 13.0       Sodium 141       WBC 7.48               Significant Imaging:  Imaging results within the past 24 hours have been reviewed.  Assessment/Plan:     GI  Esophageal obstruction due to food impaction  Plan for EGD today  NPO  Further recommendations to follow        Thank you for your consult. I will follow-up with patient. Please contact us if you have any additional questions.    Edgar Walker MD  Gastroenterology  [place] - Endoscopy ([facility])

## 2025-02-26 NOTE — PROVATION PATIENT INSTRUCTIONS
Discharge Summary/Instructions after an Endoscopic Procedure  Patient Name: Megha Mendoza  Patient MRN: [id number]  Patient YOB: 1945 Wednesday, February 26, 2025  Edgar Walker MD  Dear patient,  As a result of recent federal legislation (The Federal Cures Act), you may   receive lab or pathology results from your procedure in your [facility]   account before your physician is able to contact you. Your physician or   their representative will relay the results to you with their   recommendations at their soonest availability.  Thank you,  Your health is very important to us during the Covid Crisis. Following your   procedure today, you will receive a daily text for 2 weeks asking about   signs or symptoms of Covid 19.  Please respond to this text when you   receive it so we can follow up and keep you as safe as possible.   RESTRICTIONS:  During your procedure today, you received medications for sedation.  These   medications may affect your judgment, balance and coordination.  Therefore,   for 24 hours, you have the following restrictions:   - DO NOT drive a car, operate machinery, make legal/financial decisions,   sign important papers or drink alcohol.    ACTIVITY:  Today: no heavy lifting, straining or running due to procedural   sedation/anesthesia.  The following day: return to full activity including work.  DIET:  Eat and drink normally unless instructed otherwise.     TREATMENT FOR COMMON SIDE EFFECTS:  - Mild abdominal pain, nausea, belching, bloating or excessive gas:  rest,   eat lightly and use a heating pad.  - Sore Throat: treat with throat lozenges and/or gargle with warm salt   water.  - Because air was used during the procedure, expelling large amounts of air   from your rectum or belching is normal.  - If a bowel prep was taken, you may not have a bowel movement for 1-3 days.    This is normal.  SYMPTOMS TO WATCH FOR AND REPORT TO YOUR PHYSICIAN:  1. Abdominal pain or bloating,  other than gas cramps.  2. Chest pain.  3. Back pain.  4. Signs of infection such as: chills or fever occurring within 24 hours   after the procedure.  5. Rectal bleeding, which would show as bright red, maroon, or black stools.   (A tablespoon of blood from the rectum is not serious, especially if   hemorrhoids are present.)  6. Vomiting.  7. Weakness or dizziness.  GO DIRECTLY TO THE NEAREST EMERGENCY ROOM IF YOU HAVE ANY OF THE FOLLOWING:      Difficulty breathing              Chills and/or fever over 101 F   Persistent vomiting and/or vomiting blood   Severe abdominal pain   Severe chest pain   Black, tarry stools   Bleeding- more than one tablespoon   Any other symptom or condition that you feel may need urgent attention  Your doctor recommends these additional instructions:  If any biopsies were taken, your doctors clinic will contact you in 1 to 2   weeks with any results.  - Discharge patient to home.   - Full liquid diet.   - Continue present medications.   - Return to GI office ([facility]) at the next available appointment.  For questions, problems or results please call your physician - Edgar Walker MD.  EMERGENCY PHONE NUMBER: [phone],  LAB RESULTS: [phone]  IF A COMPLICATION OR EMERGENCY SITUATION ARISES AND YOU ARE UNABLE TO REACH   YOUR PHYSICIAN - GO DIRECTLY TO THE EMERGENCY ROOM.  Edgar Walker MD  2/26/2025 3:35:33 PM  This report has been verified and signed electronically.  Dear patient,  As a result of recent federal legislation (The Federal Cures Act), you may   receive lab or pathology results from your procedure in your [facility]   account before your physician is able to contact you. Your physician or   their representative will relay the results to you with their   recommendations at their soonest availability.  Thank you,  [employer]ATION

## 2025-02-26 NOTE — ED TRIAGE NOTES
79 y.o female presents to the ED with CC of food bolus. Patient reports last meal was Sunday and last drink was yesterday. Denies SOB and difficulty breathing. Reports hx of reoccurring  food bolus. Denies any other symptoms. AAOx4,NAD.

## 2025-02-26 NOTE — ED PROVIDER NOTES
Encounter Date: 2/26/2025       History     Chief Complaint   Patient presents with    Food bolus     Food bolus 4 days ago. Pt. Has not been able to tolerate p.o...hx. recurrent food bolus.      79 y.o. female with recurrent esophageal impaction presents via private vehicle after she was found to have an esophageal food impaction at outside hospital this morning.  She says that she has had ongoing symptoms since Sunday.  She reports similar incident occurred last year.    The history is provided by the patient and a relative.     Review of patient's allergies indicates:   Allergen Reactions    Cephalexin Rash and Other (See Comments)     Solid patches - rash like skin thickened    Hibiclens [chlorhexidine gluconate] Itching    Sulfa (sulfonamide antibiotics) Rash     Past Medical History:   Diagnosis Date    Arthritis     Back pain     Bronchitis, chronic     Carotid artery disease     50% blockage bilateral    Colitis, acute     COPD (chronic obstructive pulmonary disease)     Coronary artery disease     Diverticulitis     DJD (degenerative joint disease)     Elevated d-dimer 05/10/2023    GERD (gastroesophageal reflux disease)     Hemorrhoids     Hiatal hernia     History of diverticulitis 11/22/2015    Hyperlipidemia     Irritable bowel syndrome with constipation     Mixed stress and urge urinary incontinence 03/30/2021    Obstructive sleep apnea syndrome 10/06/2015    Osteoporosis     Respiratory distress     Trouble in sleeping      Past Surgical History:   Procedure Laterality Date    CARDIAC CATHETERIZATION  05/01/2015    CARPAL TUNNEL RELEASE Bilateral 11/20/2019    COLONOSCOPY N/A 10/11/2022    Procedure: COLONOSCOPY;  Surgeon: Tomy Baron MD;  Location: [facility];  Service: Endoscopy;  Laterality: N/A;    ESOPHAGEAL MANOMETRY WITH MEASUREMENT OF IMPEDANCE N/A 06/30/2023    Procedure: MANOMETRY, ESOPHAGUS, WITH IMPEDANCE MEASUREMENT;  Surgeon: Joseluis Santana MD;  Location: [facility] ([address] [place] [address]);   Service: Endoscopy;  Laterality: N/A; Referred by Dr. Dunlap    ESOPHAGOGASTRODUODENOSCOPY N/A 05/08/2023    Procedure: EGD (ESOPHAGOGASTRODUODENOSCOPY);  Surgeon: Khushboo Dunlap MD;  Location: [facility];  Service: Endoscopy;  Laterality: N/A;    ESOPHAGOGASTRODUODENOSCOPY N/A 04/05/2024    Procedure: EGD (ESOPHAGOGASTRODUODENOSCOPY);  Surgeon: Carmela Sequeira MD;  Location: [facility];  Service: Endoscopy;  Laterality: N/A;    ESOPHAGOGASTRODUODENOSCOPY N/A 05/28/2024    Procedure: EGD (ESOPHAGOGASTRODUODENOSCOPY);  Surgeon: Kasia Hale MD;  Location: [facility] (2ND FLR);  Service: Endoscopy;  Laterality: N/A; endoflip/4 liters of oxygen at night    ESOPHAGOGASTRODUODENOSCOPY N/A 10/25/2024    Procedure: EGD (ESOPHAGOGASTRODUODENOSCOPY);  Surgeon: Bruce Malik MD;  Location: [facility] (2ND FLR);  Service: Endoscopy;  Laterality: N/A;    ESOPHAGOGASTRODUODENOSCOPY N/A 10/24/2024    Procedure: EGD (ESOPHAGOGASTRODUODENOSCOPY);  Surgeon: Karen Collado MD;  [facility];  Service: General;  Laterality: N/A;    ESOPHAGOGASTRODUODENOSCOPY N/A 11/1/2024    Procedure: EGD (ESOPHAGOGASTRODUODENOSCOPY);  Surgeon: Karen Collado MD;  [facility];  Service: General;  Laterality: N/A;    EYE SURGERY      HAND TENODESIS Bilateral 10/04/2019    HEMORRHOID SURGERY  2009    LAPAROSCOPIC FUNDOPLICATION  03/19/2022    ROBOT-ASSISTED LAPAROSCOPIC PYLOROPLASTY USING DA PERRY XI N/A 11/1/2024    Procedure: XI ROBOTIC  PYLOROMYOTOMY;  Surgeon: Karen Collado MD;  Location: [facility] OR 2ND FLR;  Service: General;  Laterality: N/A;    TOTAL ABDOMINAL HYSTERECTOMY  1995    TOTAL KNEE ARTHROPLASTY Left 08/02/2017    TUBAL LIGATION Bilateral      Family History   Problem Relation Name Age of Onset    Diabetes Mother      Hypertension Mother      Arthritis Mother      Stroke Mother      Stroke Father      Heart disease Father          congenital heart disease     Cancer Sister 1         lung    COPD Sister 1     Colon cancer Neg Hx      Esophageal cancer Neg Hx       Social History[1]  Review of Systems    Physical Exam     Initial Vitals [02/26/25 1349]   BP Pulse Resp Temp SpO2   (!) 184/72 83 20 98.7 °F (37.1 °C) (!) 94 %      MAP       --         Physical Exam    Nursing note and vitals reviewed.  Constitutional: She appears well-developed. She is not diaphoretic. No distress.   HENT:   Head: Normocephalic and atraumatic.   Eyes: EOM are normal. Pupils are equal, round, and reactive to light.   Neck:   Normal range of motion.  Cardiovascular:  Normal rate.           Pulmonary/Chest: No respiratory distress.   Abdominal: She exhibits no distension.   Musculoskeletal:         General: Normal range of motion.      Cervical back: Normal range of motion.     Neurological: She is alert and oriented to person, place, and time.   Skin: Skin is warm and dry.   Psychiatric: She has a normal mood and affect.         ED Course   Procedures  Labs Reviewed   BASIC METABOLIC PANEL - Abnormal       Result Value    Sodium 141      Potassium 4.4      Chloride 105      CO2 20 (*)     Glucose 76      BUN 24 (*)     Creatinine 0.8      Calcium 9.6      Anion Gap 16      eGFR >60     CBC W/ AUTO DIFFERENTIAL    WBC 7.48      RBC 4.90      Hemoglobin 15.0      Hematocrit 45.2      MCV 92      MCH 30.6      MCHC 33.2      RDW 13.0      Platelets 241      MPV 9.6      Immature Granulocytes 0.1      Gran # (ANC) 5.0      Immature Grans (Abs) 0.01      Lymph # 1.8      Mono # 0.6      Eos # 0.0      Baso # 0.03      nRBC 0      Gran % 67.4      Lymph % 23.8      Mono % 7.8      Eosinophil % 0.5      Basophil % 0.4      Differential Method Automated            Imaging Results    None          Medications - No data to display  Medical Decision Making  79-year-old female past medical history as above presents for management of distal esophageal food impaction now that outside facility.  CT Chest from  OSH shows distal esophagus is dilated with suspected retained food bolus at the level of the GE junction.  Says with GI, Dr. Cleveland and consult placed. Says that pt can be discharged after she returns from EGD.    Amount and/or Complexity of Data Reviewed  External Data Reviewed: radiology.     Details: CT from OSH                                           Clinical Impression:  Final diagnoses:  [T18.128A, W44.F3XA] Esophageal obstruction due to food impaction (Primary)          ED Disposition Condition    Observation Stable                  Bouchra Fernandez MD  [id number]         [1]   Social History  Tobacco Use    Smoking status: Former     Current packs/day: 0.00     Average packs/day: 0.8 packs/day for 46.0 years (34.5 ttl pk-yrs)     Types: Cigarettes     Start date: 10/14/1959     Quit date: 10/14/2005     Years since quittin.3     Passive exposure: Past    Smokeless tobacco: Never    Tobacco comments:     Stopped and started several times   Substance Use Topics    Alcohol use: No    Drug use: Never        Bouchra Fernandez MD  25 [phone]

## 2025-02-26 NOTE — FIRST PROVIDER EVALUATION
Emergency Department TeleTriage Encounter Note      CHIEF COMPLAINT    Chief Complaint   Patient presents with    Food bolus     Food bolus 4 days ago. Pt. Has not been able to tolerate p.o...hx. recurrent food bolus.        VITAL SIGNS   Initial Vitals [02/26/25 1349]   BP Pulse Resp Temp SpO2   (!) 184/72 83 20 98.7 °F (37.1 °C) (!) 94 %      MAP       --            ALLERGIES    Review of patient's allergies indicates:   Allergen Reactions    Cephalexin Rash and Other (See Comments)     Solid patches - rash like skin thickened    Hibiclens [chlorhexidine gluconate] Itching    Sulfa (sulfonamide antibiotics) Rash       PROVIDER TRIAGE NOTE  This is a teletriage evaluation of a 79 y.o. female presenting to the ED complaining of food bolus impaction.    Alert, no distress.     Initial orders will be placed and care will be transferred to an alternate provider when patient is roomed for a full evaluation. Any additional orders and the final disposition will be determined by that provider.         ORDERS  Labs Reviewed - No data to display    ED Orders (720h ago, onward)      Start Ordered     Status Ordering Provider    02/26/25 1408 02/26/25 1407  CBC auto differential  STAT         Ordered CHAD HU    02/26/25 1408 02/26/25 1407  Basic metabolic panel  STAT         Ordered CHAD HU.    02/26/25 1407 02/26/25 1407  Insert Saline lock IV  Once         Ordered CHAD HU              Virtual Visit Note: The provider triage portion of this emergency department evaluation and documentation was performed via [email or web address], a HIPAA-compliant telemedicine application, in concert with a tele-presenter in the room. A face to face patient evaluation with one of my colleagues will occur once the patient is placed in an emergency department room.      DISCLAIMER: This note was prepared with [employer] voice recognition transcription software. Garbled syntax, mangled pronouns, and  other bizarre constructions may be attributed to that software system.

## 2025-02-26 NOTE — ANESTHESIA POSTPROCEDURE EVALUATION
Anesthesia Post Evaluation    Patient: Megha Mendoza    Procedure(s) Performed: Procedure(s) (LRB):  EGD (ESOPHAGOGASTRODUODENOSCOPY) (N/A)    Final Anesthesia Type: general      Patient location during evaluation: PACU  Patient participation: Yes- Able to Participate  Level of consciousness: awake  Post-procedure vital signs: reviewed and stable  Pain management: adequate  Airway patency: patent    PONV status at discharge: No PONV  Anesthetic complications: no      Cardiovascular status: blood pressure returned to baseline  Respiratory status: unassisted  Hydration status: euvolemic  Follow-up not needed.              Vitals Value Taken Time   /58 02/26/25 15:50   Temp 37 °C (98.6 °F) 02/26/25 15:35   Pulse 72 02/26/25 15:50   Resp 12 02/26/25 15:50   SpO2 96 % 02/26/25 15:50         No case tracking events are documented in the log.      Pain/Lou Score: No data recorded

## 2025-02-26 NOTE — TELEPHONE ENCOUNTER
Spoke with pt and Dr. Baron advise she go to [place] emergency room, pt states she hasn't eaten or drank since Sunday 02/23/2025. Pt verbalized understanding.

## 2025-02-26 NOTE — ED PROVIDER NOTES
"Encounter Date: 2/26/2025    Source of History:   Patient and medical record, without language barrier or      Chief complaint:  Foreign Body In Throat (Pt arrives to the ed with c/o food sensation in throat. Pt has had this issue for years after hernia surgery, "my esophagus twist at the bottom." Pt last ate Sunday. )    HPI:  Megha Mendoza is a 79 y.o. female with recurrent esophageal impaction presenting with chief complaint of foreign body sensation in throat.  Patient states since Sunday she has been having foreign body sensation and has not been eating.  She endorses 1 episode of vomiting of undigested food.  She had a hernia surgery at the end of last year and has not had any issues with esophageal impaction since then.  Denies any bloody emesis or significant abdominal pain.  Endorses mild retrosternal chest discomfort.  Patient refusing IV or transfer at this time stating that last time she had to wait in the ER for 8 hours that she is going to take herself to [place] if she has something in her throat    This is the extent to the patients complaints today here in the emergency department.    ROS: A review of systems was conducted with pertinent positive and negative findings documented in HPI with all other systems reviewed and negative.  ROS    Review of patient's allergies indicates:   Allergen Reactions    Cephalexin Rash and Other (See Comments)     Solid patches - rash like skin thickened    Hibiclens [chlorhexidine gluconate] Itching    Sulfa (sulfonamide antibiotics) Rash       PMH:  As per HPI and below:  Past Medical History:   Diagnosis Date    Arthritis     Back pain     Bronchitis, chronic     Carotid artery disease     50% blockage bilateral    Colitis, acute     COPD (chronic obstructive pulmonary disease)     Coronary artery disease     Diverticulitis     DJD (degenerative joint disease)     Elevated d-dimer 05/10/2023    GERD (gastroesophageal reflux disease)     Hemorrhoids     " Hiatal hernia     History of diverticulitis 11/22/2015    Hyperlipidemia     Irritable bowel syndrome with constipation     Mixed stress and urge urinary incontinence 03/30/2021    Obstructive sleep apnea syndrome 10/06/2015    Osteoporosis     Respiratory distress     Trouble in sleeping      Past Surgical History:   Procedure Laterality Date    CARDIAC CATHETERIZATION  05/01/2015    CARPAL TUNNEL RELEASE Bilateral 11/20/2019    COLONOSCOPY N/A 10/11/2022    Procedure: COLONOSCOPY;  Surgeon: Tomy Baron MD;  Location: [facility];  Service: Endoscopy;  Laterality: N/A;    ESOPHAGEAL MANOMETRY WITH MEASUREMENT OF IMPEDANCE N/A 06/30/2023    Procedure: MANOMETRY, ESOPHAGUS, WITH IMPEDANCE MEASUREMENT;  Surgeon: Joseluis Santana MD;  Location: [facility] (4TH FLR);  Service: Endoscopy;  Laterality: N/A; Referred by Dr. Dunlap    ESOPHAGOGASTRODUODENOSCOPY N/A 05/08/2023    Procedure: EGD (ESOPHAGOGASTRODUODENOSCOPY);  Surgeon: Khushboo Dunlap MD;  Location: [facility];  Service: Endoscopy;  Laterality: N/A;    ESOPHAGOGASTRODUODENOSCOPY N/A 04/05/2024    Procedure: EGD (ESOPHAGOGASTRODUODENOSCOPY);  Surgeon: Carmela Sequeira MD;  Location: [facility];  Service: Endoscopy;  Laterality: N/A;    ESOPHAGOGASTRODUODENOSCOPY N/A 05/28/2024    Procedure: EGD (ESOPHAGOGASTRODUODENOSCOPY);  Surgeon: Kasia Hale MD;  Location: [facility] (2ND FLR);  Service: Endoscopy;  Laterality: N/A; endoflip/4 liters of oxygen at night    ESOPHAGOGASTRODUODENOSCOPY N/A 10/25/2024    Procedure: EGD (ESOPHAGOGASTRODUODENOSCOPY);  Surgeon: Bruce Malik MD;  Location: [facility] (2ND FLR);  Service: Endoscopy;  Laterality: N/A;    ESOPHAGOGASTRODUODENOSCOPY N/A 10/24/2024    Procedure: EGD (ESOPHAGOGASTRODUODENOSCOPY);  Surgeon: Karen Collado MD;  [address];  Service: General;  Laterality: N/A;    ESOPHAGOGASTRODUODENOSCOPY N/A 11/1/2024    Procedure: EGD (ESOPHAGOGASTRODUODENOSCOPY);  Surgeon:  Karen Collado MD;  Location: [facility] OR 2ND FLR;  Service: General;  Laterality: N/A;    EYE SURGERY      HAND TENODESIS Bilateral 10/04/2019    HEMORRHOID SURGERY  2009    LAPAROSCOPIC FUNDOPLICATION  2022    ROBOT-ASSISTED LAPAROSCOPIC PYLOROPLASTY USING DA PERRY XI N/A 2024    Procedure: XI ROBOTIC  PYLOROMYOTOMY;  Surgeon: Karen Collado MD;  [facility];  Service: General;  Laterality: N/A;    TOTAL ABDOMINAL HYSTERECTOMY      TOTAL KNEE ARTHROPLASTY Left 2017    TUBAL LIGATION Bilateral      Social History     Socioeconomic History    Marital status:     Number of children: 4   Tobacco Use    Smoking status: Former     Current packs/day: 0.00     Average packs/day: 0.8 packs/day for 46.0 years (34.5 ttl pk-yrs)     Types: Cigarettes     Start date: 10/14/1959     Quit date: 10/14/2005     Years since quittin.3     Passive exposure: Past    Smokeless tobacco: Never    Tobacco comments:     Stopped and started several times   Substance and Sexual Activity    Alcohol use: No    Drug use: Never    Sexual activity: Not Currently     Birth control/protection: Surgical     Comment:      Social Drivers of Health     Financial Resource Strain: Low Risk  (10/24/2024)    Overall Financial Resource Strain (CARDIA)     Difficulty of Paying Living Expenses: Not hard at all   Food Insecurity: No Food Insecurity (10/24/2024)    Hunger Vital Sign     Worried About Running Out of Food in the Last Year: Never true     Ran Out of Food in the Last Year: Never true   Transportation Needs: No Transportation Needs (10/24/2024)    TRANSPORTATION NEEDS     Transportation : No   Physical Activity: Inactive (10/24/2024)    Exercise Vital Sign     Days of Exercise per Week: 0 days     Minutes of Exercise per Session: 0 min   Stress: No Stress Concern Present (10/24/2024)    [employer] [place] [employer] - Occupational Stress Questionnaire     Feeling of  "Stress : Not at all   Housing Stability: Unknown (10/24/2024)    Housing Stability Vital Sign     Unable to Pay for Housing in the Last Year: No     Homeless in the Last Year: No     Vitals:    BP (!) 144/71   Pulse 66   Temp 97.7 °F (36.5 °C)   Resp 20   Ht 4' 10" (1.473 m)   Wt 60.2 kg (132 lb 11.5 oz)   SpO2 99%   Breastfeeding No   BMI 27.74 kg/m²     Physical Exam  Vitals and nursing note reviewed.   Constitutional:       General: She is not in acute distress.     Appearance: Normal appearance. She is not toxic-appearing or diaphoretic.   HENT:      Head: Normocephalic and atraumatic.      Right Ear: External ear normal.      Left Ear: External ear normal.   Eyes:      General: No scleral icterus.     Conjunctiva/sclera: Conjunctivae normal.   Cardiovascular:      Rate and Rhythm: Normal rate and regular rhythm.   Pulmonary:      Effort: Pulmonary effort is normal. No respiratory distress.      Breath sounds: No stridor.   Abdominal:      General: Abdomen is flat. There is no distension.      Tenderness: There is no abdominal tenderness. There is no guarding.   Musculoskeletal:         General: No swelling.      Cervical back: Normal range of motion and neck supple.   Skin:     General: Skin is dry.      Coloration: Skin is not jaundiced.   Neurological:      Mental Status: She is alert. Mental status is at baseline.   Psychiatric:         Mood and Affect: Mood normal.         Behavior: Behavior normal.       Procedures    Laboratory Studies:  Labs that have been ordered have been independently reviewed and interpreted by myself.  Labs Reviewed - No data to display  Imaging Results              CT Chest Without Contrast (Final result)  Result time 02/26/25 10:08:44      Final result by Christy Ingram MD (02/26/25 10:08:44)                   Impression:      The distal esophagus is dilated with suspected retained food bolus at the level of the GE junction.    Centrilobular emphysematous disease with " few scattered solid pulmonary nodules, stable.  For multiple solid nodules all <6 mm, Fleischner Society 2017 guidelines recommend no routine follow up for a low risk patient, or follow up with non-contrast chest CT at 12 months after discovery in a high risk patient.      Electronically signed by: Christy Ingram MD  Date:    02/26/2025  Time:    10:08               Narrative:    EXAMINATION:  CT CHEST WITHOUT CONTRAST    CLINICAL HISTORY:  Aspiration;Suspect esophageal impaction;    TECHNIQUE:  Low dose axial images, sagittal and coronal reformations were obtained from the thoracic inlet to the lung bases. Contrast was not administered.    COMPARISON:  04/05/2024    FINDINGS:  The thyroid appears normal.  The main central airways are patent.  The esophagus is dilated distally with a suspected through bolus in the distal esophagus, similar in appearance to the previous study of 04/05/2024.  The heart is normal in size.  Calcified coronary artery disease.  There are calcifications of the aortic valve and aortic arch.  No mediastinal, hilar or axillary adenopathy is seen.    Centrilobular emphysematous disease.  Calcified granuloma in the lingula and left calcified hilar lymph nodes, stable.  There is a 2 mm solid nodule in the right lung apex, stable.  Stable nodule along the right minor fissure measuring 3 mm.  2 mm solid nodule in the left upper lobe, stable.  No new nodules.  No consolidation or pleural effusions.    Limited evaluation of the upper abdominal structures show no gross abnormality.    The bones are osteopenic and show age-appropriate degenerative change.                                    I decided to obtain the patient's medical records.  Summary of Medical Records:    Medications - No data to display  MDM:    79 y.o. female with foreign body sensation    Differential Dx:  Differential includes but is not limited to esophageal impaction, globus sensation, less likely esophageal tear    ED  Management:  Patient refusing labs, IV, transferred this time.  CT performed showing distal esophageal food impaction.  Discussed with GI to make them aware that patient was coming to [place] for evaluation.  Patient informed that she will need to go to the emergency department.  Patient is signed out AMA to drive POV to [place] for further treatment      Medical Decision Making  Amount and/or Complexity of Data Reviewed  Radiology: ordered.            Diagnostic Impression:    Final diagnoses:  [T18.128A, W44.F3XA] Esophageal obstruction due to food impaction (Primary)     ED Disposition Condition    AMA Stable                   Darinel Cohen MD  02/26/25 [id number]

## 2025-02-26 NOTE — HPI
79 y.o. female with recurrent esophageal impaction presents via private vehicle after she was found to have an esophageal food impaction at outside hospital this morning. She says that she has had ongoing symptoms since Sunday. She reports similar incident occurred last year.

## 2025-02-26 NOTE — TRANSFER OF CARE
"Anesthesia Transfer of Care Note    Patient: Megha Mendoza    Procedure(s) Performed: Procedure(s) (LRB):  EGD (ESOPHAGOGASTRODUODENOSCOPY) (N/A)    Patient location: PACU    Anesthesia Type: MAC    Transport from OR: Transported from OR on room air with adequate spontaneous ventilation    Post pain: adequate analgesia    Post assessment: no apparent anesthetic complications    Post vital signs: stable    Level of consciousness: responds to stimulation    Nausea/Vomiting: no nausea/vomiting    Complications: none    Transfer of care protocol was followed      Last vitals: Visit Vitals  BP (!) 142/65 (BP Location: Left arm, Patient Position: Lying)   Pulse 76   Temp 37.1 °C (98.8 °F) (Temporal)   Resp 18   Ht 4' 10" (1.473 m)   Wt 59.9 kg (132 lb)   SpO2 98%   Breastfeeding No   BMI 27.59 kg/m²     "

## 2025-02-26 NOTE — SUBJECTIVE & OBJECTIVE
Past Medical History:   Diagnosis Date    Arthritis     Back pain     Bronchitis, chronic     Carotid artery disease     50% blockage bilateral    Colitis, acute     COPD (chronic obstructive pulmonary disease)     Coronary artery disease     Diverticulitis     DJD (degenerative joint disease)     Elevated d-dimer 05/10/2023    GERD (gastroesophageal reflux disease)     Hemorrhoids     Hiatal hernia     History of diverticulitis 11/22/2015    Hyperlipidemia     Irritable bowel syndrome with constipation     Mixed stress and urge urinary incontinence 03/30/2021    Obstructive sleep apnea syndrome 10/06/2015    Osteoporosis     Respiratory distress     Trouble in sleeping        Past Surgical History:   Procedure Laterality Date    CARDIAC CATHETERIZATION  05/01/2015    CARPAL TUNNEL RELEASE Bilateral 11/20/2019    COLONOSCOPY N/A 10/11/2022    Procedure: COLONOSCOPY;  Surgeon: Tomy Baron MD;  Location: [facility];  Service: Endoscopy;  Laterality: N/A;    ESOPHAGEAL MANOMETRY WITH MEASUREMENT OF IMPEDANCE N/A 06/30/2023    Procedure: MANOMETRY, ESOPHAGUS, WITH IMPEDANCE MEASUREMENT;  Surgeon: Joseluis Santana MD;  Location: [facility] (4TH FLR);  Service: Endoscopy;  Laterality: N/A; Referred by Dr. Dunlap    ESOPHAGOGASTRODUODENOSCOPY N/A 05/08/2023    Procedure: EGD (ESOPHAGOGASTRODUODENOSCOPY);  Surgeon: Khushboo Dunlap MD;  Location: [facility];  Service: Endoscopy;  Laterality: N/A;    ESOPHAGOGASTRODUODENOSCOPY N/A 04/05/2024    Procedure: EGD (ESOPHAGOGASTRODUODENOSCOPY);  Surgeon: Carmela Sequeira MD;  Location: [facility];  Service: Endoscopy;  Laterality: N/A;    ESOPHAGOGASTRODUODENOSCOPY N/A 05/28/2024    Procedure: EGD (ESOPHAGOGASTRODUODENOSCOPY);  Surgeon: Kasia Hale MD;  Location: [facility] (2ND FLR);  Service: Endoscopy;  Laterality: N/A; endoflip/4 liters of oxygen at night    ESOPHAGOGASTRODUODENOSCOPY N/A 10/25/2024    Procedure: EGD (ESOPHAGOGASTRODUODENOSCOPY);  Surgeon:  Bruce Malik MD;  Location: [facility] ENDO (2ND FLR);  Service: Endoscopy;  Laterality: N/A;    ESOPHAGOGASTRODUODENOSCOPY N/A 10/24/2024    Procedure: EGD (ESOPHAGOGASTRODUODENOSCOPY);  Surgeon: Karen Collado MD;  Location: [facility] OR 2ND FLR;  Service: General;  Laterality: N/A;    ESOPHAGOGASTRODUODENOSCOPY N/A 2024    Procedure: EGD (ESOPHAGOGASTRODUODENOSCOPY);  Surgeon: Karen Collado MD;  Location: [facility] OR 2ND FLR;  Service: General;  Laterality: N/A;    EYE SURGERY      HAND TENODESIS Bilateral 10/04/2019    HEMORRHOID SURGERY      LAPAROSCOPIC FUNDOPLICATION  2022    ROBOT-ASSISTED LAPAROSCOPIC PYLOROPLASTY USING DA PERRY XI N/A 2024    Procedure: XI ROBOTIC  PYLOROMYOTOMY;  Surgeon: Karen Collado MD;  [facility];  Service: General;  Laterality: N/A;    TOTAL ABDOMINAL HYSTERECTOMY      TOTAL KNEE ARTHROPLASTY Left 2017    TUBAL LIGATION Bilateral        Review of patient's allergies indicates:   Allergen Reactions    Cephalexin Rash and Other (See Comments)     Solid patches - rash like skin thickened    Hibiclens [chlorhexidine gluconate] Itching    Sulfa (sulfonamide antibiotics) Rash     Family History       Problem Relation (Age of Onset)    Arthritis Mother    COPD Sister    Cancer Sister    Diabetes Mother    Heart disease Father    Hypertension Mother    Stroke Mother, Father          Tobacco Use    Smoking status: Former     Current packs/day: 0.00     Average packs/day: 0.8 packs/day for 46.0 years (34.5 ttl pk-yrs)     Types: Cigarettes     Start date: 10/14/1959     Quit date: 10/14/2005     Years since quittin.3     Passive exposure: Past    Smokeless tobacco: Never    Tobacco comments:     Stopped and started several times   Substance and Sexual Activity    Alcohol use: No    Drug use: Never    Sexual activity: Not Currently     Birth control/protection: Surgical     Comment:      Review of Systems    HENT:  Positive for trouble swallowing.    Cardiovascular:  Positive for chest pain.     Objective:     Vital Signs (Most Recent):  Temp: 98.8 °F (37.1 °C) (02/26/25 1454)  Pulse: 76 (02/26/25 1454)  Resp: 18 (02/26/25 1454)  BP: (!) 142/65 (02/26/25 1454)  SpO2: 98 % (02/26/25 1454) Vital Signs (24h Range):  Temp:  [97.7 °F (36.5 °C)-98.8 °F (37.1 °C)] 98.8 °F (37.1 °C)  Pulse:  [63-84] 76  Resp:  [18-20] 18  SpO2:  [94 %-99 %] 98 %  BP: (133-184)/(60-72) 142/65     Weight: 59.9 kg (132 lb) (02/26/25 1349)  Body mass index is 27.59 kg/m².    No intake or output data in the 24 hours ending 02/26/25 1509    Lines/Drains/Airways       Peripheral Intravenous Line  Duration                  Peripheral IV - Single Lumen 02/26/25 1407 22 G 1 in Right Forearm <1 day                     Physical Exam  Constitutional:       Appearance: She is well-developed.   HENT:      Head: Normocephalic and atraumatic.   Eyes:      Conjunctiva/sclera: Conjunctivae normal.   Pulmonary:      Effort: Pulmonary effort is normal. No respiratory distress.   Musculoskeletal:      Cervical back: Normal range of motion.   Neurological:      Mental Status: She is alert and oriented to person, place, and time.   Psychiatric:         Behavior: Behavior normal.         Thought Content: Thought content normal.         Judgment: Judgment normal.          Significant Labs:  Recent Lab Results         02/26/25  1415        Anion Gap 16       Baso # 0.03       Basophil % 0.4       BUN 24       Calcium 9.6       Chloride 105       CO2 20       Creatinine 0.8       Differential Method Automated       eGFR >60       Eos # 0.0       Eos % 0.5       Glucose 76       Gran # (ANC) 5.0       Gran % 67.4       Hematocrit 45.2       Hemoglobin 15.0       Immature Grans (Abs) 0.01  Comment: Mild elevation in immature granulocytes is non specific and   can be seen in a variety of conditions including stress response,   acute inflammation, trauma and pregnancy.  Correlation with other   laboratory and clinical findings is essential.         Immature Granulocytes 0.1       Lymph # 1.8       Lymph % 23.8       MCH 30.6       MCHC 33.2       MCV 92       Mono # 0.6       Mono % 7.8       MPV 9.6       nRBC 0       Platelet Count 241       Potassium 4.4       RBC 4.90       RDW 13.0       Sodium 141       WBC 7.48               Significant Imaging:  Imaging results within the past 24 hours have been reviewed.

## 2025-02-26 NOTE — ANESTHESIA PREPROCEDURE EVALUATION
[facility]  Anesthesia Pre-Operative Evaluation         Patient Name: Megha Mendoza  YOB: 1945  MRN: [id number]    SUBJECTIVE:     02/26/2025    Procedure(s) (LRB):  EGD (ESOPHAGOGASTRODUODENOSCOPY) (N/A)    Megha Mendoza is a 79 y.o. female here for Procedure(s) (LRB):  EGD (ESOPHAGOGASTRODUODENOSCOPY) (N/A)    Drips:     Problem List[1]    Review of patient's allergies indicates:   Allergen Reactions    Cephalexin Rash and Other (See Comments)     Solid patches - rash like skin thickened    Hibiclens [chlorhexidine gluconate] Itching    Sulfa (sulfonamide antibiotics) Rash       Medications Ordered Prior to Encounter[2]    Past Surgical History:   Procedure Laterality Date    CARDIAC CATHETERIZATION  05/01/2015    CARPAL TUNNEL RELEASE Bilateral 11/20/2019    COLONOSCOPY N/A 10/11/2022    Procedure: COLONOSCOPY;  Surgeon: Tomy Baron MD;  Location: [facility];  Service: Endoscopy;  Laterality: N/A;    ESOPHAGEAL MANOMETRY WITH MEASUREMENT OF IMPEDANCE N/A 06/30/2023    Procedure: MANOMETRY, ESOPHAGUS, WITH IMPEDANCE MEASUREMENT;  Surgeon: Joseluis Santana MD;  Location: [facility] (4TH FLR);  Service: Endoscopy;  Laterality: N/A; Referred by Dr. Dunlap    ESOPHAGOGASTRODUODENOSCOPY N/A 05/08/2023    Procedure: EGD (ESOPHAGOGASTRODUODENOSCOPY);  Surgeon: Khushboo Dunlap MD;  Location: [facility];  Service: Endoscopy;  Laterality: N/A;    ESOPHAGOGASTRODUODENOSCOPY N/A 04/05/2024    Procedure: EGD (ESOPHAGOGASTRODUODENOSCOPY);  Surgeon: Carmela Sequeira MD;  Location: [facility];  Service: Endoscopy;  Laterality: N/A;    ESOPHAGOGASTRODUODENOSCOPY N/A 05/28/2024    Procedure: EGD (ESOPHAGOGASTRODUODENOSCOPY);  Surgeon: Kasia Hale MD;  Location: [facility] (2ND FLR);  Service: Endoscopy;  Laterality: N/A; endoflip/4 liters of oxygen at night    ESOPHAGOGASTRODUODENOSCOPY N/A 10/25/2024    Procedure: EGD (ESOPHAGOGASTRODUODENOSCOPY);  Surgeon: Bruce Malik MD;  Location:  [facility] (2ND FLR);  Service: Endoscopy;  Laterality: N/A;    ESOPHAGOGASTRODUODENOSCOPY N/A 10/24/2024    Procedure: EGD (ESOPHAGOGASTRODUODENOSCOPY);  Surgeon: Karen Collado MD;  Location: [facility] OR 2ND FLR;  Service: General;  Laterality: N/A;    ESOPHAGOGASTRODUODENOSCOPY N/A 11/1/2024    Procedure: EGD (ESOPHAGOGASTRODUODENOSCOPY);  Surgeon: Karen Collado MD;  Location: [facility] OR 2ND FLR;  Service: General;  Laterality: N/A;    EYE SURGERY      HAND TENODESIS Bilateral 10/04/2019    HEMORRHOID SURGERY  2009    LAPAROSCOPIC FUNDOPLICATION  03/19/2022    ROBOT-ASSISTED LAPAROSCOPIC PYLOROPLASTY USING DA PERRY XI N/A 11/1/2024    Procedure: XI ROBOTIC  PYLOROMYOTOMY;  Surgeon: Karen Collado MD;  Location: [facility] OR 2ND FLR;  Service: General;  Laterality: N/A;    TOTAL ABDOMINAL HYSTERECTOMY  1995    TOTAL KNEE ARTHROPLASTY Left 08/02/2017    TUBAL LIGATION Bilateral        Social History[3]      OBJECTIVE:     Vital Signs Range (Last 24H):  Temp:  [36.5 °C (97.7 °F)-37.1 °C (98.7 °F)] 37.1 °C (98.7 °F)  Pulse:  [63-84] 73  Resp:  [20] 20  SpO2:  [94 %-99 %] 96 %  BP: (133-184)/(60-72) 133/60    Significant Labs:  Lab Results   Component Value Date    WBC 7.48 02/26/2025    HGB 15.0 02/26/2025    HCT 45.2 02/26/2025     02/26/2025    CHOL 146 03/12/2024    TRIG 106 03/12/2024    HDL 66 03/12/2024    LDLDIRECT 60 03/12/2024    ALT 13 10/23/2024    AST 24 10/23/2024     11/02/2024    K 4.5 11/02/2024     11/02/2024    CREATININE 0.9 11/02/2024    BUN 11 11/02/2024    CO2 21 (L) 11/02/2024    TSH 0.934 08/22/2024    HGBA1C 5.5 08/22/2024       Diagnostic Studies:    EKG:   Results for orders placed or performed during the hospital encounter of 10/23/24   EKG 12-lead    Collection Time: 10/23/24  5:01 PM   Result Value Ref Range    QRS Duration 76 ms    [facility] QTC Calculation 451 ms    Narrative    Test Reason : K56.699,W44.F3XA,    Vent. Rate : 072 BPM     Atrial Rate  : 072 BPM     P-R Int : 154 ms          QRS Dur : 076 ms      QT Int : 412 ms       P-R-T Axes : 075 -66 047 degrees     QTc Int : 451 ms    Normal sinus rhythm  Left axis deviation  Pulmonary disease pattern  Septal infarct ,age undetermined  Abnormal ECG  When compared with ECG of 05-APR-2024 11:52,  No significant change was found  Confirmed by VANIA DODSON MD ([id number]) on 10/24/2024 8:28:41 AM    Referred By: [email or web address]   SELF           Confirmed By:VANIA DODSON MD       2D ECHO:  TTE:  No results found for this or any previous visit.  No results found for this or any previous visit.    JC:  No results found for this or any previous visit.        Pre-op Assessment    I have reviewed the Patient Summary Reports.     I have reviewed the Nursing Notes. I have reviewed the NPO Status.   I have reviewed the Medications.     Review of Systems  Anesthesia Hx:  No problems with previous Anesthesia   History of prior surgery of interest to airway management or planning:          Denies Family Hx of Anesthesia complications.    Denies Personal Hx of Anesthesia complications.                    Social:  No Alcohol Use, Former Smoker       Hematology/Oncology:                      Denies Current/Recent Cancer                EENT/Dental:  denies chronic allergic rhinitis Hard of hearing - hearing aids           Cardiovascular:      Denies Hypertension. Valvular problems/Murmurs, AI  CAD          PVD (Carotid artery) hyperlipidemia                               Pulmonary:   COPD  Denies Asthma.     Denies Sleep Apnea.                Renal/:   Denies Chronic Renal Disease.                Hepatic/GI:    Hiatal Hernia, GERD   Multiple episode of food impaction              Musculoskeletal:  Denies Arthritis.               Neurological:    Denies CVA.    Denies Seizures.                                Endocrine:  Denies Diabetes. Denies Hypothyroidism.  Denies Hyperthyroidism.         Psych:  Denies Psychiatric History.                   Physical Exam  General: Well nourished, Cooperative, Alert and Oriented    Airway:  Mallampati: II   Mouth Opening: Normal  TM Distance: Normal  Tongue: Normal  Neck ROM: Normal ROM    Dental:  Edentulous, Dentures        Anesthesia Plan  Type of Anesthesia, risks & benefits discussed:    Anesthesia Type: Gen ETT  Intra-op Monitoring Plan: Standard ASA Monitors  Post Op Pain Control Plan: multimodal analgesia and IV/PO Opioids PRN  Induction:  IV and rapid sequence  Airway Plan: Video  Informed Consent: Informed consent signed with the Patient and all parties understand the risks and agree with anesthesia plan.  All questions answered.   ASA Score: 3  Day of Surgery Review of History & Physical: H&P Update referred to the surgeon/provider.    Ready For Surgery From Anesthesia Perspective.     .           [1]   Patient Active Problem List  Diagnosis    Chronic pain of left knee    Osteopenia    Former cigarette smoker    Dyslipidemia    Gastroesophageal reflux disease    Primary osteoarthritis of left knee    Coronary arteriosclerosis in native artery    Stenosis of carotid artery    Chronic insomnia    Atherosclerosis of aorta    Mixed stress and urge incontinence    Chronic constipation    Vaginal atrophy    Emphysema/COPD    Obstructive chronic bronchitis without exacerbation    Pharyngoesophageal dysphagia    Lung nodule    Acute pain of right shoulder    Pain in right acromioclavicular joint    Chronic pain of both feet    Senile purpura    Abnormality of gait and mobility    Aortic valve regurgitation    Generalized osteoarthritis    Obstructive sleep apnea syndrome    Shortness of breath    Irritable bowel syndrome without diarrhea    History of repair of hiatal hernia    Esophageal obstruction due to food impaction   [2]   No current facility-administered medications on file prior to encounter.     Current Outpatient Medications on File Prior to Encounter   Medication Sig Dispense Refill     acetaminophen (TYLENOL) 500 MG tablet Take 1,000 mg by mouth every 6 (six) hours as needed for Pain.      BACILLUS COAGULANS (DIGESTIVE ADVANTAGE ORAL) Take 1 tablet by mouth once daily.      co-enzyme Q-10 30 mg capsule Take 100 mg by mouth once daily.      denosumab (PROLIA) 60 mg/mL Syrg Inject 1 mL (60 mg total) into the skin every 6 (six) months. 1 each 1    esomeprazole (NEXIUM) 40 MG capsule Take 1 capsule (40 mg total) by mouth 2 (two) times daily. 60 capsule 11    gabapentin (NEURONTIN) 100 MG capsule Take 100 mg by mouth every evening.      krill-omega-3-dha-epa-lipids 957-16-91-50 mg Cap Take 1 capsule by mouth nightly.       multivitamin (THERAGRAN) tablet Take 1 tablet by mouth once daily.      nitroGLYCERIN (NITROSTAT) 0.4 MG SL tablet Place 0.4 mg under the tongue every 5 (five) minutes as needed for Chest pain.      oxybutynin (DITROPAN-XL) 10 MG 24 hr tablet Take 1 tablet (10 mg total) by mouth once daily. 30 tablet 11    RESTASIS 0.05 % ophthalmic emulsion Place 1 drop into both eyes 2 (two) times daily.      rosuvastatin (CRESTOR) 40 MG Tab Take 40 mg by mouth every evening.     [3]   Social History  Socioeconomic History    Marital status:     Number of children: 4   Tobacco Use    Smoking status: Former     Current packs/day: 0.00     Average packs/day: 0.8 packs/day for 46.0 years (34.5 ttl pk-yrs)     Types: Cigarettes     Start date: 10/14/1959     Quit date: 10/14/2005     Years since quittin.3     Passive exposure: Past    Smokeless tobacco: Never    Tobacco comments:     Stopped and started several times   Substance and Sexual Activity    Alcohol use: No    Drug use: Never    Sexual activity: Not Currently     Birth control/protection: Surgical     Comment:      Social Drivers of Health     Financial Resource Strain: Low Risk  (10/24/2024)    Overall Financial Resource Strain (CARDIA)     Difficulty of Paying Living Expenses: Not hard at all   Food Insecurity: No Food  Insecurity (10/24/2024)    Hunger Vital Sign     Worried About Running Out of Food in the Last Year: Never true     Ran Out of Food in the Last Year: Never true   Transportation Needs: No Transportation Needs (10/24/2024)    TRANSPORTATION NEEDS     Transportation : No   Physical Activity: Inactive (10/24/2024)    Exercise Vital Sign     Days of Exercise per Week: 0 days     Minutes of Exercise per Session: 0 min   Stress: No Stress Concern Present (10/24/2024)    [employer] [place] [employer] - Occupational Stress Questionnaire     Feeling of Stress : Not at all   Housing Stability: Unknown (10/24/2024)    Housing Stability Vital Sign     Unable to Pay for Housing in the Last Year: No     Homeless in the Last Year: No

## 2025-03-05 ENCOUNTER — OFFICE VISIT (OUTPATIENT)
Dept: SURGERY | Facility: CLINIC | Age: 80
End: 2025-03-05
Payer: MEDICARE

## 2025-03-05 ENCOUNTER — TELEPHONE (OUTPATIENT)
Dept: SURGERY | Facility: CLINIC | Age: 80
End: 2025-03-05
Payer: MEDICARE

## 2025-03-05 VITALS
WEIGHT: 137.38 LBS | BODY MASS INDEX: 28.84 KG/M2 | HEIGHT: 58 IN | DIASTOLIC BLOOD PRESSURE: 69 MMHG | SYSTOLIC BLOOD PRESSURE: 168 MMHG | HEART RATE: 56 BPM

## 2025-03-05 DIAGNOSIS — R13.14 PHARYNGOESOPHAGEAL DYSPHAGIA: ICD-10-CM

## 2025-03-05 DIAGNOSIS — Z98.890 HISTORY OF REPAIR OF HIATAL HERNIA: ICD-10-CM

## 2025-03-05 DIAGNOSIS — Z87.19 HISTORY OF REPAIR OF HIATAL HERNIA: ICD-10-CM
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
## 2025-03-05 DIAGNOSIS — T18.128A ESOPHAGEAL OBSTRUCTION DUE TO FOOD IMPACTION: Primary | ICD-10-CM

## 2025-03-05 PROCEDURE — 3077F SYST BP >= 140 MM HG: CPT | Mod: CPTII,S$GLB,, | Performed by: SURGERY

## 2025-03-05 PROCEDURE — 1101F PT FALLS ASSESS-DOCD LE1/YR: CPT | Mod: CPTII,S$GLB,, | Performed by: SURGERY

## 2025-03-05 PROCEDURE — 99999 PR PBB SHADOW E&M-EST. PATIENT-LVL III: CPT | Mod: PBBFAC,,, | Performed by: SURGERY

## 2025-03-05 PROCEDURE — 3288F FALL RISK ASSESSMENT DOCD: CPT | Mod: CPTII,S$GLB,, | Performed by: SURGERY

## 2025-03-05 PROCEDURE — 99215 OFFICE O/P EST HI 40 MIN: CPT | Mod: S$GLB,,, | Performed by: SURGERY

## 2025-03-05 PROCEDURE — 1160F RVW MEDS BY RX/DR IN RCRD: CPT | Mod: CPTII,S$GLB,, | Performed by: SURGERY

## 2025-03-05 PROCEDURE — 1159F MED LIST DOCD IN RCRD: CPT | Mod: CPTII,S$GLB,, | Performed by: SURGERY

## 2025-03-05 PROCEDURE — 1126F AMNT PAIN NOTED NONE PRSNT: CPT | Mod: CPTII,S$GLB,, | Performed by: SURGERY

## 2025-03-05 PROCEDURE — 3078F DIAST BP <80 MM HG: CPT | Mod: CPTII,S$GLB,, | Performed by: SURGERY

## 2025-03-05 NOTE — PROGRESS NOTES
HPI:  Patient is a 79 year-old woman with history of GERD s/p hiatal hernia repair with antireflux fundoplication 3/19/22 who presents for post-op follow-up s/p robotic pyloromyotomy 11/1/24 for gastroparesis noted on work-up for progressive dysphagia to solids, esophageal spasms, regurgitation, and food bolus impaction. She states she is overall doing quite well. She had 3 brief episodes of esophageal spasm within the 1 week post-op. She also had some bloating the 1st week managed with OTC medications. She advanced to a soft diet on Saturday. She has been tolerating this without any episodes of dysphagia, regurgitation, or retrosternal pain. She has had a few instances of heartburn for which she has resumes Nexium daily with resolution. She endorses normal bowel and bladder function. She denies abdominal pain and is not requiring analgesics. She is overall happy with her course.    Interval History 3/5/2025:   Megha Mendoza is a 79 y.o. F with history of Arthritis, Back pain, Bronchitis, chronic, Carotid artery disease, Colitis, acute, COPD , Coronary artery disease, Diverticulitis, DJD , GERD, Hemorrhoids, Hiatal hernia, Hyperlipidemia, Irritable bowel syndrome with constipation, Mixed stress and urge urinary incontinence (3/30/2021), DARA (obstructive sleep apnea), Osteoporosis, Respiratory distress, and Trouble in sleeping. She is s/p Hiatal Hernia repair with toupet in 3/2019 with worsening dysphagia to solids and liquids compounded by severe gastroparesis. She was presented at swallow conference in which pyloromyotomy was recommended and she underwent XI pyloromyotomy on 11/1/2024. She initially did well following surgery, however more recently reports continued dysphagia to solids and improvement of dysphagia with liquids. She presented to the ED on 2/26 with food bolus impaction at her distal esophagus. She states that week she ate a chicken salad sandwich and tolerated it fine, then the next day she had  cottage cheese and grapes and noted severe dysphagia and vomited. Over the next couple of days she was unable to keep down any solids or liquids and she presented to the ED 4 days later. She was managed with EGD and disimpaction without complication. LA grade C esophagitis was noted at the time of EGD. Since disimpaction she has been slowly advancing her diet. She reports continued discomfort with eating and has largely been only having liquids. Yesterday she introduced solid foods and tolerated it without issue. She denies any severe abdominal pain, fever, chills, bowel changes, or chest pain.       PHYSICAL EXAM:  Physical Exam  Vitals reviewed.   Constitutional:       General: She is not in acute distress.     Appearance: Normal appearance.   Cardiovascular:      Rate and Rhythm: Normal rate.   Pulmonary:      Effort: Pulmonary effort is normal. No respiratory distress.   Abdominal:      General: There is no distension.      Palpations: Abdomen is soft.      Tenderness: There is no abdominal tenderness. There is no guarding.   Skin:     General: Skin is warm and dry.      Findings: No bruising or erythema.      Comments: Well-healed lap incisions   Neurological:      General: No focal deficit present.      Mental Status: She is alert and oriented to person, place, and time.   Psychiatric:         Mood and Affect: Mood normal.         Behavior: Behavior normal.       ASSESSMENT:    Megha Mendoza is a 79 y.o. F s/p hiatal hernia repair with antireflux fundoplication 3/19/22 and XI pyloromyotomy on 11/1/2024 who presents with recurrent distal esophageal food impaction and acid reflux.     PLAN:    Present at esophageal dysmotility conference 5/18   Possible continued dietary modification vs fundoplication take down   ED precautions provided       Darlin Berger  3/5/2025
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
## 2025-03-05 NOTE — PROGRESS NOTES
HPI:  Patient is a 79 year-old woman with history of GERD s/p hiatal hernia repair with antireflux fundoplication 3/19/22 who presents for post-op follow-up s/p robotic pyloromyotomy 11/1/24 for gastroparesis noted on work-up for progressive dysphagia to solids, esophageal spasms, regurgitation, and food bolus impaction. She states she is overall doing quite well. She had 3 brief episodes of esophageal spasm within the 1 week post-op. She also had some bloating the 1st week managed with OTC medications. She advanced to a soft diet on Saturday. She has been tolerating this without any episodes of dysphagia, regurgitation, or retrosternal pain. She has had a few instances of heartburn for which she has resumes Nexium daily with resolution. She endorses normal bowel and bladder function. She denies abdominal pain and is not requiring analgesics. She is overall happy with her course.    Interval History 3/5/2025:     She presented to the ED on 2/26 with food bolus impaction at her distal esophagus. She was managed with EGD and disimpaction without complication.       PHYSICAL EXAM:  Physical Exam  Vitals reviewed.   Constitutional:       General: She is not in acute distress.     Appearance: Normal appearance.   Cardiovascular:      Rate and Rhythm: Normal rate.   Pulmonary:      Effort: Pulmonary effort is normal. No respiratory distress.   Abdominal:      General: There is no distension.      Palpations: Abdomen is soft.      Tenderness: There is no abdominal tenderness. There is no guarding.   Skin:     General: Skin is warm and dry.      Findings: No bruising or erythema.      Comments: Well-healing lap incisions   Neurological:      General: No focal deficit present.      Mental Status: She is alert and oriented to person, place, and time.   Psychiatric:         Mood and Affect: Mood normal.         Behavior: Behavior normal.     ASSESSMENT:         PLAN:        Darlin Berger  3/5/2025           airway patent/breath sounds equal/good air movement/clear to auscultation bilaterally/no rales/no rhonchi/no wheezes

## 2025-03-06 ENCOUNTER — OFFICE VISIT (OUTPATIENT)
Dept: FAMILY MEDICINE | Facility: CLINIC | Age: 80
End: 2025-03-06
Payer: MEDICARE

## 2025-03-06 VITALS
SYSTOLIC BLOOD PRESSURE: 130 MMHG | DIASTOLIC BLOOD PRESSURE: 64 MMHG | OXYGEN SATURATION: 95 % | HEIGHT: 58 IN | HEART RATE: 65 BPM | WEIGHT: 137.69 LBS | BODY MASS INDEX: 28.9 KG/M2 | RESPIRATION RATE: 16 BRPM

## 2025-03-06 DIAGNOSIS — E78.5 DYSLIPIDEMIA: Primary | ICD-10-CM

## 2025-03-06 DIAGNOSIS — R13.14 PHARYNGOESOPHAGEAL DYSPHAGIA: ICD-10-CM

## 2025-03-06 DIAGNOSIS — I70.0 ATHEROSCLEROSIS OF AORTA: ICD-10-CM

## 2025-03-06 DIAGNOSIS — K21.9 GASTROESOPHAGEAL REFLUX DISEASE, UNSPECIFIED WHETHER ESOPHAGITIS PRESENT: ICD-10-CM

## 2025-03-06 DIAGNOSIS — J96.11 CHRONIC RESPIRATORY FAILURE WITH HYPOXIA, ON HOME O2 THERAPY: ICD-10-CM

## 2025-03-06 DIAGNOSIS — I25.10 CORONARY ARTERIOSCLEROSIS IN NATIVE ARTERY: ICD-10-CM

## 2025-03-06 DIAGNOSIS — I65.21 STENOSIS OF RIGHT CAROTID ARTERY: ICD-10-CM

## 2025-03-06 DIAGNOSIS — Z99.81 CHRONIC RESPIRATORY FAILURE WITH HYPOXIA, ON HOME O2 THERAPY: ICD-10-CM

## 2025-03-06 PROBLEM — J43.9 EMPHYSEMA/COPD: Status: RESOLVED | Noted: 2021-08-02 | Resolved: 2025-03-06

## 2025-03-06 PROCEDURE — 99999 PR PBB SHADOW E&M-EST. PATIENT-LVL III: CPT | Mod: PBBFAC,,, | Performed by: FAMILY MEDICINE

## 2025-03-06 PROCEDURE — 99214 OFFICE O/P EST MOD 30 MIN: CPT | Mod: S$GLB,,, | Performed by: FAMILY MEDICINE

## 2025-03-06 PROCEDURE — 1125F AMNT PAIN NOTED PAIN PRSNT: CPT | Mod: CPTII,S$GLB,, | Performed by: FAMILY MEDICINE

## 2025-03-06 PROCEDURE — 3288F FALL RISK ASSESSMENT DOCD: CPT | Mod: CPTII,S$GLB,, | Performed by: FAMILY MEDICINE

## 2025-03-06 PROCEDURE — 1159F MED LIST DOCD IN RCRD: CPT | Mod: CPTII,S$GLB,, | Performed by: FAMILY MEDICINE

## 2025-03-06 PROCEDURE — G2211 COMPLEX E/M VISIT ADD ON: HCPCS | Mod: S$GLB,,, | Performed by: FAMILY MEDICINE

## 2025-03-06 PROCEDURE — 1101F PT FALLS ASSESS-DOCD LE1/YR: CPT | Mod: CPTII,S$GLB,, | Performed by: FAMILY MEDICINE

## 2025-03-06 PROCEDURE — 3078F DIAST BP <80 MM HG: CPT | Mod: CPTII,S$GLB,, | Performed by: FAMILY MEDICINE

## 2025-03-06 PROCEDURE — 3075F SYST BP GE 130 - 139MM HG: CPT | Mod: CPTII,S$GLB,, | Performed by: FAMILY MEDICINE

## 2025-03-06 RX ORDER — GABAPENTIN 100 MG/1
200 CAPSULE ORAL NIGHTLY
Qty: 180 CAPSULE | Refills: 1 | Status: SHIPPED | OUTPATIENT
Start: 2025-03-06 | End: 2025-09-02

## 2025-03-06 RX ORDER — ROSUVASTATIN CALCIUM 40 MG/1
40 TABLET, COATED ORAL NIGHTLY
Qty: 90 TABLET | Refills: 1 | Status: SHIPPED | OUTPATIENT
Start: 2025-03-06

## 2025-03-06 NOTE — PROGRESS NOTES
Subjective:       Patient ID: Megha Mendoza is a 79 y.o. female.    Chief Complaint: Follow-up (Pt here for 6 mth f/u. )    Pt is a 79 y.o. female who presents for evaluation and management of   Encounter Diagnoses   Name Primary?    Dyslipidemia Yes    Gastroesophageal reflux disease, unspecified whether esophagitis present     Stenosis of right carotid artery     Chronic respiratory failure with hypoxia, on home O2 therapy     Atherosclerosis of aorta     Coronary arteriosclerosis in native artery     Pharyngoesophageal dysphagia        Had EGD with disimpaction 2/26/25 due to food bolus impaction at her distal esophagus. LA grade C esophagitis was noted at the time of EGD. Saw GI yesterday for post-op follow up. Per chart review GI plan is possible continued dietary modification vs fundoplication take down   She states that she has labs scheduled with Dr. Day for next month. Per chart review, visit from 11/4/24 hold aspirin for now and will Follow-up 6 months. FLP LFTs BMP CBC TFT TSH before return to clinic.      No longer taking Oxybutyin. She states that she was hardly urinating at all while on the medication, and she also noticed more swelling   Follows with Dr. Saavedra (Neurology) and has been taking 2 of her Gabapentin instead of 1 for her neuropathy.    Doing well on current meds. Denies any side effects. Prevention is up to date.    Review of Systems   Constitutional:  Negative for chills and fever.   Respiratory:  Negative for shortness of breath.    Cardiovascular:  Negative for chest pain, palpitations and leg swelling.   Gastrointestinal:  Negative for abdominal pain, blood in stool, constipation and nausea.   Genitourinary:  Negative for difficulty urinating.   Psychiatric/Behavioral:  Negative for dysphoric mood, sleep disturbance and suicidal ideas. The patient is not nervous/anxious.        Objective:      Physical Exam  Vitals reviewed.   Constitutional:       General: She is not in acute  distress.     Appearance: She is well-developed.   HENT:      Head: Normocephalic and atraumatic.      Right Ear: External ear normal.      Left Ear: External ear normal.      Nose: Nose normal.   Eyes:      Pupils: Pupils are equal, round, and reactive to light.   Neck:      Thyroid: No thyromegaly.      Vascular: No JVD.      Trachea: No tracheal deviation.   Cardiovascular:      Rate and Rhythm: Normal rate.      Heart sounds: Normal heart sounds. No murmur heard.  Pulmonary:      Effort: Pulmonary effort is normal. No respiratory distress.      Breath sounds: Normal breath sounds. No wheezing or rales.   Chest:      Chest wall: No tenderness.   Abdominal:      General: Bowel sounds are normal. There is no distension.      Palpations: Abdomen is soft. There is no mass.      Tenderness: There is no abdominal tenderness. There is no guarding or rebound.   Musculoskeletal:         General: No tenderness. Normal range of motion.      Cervical back: Normal range of motion and neck supple.      Right lower leg: No edema.      Left lower leg: No edema.   Lymphadenopathy:      Cervical: No cervical adenopathy.   Skin:     General: Skin is warm and dry.      Coloration: Skin is not pale.      Findings: No erythema or rash.   Neurological:      Mental Status: She is alert and oriented to person, place, and time.      Cranial Nerves: No cranial nerve deficit.      Motor: No abnormal muscle tone.      Coordination: Coordination normal.      Deep Tendon Reflexes: Reflexes are normal and symmetric. Reflexes normal.   Psychiatric:         Behavior: Behavior normal.         Thought Content: Thought content normal.         Judgment: Judgment normal.         Assessment:       1. Dyslipidemia    2. Gastroesophageal reflux disease, unspecified whether esophagitis present    3. Stenosis of right carotid artery    4. Chronic respiratory failure with hypoxia, on home O2 therapy    5. Atherosclerosis of aorta    6. Coronary  arteriosclerosis in native artery    7. Pharyngoesophageal dysphagia          Plan:   1. Dyslipidemia  Overview:  Chronic   Rosuvastatin,   Omega 3 fish oil   Lab Results  Component Value Date  CHOL 177 05/09/2023  CHOL 135 02/27/2023  CHOL 154 02/02/2022    Lab Results  Component Value Date  HDL 92 (A) 05/09/2023  HDL 52 02/27/2023  HDL 73 02/02/2022    Lab Results  Component Value Date  LDLCALC 69.8 02/27/2023  LDLCALC 57.6 (L) 02/02/2022  LDLCALC 56.6 (L) 02/02/2021    Lab Results  Component Value Date  TRIG 76 05/09/2023  TRIG 66 02/27/2023  TRIG 117 02/02/2022    Lab Results  Component Value Date  CHOLHDL 38.5 02/27/2023  CHOLHDL 47.4 02/02/2022  CHOLHDL 48.4 02/02/2021    Lab Results   Component Value Date    LDLCALC 69.8 02/27/2023       Continue statin    Orders:  -     rosuvastatin (CRESTOR) 40 MG Tab; Take 1 tablet (40 mg total) by mouth every evening.  Dispense: 90 tablet; Refill: 1    2. Gastroesophageal reflux disease, unspecified whether esophagitis present  Overview:  On BID PPI   Can't take bisphosphonate for osteo   Large hiatal hernia. Already had a nissen. Possible surgery being considered---pylorotomy   Has some dysphagia as well---GI has her on PPI and high dose H2 blocker. Dilation would not be helpful      3. Stenosis of right carotid artery  Overview:  5/9/23 Carotid US per [facility], Dr. Day   Carotid Ultrasound 1.The study quality is average. 2.40-59% stenosis in the mid right internal carotid artery based on Bluth Criteria. 3.40-59% stenosis in the proximal left internal carotid artery based on Bluth Criteria. 4.Antegrade right vertebral artery flow. Antegrade left vertebral artery flow.    Monitored per Dr. Day q 6mo  Statin   aspirin  On statin and ASA      4. Chronic respiratory failure with hypoxia, on home O2 therapy    5. Atherosclerosis of aorta  Overview:  Seen on CTA 5/2023   On statin, ASA        6. Coronary arteriosclerosis in native artery  Overview:  Mild CAD 2015 per cardiology  "  Statin  Lab Results  Component Value Date  LDLCALC 57.6 (L) 02/02/2022  Per cardiology - No significant reversible defect on perfusion study today December 2023.   ASA      7. Pharyngoesophageal dysphagia  Overview:  Saw GI---EGD and modified barium swallow done. Has a fundoplication. No stenosis or correctable cause of her symptoms identified. Dr. Dunlap sent her to an "esophageal specialist" per pt.  She is to be presented at a GI dysmotility conference in May      Other orders  -     gabapentin (NEURONTIN) 100 MG capsule; Take 2 capsules (200 mg total) by mouth every evening.  Dispense: 180 capsule; Refill: 1      No follow-ups on file.      "

## 2025-03-18 ENCOUNTER — CONFERENCE (OUTPATIENT)
Dept: SURGERY | Facility: CLINIC | Age: 80
End: 2025-03-18
Payer: MEDICARE

## 2025-03-18 ENCOUNTER — PATIENT MESSAGE (OUTPATIENT)
Dept: SURGERY | Facility: CLINIC | Age: 80
End: 2025-03-18
Payer: MEDICARE

## 2025-03-18 DIAGNOSIS — R13.14 PHARYNGOESOPHAGEAL DYSPHAGIA: Primary | ICD-10-CM

## 2025-03-18 NOTE — PROGRESS NOTES
[facility]   [facility] MULTIDISCIPLINARY CONFERENCE  PATIENT REVIEW FORM  ____________________________________________________________    CLINIC #: [id number]    DATE: 03/18/2025    DIAGNOSIS:     [] Achalsia       [x] Gastropariesis           [] Functional Dyspepsia   [] Chronic Diarrhea      [] Dysphagia   [] Pseudoachalasia       [x] GERD   [x] Hiatal Hernia       [] Dysmotility   [] Dumping Syndrome  [] Cyclic Vomiting   [] IBS       [] Outlet Obstruction    [] Fecal Incontinence    [] Hirschsprung's Disease        PRESENTER:      [x] Geoffrey    [] Nima   [] Panda   [] Layla        [x] Kimi  [] Edi   [] Williams       PATIENT SUMMARY:   79F with CAD, DLD, COPD with supplemental O2, DARA, OA, IBS with chronic constipation, and h/o GERD s/p hiatal hernia repair with antireflux fundoplication 3/19/22 ([facility]) c/b esophageal food impaction and gastroparesis for which she underwent robotic pyloromyotomy 11/1/24 returns following a recent recurrent episode of impaction requiring EGD clearance 2/26/25. She was last seen on 11/13/24 at which time she was doing well on a soft diet with minimal symptoms. She states that for a month or so thereafter she continued to do well and did not require any GI medications. She then advanced to a regular solid diet since which she's noted return of her heartburn and dysphagia. She denies abdominal pain, nausea, vomiting, regurgitation, or bloating. She had the sensation of food impaction similar to prior before presenting to the ED last month.      Her work-up which was personally reviewed includes:     EGD 2/26/25: Food in the lower 1/3 esophagus, removal accomplished; LA grade C esophagitis without bleeding, normal stomach and duodenum     UGI 6/6/24: Esophageal dysmotility. TBS with no e/o achalasia. Post-fundoplication hiatal hernia repair with prominent phrenic ampulla. Cricopharyngeal bar. 13-mm barium tablet passed readily into the stomach. No EILEEN.       EGD 5/28/24: Moderately tortuous upper and middle 1/3 with severely dilated lower 1/3 esophagus. 2-cm hiatal hernia. Erythematous mucosa in the gastric body and antrum. Fundoplication with intact wrap. Normal duodenum. EndoFlip without e/o EGJOO. Esophageal dilation at EGJ to 20-mm. Path: mild chronic gastritis and reactive/regenerative changes, distal esophageal squamous mucosa with mild reactive change w/o e/o eosinophils.     EGD 4/5/24: Large amount of food in the middle and lower 2/3 of the esophagus, successfully removed. Esophagitis underlying food bolus. Tortuous esophagus. Mild gastritis in the stomach.     CT chest 4/5/24: Prominent focus of ingested material in the region of the distal esophagus above the GEJ. Favor a new small/moderate hiatal hernia containing portion of the stomach, however cannot entirely exclude food impaction. Proximal to this region the esophagus is mildly patulous. Pulmonary emphysema. Sequela of prior granulomatous disease.     GES 8/30/23: At 4 hour(s) the percentage of retention is 52 % (normal retention at 4 hours is 10% and lower). Abnormal gastric emptying time.     HREM 6/30/23: LES basal 24.2 -> 10.1. 90% intact swallows, 10% weak swallows. No hypercontractile swallows. 100% incomplete bolus clearance. Multiple rapid swallows indicate loss of inhibition with strong final contraction. No e/o residual fluid at the end of the study. Normal esophageal manometry. No e/o disorder of peristalsis.     I plan to represent her at our next [facility] Swallowing Disorders conference (3/18/25). I had a very quiana conversation with her and her daughter about dietary modification, as she was doing very well on liquids and a soft diet, the risk of uncontrolled GERD should fundoplication takedown be pursued, and the risk of no or even worsening change with fundoplication revision. All questions were answered to their satisfaction. I will reach out following conference.    RECOMMENDATIONS:    Repeat GES  SLP to discuss eating habits given poor dentition  Unclear benefit to surgical intervention however will consider if impactions continue despite optimization of gastric emptying and dietary modifications    CONSULT NEEDED:         [] Surgery    [] ENT    [] GI    [x] Speech Pathology                IMAGING:       [] Esophagram     [] MBS    [] CT ABD/PELVIS       [x] GES   [] MRI    [] UGI w/SBFT   [] SITZ MARKER      [] EKG    [] U/S    [] DEFOGRAM    PROCEDURES:    [] EGD  [] Impedance  [] MANOMETRY  [] SURGICAL INTERVENTION      [] COLONOSCOPY    [] ERCP    [] EUS    [] ENTEROSCOPY

## 2025-03-24 DIAGNOSIS — Z00.00 ENCOUNTER FOR MEDICARE ANNUAL WELLNESS EXAM: ICD-10-CM

## 2025-04-09 ENCOUNTER — CLINICAL SUPPORT (OUTPATIENT)
Dept: REHABILITATION | Facility: HOSPITAL | Age: 80
End: 2025-04-09
Payer: MEDICARE

## 2025-04-09 DIAGNOSIS — R13.14 PHARYNGOESOPHAGEAL DYSPHAGIA: ICD-10-CM

## 2025-04-09 DIAGNOSIS — R13.13 DYSPHAGIA, PHARYNGEAL PHASE: Primary | ICD-10-CM

## 2025-04-09 PROCEDURE — 92610 EVALUATE SWALLOWING FUNCTION: CPT | Mod: PN

## 2025-04-09 NOTE — PROGRESS NOTES
Outpatient Rehab    Speech-Language Pathology Evaluation (only)    Patient Name: Megha Mendoza  MRN: [id number]  YOB: 1945  Encounter Date: 4/9/2025    Therapy Diagnosis:   Encounter Diagnoses   Name Primary?    Pharyngoesophageal dysphagia     Dysphagia, pharyngeal phase Yes     Physician: Karen Collado *    Physician Orders: Eval and Treat  Medical Diagnosis: Pharyngoesophageal dysphagia    Visit # / Visits Authorized: 1 / 1   Insurance Authorization Period: 4/4/2025 to 12/31/2025  Date of Evaluation: 4/9/2025      Time In: 0728   Time Out: 0830  Total Time: 62   Total Billable Time: 62      Precautions  General precautions include: Aspiration/choking and Positioning.             Subjective   History of Present Illness  Megah is a 80 y.o. female who reports to Speech-Language Pathology with a chief concern of Patient w/ significant history of esophageal dysphagia.                                         Current Swallow Symptoms and Diet  Associated Swallowing Symptoms: Difficulty swallowing food, Food gets stuck  Current Mode of Intake: Oral diet  Oral Intake Details: Primary source of nutrition  Current Drink Consistency: Thin (IDDSI 0)  Current Food Consistency: Soft and bite-sized (IDDSI 6)  Ability to Self-Feed: Independent  Endurance During Meals: Adequate         Pain  No Pain Reported: Yes                 Treatment History       No: Previously Received Treatments                       Past Medical History/Physical Systems Review:   Megha Mendoza  has a past medical history of Arthritis, Back pain, Bronchitis, chronic, Carotid artery disease, Colitis, acute, COPD (chronic obstructive pulmonary disease), Coronary artery disease, Diverticulitis, DJD (degenerative joint disease), Elevated d-dimer, GERD (gastroesophageal reflux disease), Hemorrhoids, Hiatal hernia, History of diverticulitis, Hyperlipidemia, Irritable bowel syndrome with constipation, Mixed stress and urge urinary  incontinence, Obstructive sleep apnea syndrome, Osteoporosis, Respiratory distress, and Trouble in sleeping.    Megha Mendoza  has a past surgical history that includes Hemorrhoid surgery (2009); Tubal ligation (Bilateral); Total abdominal hysterectomy (1995); Cardiac catheterization (05/01/2015); Total knee arthroplasty (Left, 08/02/2017); Hand Tenodesis (Bilateral, 10/04/2019); Eye surgery; Laparoscopic fundoplication (03/19/2022); Colonoscopy (N/A, 10/11/2022); Esophagogastroduodenoscopy (N/A, 05/08/2023); Esophageal manometry with measurement of impedance (N/A, 06/30/2023); Esophagogastroduodenoscopy (N/A, 04/05/2024); Esophagogastroduodenoscopy (N/A, 05/28/2024); Carpal tunnel release (Bilateral, 11/20/2019); Esophagogastroduodenoscopy (N/A, 10/25/2024); Esophagogastroduodenoscopy (N/A, 10/24/2024); Robot-assisted laparoscopic pyloroplasty using da Brown Xi (N/A, 11/1/2024); Esophagogastroduodenoscopy (N/A, 11/1/2024); and Esophagogastroduodenoscopy (N/A, 2/26/2025).    Megha has a current medication list which includes the following prescription(s): bacillus coagulans, co-enzyme q-10, prolia, esomeprazole, gabapentin, krill-omega-3-dha-epa-lipids, multivitamin, nitroglycerin, oxybutynin, and rosuvastatin.    Review of patient's allergies indicates:   Allergen Reactions    Cephalexin Rash and Other (See Comments)     Solid patches - rash like skin thickened    Hibiclens [chlorhexidine gluconate] Itching    Sulfa (sulfonamide antibiotics) Rash        Objective   Oral Peripheral Exam  Dentition and Oral Hygiene  The patient's current dental condition: Dentures (Upper).   Oral hygiene is Good.       Buccal and Oral Mucosa Exam  Buccal sensation-CN V-is Normal. Buccal strength-CN VII-is Normal. Oral mucosa observations: Normal       Labial Exam  Labial Symmetry is Normal.  Range of motion-CN VII-is Normal. Alternating labial protrusion/retraction is Intact. Strength-CN VII-is Normal. Tone is Normal. Coordination is  Intact. Sensation-CN V-is Normal.           Lingual Exam  Range of motion-CN XII-is Normal. Strength-CN XII-is Normal. Symmetry-CN XII-is Normal. Tone is Normal. Speed is Intact. Coordination is Intact.            Velar and Uvular Exam  Velar elevation during phonation-CN X-is Normal. Sustained velar elevation is Intact. Alternating velar elevation/relaxation is Intact. Velum at rest is Intact. Velar tone is Normal. Gag reflex is Unable to assess.        Jaw Exam  Range of motion-CN V-is Normal.   Strength-CN V-is Normal. Tone is Normal. Sensation-CN V-is Intact. Jaw Locking: No       Face and Neck Exam  Facial symmetry-CN VII-is Normal.      Facial sensation-CN V-is Normal. Neck palpation: Normal            Non-Instrumental Swallow Exam  Trials presented by: Self    Drink Consistencies Presented: Thin (IDDSI 0)   Food Consistencies Presented: Soft and bite-sized (IDDSI 6)     Consistency Trials    via Cup.      Oral and Pharyngeal Phase  Oral phase: Normal  prolonged mastication though functional  Pharyngeal phase: Throat clearing - delayed and Multiple swallows  Globus sensation localized to level of clavicle.         Time Entry(in minutes):  Clinical Swallow Eval Time Entry: 62    Assessment & Plan   Assessment   Megha presents with symptoms that are Unpredictable.  Will Comorbidities Impact Care: Yes  Patient w/ primary esophageal dysphagia and reported pharyngeal and esophageal spasms w/ unpredictable occurrence. When patient experiences esophageal impactiona, reflux, and/or spasms, secondary pharyngeal effects expected to occur.     Diagnosis and Impressions: Patient presents to outpatient clinical swallow evaluation w/ significant history of esophageal dysphagia w/ onset ~3 years prior. Clinical swallow evaluation revealing of functional oral and pharyngeal phases of the swallow without indication of prandial aspiration. Thin liquids + soft and bite sized diet (IDDSI 0, 6) which is patient's current diet  tolerated well w/ symptoms consistent with esophageal dysphagia (reports of globus sensation at localized level of clavicle + delayed throat clearing). Patient well aware of reflux precautions including remaining upright following PO intake and eating small meals throughout the day. At this time, patient is not a candidate for behavioral swallow therapy due to suspected primary esophageal of dysphagia. Defer management of esophageal dysphagia to GI + surgical team. Patient agreeable w/ plan.          Evaluation/Treatment Response: Patient responded to treatment well             Education  Education was done with Patient. The patient's learning style includes Listening. The patient Demonstrates understanding.         Educated patient on results of clinical swallow evaluation      Swallowing Recommendations  Diet Recommendation: Oral diet  Drink Consistency Recommendation: Thin (IDDSI 0)    Food Consistency Recommendation: Soft and bite-sized (IDDSI 6)        Level of supervision: Distant     Effective Swallowing Strategies: Alternating liquids and solids, Slow rate          Plan  From a speech language pathology perspective, the patient would not benefit from: Skilled Rehab Services                                       Patient's spiritual, cultural, and educational needs considered and patient agreeable to plan of care and goals.     RICK Serrano-SLP, CCC-SLP

## 2025-04-10 ENCOUNTER — HOSPITAL ENCOUNTER (OUTPATIENT)
Dept: RADIOLOGY | Facility: HOSPITAL | Age: 80
Discharge: HOME | End: 2025-04-10
Attending: INTERNAL MEDICINE
Payer: MEDICARE

## 2025-04-10 DIAGNOSIS — K31.84 GASTROPARESIS: ICD-10-CM

## 2025-04-10 DIAGNOSIS — R13.10 DYSPHAGIA: ICD-10-CM

## 2025-04-10 DIAGNOSIS — K21.9 GERD (GASTROESOPHAGEAL REFLUX DISEASE): ICD-10-CM

## 2025-04-10 PROCEDURE — 78264 GASTRIC EMPTYING IMG STUDY: CPT | Mod: TC

## 2025-04-10 PROCEDURE — A9541 TC99M SULFUR COLLOID: HCPCS | Performed by: INTERNAL MEDICINE

## 2025-04-10 PROCEDURE — 78264 GASTRIC EMPTYING IMG STUDY: CPT | Mod: 26,,, | Performed by: RADIOLOGY

## 2025-04-10 RX ORDER — TECHNETIUM TC 99M SULFUR COLLOID 2 MG
1.07 KIT MISCELLANEOUS
Status: COMPLETED | OUTPATIENT
Start: 2025-04-10 | End: 2025-04-10

## 2025-04-10 RX ADMIN — TECHNETIUM TC 99M SULFUR COLLOID 1.07 MILLICURIE: KIT at 08:04

## 2025-04-14 ENCOUNTER — TELEPHONE (OUTPATIENT)
Dept: FAMILY MEDICINE | Facility: CLINIC | Age: 80
End: 2025-04-14
Payer: MEDICARE

## 2025-04-14 DIAGNOSIS — E78.5 DYSLIPIDEMIA: ICD-10-CM

## 2025-04-14 DIAGNOSIS — K21.9 GASTROESOPHAGEAL REFLUX DISEASE WITHOUT ESOPHAGITIS: Chronic | ICD-10-CM

## 2025-04-14 RX ORDER — ROSUVASTATIN CALCIUM 40 MG/1
40 TABLET, COATED ORAL NIGHTLY
Qty: 90 TABLET | Refills: 1 | Status: SHIPPED | OUTPATIENT
Start: 2025-04-14

## 2025-04-14 RX ORDER — GABAPENTIN 100 MG/1
200 CAPSULE ORAL NIGHTLY
Qty: 180 CAPSULE | Refills: 1 | Status: SHIPPED | OUTPATIENT
Start: 2025-04-14 | End: 2025-10-11

## 2025-04-14 RX ORDER — OMEPRAZOLE 40 MG/1
40 CAPSULE, DELAYED RELEASE ORAL DAILY
Qty: 90 CAPSULE | Refills: 3 | Status: SHIPPED | OUTPATIENT
Start: 2025-04-14 | End: 2026-04-14

## 2025-04-14 NOTE — TELEPHONE ENCOUNTER
----- Message from Santos sent at 4/11/2025 11:52 AM CDT -----  Contact: Patient  Megha MendozaMRN: [id number]: 1945PCP: Tomy BaronHome Phone      [phone] Phone      Not on file.Mobile          [phone]: Pt requesting refill or new Rx. Is this a refill or new RX:  refillsRX name and strength: Gabapentin 100 mg                                       Rosuvastatin 40 mg                                      Omeprazole 40 mgLast office visit: 3/06/25Is this a 30-day or 90-day RX:  90 dayPh[facility]  Phone:  [phone]: Loraine

## 2025-04-14 NOTE — TELEPHONE ENCOUNTER
----- Message from Aurelio sent at 4/14/2025 10:23 AM CDT -----  Megha BlumN: [id number]: 1945PCP: Tomy BaronHome Phone      [phone] Phone      Not on file.Mobile          [phone]    MESSAGE: Pt wants to speak with nurse about a medication she wants to start back [phone]

## 2025-04-14 NOTE — TELEPHONE ENCOUNTER
Pt requesting medication refill.   LOV: 03/06/25                      Pharm:  [facility]  Med pended. Thanks  Requested Prescriptions     Pending Prescriptions Disp Refills    gabapentin (NEURONTIN) 100 MG capsule 180 capsule 1     Sig: Take 2 capsules (200 mg total) by mouth every evening.    rosuvastatin (CRESTOR) 40 MG Tab 90 tablet 1     Sig: Take 1 tablet (40 mg total) by mouth every evening.    omeprazole (PRILOSEC) 40 MG capsule 90 capsule 3     Sig: Take 1 capsule (40 mg total) by mouth once daily.

## 2025-04-14 NOTE — TELEPHONE ENCOUNTER
Contacted/spoke to pt, requesting refill on Omeprazole 40 mg. Advised pt, rx has been sent to [facility] today along with gabapentin and rosuvastatin. Pt gave understanding.

## 2025-04-14 NOTE — TELEPHONE ENCOUNTER
Care Due:                  Date            Visit Type   Department     Provider  --------------------------------------------------------------------------------                                EP -                              [facility]  Last Visit: 03-      CARE ([facility])   LOREN Baron                               -                              [facility]  Next Visit: 09-      CARE ([facility])   LOREN Baron                                                            Last  Test          Frequency    Reason                     Performed    Due Date  --------------------------------------------------------------------------------    Lipid Panel.  12 months..  rosuvastatin.............  03- 03-    [facility] Embedded Care Due Messages. Reference number: [id number].   4/14/2025 9:01:12 AM CDT

## 2025-06-16 ENCOUNTER — OFFICE VISIT (OUTPATIENT)
Dept: INTERNAL MEDICINE | Facility: CLINIC | Age: 80
End: 2025-06-16
Payer: MEDICARE

## 2025-06-16 VITALS
BODY MASS INDEX: 29.21 KG/M2 | WEIGHT: 139.13 LBS | DIASTOLIC BLOOD PRESSURE: 70 MMHG | HEART RATE: 59 BPM | HEIGHT: 58 IN | OXYGEN SATURATION: 95 % | SYSTOLIC BLOOD PRESSURE: 136 MMHG | RESPIRATION RATE: 18 BRPM

## 2025-06-16 DIAGNOSIS — J96.11 CHRONIC RESPIRATORY FAILURE WITH HYPOXIA: ICD-10-CM

## 2025-06-16 DIAGNOSIS — I25.10 CORONARY ARTERIOSCLEROSIS IN NATIVE ARTERY: ICD-10-CM

## 2025-06-16 DIAGNOSIS — Z99.81 DEPENDENCE ON SUPPLEMENTAL OXYGEN: ICD-10-CM

## 2025-06-16 DIAGNOSIS — D69.2 SENILE PURPURA: ICD-10-CM

## 2025-06-16 DIAGNOSIS — J44.9 CHRONIC OBSTRUCTIVE PULMONARY DISEASE, UNSPECIFIED COPD TYPE: ICD-10-CM

## 2025-06-16 DIAGNOSIS — N18.31 STAGE 3A CHRONIC KIDNEY DISEASE: ICD-10-CM

## 2025-06-16 DIAGNOSIS — Z00.00 ENCOUNTER FOR MEDICARE ANNUAL WELLNESS EXAM: Primary | ICD-10-CM

## 2025-06-16 DIAGNOSIS — I70.0 ATHEROSCLEROSIS OF AORTA: ICD-10-CM

## 2025-06-16 DIAGNOSIS — I65.23 BILATERAL CAROTID ARTERY STENOSIS: ICD-10-CM

## 2025-06-16 DIAGNOSIS — R26.9 ABNORMALITY OF GAIT AND MOBILITY: ICD-10-CM

## 2025-06-16 DIAGNOSIS — M85.80 OSTEOPENIA, UNSPECIFIED LOCATION: ICD-10-CM

## 2025-06-16 DIAGNOSIS — M79.671 CHRONIC PAIN OF BOTH FEET: ICD-10-CM

## 2025-06-16 DIAGNOSIS — J43.8 OTHER EMPHYSEMA: ICD-10-CM

## 2025-06-16 DIAGNOSIS — M79.672 CHRONIC PAIN OF BOTH FEET: ICD-10-CM

## 2025-06-16 DIAGNOSIS — G89.29 CHRONIC PAIN OF BOTH FEET: ICD-10-CM

## 2025-06-16 DIAGNOSIS — K22.0 SPASM OF THROAT MUSCLE: ICD-10-CM

## 2025-06-16 PROCEDURE — 1158F ADVNC CARE PLAN TLK DOCD: CPT | Mod: CPTII,S$GLB,, | Performed by: NURSE PRACTITIONER

## 2025-06-16 PROCEDURE — 99999 PR PBB SHADOW E&M-EST. PATIENT-LVL V: CPT | Mod: PBBFAC,,, | Performed by: NURSE PRACTITIONER

## 2025-06-16 PROCEDURE — 1160F RVW MEDS BY RX/DR IN RCRD: CPT | Mod: CPTII,S$GLB,, | Performed by: NURSE PRACTITIONER

## 2025-06-16 PROCEDURE — 1159F MED LIST DOCD IN RCRD: CPT | Mod: CPTII,S$GLB,, | Performed by: NURSE PRACTITIONER

## 2025-06-16 PROCEDURE — 3288F FALL RISK ASSESSMENT DOCD: CPT | Mod: CPTII,S$GLB,, | Performed by: NURSE PRACTITIONER

## 2025-06-16 PROCEDURE — 1125F AMNT PAIN NOTED PAIN PRSNT: CPT | Mod: CPTII,S$GLB,, | Performed by: NURSE PRACTITIONER

## 2025-06-16 PROCEDURE — 3078F DIAST BP <80 MM HG: CPT | Mod: CPTII,S$GLB,, | Performed by: NURSE PRACTITIONER

## 2025-06-16 PROCEDURE — G0439 PPPS, SUBSEQ VISIT: HCPCS | Mod: S$GLB,,, | Performed by: NURSE PRACTITIONER

## 2025-06-16 PROCEDURE — 3075F SYST BP GE 130 - 139MM HG: CPT | Mod: CPTII,S$GLB,, | Performed by: NURSE PRACTITIONER

## 2025-06-16 PROCEDURE — 1101F PT FALLS ASSESS-DOCD LE1/YR: CPT | Mod: CPTII,S$GLB,, | Performed by: NURSE PRACTITIONER

## 2025-06-16 RX ORDER — DEXTROMETHORPHAN HYDROBROMIDE, GUAIFENESIN 5; 100 MG/5ML; MG/5ML
1300 LIQUID ORAL 2 TIMES DAILY
COMMUNITY

## 2025-06-16 RX ORDER — MULTIVIT WITH MINERALS/HERBS
1 TABLET ORAL DAILY
COMMUNITY

## 2025-06-16 RX ORDER — CALCIUM CARB/VITAMIN D3/VIT K1 500-500-40
400 TABLET,CHEWABLE ORAL DAILY
COMMUNITY

## 2025-06-16 NOTE — PATIENT INSTRUCTIONS
Counseling and Referral of Other Preventative  (Italic type indicates deductible and co-insurance are waived)    Patient Name: Megha Mendoza  Today's Date: 6/16/2025    Health Maintenance       Date Due Completion Date    Shingles Vaccine (1 of 2) Never done ---    RSV Vaccine (Age 60+ and Pregnant patients) (1 - 1-dose 75+ series) Never done ---    COVID-19 Vaccine (3 - 2024-25 season) 09/01/2024 1/31/2021    Lipid Panel 03/12/2025 3/12/2024    Hemoglobin A1c (Prediabetes) 08/22/2025 8/22/2024    Influenza Vaccine (Season Ended) 09/01/2025 2/2/2021    DEXA Scan 09/06/2026 9/6/2023    TETANUS VACCINE 02/01/2028 2/1/2018        No orders of the defined types were placed in this encounter.    The following information is provided to all patients.  This information is to help you find resources for any of the problems found today that may be affecting your health:                  Living healthy guide: www.[facility].[place].gov      Understanding Diabetes: www.diabetes.org      Eating healthy: www.cdc.gov/healthyweight      CDC home safety checklist: www.cdc.gov/steadi/patient.html      Agency on Aging: www.goea.[place].gov      Alcoholics anonymous (AA): www.aa.org      Physical Activity: www.bernice.nih.gov/wq8kqwg      Tobacco use: www.quitwithusla.org

## 2025-06-16 NOTE — PROGRESS NOTES
"  Megha Mendoza presented for a  Medicare AWV and comprehensive Health Risk Assessment today. The following components were reviewed and updated:    Medical history  Family History  Social history  Allergies and Current Medications  Health Risk Assessment  Health Maintenance  Care Team         ** See Completed Assessments for Annual Wellness Visit within the encounter summary.**         The following assessments were completed:  Living Situation  CAGE  Depression Screening  Timed Get Up and Go  Whisper Test  Cognitive Function Screening  Nutrition Screening  ADL Screening  PAQ Screening      Opioid documentation:      Patient does not have a current opioid prescription.      Does fill gabapentin routinely   Vitals:    06/16/25 1534   BP: 136/70   Pulse: (!) 59   Resp: 18   SpO2: 95%   Weight: 63.1 kg (139 lb 1.8 oz)   Height: 4' 10" (1.473 m)     Body mass index is 29.07 kg/m².  Physical Exam  Vitals and nursing note reviewed.   Constitutional:       Appearance: Normal appearance.   HENT:      Head: Normocephalic and atraumatic.   Cardiovascular:      Rate and Rhythm: Normal rate and regular rhythm.   Pulmonary:      Effort: Pulmonary effort is normal.      Breath sounds: Normal breath sounds.   Musculoskeletal:         General: No swelling. Normal range of motion.   Skin:     General: Skin is warm and dry.      Findings: Bruising present.   Neurological:      General: No focal deficit present.      Mental Status: She is alert.   Psychiatric:         Mood and Affect: Mood normal.         Behavior: Behavior normal.               Diagnoses and health risks identified today and associated recommendations/orders:       Encounter for Medicare annual wellness exam    -  Primary  She has that she did labs 5/2025 with Dr Day- reviewed UTD.   She declines mammo last one was 2022  She is 79yo and is aged out of colonoscopy screening   Declines vaccines         Dependence on supplemental oxygen      Emphysema noted on " imaging  On home O2 @ night- was following with Dr Crane        Chronic obstructive pulmonary disease, unspecified COPD type       Emphysema noted on imaging  On home O2 @ night- was following with Dr Crane            Osteopenia    Overview   Originally osteoporosis. Continue prolia and vit d         Coronary arteriosclerosis in native artery    Overview   Mild CAD 2015 per cardiology   Statin  Lab Results  Component Value Date  LDLCALC 57.6 (L) 02/02/2022  Per cardiology - No significant reversible defect on perfusion study today December 2023.   ASA         Atherosclerosis of aorta    Overview   Seen on CTA 5/2023   On statin, ASA           Other emphysema    Overview   PFTs in 2014 per Dr. Crane showed moderate obstruction   5/11/23 Ct of chest ; Centrilobular emphysematous disease   COPD with chronic dyspnea status post COVID infection November 2021.   ccasional increased dyspnea on exertion. Followed by Dr. Crane. Abn D-dimer CT May 2023 is negative for PE. Emphysema present. Small nodules. Now on home oxygen at night.   symbicort   Albuterol nebulizer          Spasm of throat muscle    Overview   Long hx of dysphagia, spasm  + hiatal hernia post surgery   Following with GI   Options dicussed to have surgery vs amitriptyline            Chronic respiratory failure with hypoxia    Overview   Dx and managed per Dr. Crane  Home o2 @ night         Chronic pain of both feet    Overview   Notes she has chronic pain to both feet from ? Raynauds and Mortons neuroma  Has seen podiatry   States she has tried gabapentin in the past but the only thing that makes pain bearable is tylenol 2 tablets twice daily          Senile purpura    Overview   Notes easily bruises; ok top continue baby ASA   See photo in exam  Lab Results   Component Value Date    WBC 5.65 04/05/2024    HGB 13.7 04/05/2024    HCT 41.9 04/05/2024    MCV 95 04/05/2024     04/05/2024                    Abnormality of gait and mobility    Overview   She  noted difficulty with ambulating and climbing stairs due to bilaeral foot pain  She ambulates independently          Bilateral carotid artery stenosis    Overview   5/9/23 Carotid US per [facility], Dr. Day   Carotid Ultrasound 1.The study quality is average. 2.40-59% stenosis in the mid right internal carotid artery based on Bluth Criteria. 3.40-59% stenosis in the proximal left internal carotid artery based on Bluth Criteria. 4.Antegrade right vertebral artery flow. Antegrade left vertebral artery flow.   Monitored per Dr. Day q 6mo  Statin   aspirin  On statin and ASA             Stage 3a chronic kidney disease    Overview   BMP  Lab Results   Component Value Date     02/26/2025    K 4.4 02/26/2025     02/26/2025    CO2 20 (L) 02/26/2025    BUN 24 (H) 02/26/2025    CREATININE 0.8 02/26/2025    CALCIUM 9.6 02/26/2025    ANIONGAP 16 02/26/2025    EGFRNORACEVR >60 02/26/2025   Also has gfr 57 on Dr Day notes 3/2024   Most recent GFR was 43 5/2/2025             Other Visit Diagnoses        Provided Megha with a 5-10 year written screening schedule and personal prevention plan. Recommendations were developed using the USPSTF age appropriate recommendations. Education, counseling, and referrals were provided as needed. After Visit Summary printed and given to patient which includes a list of additional screenings\tests needed.    No follow-ups on file.    Deanne Dunn NP            Provided Megha with a 5-10 year written screening schedule and personal prevention plan. Recommendations were developed using the USPSTF age appropriate recommendations. Education, counseling, and referrals were provided as needed. After Visit Summary printed and given to patient which includes a list of additional screenings\tests needed.       I offered to discuss advanced care planning, including how to pick a person who would make decisions for you if you were unable to make them for yourself, called a health care power of  , and what kind of decisions you might make such as use of life sustaining treatments such as ventilators and tube feeding when faced with a life limiting illness recorded on a living will that they will need to know. (How you want to be cared for as you near the end of your natural life)     X Patient is interested in learning more about how to make advanced directives.  I provided them paperwork and offered to discuss this with them. She reports that she is a - has 4 children. One son that lives out of state - 2 daughters that are in [place] and one in [place]. She has the paperwork at home just has not completed it.

## 2025-06-17 PROBLEM — I65.23 BILATERAL CAROTID ARTERY STENOSIS: Status: ACTIVE | Noted: 2025-06-17

## 2025-06-17 PROBLEM — N18.31 STAGE 3A CHRONIC KIDNEY DISEASE: Status: ACTIVE | Noted: 2025-06-17

## 2025-07-15 ENCOUNTER — OFFICE VISIT (OUTPATIENT)
Dept: FAMILY MEDICINE | Facility: CLINIC | Age: 80
End: 2025-07-15
Payer: MEDICARE

## 2025-07-15 VITALS
WEIGHT: 139.88 LBS | HEIGHT: 58 IN | RESPIRATION RATE: 18 BRPM | SYSTOLIC BLOOD PRESSURE: 110 MMHG | OXYGEN SATURATION: 96 % | DIASTOLIC BLOOD PRESSURE: 64 MMHG | HEART RATE: 71 BPM | BODY MASS INDEX: 29.36 KG/M2

## 2025-07-15 DIAGNOSIS — R42 DIZZINESS: ICD-10-CM

## 2025-07-15 DIAGNOSIS — M67.911 DISORDER OF RIGHT ROTATOR CUFF: ICD-10-CM

## 2025-07-15 DIAGNOSIS — R26.89 BALANCE PROBLEM: Primary | ICD-10-CM

## 2025-07-15 PROCEDURE — 1101F PT FALLS ASSESS-DOCD LE1/YR: CPT | Mod: CPTII,S$GLB,, | Performed by: FAMILY MEDICINE

## 2025-07-15 PROCEDURE — 1125F AMNT PAIN NOTED PAIN PRSNT: CPT | Mod: CPTII,S$GLB,, | Performed by: FAMILY MEDICINE

## 2025-07-15 PROCEDURE — 99999 PR PBB SHADOW E&M-EST. PATIENT-LVL IV: CPT | Mod: PBBFAC,,, | Performed by: FAMILY MEDICINE

## 2025-07-15 PROCEDURE — 3078F DIAST BP <80 MM HG: CPT | Mod: CPTII,S$GLB,, | Performed by: FAMILY MEDICINE

## 2025-07-15 PROCEDURE — G2211 COMPLEX E/M VISIT ADD ON: HCPCS | Mod: S$GLB,,, | Performed by: FAMILY MEDICINE

## 2025-07-15 PROCEDURE — 99214 OFFICE O/P EST MOD 30 MIN: CPT | Mod: S$GLB,,, | Performed by: FAMILY MEDICINE

## 2025-07-15 PROCEDURE — 1159F MED LIST DOCD IN RCRD: CPT | Mod: CPTII,S$GLB,, | Performed by: FAMILY MEDICINE

## 2025-07-15 PROCEDURE — 3288F FALL RISK ASSESSMENT DOCD: CPT | Mod: CPTII,S$GLB,, | Performed by: FAMILY MEDICINE

## 2025-07-15 PROCEDURE — 3074F SYST BP LT 130 MM HG: CPT | Mod: CPTII,S$GLB,, | Performed by: FAMILY MEDICINE

## 2025-07-15 NOTE — PROGRESS NOTES
Subjective:       Patient ID: Megha Mendoza is a 80 y.o. female.    Chief Complaint: Follow-up (Pt here for 6 mth f/u.)    Pt is a 80 y.o. female who presents for evaluation and management of   Encounter Diagnoses   Name Primary?    Balance problem Yes    Dizziness     Disorder of right rotator cuff      2 months   Waxes and wanes.   Can note exacerbating factors, but she experiences it in all aspects of her life. Outside working, standing up washing dishes, sitting quietly. Feels like she will fall on her head if she bends over forward. No nausea or vomiting. No falls. No alleviating factors except maybe time.     Doing well on current meds. Denies any side effects. Prevention is up to date.  Review of Systems   Gastrointestinal:  Negative for abdominal pain, nausea and vomiting.   Musculoskeletal:         Shoulder pain   R>L   Neurological:  Positive for dizziness. Negative for light-headedness.       Objective:      Physical Exam  Constitutional:       Appearance: She is well-developed.   HENT:      Head: Normocephalic and atraumatic.      Right Ear: External ear normal.      Left Ear: External ear normal.      Nose: Nose normal.   Eyes:      Pupils: Pupils are equal, round, and reactive to light.   Neck:      Thyroid: No thyromegaly.      Vascular: No JVD.      Trachea: No tracheal deviation.   Cardiovascular:      Rate and Rhythm: Normal rate.   Pulmonary:      Effort: Pulmonary effort is normal. No respiratory distress.   Abdominal:      Palpations: Abdomen is soft.   Musculoskeletal:         General: No tenderness. Normal range of motion.      Cervical back: Normal range of motion and neck supple.      Comments: Right shoulder:  postive impingement signs 1 and 2  3/5 supraspinatous   5/5 subscapularis  5/5 teres minor and infraspinatous       Lymphadenopathy:      Cervical: No cervical adenopathy.   Skin:     General: Skin is warm and dry.      Coloration: Skin is not pale.      Findings: No erythema or rash.    Neurological:      Mental Status: She is alert and oriented to person, place, and time.   Psychiatric:         Behavior: Behavior normal.         Thought Content: Thought content normal.         Judgment: Judgment normal.         Assessment:       1. Balance problem    2. Dizziness    3. Disorder of right rotator cuff        Plan:   1. Balance problem  -     Ambulatory referral/consult to ENT; Future; Expected date: 07/22/2025    2. Dizziness  -     Ambulatory referral/consult to ENT; Future; Expected date: 07/22/2025    3. Disorder of right rotator cuff    She has symptoms of vertigo   Refuse physical therapy   Will refer to ENT     Shoulder exercises given. She is not interested in surgery and neither am I.     RTC if condition acutely worsens or any other concerns, otherwise RTC as scheduled    No follow-ups on file.

## (undated) DEVICE — ELECTRODE REM PLYHSV RETURN 9

## (undated) DEVICE — Device

## (undated) DEVICE — SUT 0 VICRYL / UR6 (J603)

## (undated) DEVICE — NDL HYPO REG 25G X 1 1/2

## (undated) DEVICE — ADHESIVE DERMABOND ADVANCED

## (undated) DEVICE — DRAPE SCOPE PILLOW WARMER

## (undated) DEVICE — GOWN POLY REINF BRTH SLV XL

## (undated) DEVICE — BLADE SURG CARBON STEEL SZ11

## (undated) DEVICE — SUT GUT PL. 4-0 27 FS-2

## (undated) DEVICE — SOL NACL 0.9% IV INJ 1000ML

## (undated) DEVICE — TUBE SET SINGLE LUMEN FILTERED

## (undated) DEVICE — DRAPE ARM DAVINCI XI

## (undated) DEVICE — SPONGE PATTY SURGICAL .5X3IN

## (undated) DEVICE — SEAL UNIVERSAL 5MM-8MM XI

## (undated) DEVICE — TROCAR ENDOPATH XCEL 5MM 7.5CM

## (undated) DEVICE — DRAPE COLUMN DAVINCI XI

## (undated) DEVICE — KIT ANTIFOG W/SPONG & FLUID

## (undated) DEVICE — OBTURATOR BLADELESS 8MM XI CLR

## (undated) DEVICE — GOWN SURGICAL X-LARGE

## (undated) DEVICE — TRAY MINOR GEN SURG OMC